# Patient Record
Sex: MALE | Race: BLACK OR AFRICAN AMERICAN | NOT HISPANIC OR LATINO | Employment: FULL TIME | URBAN - METROPOLITAN AREA
[De-identification: names, ages, dates, MRNs, and addresses within clinical notes are randomized per-mention and may not be internally consistent; named-entity substitution may affect disease eponyms.]

---

## 2019-12-16 ENCOUNTER — HOSPITAL ENCOUNTER (EMERGENCY)
Facility: HOSPITAL | Age: 44
Discharge: HOME | End: 2019-12-16
Attending: EMERGENCY MEDICINE | Admitting: EMERGENCY MEDICINE
Payer: COMMERCIAL

## 2019-12-16 VITALS
DIASTOLIC BLOOD PRESSURE: 79 MMHG | TEMPERATURE: 98.2 F | WEIGHT: 307 LBS | BODY MASS INDEX: 42.98 KG/M2 | HEART RATE: 95 BPM | OXYGEN SATURATION: 97 % | SYSTOLIC BLOOD PRESSURE: 149 MMHG | HEIGHT: 71 IN | RESPIRATION RATE: 18 BRPM

## 2019-12-16 DIAGNOSIS — S39.012A STRAIN OF LUMBAR REGION, INITIAL ENCOUNTER: Primary | ICD-10-CM

## 2019-12-16 PROCEDURE — 99283 EMERGENCY DEPT VISIT LOW MDM: CPT

## 2019-12-16 PROCEDURE — 99283 EMERGENCY DEPT VISIT LOW MDM: CPT | Performed by: EMERGENCY MEDICINE

## 2019-12-16 PROCEDURE — 96372 THER/PROPH/DIAG INJ SC/IM: CPT

## 2019-12-16 RX ORDER — ACETAMINOPHEN 325 MG/1
975 TABLET ORAL ONCE
Status: COMPLETED | OUTPATIENT
Start: 2019-12-16 | End: 2019-12-16

## 2019-12-16 RX ORDER — CARVEDILOL PHOSPHATE 20 MG/1
20 CAPSULE, EXTENDED RELEASE ORAL DAILY
COMMUNITY
End: 2020-08-13 | Stop reason: ALTCHOICE

## 2019-12-16 RX ORDER — GLIMEPIRIDE 2 MG/1
2 TABLET ORAL
COMMUNITY

## 2019-12-16 RX ORDER — KETOROLAC TROMETHAMINE 30 MG/ML
15 INJECTION, SOLUTION INTRAMUSCULAR; INTRAVENOUS ONCE
Status: COMPLETED | OUTPATIENT
Start: 2019-12-16 | End: 2019-12-16

## 2019-12-16 RX ORDER — ROSUVASTATIN CALCIUM 5 MG/1
5 TABLET, COATED ORAL DAILY
COMMUNITY

## 2019-12-16 RX ORDER — LIDOCAINE 50 MG/G
1 PATCH TOPICAL ONCE
Status: DISCONTINUED | OUTPATIENT
Start: 2019-12-16 | End: 2019-12-17 | Stop reason: HOSPADM

## 2019-12-16 RX ORDER — ICOSAPENT ETHYL 1000 MG/1
1 CAPSULE ORAL
COMMUNITY

## 2019-12-16 RX ORDER — NAPROXEN 500 MG/1
500 TABLET ORAL
Qty: 30 TABLET | Refills: 0 | Status: SHIPPED | OUTPATIENT
Start: 2019-12-16 | End: 2020-05-06 | Stop reason: HOSPADM

## 2019-12-16 RX ADMIN — KETOROLAC TROMETHAMINE 15 MG: 30 INJECTION, SOLUTION INTRAMUSCULAR at 21:10

## 2019-12-16 RX ADMIN — ACETAMINOPHEN 975 MG: 325 TABLET ORAL at 21:09

## 2019-12-16 RX ADMIN — LIDOCAINE 1 PATCH: 50 PATCH TOPICAL at 21:10

## 2019-12-17 ENCOUNTER — TELEPHONE (OUTPATIENT)
Dept: PHYSICAL THERAPY | Facility: OTHER | Age: 44
End: 2019-12-17

## 2019-12-17 ENCOUNTER — NURSE TRIAGE (OUTPATIENT)
Dept: PHYSICAL THERAPY | Facility: OTHER | Age: 44
End: 2019-12-17

## 2019-12-17 DIAGNOSIS — M54.50 ACUTE MIDLINE LOW BACK PAIN WITHOUT SCIATICA: Primary | ICD-10-CM

## 2019-12-17 NOTE — TELEPHONE ENCOUNTER
Message left for Pt to call us back  Our ph # and hours of business provided  Waiting for return call from Pt  This is the first attempt at reaching this Pt

## 2019-12-17 NOTE — ED ATTENDING ATTESTATION
12/16/2019  ITommy MD, saw and evaluated the patient  I have discussed the patient with the resident/non-physician practitioner and agree with the resident's/non-physician practitioner's findings, Plan of Care, and MDM as documented in the resident's/non-physician practitioner's note, except where noted  All available labs and Radiology studies were reviewed  I was present for key portions of any procedure(s) performed by the resident/non-physician practitioner and I was immediately available to provide assistance  At this point I agree with the current assessment done in the Emergency Department  I have conducted an independent evaluation of this patient a history and physical is as follows:    ED Course     51-year-old male, presenting to the emergency department for evaluation of low back pain  Patient works as a [employer] worker, 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, 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

## 2019-12-17 NOTE — TELEPHONE ENCOUNTER
Background - Initial Assessment  Clinical complaint: Acute low back, mid back and does not radiate  Pt stated that he does lift a lot of heavy objects at work  No known injury, no back/neck surgeries, and is not currently following any neck/back specialist  Pt did state that he is going to be seeing a Chiropractor  Pt was given Naproxen  Date of onset: 1 week  Frequency of pain: constant  Quality of pain: sharp and shooting    Protocols used: [facility] COMPREHENSIVE SPINE PROGRAM PROTOCOL    This nurse did review in detail the comp spine program and what we can provide for the pt for their back pain  Pt is agreeable to being triaged by this nurse and would like to have physical therapy  Referrals were placed to the following:  Physical Therapy at the [facility]  Pt was transferred to  to make evaluation appointment  No further questions voiced at this time and Pt did state understanding of the referral that was placed

## 2019-12-17 NOTE — ED PROVIDER NOTES
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 Taking? Empagliflozin-metFORMIN HCl (SYNJARDY) 12 5-1000 MG TABS   Yes Yes   Sig: Take 1 tablet by mouth 2 (two) times a day   Icosapent Ethyl (VASCEPA) 1 g CAPS   Yes Yes   Sig: Take 1 g by mouth   carvedilol (COREG CR) 20 MG 24 hr capsule   Yes Yes   Sig: Take 20 mg by mouth daily   glimepiride (AMARYL) 2 mg tablet   Yes Yes   Sig: Take 2 mg by mouth every morning before breakfast   rosuvastatin (CRESTOR) 5 mg tablet   Yes Yes   Sig: Take 5 mg by mouth daily      Facility-Administered Medications: None       History reviewed  No pertinent past medical history  No past surgical history on file  History reviewed  No pertinent family history  I have reviewed and agree with the history as documented  Social History     Tobacco Use    Smoking status: Not on file   Substance Use Topics    Alcohol use: Not on file    Drug use: Not on file        Review of Systems   Constitutional: Negative for chills, fever and unexpected weight change  HENT: Negative for congestion, sore throat and trouble swallowing  Eyes: Negative for pain, discharge and itching     Respiratory: Negative for cough, chest tightness, shortness of breath and wheezing  Cardiovascular: Negative for chest pain, palpitations and leg swelling  Gastrointestinal: Negative for abdominal pain, blood in stool, diarrhea, nausea and vomiting  Endocrine: Negative for polyuria  Genitourinary: Negative for difficulty urinating, dysuria, frequency and hematuria  Musculoskeletal: Positive for back pain  Negative for arthralgias  Skin: Negative for color change and rash  Neurological: Negative for dizziness, syncope, weakness, light-headedness and headaches  Physical Exam  ED Triage Vitals [12/16/19 2033]   Temperature Pulse Respirations Blood Pressure SpO2   98 2 °F (36 8 °C) 95 18 149/79 97 %      Temp Source Heart Rate Source Patient Position - Orthostatic VS BP Location FiO2 (%)   Oral Monitor Sitting Left arm --      Pain Score       Worst Possible Pain             Orthostatic Vital Signs  Vitals:    12/16/19 2033   BP: 149/79   Pulse: 95   Patient Position - Orthostatic VS: Sitting       Physical Exam   Constitutional: He is oriented to person, place, and time  He appears well-developed and well-nourished  No distress  HENT:   Head: Normocephalic and atraumatic  Right Ear: External ear normal    Left Ear: External ear normal    Eyes: Pupils are equal, round, and reactive to light  Conjunctivae and EOM are normal    Neck: Normal range of motion  Cardiovascular: Normal rate, regular rhythm, normal heart sounds and intact distal pulses  Exam reveals no gallop and no friction rub  No murmur heard  Pulmonary/Chest: Effort normal and breath sounds normal  No respiratory distress  He has no wheezes  He has no rales  Abdominal: Soft  Bowel sounds are normal  He exhibits no distension  There is no tenderness  There is no guarding  Musculoskeletal: Normal range of motion  He exhibits tenderness (Paraspinal lumbar back)  He exhibits no edema or deformity  Lymphadenopathy:     He has no cervical adenopathy  Neurological: He is alert and oriented to person, place, and time  No cranial nerve deficit or sensory deficit  He exhibits normal muscle tone  Skin: Skin is warm and dry  Psychiatric: He has a normal mood and affect  His behavior is normal    Nursing note and vitals reviewed  ED Medications  Medications   lidocaine (LIDODERM) 5 % patch 1 patch (1 patch Topical Medication Applied 12/16/19 2110)   ketorolac (TORADOL) injection 15 mg (15 mg Intramuscular Given 12/16/19 2110)   acetaminophen (TYLENOL) tablet 975 mg (975 mg Oral Given 12/16/19 2109)       Diagnostic Studies  Results Reviewed     None                 No orders to display         Procedures  Procedures      ED Course                               MDM  Number of Diagnoses or Management Options  Strain of lumbar region, initial encounter:   Diagnosis management comments: 59-year-old male presenting for paraspinal lumbar back pain  Works for [employer] and does a lot of heavy lifting and bending  No red flag symptoms  No focal neuro exam   Has paraspinal tenderness  Will give Toradol, Lidoderm patch, Tylenol  Patient prescribed naproxen for home  Given referral to comprehensive spine program   Patient encouraged to buy back brace for work  Disposition  Final diagnoses:   Strain of lumbar region, initial encounter     Time reflects when diagnosis was documented in both MDM as applicable and the Disposition within this note     Time User Action Codes Description Comment    12/16/2019  9:51 PM Carlos Eduardo Lopez Add [[id number]] Strain of lumbar region, initial encounter       ED Disposition     ED Disposition Condition Date/Time Comment    Discharge Stable Mon Dec 16, 2019  9:51 PM Kiah Cuadra discharge to home/self care              Follow-up Information     Follow up With Specialties Details Why Contact Ria Parada  Schedule an appointment as soon as possible for a visit  For follow up of symptoms [address] [place] [id number] Discharge Medication List as of 12/16/2019  9:52 PM      START taking these medications    Details   naproxen (NAPROSYN) 500 mg tablet Take 1 tablet (500 mg total) by mouth 3 (three) times a day with meals, Starting Mon 12/16/2019, Print         CONTINUE these medications which have NOT CHANGED    Details   carvedilol (COREG CR) 20 MG 24 hr capsule Take 20 mg by mouth daily, Historical Med      Empagliflozin-metFORMIN HCl (SYNJARDY) 12 5-1000 MG TABS Take 1 tablet by mouth 2 (two) times a day, Historical Med      glimepiride (AMARYL) 2 mg tablet Take 2 mg by mouth every morning before breakfast, Historical Med      Icosapent Ethyl (VASCEPA) 1 g CAPS Take 1 g by mouth, Historical Med      rosuvastatin (CRESTOR) 5 mg tablet Take 5 mg by mouth daily, Historical Med               ED Provider  Attending physically available and evaluated Jersey Ybarra I managed the patient along with the ED Attending      Electronically Signed by         Noah Gomez DO  12/16/19 5290

## 2019-12-18 ENCOUNTER — EVALUATION (OUTPATIENT)
Dept: PHYSICAL THERAPY | Facility: REHABILITATION | Age: 44
End: 2019-12-18
Payer: COMMERCIAL

## 2019-12-18 VITALS — HEART RATE: 86 BPM | TEMPERATURE: 98.5 F | DIASTOLIC BLOOD PRESSURE: 82 MMHG | SYSTOLIC BLOOD PRESSURE: 125 MMHG

## 2019-12-18 DIAGNOSIS — M54.50 ACUTE MIDLINE LOW BACK PAIN WITHOUT SCIATICA: Primary | ICD-10-CM

## 2019-12-18 PROCEDURE — 97140 MANUAL THERAPY 1/> REGIONS: CPT | Performed by: PHYSICAL THERAPIST

## 2019-12-18 PROCEDURE — 97161 PT EVAL LOW COMPLEX 20 MIN: CPT | Performed by: PHYSICAL THERAPIST

## 2019-12-18 NOTE — PROGRESS NOTES
PT Evaluation     Today's date: 2019  Patient name: Tea Conner  : 1975  MRN: [id number]  Referring provider: Lupis Sampson MD  Dx:   Encounter Diagnosis     ICD-10-CM    1  Acute midline low back pain without sciatica M54 5 Ambulatory referral to PT spine       Start Time: 1640  Stop Time: 1730  Total time in clinic (min): 50 minutes    Assessment  Assessment details: Tea Conner is a 40 y o  male presenting to outpatient physical therapy on 19 with referral from comprehensive spine program for acute midline upper LS and lower TS pain that is worse after work  Upon evaluation, Lennox Thrasher demonstrates impaired segmental mobility in upper LS and lower TS with pain upon AROM that also limits his AROM with ERP  The listed impairments and functional limitation are effecting Lennox Thrasher ability to function at prior level  They can continue to benefit from physical therapy services at this times in order to address the above discussed impairments and functional limitation in order to allow for a return to premorbid status     Post TS and LS mobilization pt  Reported pain free AROM  Educated on HEP and proper lifting mechanics at work with hip hinge and utilization of hips vs spine with push/pull  Pt  Low risk category     Impairments: abnormal muscle firing, abnormal muscle tone, abnormal or restricted ROM, abnormal movement, activity intolerance, impaired physical strength and pain with function  Understanding of Dx/Px/POC: good   Prognosis: good    Plan  Patient would benefit from: skilled PT  Planned modality interventions: thermotherapy: hydrocollator packs  Planned therapy interventions: joint mobilization, manual therapy, neuromuscular re-education, home exercise program, therapeutic exercise, therapeutic activities, strengthening, patient education and functional ROM exercises  Frequency: 2x week  Duration in weeks: 4  Treatment plan discussed with: patient        Subjective Evaluation    History of Present Illness  Mechanism of injury: Magali Casillas is a 40 y o  male presenting to physical therapy on 19 with referral from [facility] through the comprehensive spine program  He reports about 1 week ago his pain began of gradual onset as his work load has increased due to the holiday  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  He denies any radicular pain, denies and saddle numbness or bowl/bladder changes  Reports pain has been improving since taking medication  Pain is worse with bending and twisting  Pain is better with sleep and rest  Denies pain with cough and sneeze  Does have some pain at times with deep breathing  Sleeping well, denies pain  Reports a prior history of LBP 20 years ago but nothing since  States if his back gets sore we will usually just "pop" it and it would feel better  Recurrent probem    Quality of life: good    Pain  Current pain ratin  At worst pain rating: 10  Location: low back - mid line  Quality: dull ache and sharp      Diagnostic Tests  No diagnostic tests performed  Treatments  No previous or current treatments  Patient Goals  Patient goals for therapy: decreased pain      STG:  Pt  Will be independent with HEP  Pt  Will have pain free AROM  Pt  Will report 5/10 pain or less with activity  Pt  Will improve FOTO to > goal  Pt  Will report 2/10 pain or less with activity including work related activity      Objective    Posture: mild increase in LS lordosis  Myotomes (L/R): Normal LE  Dermatome: (pinprick- L/R): Normal LE     Reflexes:  (L/R) L3-4: 2+ B/l       S1:   2+ B/l            GAIT: normal  Squat assess: good form, pain free    Shoulder compression: normal    Lumbar  % of normal   Flex  Full -ERP   Extn   Full -ERP   SB Left Full -ERP   SB Right Full -ERP   ROT Left full   ROT Right full         MMT         AROM PROM    Hip       L       R        L           R      L     R   Flex  100 100   Extn  Abd  Add  IR      20 20   ER  50 50            G  Max         G  Med  Iliop               Neuro Dynamic Testing:  Slump test: L= neg    R=  neg     Straight leg raise:   L=   neg   R=  neg                 Segmental mobility:   LS= hypomobile upper LS L1-2    TS= Hypomobile T8-12                         Precautions: HTN, DM, HLD, CHF      Manual  12/18            Prone TLJ and lower TS PA G4/5            Upper LS gapping-R G4                                                       Exercise Diary              Supine TS/upper LS ext 3 spots  5x            qped thread needle                                                                                                                                                                                                                                                           Modalities

## 2019-12-23 ENCOUNTER — APPOINTMENT (OUTPATIENT)
Dept: PHYSICAL THERAPY | Facility: REHABILITATION | Age: 44
End: 2019-12-23
Payer: COMMERCIAL

## 2020-01-16 NOTE — PROGRESS NOTES
Gaurav Valnetin has attended a total of 1 physical therapy appointments to date  Patient was last treated on 12/18/2019 and has no remaining appointments scheduled  Goals, objective and subjective information unable to be updated at this time  Patient will be discharged from physical therapy secondary to noncompliance with plan of care

## 2020-02-07 ENCOUNTER — HOSPITAL ENCOUNTER (EMERGENCY)
Facility: HOSPITAL | Age: 45
Discharge: HOME | End: 2020-02-07
Attending: EMERGENCY MEDICINE | Admitting: EMERGENCY MEDICINE
Payer: COMMERCIAL

## 2020-02-07 ENCOUNTER — APPOINTMENT (EMERGENCY)
Dept: RADIOLOGY | Facility: HOSPITAL | Age: 45
End: 2020-02-07
Payer: COMMERCIAL

## 2020-02-07 VITALS
DIASTOLIC BLOOD PRESSURE: 76 MMHG | OXYGEN SATURATION: 96 % | BODY MASS INDEX: 42.28 KG/M2 | HEART RATE: 76 BPM | WEIGHT: 302 LBS | HEIGHT: 71 IN | RESPIRATION RATE: 16 BRPM | SYSTOLIC BLOOD PRESSURE: 133 MMHG | TEMPERATURE: 98.7 F

## 2020-02-07 DIAGNOSIS — R10.9 ABDOMINAL PAIN: Primary | ICD-10-CM

## 2020-02-07 DIAGNOSIS — R05.9 COUGH: ICD-10-CM

## 2020-02-07 DIAGNOSIS — R11.10 VOMITING: ICD-10-CM

## 2020-02-07 LAB
ALBUMIN SERPL BCP-MCNC: 3.5 G/DL (ref 3.5–5)
ALP SERPL-CCNC: 45 U/L (ref 46–116)
ALT SERPL W P-5'-P-CCNC: 36 U/L (ref 12–78)
ANION GAP SERPL CALCULATED.3IONS-SCNC: 5 MMOL/L (ref 4–13)
AST SERPL W P-5'-P-CCNC: 16 U/L (ref 5–45)
BASOPHILS # BLD AUTO: 0.04 THOUSANDS/ΜL (ref 0–0.1)
BASOPHILS NFR BLD AUTO: 1 % (ref 0–1)
BILIRUB SERPL-MCNC: 0.35 MG/DL (ref 0.2–1)
BUN SERPL-MCNC: 16 MG/DL (ref 5–25)
CALCIUM SERPL-MCNC: 9.2 MG/DL (ref 8.3–10.1)
CHLORIDE SERPL-SCNC: 109 MMOL/L (ref 100–108)
CO2 SERPL-SCNC: 28 MMOL/L (ref 21–32)
CREAT SERPL-MCNC: 1.02 MG/DL (ref 0.6–1.3)
EOSINOPHIL # BLD AUTO: 0.08 THOUSAND/ΜL (ref 0–0.61)
EOSINOPHIL NFR BLD AUTO: 1 % (ref 0–6)
ERYTHROCYTE [DISTWIDTH] IN BLOOD BY AUTOMATED COUNT: 12.5 % (ref 11.6–15.1)
GFR SERPL CREATININE-BSD FRML MDRD: 102 ML/MIN/1.73SQ M
GLUCOSE SERPL-MCNC: 187 MG/DL (ref 65–140)
HCT VFR BLD AUTO: 45.5 % (ref 36.5–49.3)
HGB BLD-MCNC: 14.9 G/DL (ref 12–17)
IMM GRANULOCYTES # BLD AUTO: 0.05 THOUSAND/UL (ref 0–0.2)
IMM GRANULOCYTES NFR BLD AUTO: 1 % (ref 0–2)
LIPASE SERPL-CCNC: 415 U/L (ref 73–393)
LYMPHOCYTES # BLD AUTO: 2.05 THOUSANDS/ΜL (ref 0.6–4.47)
LYMPHOCYTES NFR BLD AUTO: 28 % (ref 14–44)
MCH RBC QN AUTO: 28.3 PG (ref 26.8–34.3)
MCHC RBC AUTO-ENTMCNC: 32.7 G/DL (ref 31.4–37.4)
MCV RBC AUTO: 87 FL (ref 82–98)
MONOCYTES # BLD AUTO: 0.41 THOUSAND/ΜL (ref 0.17–1.22)
MONOCYTES NFR BLD AUTO: 6 % (ref 4–12)
NEUTROPHILS # BLD AUTO: 4.59 THOUSANDS/ΜL (ref 1.85–7.62)
NEUTS SEG NFR BLD AUTO: 63 % (ref 43–75)
NRBC BLD AUTO-RTO: 0 /100 WBCS
PLATELET # BLD AUTO: 203 THOUSANDS/UL (ref 149–390)
PMV BLD AUTO: 9.7 FL (ref 8.9–12.7)
POTASSIUM SERPL-SCNC: 4 MMOL/L (ref 3.5–5.3)
PROT SERPL-MCNC: 7.3 G/DL (ref 6.4–8.2)
RBC # BLD AUTO: 5.26 MILLION/UL (ref 3.88–5.62)
SODIUM SERPL-SCNC: 142 MMOL/L (ref 136–145)
WBC # BLD AUTO: 7.22 THOUSAND/UL (ref 4.31–10.16)

## 2020-02-07 PROCEDURE — 99284 EMERGENCY DEPT VISIT MOD MDM: CPT | Performed by: EMERGENCY MEDICINE

## 2020-02-07 PROCEDURE — 80053 COMPREHEN METABOLIC PANEL: CPT | Performed by: EMERGENCY MEDICINE

## 2020-02-07 PROCEDURE — 96361 HYDRATE IV INFUSION ADD-ON: CPT

## 2020-02-07 PROCEDURE — 96374 THER/PROPH/DIAG INJ IV PUSH: CPT

## 2020-02-07 PROCEDURE — 36415 COLL VENOUS BLD VENIPUNCTURE: CPT | Performed by: EMERGENCY MEDICINE

## 2020-02-07 PROCEDURE — 71046 X-RAY EXAM CHEST 2 VIEWS: CPT

## 2020-02-07 PROCEDURE — 85025 COMPLETE CBC W/AUTO DIFF WBC: CPT | Performed by: EMERGENCY MEDICINE

## 2020-02-07 PROCEDURE — 83690 ASSAY OF LIPASE: CPT | Performed by: EMERGENCY MEDICINE

## 2020-02-07 PROCEDURE — 99284 EMERGENCY DEPT VISIT MOD MDM: CPT

## 2020-02-07 PROCEDURE — 96375 TX/PRO/DX INJ NEW DRUG ADDON: CPT

## 2020-02-07 RX ORDER — SEMAGLUTIDE 1.34 MG/ML
1 INJECTION, SOLUTION SUBCUTANEOUS WEEKLY
COMMUNITY
Start: 2019-11-21

## 2020-02-07 RX ORDER — KETOROLAC TROMETHAMINE 30 MG/ML
15 INJECTION, SOLUTION INTRAMUSCULAR; INTRAVENOUS ONCE
Status: COMPLETED | OUTPATIENT
Start: 2020-02-07 | End: 2020-02-07

## 2020-02-07 RX ORDER — ONDANSETRON 4 MG/1
4 TABLET, FILM COATED ORAL EVERY 6 HOURS
Qty: 12 TABLET | Refills: 0 | Status: SHIPPED | OUTPATIENT
Start: 2020-02-07 | End: 2021-10-26 | Stop reason: HOSPADM

## 2020-02-07 RX ORDER — ONDANSETRON 2 MG/ML
4 INJECTION INTRAMUSCULAR; INTRAVENOUS ONCE
Status: COMPLETED | OUTPATIENT
Start: 2020-02-07 | End: 2020-02-07

## 2020-02-07 RX ORDER — INSULIN DEGLUDEC INJECTION 100 U/ML
70 INJECTION, SOLUTION SUBCUTANEOUS
COMMUNITY
Start: 2019-11-24

## 2020-02-07 RX ADMIN — KETOROLAC TROMETHAMINE 15 MG: 30 INJECTION, SOLUTION INTRAMUSCULAR at 04:28

## 2020-02-07 RX ADMIN — ONDANSETRON 4 MG: 2 INJECTION INTRAMUSCULAR; INTRAVENOUS at 04:28

## 2020-02-07 RX ADMIN — SODIUM CHLORIDE 1000 ML: 0.9 INJECTION, SOLUTION INTRAVENOUS at 03:24

## 2020-02-07 NOTE — ED ATTENDING ATTESTATION
2/7/2020  Anabell RANKIN Reynold, DO, saw and evaluated the patient  I have discussed the patient with the resident/non-physician practitioner and agree with the resident's/non-physician practitioner's findings, Plan of Care, and MDM as documented in the resident's/non-physician practitioner's note, except where noted  All available labs and Radiology studies were reviewed  I was present for key portions of any procedure(s) performed by the resident/non-physician practitioner and I was immediately available to provide assistance  At this point I agree with the current assessment done in the Emergency Department  I have conducted an independent evaluation of this patient a history and physical is as follows:    59-year-old male presents with abdominal pain and cough  Patient states he was at work and has some epigastric discomfort and a cough and then vomited after coughing  Patient states the pain improved in his belly  Admits that he has had the cough for a few days, nonproductive, no fevers or chills  Sick contact with a grandchild that had RSV  Denies chest pain  On exam-no acute distress, heart regular, lungs clear, abdomen soft and nontender    Plan-chest x-ray, check labs    ED Course         Critical Care Time  Procedures

## 2020-02-07 NOTE — ED PROVIDER NOTES
History  Chief Complaint   Patient presents with    Abdominal Pain     Pt states "I have a cough for a while, I went to work today and wasn't feelin too good  I vomitted water " Pt reports 3 episodes of emesis, pt report abdominal pain subsided after vomiting episodes  Patient presents for evaluation of abdominal pain  PMHx of diabetes  Complains of pain located in his epigastric area  Patient states that he has had a cough which is nonproductive for the last 5 or 6 days and then today at work he had a coughing fit after which he had the onset of epigastric abdominal pain and a couple episodes of nonbloody/nonbilious emesis  After that his pain had much improved    No exacerbating factors, no radiation of pain to back/chest/legs  Able to tolerate PO  Still passing gas/stools  No recent travel or sick contacts  Denies nausea/vomiting, constipation, diarrhea, dysuria, hematuria, melana, or dark colored stools  No pulsatile abdominal mass to suggest AAA  No Point RLQ tenderness to suggest appy  No pain out of proportion to suggest ischemic colitis  No reported chest pain, pleuritic chest pain or shortness of breath  No reported pulmonary symptoms  No tachypnea  No suprapubic or CVA tenderness  No fever/ruq pain/jaundice  Prior to Admission Medications   Prescriptions Last Dose Informant Patient Reported? Taking?    Empagliflozin-metFORMIN HCl (SYNJARDY) 12 5-1000 MG TABS   Yes Yes   Sig: Take 1 tablet by mouth 2 (two) times a day   Icosapent Ethyl (VASCEPA) 1 g CAPS   Yes Yes   Sig: Take 1 g by mouth   OZEMPIC, 1 MG/DOSE, 2 MG/1 5ML SOPN   Yes Yes   TRESIBA FLEXTOUCH 100 units/mL injection pen   Yes Yes   carvedilol (COREG CR) 20 MG 24 hr capsule   Yes Yes   Sig: Take 20 mg by mouth daily   glimepiride (AMARYL) 2 mg tablet   Yes Yes   Sig: Take 2 mg by mouth every morning before breakfast   naproxen (NAPROSYN) 500 mg tablet Not Taking at Unknown time  No No   Sig: Take 1 tablet (500 mg total) by mouth 3 (three) times a day with meals   Patient not taking: Reported on 2/7/2020   rosuvastatin (CRESTOR) 5 mg tablet   Yes Yes   Sig: Take 5 mg by mouth daily      Facility-Administered Medications: None       Past Medical History:   Diagnosis Date    Congest[facility] heart failure ([facility] )     Diabetes mellitus ([facility] )     Hyperlipidemia     Hypertension        History reviewed  No pertinent surgical history  Family History   Problem Relation Age of Onset    Stroke Mother      I have reviewed and agree with the history as documented  Social History     Tobacco Use    Smoking status: Never Smoker    Smokeless tobacco: Never Used   Substance Use Topics    Alcohol use: Not Currently    Drug use: Never        Review of Systems   Constitutional: Negative for activity change, chills, fatigue and fever  Respiratory: Positive for cough  Negative for shortness of breath  Cardiovascular: Negative for chest pain and palpitations  Gastrointestinal: Positive for abdominal pain, nausea and vomiting  Negative for abdominal distention, constipation and diarrhea  Genitourinary: Negative for dysuria and hematuria  Musculoskeletal: Negative for arthralgias, myalgias and neck pain  Neurological: Negative for dizziness, syncope, light-headedness and headaches  All other systems reviewed and are negative  Physical Exam  ED Triage Vitals [02/07/20 0230]   Temperature Pulse Respirations Blood Pressure SpO2   98 7 °F (37 1 °C) 97 16 128/81 97 %      Temp Source Heart Rate Source Patient Position - Orthostatic VS BP Location FiO2 (%)   Oral Monitor Sitting Right arm --      Pain Score       No Pain             Orthostatic Vital Signs  Vitals:    02/07/20 0230 02/07/20 0445   BP: 128/81 133/76   Pulse: 97 76   Patient Position - Orthostatic VS: Sitting Lying       Physical Exam   Constitutional: He is oriented to person, place, and time  He appears well-developed and well-nourished  No distress     HENT: Head: Normocephalic and atraumatic  Right Ear: External ear normal    Left Ear: External ear normal    Eyes: Pupils are equal, round, and reactive to light  Conjunctivae and EOM are normal  Right eye exhibits no discharge  Left eye exhibits no discharge  Neck: Normal range of motion  Neck supple  No JVD present  No tracheal deviation present  Cardiovascular: Normal rate, regular rhythm, normal heart sounds and intact distal pulses  Exam reveals no gallop and no friction rub  No murmur heard  Pulmonary/Chest: Effort normal and breath sounds normal  No respiratory distress  He has no wheezes  He has no rales  Abdominal: Soft  Normal appearance and bowel sounds are normal  He exhibits no distension and no mass  There is no tenderness  There is no guarding  Musculoskeletal: Normal range of motion  He exhibits no edema, tenderness or deformity  Neurological: He is alert and oriented to person, place, and time  No cranial nerve deficit or sensory deficit  He exhibits normal muscle tone  Coordination normal    Skin: He is not diaphoretic  Psychiatric: He has a normal mood and affect  His behavior is normal  Judgment and thought content normal    Vitals reviewed        ED Medications  Medications   sodium chloride 0 9 % bolus 1,000 mL (0 mL Intravenous Stopped 2/7/20 0428)   ketorolac (TORADOL) injection 15 mg (15 mg Intravenous Given 2/7/20 0428)   ondansetron (ZOFRAN) injection 4 mg (4 mg Intravenous Given 2/7/20 0428)       Diagnostic Studies  Results Reviewed     Procedure Component Value Units Date/Time    Comprehensive metabolic panel [[id number]]  (Abnormal) Collected:  02/07/20 0324    Lab Status:  Final result Specimen:  Blood from Arm, Left Updated:  02/07/20 0354     Sodium 142 mmol/L      Potassium 4 0 mmol/L      Chloride 109 mmol/L      CO2 28 mmol/L      ANION GAP 5 mmol/L      BUN 16 mg/dL      Creatinine 1 02 mg/dL      Glucose 187 mg/dL      Calcium 9 2 mg/dL      AST 16 U/L      ALT 36 U/L      Alkaline Phosphatase 45 U/L      Total Protein 7 3 g/dL      Albumin 3 5 g/dL      Total Bilirubin 0 35 mg/dL      eGFR 102 ml/min/1 73sq m     Narrative:       National Kid[employer] guidelines for Chronic Kidney Disease (CKD):     Stage 1 with normal or high GFR (GFR > 90 mL/min/1 73 square meters)    Stage 2 Mild CKD (GFR = 60-89 mL/min/1 73 square meters)    Stage 3A Moderate CKD (GFR = 45-59 mL/min/1 73 square meters)    Stage 3B Moderate CKD (GFR = 30-44 mL/min/1 73 square meters)    Stage 4 Severe CKD (GFR = 15-29 mL/min/1 73 square meters)    Stage 5 End Stage CKD (GFR <15 mL/min/1 73 square meters)  Note: GFR calculation is accurate only with a steady state creatinine    Lipase [[id number]]  (Abnormal) Collected:  02/07/20 0324    Lab Status:  Final result Specimen:  Blood from Arm, Left Updated:  02/07/20 0354     Lipase 415 u/L     CBC and differential [[id number]] Collected:  02/07/20 0324    Lab Status:  Final result Specimen:  Blood from Arm, Left Updated:  02/07/20 0335     WBC 7 22 Thousand/uL      RBC 5 26 Million/uL      Hemoglobin 14 9 g/dL      Hematocrit 45 5 %      MCV 87 fL      MCH 28 3 pg      MCHC 32 7 g/dL      RDW 12 5 %      MPV 9 7 fL      Platelets 321 Thousands/uL      nRBC 0 /100 WBCs      Neutrophils Relative 63 %      Immat GRANS % 1 %      Lymphocytes Relative 28 %      Monocytes Relative 6 %      Eosinophils Relative 1 %      Basophils Relative 1 %      Neutrophils Absolute 4 59 Thousands/µL      Immature Grans Absolute 0 05 Thousand/uL      Lymphocytes Absolute 2 05 Thousands/µL      Monocytes Absolute 0 41 Thousand/µL      Eosinophils Absolute 0 08 Thousand/µL      Basophils Absolute 0 04 Thousands/µL                  XR chest 2 views    (Results Pending)         Procedures  Procedures      ED Course                               MDM  Number of Diagnoses or Management Options  Abdominal pain:   Cough:   Vomiting:   Diagnosis management comments: Laboratory analysis was unremarkable  Chest x-ray showed no concern for pneumonia  Patient had no abdominal pain or additional episodes of nausea and vomiting while here in emergency department  Provided with a prescription for Zofran in case he developed nausea over the following days  Instructed follow-up with his PCP in the next 3-5 days symptoms do not improve  Disposition  Final diagnoses:   Abdominal pain   Cough   Vomiting     Time reflects when diagnosis was documented in both MDM as applicable and the Disposition within this note     Time User Action Codes Description Comment    2/7/2020  4:59 AM Esau Saucedo Add [R10 9] Abdominal pain     2/7/2020  4:59 AM Anaya Nahum Add [R05] Cough     2/7/2020  4:59 AM Anaya Nahum Add [R11 10] Vomiting       ED Disposition     ED Disposition Condition Date/Time Comment    Discharge Stable Fri Feb 7, 2020  4:59 AM Imagene Cea discharge to home/self care              Follow-up Information     Follow up With Specialties Details Why Contact Info Additional Information    [facility] Al[facility]    [facility] Emergency Department Emergency Medicine   [address] [place] [address], [address] [place] [address], [id number]   [phone]          Discharge Medication List as of 2/7/2020  5:00 AM      START taking these medications    Details   ondansetron (ZOFRAN) 4 mg tablet Take 1 tablet (4 mg total) by mouth every 6 (six) hours, Starting Fri 2/7/2020, Print         CONTINUE these medications which have NOT CHANGED    Details   carvedilol (COREG CR) 20 MG 24 hr capsule Take 20 mg by mouth daily, Historical Med      Empagliflozin-metFORMIN HCl (SYNJARDY) 12 5-1000 MG TABS Take 1 tablet by mouth 2 (two) times a day, Historical Med      glimepiride (AMARYL) 2 mg tablet Take 2 mg by mouth every morning before breakfast, Historical Med      Icosapent Ethyl (VASCEPA) 1 g CAPS Take 1 g by mouth, Historical Med      naproxen (NAPROSYN) 500 mg tablet Take 1 tablet (500 mg total) by mouth 3 (three) times a day with meals, Starting Mon 12/16/2019, Print      OZEMPIC, 1 MG/DOSE, 2 MG/1 5ML SOPN Starting Thu 11/21/2019, Historical Med      rosuvastatin (CRESTOR) 5 mg tablet Take 5 mg by mouth daily, Historical Med      TRESIBA FLEXTOUCH 100 units/mL injection pen Starting Sun 11/24/2019, Historical Med           No discharge procedures on file  ED Provider  Attending physically available and evaluated Gregorio Baez  MASSIEL managed the patient along with the ED Attending      Electronically Signed by         Priyank Simon MD  02/07/20 5399

## 2020-05-05 ENCOUNTER — APPOINTMENT (OUTPATIENT)
Dept: NON INVASIVE DIAGNOSTICS | Facility: HOSPITAL | Age: 45
End: 2020-05-05
Payer: COMMERCIAL

## 2020-05-05 ENCOUNTER — APPOINTMENT (EMERGENCY)
Dept: RADIOLOGY | Facility: HOSPITAL | Age: 45
End: 2020-05-05
Payer: COMMERCIAL

## 2020-05-05 ENCOUNTER — HOSPITAL ENCOUNTER (OUTPATIENT)
Facility: HOSPITAL | Age: 45
Setting detail: OBSERVATION
Discharge: HOME | End: 2020-05-06
Attending: EMERGENCY MEDICINE | Admitting: INTERNAL MEDICINE
Payer: COMMERCIAL

## 2020-05-05 DIAGNOSIS — Z95.5 HISTORY OF CORONARY ANGIOPLASTY WITH INSERTION OF STENT: ICD-10-CM

## 2020-05-05 DIAGNOSIS — R07.9 CHEST PAIN: Primary | ICD-10-CM

## 2020-05-05 DIAGNOSIS — E11.9 TYPE 2 DIABETES MELLITUS WITHOUT COMPLICATION, WITHOUT LONG-TERM CURRENT USE OF INSULIN (HCC): ICD-10-CM

## 2020-05-05 PROBLEM — E78.5 HYPERLIPIDEMIA: Status: ACTIVE | Noted: 2020-05-05

## 2020-05-05 PROBLEM — E78.49 OTHER HYPERLIPIDEMIA: Status: ACTIVE | Noted: 2020-05-05

## 2020-05-05 PROBLEM — I25.118 CORONARY ARTERY DISEASE OF NATIVE ARTERY OF NATIVE HEART WITH STABLE ANGINA PECTORIS (HCC): Status: ACTIVE | Noted: 2020-05-05

## 2020-05-05 PROBLEM — I10 HYPERTENSION: Status: ACTIVE | Noted: 2020-05-05

## 2020-05-05 LAB
ALBUMIN SERPL BCP-MCNC: 3.8 G/DL (ref 3.5–5)
ALP SERPL-CCNC: 52 U/L (ref 46–116)
ALT SERPL W P-5'-P-CCNC: 41 U/L (ref 12–78)
ANION GAP SERPL CALCULATED.3IONS-SCNC: 7 MMOL/L (ref 4–13)
ANION GAP SERPL CALCULATED.3IONS-SCNC: 8 MMOL/L (ref 4–13)
AST SERPL W P-5'-P-CCNC: 20 U/L (ref 5–45)
ATRIAL RATE: 98 BPM
BACTERIA UR QL AUTO: NORMAL /HPF
BASOPHILS # BLD AUTO: 0.04 THOUSANDS/ΜL (ref 0–0.1)
BASOPHILS # BLD AUTO: 0.04 THOUSANDS/ΜL (ref 0–0.1)
BASOPHILS NFR BLD AUTO: 1 % (ref 0–1)
BASOPHILS NFR BLD AUTO: 1 % (ref 0–1)
BILIRUB SERPL-MCNC: 0.36 MG/DL (ref 0.2–1)
BILIRUB UR QL STRIP: NEGATIVE
BUN SERPL-MCNC: 17 MG/DL (ref 5–25)
BUN SERPL-MCNC: 19 MG/DL (ref 5–25)
CALCIUM SERPL-MCNC: 8 MG/DL (ref 8.3–10.1)
CALCIUM SERPL-MCNC: 9.5 MG/DL (ref 8.3–10.1)
CHLORIDE SERPL-SCNC: 107 MMOL/L (ref 100–108)
CHLORIDE SERPL-SCNC: 113 MMOL/L (ref 100–108)
CHOLEST SERPL-MCNC: 122 MG/DL (ref 50–200)
CLARITY UR: CLEAR
CO2 SERPL-SCNC: 22 MMOL/L (ref 21–32)
CO2 SERPL-SCNC: 26 MMOL/L (ref 21–32)
COLOR UR: YELLOW
CREAT SERPL-MCNC: 0.78 MG/DL (ref 0.6–1.3)
CREAT SERPL-MCNC: 1.07 MG/DL (ref 0.6–1.3)
EOSINOPHIL # BLD AUTO: 0.11 THOUSAND/ΜL (ref 0–0.61)
EOSINOPHIL # BLD AUTO: 0.16 THOUSAND/ΜL (ref 0–0.61)
EOSINOPHIL NFR BLD AUTO: 2 % (ref 0–6)
EOSINOPHIL NFR BLD AUTO: 2 % (ref 0–6)
ERYTHROCYTE [DISTWIDTH] IN BLOOD BY AUTOMATED COUNT: 12.7 % (ref 11.6–15.1)
ERYTHROCYTE [DISTWIDTH] IN BLOOD BY AUTOMATED COUNT: 12.7 % (ref 11.6–15.1)
EST. AVERAGE GLUCOSE BLD GHB EST-MCNC: 263 MG/DL
GFR SERPL CREATININE-BSD FRML MDRD: 126 ML/MIN/1.73SQ M
GFR SERPL CREATININE-BSD FRML MDRD: 96 ML/MIN/1.73SQ M
GLUCOSE P FAST SERPL-MCNC: 179 MG/DL (ref 65–99)
GLUCOSE SERPL-MCNC: 113 MG/DL (ref 65–140)
GLUCOSE SERPL-MCNC: 113 MG/DL (ref 65–140)
GLUCOSE SERPL-MCNC: 162 MG/DL (ref 65–140)
GLUCOSE SERPL-MCNC: 179 MG/DL (ref 65–140)
GLUCOSE SERPL-MCNC: 228 MG/DL (ref 65–140)
GLUCOSE SERPL-MCNC: 94 MG/DL (ref 65–140)
GLUCOSE UR STRIP-MCNC: ABNORMAL MG/DL
HBA1C MFR BLD: 10.8 %
HCT VFR BLD AUTO: 45.9 % (ref 36.5–49.3)
HCT VFR BLD AUTO: 46.4 % (ref 36.5–49.3)
HDLC SERPL-MCNC: 26 MG/DL
HGB BLD-MCNC: 15.1 G/DL (ref 12–17)
HGB BLD-MCNC: 15.5 G/DL (ref 12–17)
HGB UR QL STRIP.AUTO: NEGATIVE
HYALINE CASTS #/AREA URNS LPF: NORMAL /LPF
IMM GRANULOCYTES # BLD AUTO: 0.04 THOUSAND/UL (ref 0–0.2)
IMM GRANULOCYTES # BLD AUTO: 0.04 THOUSAND/UL (ref 0–0.2)
IMM GRANULOCYTES NFR BLD AUTO: 1 % (ref 0–2)
IMM GRANULOCYTES NFR BLD AUTO: 1 % (ref 0–2)
KETONES UR STRIP-MCNC: ABNORMAL MG/DL
LDLC SERPL CALC-MCNC: 43 MG/DL (ref 0–100)
LEUKOCYTE ESTERASE UR QL STRIP: ABNORMAL
LYMPHOCYTES # BLD AUTO: 1.78 THOUSANDS/ΜL (ref 0.6–4.47)
LYMPHOCYTES # BLD AUTO: 2.15 THOUSANDS/ΜL (ref 0.6–4.47)
LYMPHOCYTES NFR BLD AUTO: 27 % (ref 14–44)
LYMPHOCYTES NFR BLD AUTO: 30 % (ref 14–44)
MAGNESIUM SERPL-MCNC: 1.7 MG/DL (ref 1.6–2.6)
MCH RBC QN AUTO: 28.3 PG (ref 26.8–34.3)
MCH RBC QN AUTO: 28.5 PG (ref 26.8–34.3)
MCHC RBC AUTO-ENTMCNC: 32.9 G/DL (ref 31.4–37.4)
MCHC RBC AUTO-ENTMCNC: 33.4 G/DL (ref 31.4–37.4)
MCV RBC AUTO: 86 FL (ref 82–98)
MCV RBC AUTO: 86 FL (ref 82–98)
MONOCYTES # BLD AUTO: 0.48 THOUSAND/ΜL (ref 0.17–1.22)
MONOCYTES # BLD AUTO: 0.62 THOUSAND/ΜL (ref 0.17–1.22)
MONOCYTES NFR BLD AUTO: 7 % (ref 4–12)
MONOCYTES NFR BLD AUTO: 9 % (ref 4–12)
NEUTROPHILS # BLD AUTO: 4.11 THOUSANDS/ΜL (ref 1.85–7.62)
NEUTROPHILS # BLD AUTO: 4.21 THOUSANDS/ΜL (ref 1.85–7.62)
NEUTS SEG NFR BLD AUTO: 57 % (ref 43–75)
NEUTS SEG NFR BLD AUTO: 62 % (ref 43–75)
NITRITE UR QL STRIP: NEGATIVE
NON-SQ EPI CELLS URNS QL MICRO: NORMAL /HPF
NRBC BLD AUTO-RTO: 0 /100 WBCS
NRBC BLD AUTO-RTO: 0 /100 WBCS
P AXIS: 51 DEGREES
PH UR STRIP.AUTO: 5.5 [PH]
PHOSPHATE SERPL-MCNC: 3.7 MG/DL (ref 2.7–4.5)
PLATELET # BLD AUTO: 183 THOUSANDS/UL (ref 149–390)
PLATELET # BLD AUTO: 194 THOUSANDS/UL (ref 149–390)
PMV BLD AUTO: 9.4 FL (ref 8.9–12.7)
PMV BLD AUTO: 9.7 FL (ref 8.9–12.7)
POTASSIUM SERPL-SCNC: 3.2 MMOL/L (ref 3.5–5.3)
POTASSIUM SERPL-SCNC: 3.9 MMOL/L (ref 3.5–5.3)
PR INTERVAL: 190 MS
PROT SERPL-MCNC: 7.8 G/DL (ref 6.4–8.2)
PROT UR STRIP-MCNC: NEGATIVE MG/DL
QRS AXIS: -30 DEGREES
QRSD INTERVAL: 118 MS
QT INTERVAL: 314 MS
QTC INTERVAL: 400 MS
RBC # BLD AUTO: 5.34 MILLION/UL (ref 3.88–5.62)
RBC # BLD AUTO: 5.43 MILLION/UL (ref 3.88–5.62)
RBC #/AREA URNS AUTO: NORMAL /HPF
SODIUM SERPL-SCNC: 140 MMOL/L (ref 136–145)
SODIUM SERPL-SCNC: 143 MMOL/L (ref 136–145)
SP GR UR STRIP.AUTO: 1.04 (ref 1–1.03)
T WAVE AXIS: 140 DEGREES
TRIGL SERPL-MCNC: 265 MG/DL
TROPONIN I SERPL-MCNC: <0.02 NG/ML
TSH SERPL DL<=0.05 MIU/L-ACNC: 1.52 UIU/ML (ref 0.36–3.74)
UROBILINOGEN UR QL STRIP.AUTO: 0.2 E.U./DL
VENTRICULAR RATE: 98 BPM
WBC # BLD AUTO: 6.66 THOUSAND/UL (ref 4.31–10.16)
WBC # BLD AUTO: 7.12 THOUSAND/UL (ref 4.31–10.16)
WBC #/AREA URNS AUTO: NORMAL /HPF

## 2020-05-05 PROCEDURE — 83036 HEMOGLOBIN GLYCOSYLATED A1C: CPT | Performed by: INTERNAL MEDICINE

## 2020-05-05 PROCEDURE — 93306 TTE W/DOPPLER COMPLETE: CPT | Performed by: INTERNAL MEDICINE

## 2020-05-05 PROCEDURE — 36415 COLL VENOUS BLD VENIPUNCTURE: CPT

## 2020-05-05 PROCEDURE — 81001 URINALYSIS AUTO W/SCOPE: CPT | Performed by: INTERNAL MEDICINE

## 2020-05-05 PROCEDURE — 99285 EMERGENCY DEPT VISIT HI MDM: CPT

## 2020-05-05 PROCEDURE — 80053 COMPREHEN METABOLIC PANEL: CPT | Performed by: EMERGENCY MEDICINE

## 2020-05-05 PROCEDURE — 99285 EMERGENCY DEPT VISIT HI MDM: CPT | Performed by: EMERGENCY MEDICINE

## 2020-05-05 PROCEDURE — 82948 REAGENT STRIP/BLOOD GLUCOSE: CPT

## 2020-05-05 PROCEDURE — 83735 ASSAY OF MAGNESIUM: CPT | Performed by: INTERNAL MEDICINE

## 2020-05-05 PROCEDURE — 84484 ASSAY OF TROPONIN QUANT: CPT | Performed by: INTERNAL MEDICINE

## 2020-05-05 PROCEDURE — 84484 ASSAY OF TROPONIN QUANT: CPT | Performed by: EMERGENCY MEDICINE

## 2020-05-05 PROCEDURE — 99220 PR INITIAL OBSERVATION CARE/DAY 70 MINUTES: CPT | Performed by: INTERNAL MEDICINE

## 2020-05-05 PROCEDURE — 93306 TTE W/DOPPLER COMPLETE: CPT

## 2020-05-05 PROCEDURE — 80061 LIPID PANEL: CPT | Performed by: NURSE PRACTITIONER

## 2020-05-05 PROCEDURE — 84443 ASSAY THYROID STIM HORMONE: CPT | Performed by: INTERNAL MEDICINE

## 2020-05-05 PROCEDURE — 84100 ASSAY OF PHOSPHORUS: CPT | Performed by: INTERNAL MEDICINE

## 2020-05-05 PROCEDURE — 85025 COMPLETE CBC W/AUTO DIFF WBC: CPT | Performed by: EMERGENCY MEDICINE

## 2020-05-05 PROCEDURE — 85025 COMPLETE CBC W/AUTO DIFF WBC: CPT | Performed by: INTERNAL MEDICINE

## 2020-05-05 PROCEDURE — 93005 ELECTROCARDIOGRAM TRACING: CPT

## 2020-05-05 PROCEDURE — 71045 X-RAY EXAM CHEST 1 VIEW: CPT

## 2020-05-05 PROCEDURE — 93010 ELECTROCARDIOGRAM REPORT: CPT | Performed by: INTERNAL MEDICINE

## 2020-05-05 PROCEDURE — 80048 BASIC METABOLIC PNL TOTAL CA: CPT | Performed by: INTERNAL MEDICINE

## 2020-05-05 PROCEDURE — 99244 OFF/OP CNSLTJ NEW/EST MOD 40: CPT | Performed by: INTERNAL MEDICINE

## 2020-05-05 RX ORDER — ONDANSETRON 2 MG/ML
4 INJECTION INTRAMUSCULAR; INTRAVENOUS EVERY 4 HOURS PRN
Status: DISCONTINUED | OUTPATIENT
Start: 2020-05-05 | End: 2020-05-06 | Stop reason: HOSPADM

## 2020-05-05 RX ORDER — CARVEDILOL 6.25 MG/1
6.25 TABLET ORAL 2 TIMES DAILY WITH MEALS
Status: DISCONTINUED | OUTPATIENT
Start: 2020-05-05 | End: 2020-05-06 | Stop reason: HOSPADM

## 2020-05-05 RX ORDER — CARVEDILOL 12.5 MG/1
12.5 TABLET ORAL 2 TIMES DAILY WITH MEALS
Status: DISCONTINUED | OUTPATIENT
Start: 2020-05-05 | End: 2020-05-05

## 2020-05-05 RX ORDER — ONDANSETRON 4 MG/1
4 TABLET, ORALLY DISINTEGRATING ORAL EVERY 6 HOURS PRN
Status: DISCONTINUED | OUTPATIENT
Start: 2020-05-05 | End: 2020-05-06 | Stop reason: HOSPADM

## 2020-05-05 RX ORDER — MAGNESIUM HYDROXIDE/ALUMINUM HYDROXICE/SIMETHICONE 120; 1200; 1200 MG/30ML; MG/30ML; MG/30ML
30 SUSPENSION ORAL EVERY 6 HOURS PRN
Status: DISCONTINUED | OUTPATIENT
Start: 2020-05-05 | End: 2020-05-06 | Stop reason: HOSPADM

## 2020-05-05 RX ORDER — GLIMEPIRIDE 2 MG/1
2 TABLET ORAL
Status: DISCONTINUED | OUTPATIENT
Start: 2020-05-05 | End: 2020-05-06 | Stop reason: HOSPADM

## 2020-05-05 RX ORDER — SENNOSIDES 8.6 MG
1 TABLET ORAL DAILY PRN
Status: DISCONTINUED | OUTPATIENT
Start: 2020-05-05 | End: 2020-05-06 | Stop reason: HOSPADM

## 2020-05-05 RX ORDER — PRAVASTATIN SODIUM 20 MG
40 TABLET ORAL
Status: DISCONTINUED | OUTPATIENT
Start: 2020-05-05 | End: 2020-05-06 | Stop reason: HOSPADM

## 2020-05-05 RX ORDER — ACETAMINOPHEN 325 MG/1
650 TABLET ORAL EVERY 4 HOURS PRN
Status: DISCONTINUED | OUTPATIENT
Start: 2020-05-05 | End: 2020-05-06 | Stop reason: HOSPADM

## 2020-05-05 RX ORDER — ASPIRIN 81 MG/1
81 TABLET, CHEWABLE ORAL DAILY
Status: DISCONTINUED | OUTPATIENT
Start: 2020-05-05 | End: 2020-05-06 | Stop reason: HOSPADM

## 2020-05-05 RX ORDER — OMEGA-3-ACID ETHYL ESTERS 1 G/1
2 CAPSULE, LIQUID FILLED ORAL DAILY
Status: DISCONTINUED | OUTPATIENT
Start: 2020-05-05 | End: 2020-05-05 | Stop reason: CLARIF

## 2020-05-05 RX ORDER — CHLORAL HYDRATE 500 MG
2000 CAPSULE ORAL DAILY
Status: DISCONTINUED | OUTPATIENT
Start: 2020-05-05 | End: 2020-05-06 | Stop reason: HOSPADM

## 2020-05-05 RX ADMIN — CARVEDILOL 6.25 MG: 6.25 TABLET, FILM COATED ORAL at 16:32

## 2020-05-05 RX ADMIN — ASPIRIN 81 MG 81 MG: 81 TABLET ORAL at 12:01

## 2020-05-05 RX ADMIN — CARVEDILOL 6.25 MG: 6.25 TABLET, FILM COATED ORAL at 07:14

## 2020-05-05 RX ADMIN — OMEGA-3 FATTY ACIDS CAP 1000 MG 2000 MG: 1000 CAP at 09:16

## 2020-05-05 RX ADMIN — PRAVASTATIN SODIUM 40 MG: 20 TABLET ORAL at 16:32

## 2020-05-05 RX ADMIN — ENOXAPARIN SODIUM 40 MG: 40 INJECTION SUBCUTANEOUS at 09:16

## 2020-05-05 RX ADMIN — GLIMEPIRIDE 2 MG: 2 TABLET ORAL at 07:14

## 2020-05-05 RX ADMIN — INSULIN LISPRO 2 UNITS: 100 INJECTION, SOLUTION INTRAVENOUS; SUBCUTANEOUS at 07:14

## 2020-05-06 ENCOUNTER — APPOINTMENT (OUTPATIENT)
Dept: RADIOLOGY | Facility: HOSPITAL | Age: 45
End: 2020-05-06
Payer: COMMERCIAL

## 2020-05-06 ENCOUNTER — APPOINTMENT (OUTPATIENT)
Dept: NON INVASIVE DIAGNOSTICS | Facility: HOSPITAL | Age: 45
End: 2020-05-06
Payer: COMMERCIAL

## 2020-05-06 VITALS
HEART RATE: 96 BPM | BODY MASS INDEX: 41.51 KG/M2 | TEMPERATURE: 98.5 F | RESPIRATION RATE: 18 BRPM | OXYGEN SATURATION: 96 % | HEIGHT: 71 IN | WEIGHT: 296.52 LBS | SYSTOLIC BLOOD PRESSURE: 123 MMHG | DIASTOLIC BLOOD PRESSURE: 81 MMHG

## 2020-05-06 PROBLEM — R07.9 CHEST PAIN IN ADULT: Status: RESOLVED | Noted: 2020-05-05 | Resolved: 2020-05-06

## 2020-05-06 LAB
ANION GAP SERPL CALCULATED.3IONS-SCNC: 5 MMOL/L (ref 4–13)
BASOPHILS # BLD AUTO: 0.04 THOUSANDS/ΜL (ref 0–0.1)
BASOPHILS NFR BLD AUTO: 1 % (ref 0–1)
BUN SERPL-MCNC: 15 MG/DL (ref 5–25)
CALCIUM SERPL-MCNC: 9.1 MG/DL (ref 8.3–10.1)
CHLORIDE SERPL-SCNC: 107 MMOL/L (ref 100–108)
CO2 SERPL-SCNC: 26 MMOL/L (ref 21–32)
CREAT SERPL-MCNC: 1.04 MG/DL (ref 0.6–1.3)
EOSINOPHIL # BLD AUTO: 0.16 THOUSAND/ΜL (ref 0–0.61)
EOSINOPHIL NFR BLD AUTO: 3 % (ref 0–6)
ERYTHROCYTE [DISTWIDTH] IN BLOOD BY AUTOMATED COUNT: 12.9 % (ref 11.6–15.1)
GFR SERPL CREATININE-BSD FRML MDRD: 100 ML/MIN/1.73SQ M
GLUCOSE P FAST SERPL-MCNC: 165 MG/DL (ref 65–99)
GLUCOSE SERPL-MCNC: 146 MG/DL (ref 65–140)
GLUCOSE SERPL-MCNC: 165 MG/DL (ref 65–140)
GLUCOSE SERPL-MCNC: 179 MG/DL (ref 65–140)
HCT VFR BLD AUTO: 48.2 % (ref 36.5–49.3)
HGB BLD-MCNC: 15.7 G/DL (ref 12–17)
IMM GRANULOCYTES # BLD AUTO: 0.03 THOUSAND/UL (ref 0–0.2)
IMM GRANULOCYTES NFR BLD AUTO: 1 % (ref 0–2)
LYMPHOCYTES # BLD AUTO: 1.81 THOUSANDS/ΜL (ref 0.6–4.47)
LYMPHOCYTES NFR BLD AUTO: 31 % (ref 14–44)
MCH RBC QN AUTO: 28.3 PG (ref 26.8–34.3)
MCHC RBC AUTO-ENTMCNC: 32.6 G/DL (ref 31.4–37.4)
MCV RBC AUTO: 87 FL (ref 82–98)
MONOCYTES # BLD AUTO: 0.55 THOUSAND/ΜL (ref 0.17–1.22)
MONOCYTES NFR BLD AUTO: 9 % (ref 4–12)
NEUTROPHILS # BLD AUTO: 3.31 THOUSANDS/ΜL (ref 1.85–7.62)
NEUTS SEG NFR BLD AUTO: 55 % (ref 43–75)
NRBC BLD AUTO-RTO: 0 /100 WBCS
PLATELET # BLD AUTO: 183 THOUSANDS/UL (ref 149–390)
PMV BLD AUTO: 9.5 FL (ref 8.9–12.7)
POTASSIUM SERPL-SCNC: 4.2 MMOL/L (ref 3.5–5.3)
RBC # BLD AUTO: 5.54 MILLION/UL (ref 3.88–5.62)
SODIUM SERPL-SCNC: 138 MMOL/L (ref 136–145)
WBC # BLD AUTO: 5.9 THOUSAND/UL (ref 4.31–10.16)

## 2020-05-06 PROCEDURE — 78452 HT MUSCLE IMAGE SPECT MULT: CPT | Performed by: INTERNAL MEDICINE

## 2020-05-06 PROCEDURE — A9502 TC99M TETROFOSMIN: HCPCS

## 2020-05-06 PROCEDURE — 80048 BASIC METABOLIC PNL TOTAL CA: CPT | Performed by: PHYSICIAN ASSISTANT

## 2020-05-06 PROCEDURE — 99214 OFFICE O/P EST MOD 30 MIN: CPT | Performed by: INTERNAL MEDICINE

## 2020-05-06 PROCEDURE — 93017 CV STRESS TEST TRACING ONLY: CPT

## 2020-05-06 PROCEDURE — 93016 CV STRESS TEST SUPVJ ONLY: CPT | Performed by: INTERNAL MEDICINE

## 2020-05-06 PROCEDURE — 99217 PR OBSERVATION CARE DISCHARGE MANAGEMENT: CPT | Performed by: PHYSICIAN ASSISTANT

## 2020-05-06 PROCEDURE — 78452 HT MUSCLE IMAGE SPECT MULT: CPT

## 2020-05-06 PROCEDURE — 85025 COMPLETE CBC W/AUTO DIFF WBC: CPT | Performed by: PHYSICIAN ASSISTANT

## 2020-05-06 PROCEDURE — 93018 CV STRESS TEST I&R ONLY: CPT | Performed by: INTERNAL MEDICINE

## 2020-05-06 PROCEDURE — 82948 REAGENT STRIP/BLOOD GLUCOSE: CPT

## 2020-05-06 RX ORDER — ASPIRIN 81 MG/1
81 TABLET ORAL DAILY
Qty: 30 TABLET | Refills: 0
Start: 2020-05-06

## 2020-05-06 RX ADMIN — ENOXAPARIN SODIUM 40 MG: 40 INJECTION SUBCUTANEOUS at 08:57

## 2020-05-06 RX ADMIN — CARVEDILOL 6.25 MG: 6.25 TABLET, FILM COATED ORAL at 08:57

## 2020-05-06 RX ADMIN — GLIMEPIRIDE 2 MG: 2 TABLET ORAL at 08:59

## 2020-05-06 RX ADMIN — INSULIN LISPRO 2 UNITS: 100 INJECTION, SOLUTION INTRAVENOUS; SUBCUTANEOUS at 11:30

## 2020-05-06 RX ADMIN — OMEGA-3 FATTY ACIDS CAP 1000 MG 2000 MG: 1000 CAP at 08:57

## 2020-05-06 RX ADMIN — ASPIRIN 81 MG 81 MG: 81 TABLET ORAL at 08:57

## 2020-05-07 LAB
CHEST PAIN STATEMENT: NORMAL
MAX DIASTOLIC BP: 80 MMHG
MAX HEART RATE: 148 BPM
MAX PREDICTED HEART RATE: 175 BPM
MAX. SYSTOLIC BP: 180 MMHG
PROTOCOL NAME: NORMAL
REASON FOR TERMINATION: NORMAL
TARGET HR FORMULA: NORMAL
TEST INDICATION: NORMAL
TIME IN EXERCISE PHASE: NORMAL

## 2020-05-12 ENCOUNTER — TELEPHONE (OUTPATIENT)
Dept: NON INVASIVE DIAGNOSTICS | Facility: HOSPITAL | Age: 45
End: 2020-05-12

## 2020-05-19 ENCOUNTER — HOSPITAL ENCOUNTER (OUTPATIENT)
Dept: RADIOLOGY | Facility: HOSPITAL | Age: 45
Discharge: HOME | End: 2020-05-19
Payer: COMMERCIAL

## 2020-05-19 DIAGNOSIS — R07.9 CHEST PAIN: ICD-10-CM

## 2020-05-19 PROCEDURE — A9585 GADOBUTROL INJECTION: HCPCS | Performed by: NURSE PRACTITIONER

## 2020-05-19 PROCEDURE — 75561 CARDIAC MRI FOR MORPH W/DYE: CPT

## 2020-05-19 RX ADMIN — GADOBUTROL 26 ML: 604.72 INJECTION INTRAVENOUS at 19:09

## 2020-06-10 ENCOUNTER — TELEMEDICINE (OUTPATIENT)
Dept: CARDIOLOGY CLINIC | Facility: CLINIC | Age: 45
End: 2020-06-10
Payer: COMMERCIAL

## 2020-06-10 DIAGNOSIS — I25.118 CORONARY ARTERY DISEASE OF NATIVE ARTERY OF NATIVE HEART WITH STABLE ANGINA PECTORIS (HCC): ICD-10-CM

## 2020-06-10 DIAGNOSIS — I10 ESSENTIAL HYPERTENSION: ICD-10-CM

## 2020-06-10 DIAGNOSIS — I42.2 HYPERTROPHIC CARDIOMYOPATHY (HCC): Primary | ICD-10-CM

## 2020-06-10 DIAGNOSIS — E78.49 OTHER HYPERLIPIDEMIA: ICD-10-CM

## 2020-06-10 PROCEDURE — 99214 OFFICE O/P EST MOD 30 MIN: CPT | Performed by: INTERNAL MEDICINE

## 2020-06-11 ENCOUNTER — TELEPHONE (OUTPATIENT)
Dept: CARDIOLOGY CLINIC | Facility: CLINIC | Age: 45
End: 2020-06-11

## 2020-08-04 ENCOUNTER — OFFICE VISIT (OUTPATIENT)
Dept: CARDIOLOGY CLINIC | Facility: CLINIC | Age: 45
End: 2020-08-04
Payer: COMMERCIAL

## 2020-08-04 VITALS
HEIGHT: 71 IN | WEIGHT: 305.8 LBS | SYSTOLIC BLOOD PRESSURE: 112 MMHG | OXYGEN SATURATION: 96 % | DIASTOLIC BLOOD PRESSURE: 82 MMHG | BODY MASS INDEX: 42.81 KG/M2 | HEART RATE: 93 BPM | TEMPERATURE: 96.8 F

## 2020-08-04 DIAGNOSIS — I42.2 HYPERTROPHIC CARDIOMYOPATHY (HCC): Primary | ICD-10-CM

## 2020-08-04 DIAGNOSIS — I25.118 CORONARY ARTERY DISEASE OF NATIVE ARTERY OF NATIVE HEART WITH STABLE ANGINA PECTORIS (HCC): ICD-10-CM

## 2020-08-04 DIAGNOSIS — I10 ESSENTIAL HYPERTENSION: ICD-10-CM

## 2020-08-04 PROBLEM — E66.9 OBESITY WITH SERIOUS COMORBIDITY: Status: ACTIVE | Noted: 2020-08-04

## 2020-08-04 PROCEDURE — 99245 OFF/OP CONSLTJ NEW/EST HI 55: CPT | Performed by: INTERNAL MEDICINE

## 2020-08-04 NOTE — PROGRESS NOTES
HCM Consultation - Cardiology   Apoorva Rahman 39 y o  male MRN: [id number]  Unit/Bed#:  Encounter: [id number]      Active problem list:    Patient Active Problem List    Diagnosis Date Noted    Hypertrophic cardiomyopathy ([place] [address] ) 06/10/2020     Priority: Low    Type 2 diabetes mellitus without complication, without long-term current use of insulin ([place] [address] ) 2020     Priority: Low    Essential hypertension 2020     Priority: Low    Other hyperlipidemia 2020     Priority: Low    Coronary artery disease of native artery of native heart with stable angina pectoris ([place] [address] ) 2020     Priority: Low    History of coronary angioplasty with insertion of stent 2020     Priority: Low     Plan: Mr Jorge Alberto Vizcarra has had no further chest pain since hospital discharge  He also denies shortness of breath, palpitation, dizziness, syncope or lower extremity swelling  He does snore and wife has noted that he does periodically stop breathing during sleep  His physical activity is limited but he does notice daytime sleepiness and takes hour long naps during the day  He works for [employer] and often has to lift heavy objects (up to 150 lbs)  His family history is relatively benign  His father  at age 39 when he was only 15year old but the circumstances of his death was unclear  There is no history of sudden death at a young age in his first degree relatives  His risk stratification is incomplete  I have scheduled him for an exercise stress echocardiogram (to evaluate for latent outflow tract obstruction) and a 48 hour ambulatory monitor for assessment of arrhythmias  He has agreed to have genetic testing done  This would be important to particularly exclude HCM phenocopies such as hTTR amyloidosis  I have advised Mr Jorge Alberto Vizcarra to adopt a heart healthy diet low in sodium, saturated fat and processed food  I have also discussed potential restrictions on lifting heavy weights   After completion of risk stratification, he would be advised to start daily exercises with the hope of improving cardiovascular conditioning  He states that he has been evaluated for sleep apnea several times (last time 4 years ago) that has been negative  He may need to be reassessed  He will also need evaluation by weight management  Physician Requesting Consult: Maricruz Roberts DO  Reason for Consult / Principal Problem: Hypertrophic cardiomyopathy    HPI: Mr Miko Basilio is a 39year old man with past medical history of hyperlipidemia, type II diabetes and hypertension who was recently diagnosed as having hypertrophic cardiomyopathy phenotype on a transthoracic echocardiogram  The study was performed during an admission in May 2020 for chest pain and showed marked asymmetric septal hypertrophy without left ventricular outflow tract obstruction  Apical views were suboptimal and provocative maneuvers were not performed to elicit any intracavitary or outflow tract gradients  He then underwent an exercise myocardial perfusion study that showed reduced exercise capacity, exaggerated heart rate response, normal blood pressure response and no ischemic changes or significant rhythm abnormality  A cardiac MRI performed on 2020 showed an anteroseptal wall thickness of 29 mm with a septal to inferolateral wall thickness ration of 1 8  The left atrium was mildly dilated and there was no evidence of late gadolinium enhancing lesions  He is currently on carvedilol 20 mg daily  Family history: Father  at age 39 for uncertain cause and the circumstances of his death is not known to the patient who was 15years old at the time  Mother  in  and had diabetes, hypertension and heart failure  He has 3 siblings  The older sister  of complications of drug use at age 32  Another sister is 52year old and has no known medical condition  A younger brother is 29years old and has no heart disease  Mr Lilly Rae has 2 daughters (22 and 16) who are healthy   There is no history of sudden cardiac death or hypertrophic cardiomyopathy in the family  Genetic testing: Initiated (8-4-2020)    Review of Systems: Mr Lavonne Morales has had no further chest pain since hospital discharge  He also denies shortness of breath, palpitation, dizziness, syncope or lower extremity swelling  He does snore and wife has noted that he does periodically stop breathing during sleep  Historical Information   Past Medical History:   Diagnosis Date    [facility]     Diabetes mellitus [facility] )     Hyperlipidemia     Hypertension     Obesity      No past surgical history on file  Family History   Problem Relation Age of Onset    Stroke Mother      Current Outpatient Medications on File Prior to Visit   Medication Sig Dispense Refill    aspirin (ECOTRIN LOW STRENGTH) 81 mg EC tablet Take 1 tablet (81 mg total) by mouth daily 30 tablet 0    carvedilol (COREG CR) 20 MG 24 hr capsule Take 20 mg by mouth daily      Empagliflozin-metFORMIN HCl (SYNJARDY) 12 5-1000 MG TABS Take 1 tablet by mouth 2 (two) times a day      glimepiride (AMARYL) 2 mg tablet Take 2 mg by mouth every morning before breakfast      Icosapent Ethyl (VASCEPA) 1 g CAPS Take 1 g by mouth      ondansetron (ZOFRAN) 4 mg tablet Take 1 tablet (4 mg total) by mouth every 6 (six) hours 12 tablet 0    OZEMPIC, 1 MG/DOSE, 2 MG/1 5ML SOPN       rosuvastatin (CRESTOR) 5 mg tablet Take 5 mg by mouth daily      TRESIBA FLEXTOUCH 100 units/mL injection pen        No current facility-administered medications on file prior to visit        No Known Allergies  Social History     Substance and Sexual Activity   Alcohol Use Not Currently     Social History     Substance and Sexual Activity   Drug Use Never     Social History     Tobacco Use   Smoking Status Never Smoker   Smokeless Tobacco Never Used     Objective   Vitals:   Vitals:    08/04/20 0902   BP: 112/82   BP Location: Right arm   Patient Position: Sitting   Cuff Size: Large Pulse: 93   Temp: (!) 96 8 °F (36 °C)   TempSrc: Temporal   SpO2: 96%   Weight: (!) 139 kg (305 lb 12 8 oz)   Height: 5' 11"   Body mass index is 42 65 kg/m²  Body surface area is 2 53 meters squared        Invasive Devices     Peripheral Intravenous Line            Peripheral IV 05/19/20 Right Antecubital 76 days              Physical Exam:  GEN: Joshua Ruano appears well, alert and oriented x 3, pleasant and cooperative   HEENT: pupils equal, round, and reactive to light; extraocular muscles intact  NECK: supple, no carotid bruits   HEART: regular rhythm, normal S1 and S2, +S4 gallop, no murmurs, clicks, or rubs   LUNGS: clear to auscultation bilaterally; no wheezes, rales, or rhonchi   ABDOMEN: normal bowel sounds, soft, no tenderness, no distention  EXTREMITIES: peripheral pulses normal; no clubbing, cyanosis, or edema  NEURO: no focal findings   SKIN: normal without suspicious lesions on exposed skin    Lab Results:   Labs:  Lab Results   Component Value Date    WBC 5 90 05/06/2020    RBC 5 54 05/06/2020    HGB 15 7 05/06/2020    HCT 48 2 05/06/2020    MCV 87 05/06/2020     05/06/2020    RDW 12 9 05/06/2020     Lab Results   Component Value Date    K 4 2 05/06/2020     05/06/2020    CO2 26 05/06/2020    BUN 15 05/06/2020    CREATININE 1 04 05/06/2020    EGFR 100 05/06/2020    CALCIUM 9 1 05/06/2020    AST 20 05/05/2020    ALT 41 05/05/2020    ALKPHOS 52 05/05/2020     Lab Results   Component Value Date    MG 1 7 05/05/2020     Lab Results   Component Value Date    HDL 26 (L) 05/05/2020    TRIG 265 (H) 05/05/2020    LDLCALC 43 05/05/2020     Lab Results   Component Value Date    MDF0WMXHXYUH 1 520 05/05/2020       Imaging:   I have personally reviewed pertinent films in PACS    Cardiac testing:   Results for orders placed during the hospital encounter of 05/05/20   Echo complete with contrast if indicated    Narrative [address]  [place], [place] [address]  [phone]    Transthoracic Echocardiogram  2D, M-mode, Doppler, and Color Doppler    Study date:  05-May-2020    Patient: Leonard Rosales  MR number: [id number]  Account number: [de-identified]  : 1975  Age: 39 years  Gender: Male  Status: Outpatient  Location: Bedside  Height: 71 in  Weight: 295 5 lb  BP: 140/ 82 mmHg    Indications: Exertional Chest Pain  Diagnoses: R07 9 - Chest pain, unspecified    Sonographer:  Avila Prince  Referring Physician:  BRIDGET Marley  Group:  [facility]  Cardiology Fellow: Merlin Fitch, MD  Interpreting Physician:  Roxana Haney MD    IMPRESSIONS:  There appears to be mild concentric hypertrophy with more moderately thickened septum  In the absence of hypertension, consider the possibility of Hypertrophic cardiomyopathy    SUMMARY    LEFT VENTRICLE:  Systolic function was hyperdynamic  Ejection fraction was estimated to be 75 %  There were no regional wall motion abnormalities  Wall thickness was mild-moderately increased  There was mild concentric hypertrophy  There was moderate assymetrical hypertrophy of the septum  There was dynamic obstruction at rest in the mid cavity, with a peak velocity of 200 cm/s  Doppler parameters were consistent with abnormal left ventricular relaxation (grade 1 diastolic dysfunction)  TRICUSPID VALVE:  There was trace regurgitation  HISTORY: PRIOR HISTORY: HTN;Dyslipidemia;morbid obesity;DM;CHF  PROCEDURE: The procedure was performed at the bedside  This was a routine study  The transthoracic approach was used  The study included complete 2D imaging, M-mode, complete spectral Doppler, and color Doppler  The heart rate was 87 bpm,  at the start of the study  Images were obtained from the parasternal, apical, subcostal, and suprasternal notch acoustic windows  Echocardiographic views were limited due to decreased penetration and lung interference   This was a  technically difficult study     LEFT VENTRICLE: Size was normal  Systolic function was hyperdynamic  Ejection fraction was estimated to be 75 %  There were no regional wall motion abnormalities  Wall thickness was mild-moderately increased  There was mild concentric  hypertrophy  There was moderate assymetrical hypertrophy of the septum  DOPPLER: There was dynamic obstruction at rest in the mid cavity, with a peak velocity of 200 cm/s  Doppler parameters were consistent with abnormal left ventricular  relaxation (grade 1 diastolic dysfunction)  RIGHT VENTRICLE: The size was normal  Systolic function was normal  Wall thickness was normal     LEFT ATRIUM: Size was normal     RIGHT ATRIUM: Size was normal     MITRAL VALVE: Valve structure was normal  There was normal leaflet separation  DOPPLER: The transmitral velocity was within the normal range  There was no evidence for stenosis  There was no regurgitation  AORTIC VALVE: The valve was trileaflet  Leaflets exhibited normal thickness and normal cuspal separation  DOPPLER: Transaortic velocity was within the normal range  There was no evidence for stenosis  There was no regurgitation  TRICUSPID VALVE: The valve structure was normal  There was normal leaflet separation  DOPPLER: The transtricuspid velocity was within the normal range  There was no evidence for stenosis  There was trace regurgitation  PULMONIC VALVE: Leaflets exhibited normal thickness, no calcification, and normal cuspal separation  DOPPLER: The transpulmonic velocity was within the normal range  There was trace regurgitation  PERICARDIUM: There was no pericardial effusion  The pericardium was normal in appearance  AORTA: The root exhibited normal size      SYSTEM MEASUREMENT TABLES    2D  %FS: 32 23 %  Ao Diam: 2 56 cm  EDV(Teich): 98 26 ml  EF(Cube): 68 87 %  EF(Teich): 60 49 %  ESV(Cube): 30 67 ml  ESV(Teich): 38 82 ml  IVSd: 1 71 cm  LA Area: 19 82 cm2  LA Diam: 4 97 cm  LVEDV MOD A4C: 145 82 ml  LVEF MOD A4C: 50 05 %  LVESV MOD A4C: 72 84 ml  LVIDd: 4 62 cm  LVIDs: 3 13 cm  LVLd A4C: 9 34 cm  LVLs A4C: 8 11 cm  LVPWd: 1 65 cm  RA Area: 14 38 cm2  SI(Cube): 27 25 ml/m2  SI(Teich): 23 87 ml/m2  SV MOD A4C: 72 98 ml  SV(Cube): 67 86 ml  SV(Teich): 59 44 ml  rv diam: 3 67 cm    CW  AV Env  Ti: 273 57 ms  AV VTI: 31 4 cm  AV Vmax: 1 55 m/s  AV Vmean: 1 15 m/s  AV maxP 63 mmHg  AV meanP 81 mmHg  MR VTI: 156 12 cm  MR Vmax: 3 8 m/s  MR Vmean: 3 25 m/s  MR maxP 84 mmHg  MR meanP 92 mmHg  PRend P 06 mmHg  PRend Vmax: 1 33 m/s  TR Vmax: 2 79 m/s  TR maxP 17 mmHg    MM  TAPSE: 2 5 cm    PW  E': 0 12 m/s  E/E': 5 62  LVOT Env  Ti: 273 57 ms  LVOT VTI: 30 55 cm  LVOT Vmax: 1 48 m/s  LVOT Vmean: 1 12 m/s  LVOT maxP 77 mmHg  LVOT meanP 43 mmHg  MV A Randell: 0 79 m/s  MV Dec Carson: 2 97 m/s2  MV DecT: 224 72 ms  MV E Randell: 0 67 m/s  MV E/A Ratio: 0 84    [facility] Accredited Echocardiography Laboratory    Prepared and electronically signed by    Markus Carlson MD  Signed [id number] 16:21:39         Results for orders placed during the hospital encounter of 20   NM Myocardial Perfusion Spect (Exercise Induced Stress and/or Rest)    Narrative [address]  [phone]    Stress Gated SPECT Myocardial Perfusion Imaging after Exercise    Patient: Pawan Diop  MR number: [id number]  Account number: [de-identified]  : 1975  Age: 39 years  Gender: Male  Status: Outpatient  Location: Stress lab  Height: 71 in  Weight: 196 lb  BP: 132/ 80 mmHg    Allergies: NO KNOWN ALLERGIES    Diagnosis: R07 89 - Other chest pain    Interpreting Physician:  Phylicia Nieves MD  Referring Physician:  BRIDGET Breen  Primary Physician:  Allan Collins  Technician:  Mayte Parks  RN:  Sage Wolf RN  Group:  [facility]  Cardiology Fellow:   Erlin Greer MD  Report Prepared by[de-identified]  Artemio Obrien RASHEED Malagon    INDICATIONS: Coronary artery disease  HISTORY: The patient is a 39year old  male  Chest pain status: chest pain  Coronary artery disease risk factors: dyslipidemia, hypertension, and diabetes mellitus  Cardiovascular history: congestive heart failure  Medications: a beta blocker, aspirin, and a lipid lowering agent  PHYSICAL EXAM: Baseline physical exam screening: normal and no wheezes audible  REST ECG: Normal sinus rhythm  The ECG showed occasional premature ventricular contractions  T wave inversions were observed in leads I, II, aVL, aVF, V5 and V6  Nonspecific ST abnormalities were present  PROCEDURE: The study was performed in the the Stress lab  Treadmill exercise testing was performed, using the Tin protocol  Gated SPECT myocardial perfusion imaging was performed during stress  Systolic blood pressure was 132 mmHg, at  the start of the study  Diastolic blood pressure was 80 mmHg, at the start of the study  The heart rate was 96 bpm, at the start of the study  IV double checked  TIN PROTOCOL:  HR bpm SBP mmHg DBP mmHg Symptoms  Baseline 96 132 80 none  Stage 1 134 142 80 moderate dyspnea, mild fatigue  Stage 2 139 178 88 moderate dyspnea, moderate fatigue  Immediate 139 180 80 subsiding  Recovery 1 105 146 80 subsiding  Recovery 2 103 134 80 none  No medications or fluids given  STRESS SUMMARY: Duration of exercise was 6 min and 0 sec  Functional capacity was decreased (greater than 40%)  Maximal heart rate during stress was 148 bpm ( 85 % of maximal predicted heart rate)  The heart rate response to stress was  normal  There was normal resting blood pressure with an appropriate response to stress  The rate-pressure product for the peak heart rate and blood pressure was 78115  There was no chest pain during stress  The stress test was terminated  due to achievement of maximal (symptom limited) exercise  Pre oxygen saturation: 95 %   Peak oxygen saturation: 98 %  The stress ECG was negative for ischemia and normal  Arrhythmia during stress: isolated atrial premature beats  ISOTOPE ADMINISTRATION:  Stress isotope administration Resting isotope administration  Agent Tetrofosmin Tetrofosmin  Dose 16 3 mCi 49 2 mCi  Date 05/06/2020 05/06/2020  Injection time 07:05 09:43  Injection-image interval 30 min 49 min    The radiopharmaceutical was injected one minute before the end of exercise  MYOCARDIAL PERFUSION IMAGING:  The image quality was fair  Rotating projection images reveal moderate diaphragmatic attenuation  The TID ratio was 1 2  PERFUSION DEFECTS:  -  No ischemia visualized  There was a small, moderately severe, fixed myocardial perfusion defect of the basal inferior wall likely due to diaphragm attenuation  The defect was worse on rest images  PERFUSION QUANTITATION:  The summed perfusion score measured 2 during stress  GATED SPECT:  Left ventricular ejection fraction was within normal limits by visual estimate  There was no left ventricular regional abnormality  SUMMARY:  -  Stress results: Duration of exercise was 6 min and 0 sec  Functional capacity was decreased (greater than 40%)  Target heart rate was achieved  There was no chest pain during stress  -  ECG conclusions: The stress ECG was negative for ischemia and normal  Arrhythmia during stress: isolated atrial premature beats  -  Perfusion imaging: No ischemia visualized  There was a small, moderately severe, fixed myocardial perfusion defect of the basal inferior wall likely due to diaphragm attenuation  The defect was worse on rest images  -  Gated SPECT: Left ventricular ejection fraction was within normal limits by visual estimate  There was no left ventricular regional abnormality  IMPRESSIONS: There was image artifact, without diagnostic evidence for perfusion abnormality   Left ventricular systolic function was normal     Prepared and signed by    Kanwal Belle MD  Signed 05/06/2020 12:51:16       MRI cardiac  w wo contrast   Status: Final result      39year old man for evaluation for hypertrophic cardiomyopathy      Technique:  1  3 plane SSFP localizers  2  SSFP cine imaging in long and short axis planes  3  T2 weighted DIR FSE in short axis plane  4  26 ml gadobutrol power injected  5  2D inversion recovery FGRE for delayed myocardial enhancement  6  The patient tolerated the procedure well without complication      Measurements:   [place]-septal wall 29 mm  Postero-lateral wall 16 mm  Left ventricular end-diastolic dimension 44 mm  Left ventricular end-systolic dimension 21 mm  Left ventricular end-diastolic volume 082 ml  Left ventricular end-systolic volume  37 ml  Stroke volume 90 ml  Cardiac Output 8 5 L/min  Ejection fraction 71 %  Left atrium 42 mm  Aortic Root 32 mm     Findings:    1  The left ventricle is normal in size with severe anteroseptal hypertrophy with moderate hypertrophy of the remaining myocardial segments  Left ventricular systolic function is normal with a measured ejection fraction of 71%  There is near cavity   obliteration with systole  No noted left ventricular wall motion abnormalities  2  The right ventricle is normal in size with normal right ventricular systolic function  3  The aortic, mitral, and tricuspid valves open without restriction  There is no significant valvular regurgitation, however cine MRI is inaccurate in the qualitative assessment of valvular regurgitation  4  The left atrium is mildly dilated  The aortic root is normal in size  5  There is no evidence of myocardial edema  6  Delayed post-gadolinium imaging demonstrates no region of hyperenhancement      IMPRESSION:  Impression:  1  Severe anteroseptal hypertrophy with normal left ventricular systolic function  2  Normal right ventricular size and systolic function  3  No significant valvular abnormalities    4  No evidence of myocardial scarring, inflammation, or infiltrative disease  5  These findings are suggestive of asymmetric hypertrophic cardiomyopathy, but there currently is no evidence of myocardial fibrosis          Counseling / Coordination of Care  Total floor / unit time spent today 60 minutes  Greater than 50% of total time was spent with the patient and / or family counseling and / or coordination of care

## 2020-08-07 ENCOUNTER — DOCUMENTATION (OUTPATIENT)
Dept: CARDIOLOGY CLINIC | Facility: CLINIC | Age: 45
End: 2020-08-07

## 2020-08-10 ENCOUNTER — HOSPITAL ENCOUNTER (OUTPATIENT)
Dept: NON INVASIVE DIAGNOSTICS | Facility: HOSPITAL | Age: 45
Discharge: HOME | End: 2020-08-10
Payer: COMMERCIAL

## 2020-08-10 DIAGNOSIS — I42.2 HYPERTROPHIC CARDIOMYOPATHY (HCC): ICD-10-CM

## 2020-08-10 LAB
CHEST PAIN STATEMENT: NORMAL
MAX DIASTOLIC BP: 64 MMHG
MAX HEART RATE: 136 BPM
MAX PREDICTED HEART RATE: 175 BPM
MAX. SYSTOLIC BP: 156 MMHG
PROTOCOL NAME: NORMAL
REASON FOR TERMINATION: NORMAL
TARGET HR FORMULA: NORMAL
TEST INDICATION: NORMAL
TIME IN EXERCISE PHASE: NORMAL

## 2020-08-10 PROCEDURE — 93225 XTRNL ECG REC<48 HRS REC: CPT

## 2020-08-10 PROCEDURE — 93350 STRESS TTE ONLY: CPT

## 2020-08-10 PROCEDURE — 93226 XTRNL ECG REC<48 HR SCAN A/R: CPT

## 2020-08-10 PROCEDURE — 93351 STRESS TTE COMPLETE: CPT | Performed by: INTERNAL MEDICINE

## 2020-08-13 DIAGNOSIS — I42.2 HYPERTROPHIC CARDIOMYOPATHY (HCC): Primary | ICD-10-CM

## 2020-08-13 PROCEDURE — 93227 XTRNL ECG REC<48 HR R&I: CPT | Performed by: INTERNAL MEDICINE

## 2020-08-13 RX ORDER — METOPROLOL SUCCINATE 50 MG/1
50 TABLET, EXTENDED RELEASE ORAL 2 TIMES DAILY
Qty: 180 TABLET | Refills: 1 | Status: SHIPPED | OUTPATIENT
Start: 2020-08-13 | End: 2020-08-13 | Stop reason: SDUPTHER

## 2020-08-13 RX ORDER — METOPROLOL SUCCINATE 50 MG/1
50 TABLET, EXTENDED RELEASE ORAL 2 TIMES DAILY
Qty: 180 TABLET | Refills: 1 | Status: SHIPPED | OUTPATIENT
Start: 2020-08-13 | End: 2020-12-28

## 2020-08-25 ENCOUNTER — DOCUMENTATION (OUTPATIENT)
Dept: CARDIOLOGY CLINIC | Facility: CLINIC | Age: 45
End: 2020-08-25

## 2020-09-15 ENCOUNTER — OFFICE VISIT (OUTPATIENT)
Dept: CARDIOLOGY CLINIC | Facility: CLINIC | Age: 45
End: 2020-09-15
Payer: COMMERCIAL

## 2020-09-15 VITALS
BODY MASS INDEX: 42.6 KG/M2 | DIASTOLIC BLOOD PRESSURE: 80 MMHG | HEART RATE: 97 BPM | HEIGHT: 71 IN | TEMPERATURE: 96.4 F | WEIGHT: 304.3 LBS | SYSTOLIC BLOOD PRESSURE: 130 MMHG

## 2020-09-15 DIAGNOSIS — I42.2 HYPERTROPHIC CARDIOMYOPATHY (HCC): Primary | ICD-10-CM

## 2020-09-15 DIAGNOSIS — I10 ESSENTIAL HYPERTENSION: ICD-10-CM

## 2020-09-15 PROCEDURE — 99215 OFFICE O/P EST HI 40 MIN: CPT | Performed by: INTERNAL MEDICINE

## 2020-09-15 NOTE — PROGRESS NOTES
HCM Clinic Follow Up Visit - Cardiology   Jory Hogan 39 y o  male MRN: [id number]  Unit/Bed#:  Encounter: [id number]      Active problem list:    Patient Active Problem List    Diagnosis Date Noted    Obesity with serious comorbidity 08/04/2020     Priority: High    Hypertrophic cardiomyopathy ([facility] ) 06/10/2020     Priority: Low    Type 2 diabetes mellitus without complication, without long-term current use of insulin ([place] [address] ) 05/05/2020     Priority: Low    Essential hypertension 05/05/2020     Priority: Low    Other hyperlipidemia 05/05/2020     Priority: Low    Coronary artery disease of native artery of native heart with stable angina pectoris ([place] [address] ) 05/05/2020     Priority: Low    History of coronary angioplasty with insertion of stent 05/05/2020     Priority: Low       Plan: Mr Janett Quintanilla has been doing well on his current medications  He denies chest pain, shortness of breath, palpitation, dizziness, syncope or lower extremity swelling  He does snore and wife has noted that he does periodically stop breathing during sleep  He has not returned to work and is looking for partial disability due to the requirement for lifting heavy objects at work ([employer])  He is now referred to nutritionist and sleep medicine for further evaluation as weight management and correction of nocturnal hypoxemia would be essential for favorable outcomes  He is motivated to achieve both goals  His genetic test results were received and 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  His two daughters and 2 siblings can now undergo targeted gene testing  His cardiac MRI results were reviewed with him      Physician Requesting Consult: Gracy Schafer DO  Reason for Consult / Principal Problem: Hypertrophic cardiomyopathy     HPI: Mr Jory Hogan is a 39year old man with past medical history of hyperlipidemia, type II diabetes and hypertension who was recently diagnosed as having hypertrophic cardiomyopathy phenotype on a transthoracic echocardiogram  The study was performed during an admission in May 2020 for chest pain and showed marked asymmetric septal hypertrophy without left ventricular outflow tract obstruction  Apical views were suboptimal and provocative maneuvers were not performed to elicit any intracavitary or outflow tract gradients  He then underwent an exercise myocardial perfusion study that showed reduced exercise capacity, exaggerated heart rate response, normal blood pressure response and no ischemic changes or significant rhythm abnormality  A cardiac MRI performed on 2020 showed an anteroseptal wall thickness of 29 mm with a septal to inferolateral wall thickness ration of 1 8  The left atrium was mildly dilated and there was no evidence of late gadolinium enhancing lesions  He is currently on carvedilol 20 mg daily  On 2020: Mr Edilberto Willoughby has had no further chest pain since hospital discharge  He also denies shortness of breath, palpitation, dizziness, syncope or lower extremity swelling  He does snore and wife has noted that he does periodically stop breathing during sleep  His physical activity is limited but he does notice daytime sleepiness and takes hour long naps during the day  He works for [employer] and often has to lift heavy objects (up to 150 lbs)  His family history is relatively benign  His father  at age 39 when he was only 15year old but the circumstances of his death was unclear  There is no history of sudden death at a young age in his first degree relatives  His risk stratification is incomplete  I have scheduled him for an exercise stress echocardiogram (to evaluate for latent outflow tract obstruction) and a 48 hour ambulatory monitor for assessment of arrhythmias  He has agreed to have genetic testing done  This would be important to particularly exclude HCM phenocopies such as hTTR amyloidosis   I have advised Mr Edilberto Willoughby to adopt a heart healthy diet low in sodium, saturated fat and processed food  I have also discussed potential restrictions on lifting heavy weights  After completion of risk stratification, he would be advised to start daily exercises with the hope of improving cardiovascular conditioning  He states that he has been evaluated for sleep apnea several times (last time 4 years ago) that has been negative  He may need to be reassessed  He will also need evaluation by weight management       Family history: Father  at age 39 for uncertain cause and the circumstances of his death is not known to the patient who was 15years old at the time  Mother  in 2019 and had diabetes, hypertension and heart failure  He has 3 siblings  The older sister  of complications of drug use at age 32  Another sister is 52year old and has no known medical condition  A younger brother is 29years old and has no heart disease  Mr Paula Caballero has 2 daughters (Ramsey Andres 3- and Everett Villa 2003) who are healthy  There is no history of sudden cardiac death or hypertrophic cardiomyopathy in the family      Genetic testing Slick Brito 2020): 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      Review of Systems: He denies chest pain, shortness of breath, palpitation, dizziness, syncope or lower extremity swelling  He does snore and wife has noted that he does periodically stop breathing during sleep  Historical Information   Past Medical History:   Diagnosis Date    Congest[facility] failure [facility] )     Diabetes mellitus ([facility] )     Hyperlipidemia     Hypertension     Obesity      No past surgical history on file    Social History     Substance and Sexual Activity   Alcohol Use Not Currently     Social History     Substance and Sexual Activity   Drug Use Never     Social History     Tobacco Use   Smoking Status Never Smoker   Smokeless Tobacco Never Used     Meds/Allergies   all current active meds have been reviewed  No Known Allergies     Current Outpatient Medications on File Prior to Visit   Medication Sig Dispense Refill    aspirin (ECOTRIN LOW STRENGTH) 81 mg EC tablet Take 1 tablet (81 mg total) by mouth daily 30 tablet 0    Empagliflozin-metFORMIN HCl (SYNJARDY) 12 5-1000 MG TABS Take 1 tablet by mouth 2 (two) times a day      glimepiride (AMARYL) 2 mg tablet Take 2 mg by mouth every morning before breakfast      Icosapent Ethyl (VASCEPA) 1 g CAPS Take 1 g by mouth      metoprolol succinate (TOPROL-XL) 50 mg 24 hr tablet Take 1 tablet (50 mg total) by mouth 2 (two) times a day 180 tablet 1    OZEMPIC, 1 MG/DOSE, 2 MG/1 5ML SOPN       rosuvastatin (CRESTOR) 5 mg tablet Take 5 mg by mouth daily      TRESIBA FLEXTOUCH 100 units/mL injection pen       ondansetron (ZOFRAN) 4 mg tablet Take 1 tablet (4 mg total) by mouth every 6 (six) hours (Patient not taking: Reported on 8/4/2020) 12 tablet 0     No current facility-administered medications on file prior to visit  Objective   Vitals:   Vitals:    09/15/20 1246   BP: 130/80   Pulse: 97   Temp: (!) 96 4 °F (35 8 °C)   Body surface area is 2 52 meters squared  Body mass index is 42 44 kg/m²  Invasive Devices     Peripheral Intravenous Line            Peripheral IV 05/19/20 Right Antecubital 118 days              Physical Exam:  GEN: Latasha Drafts appears well, alert and oriented x 3, pleasant and cooperative   HEENT: pupils equal, round, and reactive to light; extraocular muscles intact  NECK: supple, no carotid bruits   HEART: regular rhythm, normal S1 and S2, + S4 gallop, no murmurs, clicks, or rubs   LUNGS: clear to auscultation bilaterally; no wheezes, rales, or rhonchi   ABDOMEN: normal bowel sounds, soft, no tenderness, no distention  EXTREMITIES: peripheral pulses normal; no clubbing, cyanosis, or edema  NEURO: no focal findings   SKIN: normal without suspicious lesions on exposed skin    Lab Results:   No visits with results within 1 Day(s) from this visit     Latest known visit with results is:   Hospital Outpatient Visit on 08/10/2020   Component Date Value    Protocol Name 08/10/2020 Darylene Prows Time In Exercise Phase 08/10/2020 00:06:00     MAX  SYSTOLIC BP  512     Max Diastolic Bp  64     Max Heart Rate 08/10/2020 136     Max Predicted Heart Rate 08/10/2020 175     Reason for Termination 08/10/2020 Fatigue     Test Indication 08/10/2020 Ischemia Evaluation     Target Hr Formular 08/10/2020 (220 - Age)*100%     Chest Pain Statement 08/10/2020 none        Imaging: I have personally reviewed pertinent reports  Holter monitor - 48 hour           PT NAME: Alivia Luna  : 1975                      AGE: 39 y o  GENDER: male  MRN: [id number]                           PROCEDURE: Holter monitor - 48 hour        INDICATIONS(S): HCM     DESCRIPTION OF FINDINGS:     The patient was monitored for a total of 48 hours  The patient was predominantly in normal sinus rhythm throughout the study  The average heart rate was 96 beats per minute  The heart rate ranged from a low of 74 beats per minute in sinus rhythm at 7:41 AM to a maximum of 126 beats per minute in sinus tachycardia at 7:47 PM      Ventricular ectopic activity consisted of 1127 beats (<1% of total beats), of which, 992 were single PVCs, 6 were in bigeminy  There was no sustained or nonsustained ventricular tachycardia      Supraventricular ectopic activity consisted of 4 beats (<1% of total beats), of which, all were single PACs  There was no supraventricular tachycardia identified  There was no evidence of atrial fibrillation or atrial flutter      The patient was in tachycardia for 16 hours and 3 minutes  This was predominantly in the evening and night time hours  There were no significant pauses  The longest R-R interval was 1 2 seconds at 6:07 AM   There was no evidence of advanced degree heart block      A symptom diary was not returned with the Holter Monitor   Therefore, a correlation between symptoms and rhythm tracings could not be made          IMPRESSION:     1  Predominantly normal sinus rhythm with an average heart rate of 96 beats per minute  The patient was in low grade sinus tachycardia for 16 hours out of 48 hours observed  This was predominantly in the evening and night time hours  2  There were rare premature atrial contractions  3  There was a low burden (<1%) of premature ventricular contractions  4  There were no significant pauses or advanced degree heart block       Cardiology Fellow: Ken Guido MD  Attending Cardiologist: Belinda Snow MD     Echo stress test w contrast if indicated     [facility] [address]  [facility] [place] [address] [facility]  [phone]     Exercise Stress Echocardiography     Study date:  10-Aug-2020     Patient: Dustin Javier  MR number: [id number]  Account number: [de-identified]  : 1975  Age: 39 years  Gender: Male  Study date: 10-Aug-2020  Status: Outpatient  Location: Stress lab  Height: 71 in  Weight: 305 lb  BP: 112// 78 mmHg     Indications: Assessment of cardiomyopathy      Diagnosis: I42 9 - Cardiomyopathy, unspecified     Sonographer:  UGO Figueroa  Interpreting Physician:  Beilnda Snow MD  Primary Physician:  Dunia Waterman  Referring Physician:  Belinda Snow MD  Group:  [facility]  Cardiology Fellow:  Jovanni Kwon DO  RN:  Maciel Coreas RN  Report Prepared By:  Maciel Coreas RN     IMPRESSIONS:  Negative exercise stress echocardiogram for myocardial ischemia at submaximal cardiac workload  Exercise capacity was reduced  Blood pressure response to exercise was appropriate  There was no significant rhythm abnormality at rest or  during exercise  There was no significant left ventricular intracavitary gradient at rest, with provocation (Valsalva and standing) or following exercise   Left ventricular systolic function was normal  Diagnostic sensitivity was limited by  submaximal stress      SUMMARY     STRESS RESULTS:  Duration of exercise was 6 min and 0 sec  Maximal work rate was 7 METs  Maximal heart rate during stress was 136 bpm ( 77 % of maximal predicted heart rate)  Target heart rate was not achieved  There was no chest pain during stress  Resting O2 sat 98%, peak stress O2 sat 97%      ECG CONCLUSIONS:  The stress ECG was negative for ischemia      BASELINE:  Estimated left ventricular ejection fraction was 75 %       OTHER ECHO FINDINGS:  Left ventricular wall thickness was markedly increased      HISTORY: The patient is a 39year old  male  Chest pain status: recent onset chest pain  Coronary artery disease risk factors: dyslipidemia, hypertension, and diabetes mellitus  Cardiovascular history: coronary artery  disease  Prior cardiovascular procedures: percutaneous transcoronary angioplasty  Co-morbidity: obesity  Medications: a beta blocker, aspirin, a lipid lowering agent, and diabetic medications      PHYSICAL EXAM: Baseline physical exam screening: normal and no wheezes audible      REST ECG: Normal sinus rhythm  Left axis deviation  The ECG showed incomplete right bundle branch block  There was biatrial enlargement  T wave inversions were observed in leads I, aVL, II, V2, V4, V5, V6      PROCEDURE: The study was performed at the Stress lab  Treadmill exercise testing was performed, using the Tin protocol  Stress and rest echocardiographic evaluation with 2D imaging, spectral Doppler, and color Doppler was performed from  multiple acoustic windows for evaluation of ventricular function      TIN PROTOCOL:  HR bpm SBP mmHg DBP mmHg Symptoms  Baseline 96 112 78 none  Stage 1 126 128 74 none  Stage 2 136 152 86 moderate dyspnea, moderate fatigue  Immediate 99 156 64 subsiding  Recovery 1 100 116 60 none     MEDICATIONS GIVEN: No medications or fluids given      STRESS RESULTS: Duration of exercise was 6 min and 0 sec   The patient exercised to protocol stage 2  Maximal work rate was 7 METs  Maximal heart rate during stress was 136 bpm ( 77 % of maximal predicted heart rate)  Target heart rate was  not achieved  The heart rate response to stress was normal  Maximal systolic blood pressure during stress was 156 mmHg  There was normal resting blood pressure with an appropriate response to stress  There was no chest pain during stress  Resting O2 sat 98%, peak stress O2 sat 97%  The stress test was terminated due to moderate dyspnea and moderate fatigue      ECG CONCLUSIONS: The stress ECG was negative for ischemia  There were no stress arrhythmias or conduction abnormalities      STRESS 2D ECHO RESULTS:     BASELINE: Left ventricular size was normal  Overall left ventricular systolic function was hyperdynamic  Estimated left ventricular ejection fraction was 75 %       PEAK STRESS: There was an appropriate reduction in left ventricular size  There was an appropriate augmentation in LV function      OTHER ECHO FINDINGS: There was severe asymmetric left ventricular hypertrophy  Basal anterior wall measured 28 mm  Basal inferolateral wall measured 17 mm (ratio 1 7)  Left ventricular wall thickness was markedly increased  There was no  evidence for left ventricular dynamic outflow obstruction  The image quality was fair      SYSTEM MEASUREMENT TABLES     CW  AV Env  Ti: 267 59 ms  AV VTI: 31 78 cm  AV Vmax: 1 64 m/s  AV Vmean: 1 19 m/s  AV maxPG: 10 69 mmHg  AV meanP 42 mmHg     PW  LVOT Env  Ti: 276 82 ms  LVOT VTI: 25 31 cm  LVOT Vmax: 1 43 m/s  LVOT Vmean: 0 91 m/s  LVOT maxP 23 mmHg  LVOT meanP 19 mmHg     Prepared and electronically signed by  Juan Carlos Harmon MD  Signed 10-Aug-2020 16:53:21     MRI cardiac  w wo contrast     39year old man for evaluation for hypertrophic cardiomyopathy      Technique:  1  3 plane SSFP localizers  2  SSFP cine imaging in long and short axis planes  3  T2 weighted DIR FSE in short axis plane    4  26 ml gadobutrol power injected  5  2D inversion recovery FGRE for delayed myocardial enhancement  6  The patient tolerated the procedure well without complication      Measurements:   [place]-septal wall 29 mm  Postero-lateral wall 16 mm  Left ventricular end-diastolic dimension 44 mm  Left ventricular end-systolic dimension 21 mm  Left ventricular end-diastolic volume 412 ml  Left ventricular end-systolic volume  37 ml  Stroke volume 90 ml  Cardiac Output 8 5 L/min  Ejection fraction 71 %  Left atrium 42 mm  Aortic Root 32 mm     Findings:    1  The left ventricle is normal in size with severe anteroseptal hypertrophy with moderate hypertrophy of the remaining myocardial segments  Left ventricular systolic function is normal with a measured ejection fraction of 71%  There is near cavity   obliteration with systole  No noted left ventricular wall motion abnormalities  2  The right ventricle is normal in size with normal right ventricular systolic function  3  The aortic, mitral, and tricuspid valves open without restriction  There is no significant valvular regurgitation, however cine MRI is inaccurate in the qualitative assessment of valvular regurgitation  4  The left atrium is mildly dilated  The aortic root is normal in size  5  There is no evidence of myocardial edema  6  Delayed post-gadolinium imaging demonstrates no region of hyperenhancement      IMPRESSION:  Impression:  1  Severe anteroseptal hypertrophy with normal left ventricular systolic function  2  Normal right ventricular size and systolic function  3  No significant valvular abnormalities  4  No evidence of myocardial scarring, inflammation, or infiltrative disease  5  These findings are suggestive of asymmetric hypertrophic cardiomyopathy, but there currently is no evidence of myocardial fibrosis       Workstation performed: [id number]     Counseling / Coordination of Care  Total floor / unit time spent today 40 minutes    Greater than 50% of total time was spent with the patient and / or family counseling and / or coordination of care  EMAR

## 2020-09-16 ENCOUNTER — TELEPHONE (OUTPATIENT)
Dept: SLEEP CENTER | Facility: CLINIC | Age: 45
End: 2020-09-16

## 2020-09-16 NOTE — TELEPHONE ENCOUNTER
----- Message from Saadia Brown MD sent at 9/16/2020  4:39 PM EDT -----  Approved  ----- Message -----  From: Hussein Andre  Sent: 9/16/2020   6:05 AM EDT  To: Saadia Brown MD    This sleep study needs approval      If approved please sign and return to clerical pool  If denied please include reasons why  Also provide alternative testing if warranted  Please sign and return to clerical pool

## 2020-10-19 ENCOUNTER — TELEPHONE (OUTPATIENT)
Dept: SLEEP CENTER | Facility: CLINIC | Age: 45
End: 2020-10-19

## 2020-10-20 ENCOUNTER — OFFICE VISIT (OUTPATIENT)
Dept: CARDIOLOGY CLINIC | Facility: CLINIC | Age: 45
End: 2020-10-20
Payer: COMMERCIAL

## 2020-10-20 VITALS
DIASTOLIC BLOOD PRESSURE: 80 MMHG | HEIGHT: 71 IN | SYSTOLIC BLOOD PRESSURE: 122 MMHG | WEIGHT: 303.8 LBS | BODY MASS INDEX: 42.53 KG/M2 | TEMPERATURE: 97.1 F | HEART RATE: 88 BPM

## 2020-10-20 DIAGNOSIS — I25.118 CORONARY ARTERY DISEASE OF NATIVE ARTERY OF NATIVE HEART WITH STABLE ANGINA PECTORIS (HCC): ICD-10-CM

## 2020-10-20 DIAGNOSIS — I10 ESSENTIAL HYPERTENSION: ICD-10-CM

## 2020-10-20 DIAGNOSIS — I42.2 HYPERTROPHIC CARDIOMYOPATHY (HCC): Primary | ICD-10-CM

## 2020-10-20 PROCEDURE — 99215 OFFICE O/P EST HI 40 MIN: CPT | Performed by: INTERNAL MEDICINE

## 2020-11-12 ENCOUNTER — DOCUMENTATION (OUTPATIENT)
Dept: CARDIOLOGY CLINIC | Facility: CLINIC | Age: 45
End: 2020-11-12

## 2020-12-15 ENCOUNTER — HOSPITAL ENCOUNTER (OUTPATIENT)
Dept: SLEEP CENTER | Facility: CLINIC | Age: 45
Discharge: HOME | End: 2020-12-15
Payer: COMMERCIAL

## 2020-12-15 DIAGNOSIS — I42.2 HYPERTROPHIC CARDIOMYOPATHY (HCC): ICD-10-CM

## 2020-12-15 DIAGNOSIS — I10 ESSENTIAL HYPERTENSION: ICD-10-CM

## 2020-12-15 PROCEDURE — 95810 POLYSOM 6/> YRS 4/> PARAM: CPT

## 2020-12-21 ENCOUNTER — TELEPHONE (OUTPATIENT)
Dept: SLEEP CENTER | Facility: CLINIC | Age: 45
End: 2020-12-21

## 2020-12-24 DIAGNOSIS — I42.2 HYPERTROPHIC CARDIOMYOPATHY (HCC): ICD-10-CM

## 2020-12-28 RX ORDER — METOPROLOL SUCCINATE 50 MG/1
TABLET, EXTENDED RELEASE ORAL
Qty: 180 TABLET | Refills: 1 | Status: SHIPPED | OUTPATIENT
Start: 2020-12-28 | End: 2021-10-30 | Stop reason: SDUPTHER

## 2021-02-10 DIAGNOSIS — E66.9 OBESITY: ICD-10-CM

## 2021-02-10 DIAGNOSIS — G47.30 SLEEP APNEA: Primary | ICD-10-CM

## 2021-03-30 ENCOUNTER — DOCUMENTATION (OUTPATIENT)
Dept: CARDIOLOGY CLINIC | Facility: CLINIC | Age: 46
End: 2021-03-30

## 2021-03-30 ENCOUNTER — OFFICE VISIT (OUTPATIENT)
Dept: CARDIOLOGY CLINIC | Facility: CLINIC | Age: 46
End: 2021-03-30
Payer: COMMERCIAL

## 2021-03-30 VITALS
SYSTOLIC BLOOD PRESSURE: 138 MMHG | WEIGHT: 309.6 LBS | HEIGHT: 71 IN | HEART RATE: 87 BPM | DIASTOLIC BLOOD PRESSURE: 90 MMHG | BODY MASS INDEX: 43.34 KG/M2

## 2021-03-30 DIAGNOSIS — I10 ESSENTIAL HYPERTENSION: ICD-10-CM

## 2021-03-30 DIAGNOSIS — I25.118 CORONARY ARTERY DISEASE OF NATIVE ARTERY OF NATIVE HEART WITH STABLE ANGINA PECTORIS (HCC): ICD-10-CM

## 2021-03-30 DIAGNOSIS — I42.2 HYPERTROPHIC CARDIOMYOPATHY (HCC): Primary | ICD-10-CM

## 2021-03-30 PROCEDURE — 99215 OFFICE O/P EST HI 40 MIN: CPT | Performed by: INTERNAL MEDICINE

## 2021-03-30 NOTE — PROGRESS NOTES
HCM Clinic Follow Up Visit - Cardiology   Aaliyah Goyal 55 y o  male MRN: [id number]  Unit/Bed#:  Encounter: [id number]      Active problem list:    Patient Active Problem List    Diagnosis Date Noted    Obesity with serious comorbidity 08/04/2020     Priority: High    Obstructive sleep apnea (adult) (pediatric)      Priority: Low    Hypertrophic cardiomyopathy ([place] [id number] ) 06/10/2020     Priority: Low    Type 2 diabetes mellitus without complication, without long-term current use of insulin ([place] [id number] ) 05/05/2020     Priority: Low    Essential hypertension 05/05/2020     Priority: Low    Other hyperlipidemia 05/05/2020     Priority: Low    Coronary artery disease of native artery of native heart with stable angina pectoris ([place] [id number] ) 05/05/2020     Priority: Low    History of coronary angioplasty with insertion of stent 05/05/2020     Priority: Low       Plan: Mr Perry Franklin complains of exertional chest discomfort and shortness of breath for the few weeks  He states that the symptoms have prevented him from physical activity intended to help weight loss  As the result, he has gained some weight (now 310 lbs)  He denies dizziness, syncope, lower extremity edema or palpitation  His blood pressure is borderline controlled on metoprolol 50 mg twice daily and he states that he has been watching his diet for excess salt  His diabetes is not optimally controlled with home blood sugars around 175 mg/dL on 3 oral agents, insulin and semaglutide  He also has mild obstructive sleep apnea  He will be seen by sleep specialist on April 19  His cholesterol was last evaluated on 5/5/2020 and showed an HDL of 26, LDL of 43 and a triglyceride level of 265  He is on rosuvastatin 5 mg daily  He is now on long term disability and does not have to lift heavy objects any longer  I have recommended that he undergoes a stress echo study to evaluate his chest pain and shortness of breath as well as his blood pressure response to exercise   He is also referred to nutritionist to help and direct him to lose substantial amount of weight (including referral to weight management team) in order to improve management of his cardiac disease as well as hypertension, diabetes and sleep apnea  Unfortunately, his 2 daughters, and his siblings did not choose to do the targeted gene testing  Physician Requesting Consult: Bennie Garcias DO  Reason for Consult / Principal Problem: Hypertrophic cardiomyopathy    HPI: Mr Marv Coyne is a 55year old man with past medical history of hyperlipidemia, type II diabetes and hypertension who was recently diagnosed as having hypertrophic cardiomyopathy phenotype on a transthoracic echocardiogram  The study was performed during an admission in May 2020 for chest pain and showed marked asymmetric septal hypertrophy without left ventricular outflow tract obstruction  Apical views were suboptimal and provocative maneuvers were not performed to elicit any intracavitary or outflow tract gradients  He then underwent an exercise myocardial perfusion study that showed reduced exercise capacity, exaggerated heart rate response, normal blood pressure response and no ischemic changes or significant rhythm abnormality  A cardiac MRI performed on 5- showed an anteroseptal wall thickness of 29 mm with a septal to inferolateral wall thickness ratio of 18 mm  The left atrium was mildly dilated and there was no evidence of late gadolinium enhancing lesions  He is currently on carvedilol 20 mg daily      On 8-4-2020: Mr Ursula Bach has had no further chest pain since hospital discharge  He also denies shortness of breath, palpitation, dizziness, syncope or lower extremity swelling  He does snore and wife has noted that he does periodically stop breathing during sleep  His physical activity is limited but he does notice daytime sleepiness and takes hour long naps during the day  He works for "[employer] and often has to lift heavy objects (up to 150 lbs)  His family history is relatively benign  His father  at age 39 when he was only 15year old but the circumstances of his death is unclear  There is no history of sudden death at a young age in his first degree relatives  His risk stratification is incomplete  I have scheduled him for an exercise stress echocardiogram (to evaluate for latent outflow tract obstruction) and a 48 hour ambulatory monitor for assessment of arrhythmias  He has agreed to have genetic testing done  This would be important to particularly exclude HCM phenocopies such as hTTR amyloidosis  I have advised Mr Evens Villeda to adopt a heart healthy diet low in sodium, saturated fat and processed food  I have also discussed potential restrictions on lifting heavy weights  After completion of risk stratification, he would be advised to start daily exercises with the hope of improving cardiovascular conditioning  He states that he has been evaluated for sleep apnea several times (last time 4 years ago) that has been negative  He may need to be reassessed  He will also need evaluation by weight management      9-: Mr Evens Villeda has been doing well on his current medications  He denies chest pain, shortness of breath, palpitation, dizziness, syncope or lower extremity swelling  He does snore and wife has noted that he does periodically stop breathing during sleep  He has not returned to work and is looking for partial disability due to the requirement for lifting heavy objects at work ([employer])  He is now referred to nutritionist and sleep medicine for further evaluation as weight management and correction of nocturnal hypoxemia would be essential for favorable outcomes  He is motivated to achieve both goals  His genetic test results were received and 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  His two daughters and 2 siblings can now undergo targeted gene testing  His cardiac MRI results were reviewed with him      10-: Mr Evens Villeda has done very well since last office visit  He denies shortness of breath, palpitation, dizziness, syncope, chest pain or lower extremity swelling  He has increased his daily physical activities and does short distance walks 4 to 5 times a week  He is tolerating his medications and will be returning to work on  with restrictions to his activities  He is scheduled to have sleep study and see the nutritionist in December  Both daughters have been gene tested but results have not been received yet  He does not know if his brother ans sister have done their targeted gene testings  His diet has improved and his blood pressure and heart rate are well controlled       Family history: Father  at age 39 for uncertain cause and the circumstances of his death is not known to the patient who was 15years old at the time  Mother  in 2019 and had diabetes, hypertension and heart failure  He has 3 siblings  The older sister  of complications of drug use at age 32  Another sister Yue Duncan)  is 52year old and has no known medical condition  A younger brother Carine Maynard) is 29years old and has no heart disease  Mr [facility]ins [facility] [place] has 2 daughters ([facility] [id number] [id number] 2003) who are healthy  There is no history of sudden cardiac death or hypertrophic cardiomyopathy in the family      Genetic testing ([employer] 2020): Positive for a pathogenic mutation in the MYH7 gene      Review of Systems: He complains chest pain and shortness of breath particularly with exertion, denies palpitation, dizziness, syncope or lower extremity swelling  He does snore and wife has noted that he does periodically stop breathing during sleep      Historical Information   Past Medical History:   Diagnosis Date    Congestive heart failure ([facility] [address] )     Diabetes mellitus ([facility] )     Hyperlipidemia     Hypertension     Obesity      No past surgical history on file    Social History     Substance and Sexual Activity   Alcohol Use Not Currently Social History     Substance and Sexual Activity   Drug Use Never     Social History     Tobacco Use   Smoking Status Never Smoker   Smokeless Tobacco Never Used     Meds/Allergies   all current active meds have been reviewed  No Known Allergies     Current Outpatient Medications on File Prior to Visit   Medication Sig Dispense Refill    aspirin (ECOTRIN LOW STRENGTH) 81 mg EC tablet Take 1 tablet (81 mg total) by mouth daily 30 tablet 0    Empagliflozin-metFORMIN HCl (SYNJARDY) 12 5-1000 MG TABS Take 1 tablet by mouth 2 (two) times a day      glimepiride (AMARYL) 2 mg tablet Take 2 mg by mouth every morning before breakfast      Icosapent Ethyl (VASCEPA) 1 g CAPS Take 1 g by mouth      metoprolol succinate (TOPROL-XL) 50 mg 24 hr tablet TAKE 1 TABLET TWICE A  tablet 1    OZEMPIC, 1 MG/DOSE, 2 MG/1 5ML SOPN       rosuvastatin (CRESTOR) 5 mg tablet Take 5 mg by mouth daily      TRESIBA FLEXTOUCH 100 units/mL injection pen       ondansetron (ZOFRAN) 4 mg tablet Take 1 tablet (4 mg total) by mouth every 6 (six) hours (Patient not taking: Reported on 8/4/2020) 12 tablet 0     No current facility-administered medications on file prior to visit  Objective   Vitals:   Vitals:    03/30/21 1127   BP: 138/90   BP Location: Left arm   Patient Position: Sitting   Cuff Size: Large   Pulse: 87   Weight: (!) 140 kg (309 lb 9 6 oz)   Height: 5' 11" (1 803 m)   Body mass index is 43 18 kg/m²  Body surface area is 2 54 meters squared      Invasive Devices     Peripheral Intravenous Line            Peripheral IV 05/19/20 Right Antecubital 314 days              Physical Exam:  GEN: Aaliyah Britt appears well, alert and oriented x 3, pleasant and cooperative   HEENT: pupils equal, round, and reactive to light; extraocular muscles intact  NECK: supple, no carotid bruits   HEART: regular rhythm, normal S1 and S2, no murmurs, +S4 gallop, no clicks or rubs   LUNGS: clear to auscultation bilaterally; no wheezes, rales, or rhonchi   ABDOMEN: normal bowel sounds, soft, no tenderness, no distention  EXTREMITIES: peripheral pulses normal; no clubbing, cyanosis, or edema  NEURO: no focal findings   SKIN: normal without suspicious lesions on exposed skin    Lab Results:   No visits with results within 1 Day(s) from this visit  Latest known visit with results is:   Hospital Outpatient Visit on 08/10/2020   Component Date Value    Protocol Name 08/10/2020 Vianney Maldonado Time In Exercise Phase 08/10/2020 00:06:00     MAX  SYSTOLIC BP 13/41/5992 693     Max Diastolic Bp 18/00/6161 64     Max Heart Rate 08/10/2020 136     Max Predicted Heart Rate 08/10/2020 175     Reason for Termination 08/10/2020 Fatigue     Test Indication 08/10/2020 Ischemia Evaluation     Target Hr Formular 08/10/2020 (220 - Age)*100%     Chest Pain Statement 08/10/2020 none      Diagnostic Sleep Study  Status: Final result   Diagnostic Sleep Study  Order: [id number]  Status:  Final result   Visible to patient:  No (inaccessible in [address]) Next appt:  04/09/2021 at 11:30 AM in Sleep Medicine Elizabeth Odell MD) Dx:  Hypertrophic cardiomyopathy ([facility] [address] ); Es    Details    Reading Physician Reading Date Result Priority   Joi Barrientos, [address] [facility] 12/20/2020 Routine      Narrative     Diagnostic Report   Patient Name: Veronica Montalvo Patient Details: Male, 39 years     Patient #: [id number] YOB: 1975   Referring Physician: DR Ta Lindquist Height: 71 0 in   Interpreting Physician: DR MARY CUADRA Weight: 304 0 lbs   Study Date: 12/15/2020 B  M I : 42 4     STUDY FORMAT:   The patient was admitted to the sleep laboratory at the [facility] and oriented to   procedures and equipment  Parvez Sumner was obtained using the [employer] sleep   monitoring system; Parameters monitored: EEG (frontal, central,   occipital), EOG, EMG (chin and leg), ECG, body position, abdominal and   thoracic respiratory effort, snoring, pulse oximetry, and airflow   The   sleep study was scored following the rules established by the American   Academy of Sleep Medicine (AASM)   Hypopnea: event lasting ? 10 seconds   with a ?30% reduction in airflow amplitude and a ?4% decrease in SpO2  HISTORY:   The patient has a history of snoring, hypertension and congestive heart   failure  SLEEP:   Patient slept for 404 0 minutes out of 476 0 minutes in bed representing a   sleep efficiency of 84 9%   Sleep architecture showed a sleep latency of   26 0 minutes and a REM sleep latency of 90 5 minutes   There was 2 6%   Stage N1 noted   There was 69 9% Stage N2 noted   There was 5 9% Stage N3   noted  There was 21 5% REM sleep noted  The patient had 37 arousals for an   average of 5 5 arousals per hour of sleep  RESPIRATORY:   There were a total of 35 respiratory events made up of 1 obstructive   apneas, 0 mixed apneas, 0 central apneas, and 34 hypopneas resulting in an   apnea/hypopnea index (AHI) of 5 2 events per hour of sleep   The AHI in   the supine position was 3 4   The AHI during REM sleep was 17 9  OXIMETRY:   The baseline oxygen saturation with the patient awake was 94 2%   The mean   oxygen saturation throughout the study was 93 8%   The amount of sleep   time below 90% was 0 9 minutes   The lowest oxygen saturation was 87 0%  BODY POSITION:   The patient slept 39 9% of the evening in the supine position, 60 1% on   left side, 0 0% on right side, and 0 0%  in the prone position  LEG MOVEMENT:   There were 465 PLMs; PLM index of 69 1; 4 PLMs associated with arousal;   PLM w/arousal index of 0 6  SUMMARY:          TOTAL INDEX   Apneas and Hypopneas 35 5 2   Central Apneas 0 0 0   Arousals 37 5 5   PLMs 465 69 1       Impression         Polysomnography demonstrates mild obstructive sleep apnea  Consideration   may be given to treatment           Sean Anderson MD   Pulmonology/Sleep Medicine     Board Certified Sleep Physician              Last Resulted: 12/20/20 14:53               Counseling / Coordination of Care  Total floor / unit time spent today 40 minutes  Greater than 50% of total time was spent with the patient and / or family counseling and / or coordination of care

## 2021-04-08 ENCOUNTER — HOSPITAL ENCOUNTER (OUTPATIENT)
Dept: NON INVASIVE DIAGNOSTICS | Facility: HOSPITAL | Age: 46
Discharge: HOME | End: 2021-04-08
Payer: COMMERCIAL

## 2021-04-08 DIAGNOSIS — I42.2 HYPERTROPHIC CARDIOMYOPATHY (HCC): ICD-10-CM

## 2021-04-08 DIAGNOSIS — I42.2 HYPERTROPHIC CARDIOMYOPATHY (HCC): Primary | ICD-10-CM

## 2021-04-08 PROCEDURE — 93351 STRESS TTE COMPLETE: CPT | Performed by: INTERNAL MEDICINE

## 2021-04-08 PROCEDURE — 93350 STRESS TTE ONLY: CPT

## 2021-04-08 RX ORDER — VERAPAMIL HYDROCHLORIDE 40 MG/1
40 TABLET ORAL 3 TIMES DAILY
Qty: 90 TABLET | Refills: 0 | Status: SHIPPED | OUTPATIENT
Start: 2021-04-08 | End: 2021-04-30

## 2021-04-09 ENCOUNTER — OFFICE VISIT (OUTPATIENT)
Dept: SLEEP CENTER | Facility: CLINIC | Age: 46
End: 2021-04-09
Payer: COMMERCIAL

## 2021-04-09 VITALS
BODY MASS INDEX: 42.7 KG/M2 | HEIGHT: 71 IN | WEIGHT: 305 LBS | SYSTOLIC BLOOD PRESSURE: 120 MMHG | DIASTOLIC BLOOD PRESSURE: 80 MMHG | HEART RATE: 89 BPM

## 2021-04-09 DIAGNOSIS — G47.33 OSA (OBSTRUCTIVE SLEEP APNEA): Primary | ICD-10-CM

## 2021-04-09 DIAGNOSIS — G47.30 SLEEP APNEA: ICD-10-CM

## 2021-04-09 PROCEDURE — 99243 OFF/OP CNSLTJ NEW/EST LOW 30: CPT | Performed by: INTERNAL MEDICINE

## 2021-04-09 NOTE — PROGRESS NOTES
Consultation - Cassandra  41 : 1975  MRN: [id number]      Assessment:  The patient has mild obstructive sleep apnea with AHI = 5 2  He has mild daytime sleepiness as well  He works nights and his sleep is disrupted by a young grandchild  Plan:  Start APAP 4 to 20 cm  The patient should well with a nasal mask  Follow up:  Compliance check    History of Present Illness:   55 y o male with a longstanding history of snoring and mild daytime sleepiness  He also has a history of hypertension as well as diabetes mellitus  He has left ventricular hypertrophy with normal ejection fraction    There is no pulmonary hypertension seen on echocardiogram        Review of Systems      Genitourinary none   Cardiology Frequent chest pain or angina,    Gastrointestinal none   Neurology none   Constitutional excessive sweating at night   Integumentary none   Psychiatry none   Musculoskeletal back pain   Pulmonary frequent cough and snoring   ENT none   Endocrine none   Hematological none       I have reviewed and updated the review of systems as necessary    Historical Information    Past Medical History:        Family History: non-contributory    Social History     Socioeconomic History    Marital status: /Civil Union     Spouse name: None    Number of children: None    Years of education: None    Highest education level: None   Occupational History    None   Social Needs    Financial resource strain: None    Food insecurity     Worry: None     Inability: None    Transportation needs     Medical: None     Non-medical: None   Tobacco Use    Smoking status: Never Smoker    Smokeless tobacco: Never Used   Substance and Sexual Activity    Alcohol use: Not Currently    Drug use: Never    Sexual activity: None   Lifestyle    Physical activity     Days per week: None     Minutes per session: None    Stress: None   Relationships    Social connections     Talks on phone: None     Gets together: None     Attends Tenriism service: None     Active member of club or organization: None     Attends meetings of clubs or organizations: None     Relationship status: None    Intimate partner violence     Fear of current or ex partner: None     Emotionally abused: None     Physically abused: None     Forced sexual activity: None   Other Topics Concern    None   Social History Narrative    None         Sleep Schedule: unremarkable    Snoring:  Yes    Witnessed Apnea:  No    Medications/Allergies:    Current Outpatient Medications:     aspirin (ECOTRIN LOW STRENGTH) 81 mg EC tablet, Take 1 tablet (81 mg total) by mouth daily, Disp: 30 tablet, Rfl: 0    Empagliflozin-metFORMIN HCl (SYNJARDY) 12 5-1000 MG TABS, Take 1 tablet by mouth 2 (two) times a day, Disp: , Rfl:     glimepiride (AMARYL) 2 mg tablet, Take 2 mg by mouth every morning before breakfast, Disp: , Rfl:     Icosapent Ethyl (VASCEPA) 1 g CAPS, Take 1 g by mouth, Disp: , Rfl:     metoprolol succinate (TOPROL-XL) 50 mg 24 hr tablet, TAKE 1 TABLET TWICE A DAY, Disp: 180 tablet, Rfl: 1    ondansetron (ZOFRAN) 4 mg tablet, Take 1 tablet (4 mg total) by mouth every 6 (six) hours, Disp: 12 tablet, Rfl: 0    OZEMPIC, 1 MG/DOSE, 2 MG/1 5ML SOPN, , Disp: , Rfl:     rosuvastatin (CRESTOR) 5 mg tablet, Take 5 mg by mouth daily, Disp: , Rfl:     TRESIBA FLEXTOUCH 100 units/mL injection pen, , Disp: , Rfl:     verapamil (CALAN) 40 mg tablet, Take 1 tablet (40 mg total) by mouth 3 (three) times a day, Disp: 90 tablet, Rfl: 0        No notes on file                  Objective:    Vital Signs:   Vitals:    04/09/21 1100   BP: 120/80   Pulse: 89   Weight: (!) 138 kg (305 lb)   Height: 5' 11" (1 803 m)     Neck Circumference: 17      [place] Sleepiness Scale:  Total score: 10    Physical Exam:    General: Alert, appropriate, cooperative, overweight    Head: NC/AT, mild retrognathia    Nose: No septal deviation, nares not obstructed, mucosa normal    Throat: Airway diminished, tongue base thickened, no tonsils visualized    Neck: Normal, no thyromegaly or lymphadenopathy, no JVD    Heart: RR, normal S1 and S2, no murmurs    Chest: Clear bilaterally    Extremity: No clubbing, cyanosis, no edema    Skin: Warm, dry    Neuro: No motor abnormalities, cranial nerves appear intact    Sleep Study Results:   AHI = 5 2  PAP Pressure: Auto PAP: 4 to 20 cm  DME Provider: [employer] [facility]    Counseling / Coordination of Care  A description of the counseling / coordination of care: We discussed the pathophysiology of obstructive sleep apnea as well as the possible treatment options  We also discussed the rationale for positive airway pressure therapy  Board Certified Sleep Specialist    Portions of the record may have been created with voice recognition software  Occasional wrong word or "sound a like" substitutions may have occurred due to the inherent limitations of voice recognition software  Read the chart carefully and recognize, using context, where substitutions have occurred

## 2021-04-14 ENCOUNTER — TELEPHONE (OUTPATIENT)
Dept: SLEEP CENTER | Facility: CLINIC | Age: 46
End: 2021-04-14

## 2021-04-14 LAB
CHEST PAIN STATEMENT: NORMAL
MAX DIASTOLIC BP: 86 MMHG
MAX HEART RATE: 157 BPM
MAX PREDICTED HEART RATE: 174 BPM
MAX. SYSTOLIC BP: 186 MMHG
PROTOCOL NAME: NORMAL
REASON FOR TERMINATION: NORMAL
TARGET HR FORMULA: NORMAL
TIME IN EXERCISE PHASE: NORMAL

## 2021-04-14 NOTE — TELEPHONE ENCOUNTER
Patient has DME set up scheduled 4/30/21 in [place]  Appointment information emailed to Bret santoyo

## 2021-04-30 DIAGNOSIS — I42.2 HYPERTROPHIC CARDIOMYOPATHY (HCC): ICD-10-CM

## 2021-04-30 RX ORDER — VERAPAMIL HYDROCHLORIDE 40 MG/1
40 TABLET ORAL 3 TIMES DAILY
Qty: 90 TABLET | Refills: 0 | Status: SHIPPED | OUTPATIENT
Start: 2021-04-30 | End: 2021-05-07 | Stop reason: ALTCHOICE

## 2021-05-06 DIAGNOSIS — I42.2 HYPERTROPHIC CARDIOMYOPATHY (HCC): ICD-10-CM

## 2021-05-07 DIAGNOSIS — I42.2 HYPERTROPHIC CARDIOMYOPATHY (HCC): Primary | ICD-10-CM

## 2021-05-07 RX ORDER — VERAPAMIL HYDROCHLORIDE 40 MG/1
40 TABLET ORAL 3 TIMES DAILY
Qty: 270 TABLET | Refills: 2 | OUTPATIENT
Start: 2021-05-07 | End: 2021-06-06

## 2021-05-07 RX ORDER — VERAPAMIL HYDROCHLORIDE 120 MG/1
120 CAPSULE, EXTENDED RELEASE ORAL
Qty: 90 CAPSULE | Refills: 1 | Status: SHIPPED | OUTPATIENT
Start: 2021-05-07 | End: 2021-09-21

## 2021-06-10 ENCOUNTER — OFFICE VISIT (OUTPATIENT)
Dept: SLEEP CENTER | Facility: CLINIC | Age: 46
End: 2021-06-10
Payer: COMMERCIAL

## 2021-06-10 VITALS
WEIGHT: 310 LBS | HEART RATE: 83 BPM | DIASTOLIC BLOOD PRESSURE: 80 MMHG | BODY MASS INDEX: 43.4 KG/M2 | SYSTOLIC BLOOD PRESSURE: 126 MMHG | HEIGHT: 71 IN

## 2021-06-10 DIAGNOSIS — G47.33 OSA (OBSTRUCTIVE SLEEP APNEA): Primary | ICD-10-CM

## 2021-06-10 PROCEDURE — 99213 OFFICE O/P EST LOW 20 MIN: CPT | Performed by: INTERNAL MEDICINE

## 2021-06-10 NOTE — PROGRESS NOTES
Progress Note - Sleep Center   Philomena Luz :1975 MRN: [id number]      Reason for Visit:  55 y o male here for annual follow-up    Assessment:  Doing well on current therapy of APAP 4 to 20 cm for mild obstructive sleep apnea (AHI = 5 2)  The patient uses his CPAP nightly, but approximately half of the nights does not use it for a minimum of 4 hours  Plan:  Continue same    Follow up: One year    History of Present Illness:  History of JUNIOR on PAP therapy  Fully compliant and deriving benefit  Review of Systems      Genitourinary none   Cardiology none   Gastrointestinal none   Neurology none   Constitutional excessive sweating at night   Integumentary rash or dry skin   Psychiatry none   Musculoskeletal muscle aches and back pain   Pulmonary shortness of breath with activity   ENT none   Endocrine none   Hematological none         I have reviewed and updated the review of systems as necessary      Historical Information    Past Medical History:   Past Medical History:   Diagnosis Date    Congest[facility] heart failure ([facility] )     Diabetes mellitus ([facility] )     Hyperlipidemia     Hypertension     Obesity          Past Surgical History: No past surgical history on file      Social History:   Social History     Socioeconomic History    Marital status: /Civil Union     Spouse name: None    Number of children: None    Years of education: None    Highest education level: None   Occupational History    None   Social Needs    Financial resource strain: None    Food insecurity     Worry: None     Inability: None    Transportation needs     Medical: None     Non-medical: None   Tobacco Use    Smoking status: Never Smoker    Smokeless tobacco: Never Used   Substance and Sexual Activity    Alcohol use: Not Currently    Drug use: Never    Sexual activity: None   Lifestyle    Physical activity     Days per week: None     Minutes per session: None    Stress: None   Relationships    Social connections     Talks on phone: None     Gets together: None     Attends Scientology service: None     Active member of club or organization: None     Attends meetings of clubs or organizations: None     Relationship status: None    Intimate partner violence     Fear of current or ex partner: None     Emotionally abused: None     Physically abused: None     Forced sexual activity: None   Other Topics Concern    None   Social History Narrative    None       Family History:   Family History   Problem Relation Age of Onset    Stroke Mother        Medications/Allergies:      Current Outpatient Medications:     aspirin (ECOTRIN LOW STRENGTH) 81 mg EC tablet, Take 1 tablet (81 mg total) by mouth daily, Disp: 30 tablet, Rfl: 0    glimepiride (AMARYL) 2 mg tablet, Take 2 mg by mouth every morning before breakfast, Disp: , Rfl:     Icosapent Ethyl (VASCEPA) 1 g CAPS, Take 1 g by mouth, Disp: , Rfl:     metoprolol succinate (TOPROL-XL) 50 mg 24 hr tablet, TAKE 1 TABLET TWICE A DAY, Disp: 180 tablet, Rfl: 1    OZEMPIC, 1 MG/DOSE, 2 MG/1 5ML SOPN, , Disp: , Rfl:     rosuvastatin (CRESTOR) 5 mg tablet, Take 5 mg by mouth daily, Disp: , Rfl:     TRESIBA FLEXTOUCH 100 units/mL injection pen, , Disp: , Rfl:     verapamil (VERELAN PM) 120 MG 24 hr capsule, Take 1 capsule (120 mg total) by mouth daily at bedtime, Disp: 90 capsule, Rfl: 1    Empagliflozin-metFORMIN HCl (SYNJARDY) 12 5-1000 MG TABS, Take 1 tablet by mouth 2 (two) times a day, Disp: , Rfl:     ondansetron (ZOFRAN) 4 mg tablet, Take 1 tablet (4 mg total) by mouth every 6 (six) hours (Patient not taking: Reported on 6/10/2021), Disp: 12 tablet, Rfl: 0          Objective      Vital Signs:   Vitals:    06/10/21 1359   BP: 126/80   Pulse: 83     [place] Sleepiness Scale:  Total score: 4        Physical Exam:    General: Alert, appropriate, cooperative, overweight    Head: NC/AT    Skin: Warm, dry    Neuro: No motor abnormalities, cranial nerves appear intact    Extremity: No clubbing, cyanosis      DME Provider: [employer] [facility]        Counseling / Coordination of Care   I have spent 15 minutes with the patient today in which greater than 50% of this time was spent in counseling/coordination of care regarding: equipment and compliance  Board Certified Sleep Specialist    Portions of the record may have been created with voice recognition software  Occasional wrong word or "sound a like" substitutions may have occurred due to the inherent limitations of voice recognition software  Read the chart carefully and recognize, using context, where substitutions have occurred

## 2021-06-15 ENCOUNTER — OFFICE VISIT (OUTPATIENT)
Dept: CARDIOLOGY CLINIC | Facility: CLINIC | Age: 46
End: 2021-06-15
Payer: COMMERCIAL

## 2021-06-15 VITALS
WEIGHT: 306.1 LBS | BODY MASS INDEX: 42.85 KG/M2 | DIASTOLIC BLOOD PRESSURE: 82 MMHG | HEIGHT: 71 IN | HEART RATE: 85 BPM | SYSTOLIC BLOOD PRESSURE: 118 MMHG

## 2021-06-15 DIAGNOSIS — E11.9 TYPE 2 DIABETES MELLITUS WITHOUT COMPLICATION, WITH LONG-TERM CURRENT USE OF INSULIN (HCC): ICD-10-CM

## 2021-06-15 DIAGNOSIS — E55.9 VITAMIN D DEFICIENCY: ICD-10-CM

## 2021-06-15 DIAGNOSIS — I10 ESSENTIAL HYPERTENSION: ICD-10-CM

## 2021-06-15 DIAGNOSIS — Z79.4 TYPE 2 DIABETES MELLITUS WITHOUT COMPLICATION, WITH LONG-TERM CURRENT USE OF INSULIN (HCC): ICD-10-CM

## 2021-06-15 DIAGNOSIS — I42.2 HYPERTROPHIC CARDIOMYOPATHY (HCC): Primary | ICD-10-CM

## 2021-06-15 DIAGNOSIS — I25.118 CORONARY ARTERY DISEASE OF NATIVE ARTERY OF NATIVE HEART WITH STABLE ANGINA PECTORIS (HCC): ICD-10-CM

## 2021-06-15 DIAGNOSIS — E78.49 OTHER HYPERLIPIDEMIA: ICD-10-CM

## 2021-06-15 PROCEDURE — 99215 OFFICE O/P EST HI 40 MIN: CPT | Performed by: INTERNAL MEDICINE

## 2021-06-15 RX ORDER — FLUTICASONE PROPIONATE 50 MCG
SPRAY, SUSPENSION (ML) NASAL
COMMUNITY
Start: 2021-06-14 | End: 2021-10-26 | Stop reason: HOSPADM

## 2021-06-15 RX ORDER — DAPAGLIFLOZIN AND METFORMIN HYDROCHLORIDE 10; 1000 MG/1; MG/1
TABLET, FILM COATED, EXTENDED RELEASE ORAL
COMMUNITY
Start: 2021-04-22

## 2021-06-15 RX ORDER — CANDESARTAN 32 MG/1
TABLET ORAL
COMMUNITY
Start: 2021-04-16

## 2021-06-15 NOTE — PROGRESS NOTES
[facility] 3 Follow Up Visit - Cardiology   Karuna Fam 55 y o  male MRN: [id number]  Unit/Bed#:  Encounter: [id number]      Active problem list:    Patient Active Problem List    Diagnosis Date Noted    Obesity with serious comorbidity 08/04/2020    Obstructive sleep apnea (adult) (pediatric)     Hypertrophic cardiomyopathy ([place] [address] ) 06/10/2020    Type 2 diabetes mellitus without complication, without long-term current use of insulin ([place] [address] ) 05/05/2020    Essential hypertension 05/05/2020    Other hyperlipidemia 05/05/2020    Coronary artery disease of native artery of native heart with stable angina pectoris ([place] [address] ) 05/05/2020    History of coronary angioplasty with insertion of stent 05/05/2020       Plan: Mr Burton Crenshaw had Stress Echocardiography done on 4/8/2021 which showed significantly reduced exercise capacity, shortness of breath at peak exercise, normal heart rate and blood pressure response to exercise, frequent isolated and paired ventricular premature complexes at peak exercise  It also showed severe left ventricular hypertrophy with asymmetrical hypertrophy of the septum (maximum wall thickness 20 mm), systolic anterior motion of the anterior mitral valve leaflet and subvalvular apparatus was present with pressure gradient of 22 and 42 mmHg at rest and during Valsalva maneuver, with gradient increasing to 60 mmHg immediately after the exercise, findings consistent with a latent obstructive hypertrophic cardiomyopathy  Trivial tricuspid regurgitation with normal estimated pulmonary systolic pressure was also noted  Verapamil 120 mg daily was added to his regimen of metoprolol succinate 50 mg b i d  Today Mr Burton Crenshaw admits to occasional palpitation with exertion, but no dizziness, shortness of breath, or chest pain  Mr Burton Crenshaw continues to follow with Sleep clinic, he had an AHI of 5 2 and was recommended to continue with APAP for at lease 4 hours per night   He denies daytime sleepiness although does take occasional naps during the day  He believes at this time he is getting quality sleep of up to 7 hours per night  His daughters Nilam and Angelina Saini did not choose to undergo genetic testing and have not had screening echocardiograms  He checks his blood glucose at home and has been obtaining values between 120 (fasting), around 220 (post-prandial)  Dapagliflozin-metformin was added to his diabetics regime   He has not had routine labs since 2019 and will order them today  He has received both doses of Pfizer COVID-19 vaccine, along with his wife and daughters  He checks his blood pressure occasionally and the most recent blood pressure was 113/78 mmHg  He has lost 3 pounds since last visit  He has not seen a nutritionist yet  We will obtain the contact information for the clinic  We again discussed the importance of weight loss for overall cardiovascular health  Although his dietary choices have improved he still needs counselling on total caloric intake and choice of food items  Reinforced the importance of the heart-healthy diet  Mr Janett Quintanilla will be seen in [facility] in 11 month  Physician Requesting Consult: Bennie Garcias DO  Reason for Consult / Principal Problem: Hypertrophic cardiomyopathy     HPI: Mr Jory Hogan is a 55year old man with past medical history of hyperlipidemia, type II diabetes and hypertension who was recently diagnosed as having hypertrophic cardiomyopathy phenotype on a transthoracic echocardiogram  The study was performed during an admission in May 2020 for chest pain and showed marked asymmetric septal hypertrophy without left ventricular outflow tract obstruction  Apical views were suboptimal and provocative maneuvers were not performed to elicit any intracavitary or outflow tract gradients   He then underwent an exercise myocardial perfusion study that showed reduced exercise capacity, exaggerated heart rate response, normal blood pressure response and no ischemic changes or significant rhythm abnormality  A cardiac MRI performed on 2020 showed an anteroseptal wall thickness of 29 mm and inferolateral wall thickness of 18 mm  The left atrium was mildly dilated and there was no evidence of late gadolinium enhancing lesions  He is currently on carvedilol 20 mg daily      On 2020: Mr Galdino Cardona has had no further chest pain since hospital discharge  He also denies shortness of breath, palpitation, dizziness, syncope or lower extremity swelling  He does snore and wife has noted that he does periodically stop breathing during sleep  His physical activity is limited but he does notice daytime sleepiness and takes hour long naps during the day  He works for [employer] and often has to lift heavy objects (up to 150 lbs)  His family history is relatively benign  His father  at age 39 when he was only 15year old but the circumstances of his death is unclear  There is no history of sudden death at a young age in his first degree relatives  His risk stratification is incomplete  I have scheduled him for an exercise stress echocardiogram (to evaluate for latent outflow tract obstruction) and a 48 hour ambulatory monitor for assessment of arrhythmias  He has agreed to have genetic testing done  This would be important to particularly exclude HCM phenocopies such as hTTR amyloidosis  I have advised Mr Galdino Cardona to adopt a heart healthy diet low in sodium, saturated fat and processed food  I have also discussed potential restrictions on lifting heavy weights  After completion of risk stratification, he would be advised to start daily exercises with the hope of improving cardiovascular conditioning  He states that he has been evaluated for sleep apnea several times (last time 4 years ago) that has been negative  He may need to be reassessed   He will also need evaluation by weight management      9-: Mr Galdino Cardona has been doing well on his current medications  He denies chest pain, shortness of breath, palpitation, dizziness, syncope or lower extremity swelling  He does snore and wife has noted that he does periodically stop breathing during sleep  He has not returned to work and is looking for partial disability due to the requirement for lifting heavy objects at work ([employer])  He is now referred to nutritionist and sleep medicine for further evaluation as weight management and correction of nocturnal hypoxemia would be essential for favorable outcomes  He is motivated to achieve both goals  His genetic test results were received and 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  His two daughters and 2 siblings can now undergo targeted gene testing  His cardiac MRI results were reviewed with him      10-: Mr Little Barrow has done very well since last office visit  He denies shortness of breath, palpitation, dizziness, syncope, chest pain or lower extremity swelling  He has increased his daily physical activities and does short distance walks 4 to 5 times a week  He is tolerating his medications and will be returning to work on November 4 with restrictions to his activities  He is scheduled to have sleep study and see the nutritionist in December  Both daughters have been gene tested but results have not been received yet  He does not know if his brother ans sister have done their targeted gene testings  His diet has improved and his blood pressure and heart rate are well controlled  3-: Mr Little Barrow complains of exertional chest discomfort and shortness of breath for the few weeks  He states that the symptoms have prevented him from physical activity intended to help weight loss  As the result, he has gained some weight (now 310 lbs)  He denies dizziness, syncope, lower extremity edema or palpitation  His blood pressure is borderline controlled on metoprolol 50 mg twice daily and he states that he has been watching his diet for excess salt   His diabetes is not optimally controlled with home blood sugars around 175 mg/dL on 3 oral agents, insulin and semaglutide  He also has mild obstructive sleep apnea  He will be seen by sleep specialist on   His cholesterol was last evaluated on 2020 and showed an HDL of 26, LDL of 43 and a triglyceride level of 265  He is on rosuvastatin 5 mg daily  He is now on long term disability and does not have to lift heavy objects any longer  I have recommended that he undergoes a stress echo study to evaluate his chest pain and shortness of breath as well as his blood pressure response to exercise  He is also referred to nutritionist to help and direct him to lose substantial amount of weight (including referral to weight management team) in order to improve management of his cardiac disease as well as hypertension, diabetes and sleep apnea  Unfortunately, his 2 daughters, and his siblings did not choose to do the targeted gene testing       Family history: Father  at age 39 for uncertain cause and the circumstances of his death is not known to the patient who was 15years old at the time  Mother  in  and had diabetes, hypertension and heart failure  He has 3 siblings  The older sister  of complications of drug use at age 32  Another sister Erasmo Phan)  is 50year old and has no known medical condition  A younger brother Teja Macario) is 39years old and has no heart disease  Mr Dejon Ruelas has 2 daughters (Sekou Orozco 1- [facility] Dejon Ruelas 2003) who are healthy  There is no history of sudden cardiac death or hypertrophic cardiomyopathy in the family      Genetic testing ([facility] 2020): Positive for a pathogenic mutation in the MYH7 gene      Review of Systems: He complains chest pain and shortness of breath particularly with exertion, denies palpitation, dizziness, syncope or lower extremity swelling   He does snore and wife has noted that he does periodically stop breathing during sleep      Historical Information   Past Medical History:   Diagnosis Date  Congestive heart failure (HCC)     Diabetes mellitus ([facility] )     Hyperlipidemia     Hypertension     Obesity      No past surgical history on file  Social History     Substance and Sexual Activity   Alcohol Use Not Currently     Social History     Substance and Sexual Activity   Drug Use Never     Social History     Tobacco Use   Smoking Status Never Smoker   Smokeless Tobacco Never Used     Meds/Allergies   all current active meds have been reviewed  No Known Allergies     Current Outpatient Medications on File Prior to Visit   Medication Sig Dispense Refill    aspirin (ECOTRIN LOW STRENGTH) 81 mg EC tablet Take 1 tablet (81 mg total) by mouth daily 30 tablet 0    Empagliflozin-metFORMIN HCl (SYNJARDY) 12 5-1000 MG TABS Take 1 tablet by mouth 2 (two) times a day      glimepiride (AMARYL) 2 mg tablet Take 2 mg by mouth every morning before breakfast      Icosapent Ethyl (VASCEPA) 1 g CAPS Take 1 g by mouth      metoprolol succinate (TOPROL-XL) 50 mg 24 hr tablet TAKE 1 TABLET TWICE A  tablet 1    ondansetron (ZOFRAN) 4 mg tablet Take 1 tablet (4 mg total) by mouth every 6 (six) hours (Patient not taking: Reported on 6/10/2021) 12 tablet 0    OZEMPIC, 1 MG/DOSE, 2 MG/1 5ML SOPN       rosuvastatin (CRESTOR) 5 mg tablet Take 5 mg by mouth daily      TRESIBA FLEXTOUCH 100 units/mL injection pen       verapamil (VERELAN PM) 120 MG 24 hr capsule Take 1 capsule (120 mg total) by mouth daily at bedtime 90 capsule 1     No current facility-administered medications on file prior to visit       Objective   Vitals:   Invasive Devices     Peripheral Intravenous Line            Peripheral IV 05/19/20 Right Antecubital 391 days              Physical Exam:  GEN: Cristiano Higgins appears well, alert and oriented x 3, pleasant and cooperative   HEENT: pupils equal, round, and reactive to light; extraocular muscles intact  NECK: supple, no carotid bruits   HEART: regular rhythm, normal S1 and S2, no murmurs, clicks, gallops or rubs   LUNGS: clear to auscultation bilaterally; no wheezes, rales, or rhonchi   ABDOMEN: normal bowel sounds, soft, no tenderness, no distention  EXTREMITIES: peripheral pulses normal; no clubbing, cyanosis, or edema  NEURO: no focal findings   SKIN: normal without suspicious lesions on exposed skin    Lab Results:   No visits with results within 1 Day(s) from this visit  Latest known visit with results is:   Hospital Outpatient Visit on 04/08/2021   Component Date Value    Protocol Name 04/08/2021 Kerry Cost Time In Exercise Phase 04/08/2021 00:06:00     MAX  SYSTOLIC BP 76/32/7062 933     Max Diastolic Bp 72/10/4210 86     Max Heart Rate 04/08/2021 157     Max Predicted Heart Rate 04/08/2021 174     Reason for Termination 04/08/2021 Fatigue     Target Hr Formular 04/08/2021 (220 - Age)*100%     Chest Pain Statement 04/08/2021 none    Results for Mariya George (MRN [id number]) as of 6/15/2021 06:45   Ref   Range 5/6/2020 05:12   Sodium Latest Ref Range: 136 - 145 mmol/L 138   Potassium Latest Ref Range: 3 5 - 5 3 mmol/L 4 2   Chloride Latest Ref Range: 100 - 108 mmol/L 107   CO2 Latest Ref Range: 21 - 32 mmol/L 26   Anion Gap Latest Ref Range: 4 - 13 mmol/L 5   BUN Latest Ref Range: 5 - 25 mg/dL 15   Creatinine Latest Ref Range: 0 60 - 1 30 mg/dL 1 04   Glucose, Random Latest Ref Range: 65 - 140 mg/dL 165 (H)   GLUCOSE FASTING Latest Ref Range: 65 - 99 mg/dL 165 (H)   Calcium Latest Ref Range: 8 3 - 10 1 mg/dL 9 1   eGFR Latest Units: ml/min/1 73sq m 100   WBC Latest Ref Range: 4 31 - 10 16 Thousand/uL 5 90   Red Blood Cell Count Latest Ref Range: 3 88 - 5 62 Million/uL 5 54   Hemoglobin Latest Ref Range: 12 0 - 17 0 g/dL 15 7   HCT Latest Ref Range: 36 5 - 49 3 % 48 2   MCV Latest Ref Range: 82 - 98 fL 87   MCH Latest Ref Range: 26 8 - 34 3 pg 28 3   MCHC Latest Ref Range: 31 4 - 37 4 g/dL 32 6   RDW Latest Ref Range: 11 6 - 15 1 % 12 9   Platelet Count Latest Ref Range: 149 - 390 Thousands/uL 183   MPV Latest Ref Range: 8 9 - 12 7 fL 9 5   nRBC Latest Units: /100 WBCs 0   Neutrophils % Latest Ref Range: 43 - 75 % 55   Immat GRANS % Latest Ref Range: 0 - 2 % 1   Lymphocytes Relative Latest Ref Range: 14 - 44 % 31   Monocytes Relative Latest Ref Range: 4 - 12 % 9   Eosinophils Latest Ref Range: 0 - 6 % 3   Basophils Relative Latest Ref Range: 0 - 1 % 1   Immature Grans Absolute Latest Ref Range: 0 00 - 0 20 Thousand/uL 0 03   Absolute Neutrophils Latest Ref Range: 1 85 - 7 62 Thousands/µL 3 31   Lymphocytes Absolute Latest Ref Range: 0 60 - 4 47 Thousands/µL 1 81   Absolute Monocytes Latest Ref Range: 0 17 - 1 22 Thousand/µL 0 55   Absolute Eosinophils Latest Ref Range: 0 00 - 0 61 Thousand/µL 0 16   Basophils Absolute Latest Ref Range: 0 00 - 0 10 Thousands/µL 0 04       Imaging: I have personally reviewed pertinent reports  Echo stress test w contrast if indicated  Status: Final result   PACS Images     Show images for Echo stress test w contrast if indicated   Study Result    Narrative & Impression   [facility] [address]  [facility], [address] [facility]  [phone]     Exercise Stress Echocardiography     Study date:  2021     Patient: Chaparro Jalloh  MR number: [id number]  Account number: [de-identified]  : 1975  Age: 55 years  Gender: Male  Study date: 2021  Status: Outpatient  Location: Stress lab  Height: 71 in  Weight: 306 lb  BP: 122// 80 mmHg     Indications: Hypertrophic Cardiomyopathy, Chest Pain, SOB     Diagnosis: I42 9 - Cardiomyopathy, unspecified, R07 9 - Chest pain, unspecified     Sonographer:  TITO [facility]  Interpreting Physician:  Jake Ibarra MD  Referring Physician:  Arelis Pond DO  Group:  [facility]  RN:  Sydnie Ernandez RN     IMPRESSIONS:  Exercise capacity was significantly reduced  Severe shortness of breath was noted at peak exercise   Heart rate and blood pressure responses to exercise were normal  There were frequent isolated and paired ventricular premature complexes at  peak exercise  Findings were consistent with latent obstructive hypertrophic cardiomyopathy  Abnormal study after maximal exercise with reproduction of symptoms      SUMMARY     STRESS RESULTS:  Duration of exercise was 6 min  Maximal work rate was 7 METs  Maximal heart rate during stress was 157 bpm ( 90 % of maximal predicted heart rate)  Target heart rate was achieved  There was no chest pain during stress  SpO2 at rest 96%; SpO2 at peak stress 98%      ECG CONCLUSIONS:  The stress ECG was equivocal for ischemia      BASELINE:  Estimated left ventricular ejection fraction was 75 %       ECHO CONCLUSIONS:  There was no echocardiographic evidence for stress-induced ischemia      HISTORY: The patient is a 55year old [facility] [employer] male  Chest pain status: no chest pain  Coronary artery disease risk factors: dyslipidemia, hypertension, and diabetes mellitus  Cardiovascular history: coronary artery disease  Co-morbidity: obesity  Medications: a beta blocker      REST ECG: Normal sinus rhythm  The ECG showed occasional premature ventricular contractions  There was left atrial enlargement  Nonspecific ST and T wave abnormalities were present      PROCEDURE: The study was performed at the Stress lab  The procedure was explained to the patient and informed consent was obtained  Treadmill exercise testing was performed, using the Tin protocol   Stress and rest echocardiographic  evaluation with 2D imaging, spectral Doppler, and color Doppler was performed from multiple acoustic windows for evaluation of ventricular function      TIN PROTOCOL:  HR bpm SBP mmHg DBP mmHg Symptoms  Baseline 100 122 80 none  Stage 1 139 180 82 none  Stage 2 157 186 86 moderate dyspnea, moderate fatigue  Recovery 1 109 114 74 subsiding  Recovery 2 112 120 76 none     MEDICATIONS GIVEN: No medications or fluids given      STRESS RESULTS: Duration of exercise was 6 min  The patient exercised to protocol stage 2  Maximal work rate was 7 METs  Maximal heart rate during stress was 157 bpm ( 90 % of maximal predicted heart rate)  Target heart rate was achieved  The heart rate response to stress was normal  Maximal systolic blood pressure during stress was 157 mmHg  There was normal resting blood pressure with an appropriate response to stress  The rate-pressure product for the peak heart rate and  blood pressure was 65768  There was no chest pain during stress  SpO2 at rest 96%; SpO2 at peak stress 98%  The stress test was terminated due to achievement of target heart rate, severe dyspnea, and mild fatigue  After the procedure, the  patient was discharged to home      ECG CONCLUSIONS: The stress ECG was equivocal for ischemia  Arrhythmia during stress: isolated premature ventricular beats and pairs of premature ventricular beats      STRESS 2D ECHO RESULTS:     BASELINE: The left ventricle was small  Overall left ventricular systolic function was hyperdynamic  Estimated left ventricular ejection fraction was 75 %       PEAK STRESS: LV size was markedly reduced  Excessive augmentation in LV function      OTHER ECHO FINDINGS: There was severe left ventricular hypertrophy with asymmetrical hypertrophy of the septum  Maximum wall thickness was 20 mm  Systolic anterior motion of the anterior mitral valve leaflet and subvalvular apparatus was noted at baseline with pressure gradients of 22 and 42 mmHg, respectively at rest and during Valsalva maneuver  The gradient increased to 60 mmHg  immediately after exercise    There was trivial tricuspid regurgitation with normal estimated pulmonary systolic pressure      ECHO CONCLUSIONS: There was no echocardiographic evidence for stress-induced ischemia      SYSTEM MEASUREMENT TABLES     CW  TR Vmax: 2 32 m/s  TR maxP 6 mmHg     Prepared and electronically signed by  Krystal Carranza Nayan Mosquera MD  Signed 08-Apr-2021 13:42:52       EKG:     Counseling / Coordination of Care  Total floor / unit time spent today 40 minutes  Greater than 50% of total time was spent with the patient and / or family counseling and / or coordination of care

## 2021-06-18 ENCOUNTER — APPOINTMENT (OUTPATIENT)
Dept: LAB | Facility: CLINIC | Age: 46
End: 2021-06-18
Payer: COMMERCIAL

## 2021-06-18 DIAGNOSIS — E78.49 OTHER HYPERLIPIDEMIA: ICD-10-CM

## 2021-06-18 LAB
25(OH)D3 SERPL-MCNC: 42.6 NG/ML (ref 30–100)
ALBUMIN SERPL BCP-MCNC: 3.8 G/DL (ref 3.5–5)
ALP SERPL-CCNC: 49 U/L (ref 46–116)
ALT SERPL W P-5'-P-CCNC: 48 U/L (ref 12–78)
ANION GAP SERPL CALCULATED.3IONS-SCNC: 3 MMOL/L (ref 4–13)
AST SERPL W P-5'-P-CCNC: 24 U/L (ref 5–45)
BASOPHILS # BLD AUTO: 0.07 THOUSANDS/ΜL (ref 0–0.1)
BASOPHILS NFR BLD AUTO: 1 % (ref 0–1)
BILIRUB SERPL-MCNC: 0.57 MG/DL (ref 0.2–1)
BUN SERPL-MCNC: 16 MG/DL (ref 5–25)
CALCIUM SERPL-MCNC: 9.5 MG/DL (ref 8.3–10.1)
CHLORIDE SERPL-SCNC: 107 MMOL/L (ref 100–108)
CHOLEST SERPL-MCNC: 140 MG/DL (ref 50–200)
CO2 SERPL-SCNC: 29 MMOL/L (ref 21–32)
CREAT SERPL-MCNC: 0.89 MG/DL (ref 0.6–1.3)
EOSINOPHIL # BLD AUTO: 0.1 THOUSAND/ΜL (ref 0–0.61)
EOSINOPHIL NFR BLD AUTO: 2 % (ref 0–6)
ERYTHROCYTE [DISTWIDTH] IN BLOOD BY AUTOMATED COUNT: 12.7 % (ref 11.6–15.1)
EST. AVERAGE GLUCOSE BLD GHB EST-MCNC: 269 MG/DL
GFR SERPL CREATININE-BSD FRML MDRD: 119 ML/MIN/1.73SQ M
GLUCOSE P FAST SERPL-MCNC: 160 MG/DL (ref 65–99)
HBA1C MFR BLD: 11 %
HCT VFR BLD AUTO: 46.8 % (ref 36.5–49.3)
HDLC SERPL-MCNC: 32 MG/DL
HGB BLD-MCNC: 15.6 G/DL (ref 12–17)
IMM GRANULOCYTES # BLD AUTO: 0.01 THOUSAND/UL (ref 0–0.2)
IMM GRANULOCYTES NFR BLD AUTO: 0 % (ref 0–2)
LDLC SERPL CALC-MCNC: 79 MG/DL (ref 0–100)
LYMPHOCYTES # BLD AUTO: 1.75 THOUSANDS/ΜL (ref 0.6–4.47)
LYMPHOCYTES NFR BLD AUTO: 32 % (ref 14–44)
MCH RBC QN AUTO: 28.6 PG (ref 26.8–34.3)
MCHC RBC AUTO-ENTMCNC: 33.3 G/DL (ref 31.4–37.4)
MCV RBC AUTO: 86 FL (ref 82–98)
MONOCYTES # BLD AUTO: 0.41 THOUSAND/ΜL (ref 0.17–1.22)
MONOCYTES NFR BLD AUTO: 7 % (ref 4–12)
NEUTROPHILS # BLD AUTO: 3.21 THOUSANDS/ΜL (ref 1.85–7.62)
NEUTS SEG NFR BLD AUTO: 58 % (ref 43–75)
NRBC BLD AUTO-RTO: 0 /100 WBCS
PLATELET # BLD AUTO: 195 THOUSANDS/UL (ref 149–390)
PMV BLD AUTO: 10 FL (ref 8.9–12.7)
POTASSIUM SERPL-SCNC: 4.3 MMOL/L (ref 3.5–5.3)
PROT SERPL-MCNC: 7.7 G/DL (ref 6.4–8.2)
RBC # BLD AUTO: 5.46 MILLION/UL (ref 3.88–5.62)
SODIUM SERPL-SCNC: 139 MMOL/L (ref 136–145)
TRIGL SERPL-MCNC: 146 MG/DL
WBC # BLD AUTO: 5.55 THOUSAND/UL (ref 4.31–10.16)

## 2021-06-18 PROCEDURE — 80053 COMPREHEN METABOLIC PANEL: CPT | Performed by: INTERNAL MEDICINE

## 2021-06-18 PROCEDURE — 85025 COMPLETE CBC W/AUTO DIFF WBC: CPT | Performed by: INTERNAL MEDICINE

## 2021-06-18 PROCEDURE — 80061 LIPID PANEL: CPT

## 2021-06-18 PROCEDURE — 83036 HEMOGLOBIN GLYCOSYLATED A1C: CPT | Performed by: INTERNAL MEDICINE

## 2021-06-18 PROCEDURE — 82306 VITAMIN D 25 HYDROXY: CPT | Performed by: INTERNAL MEDICINE

## 2021-06-18 PROCEDURE — 36415 COLL VENOUS BLD VENIPUNCTURE: CPT | Performed by: INTERNAL MEDICINE

## 2021-06-22 NOTE — PROGRESS NOTES
Reviewed denis's recent labs, which showed Hgb A1C 11%  Consult for endocrinology is placed to help with management of diabetes mellitus type 2  I called  Maciel Shelly to update him on the plan, he is in agreement

## 2021-08-16 ENCOUNTER — DOCUMENTATION (OUTPATIENT)
Dept: CARDIOLOGY CLINIC | Facility: CLINIC | Age: 46
End: 2021-08-16

## 2021-08-16 NOTE — PROGRESS NOTES
[place] [facility]-Attending Physicians Statement and Provider Statement of Physical Abilities faxed to: [phone]

## 2021-09-18 DIAGNOSIS — I42.2 HYPERTROPHIC CARDIOMYOPATHY (HCC): ICD-10-CM

## 2021-09-21 RX ORDER — VERAPAMIL HYDROCHLORIDE 120 MG/1
CAPSULE, EXTENDED RELEASE ORAL
Qty: 90 CAPSULE | Refills: 1 | Status: SHIPPED | OUTPATIENT
Start: 2021-09-21 | End: 2021-10-30 | Stop reason: SDUPTHER

## 2021-10-06 ENCOUNTER — DOCUMENTATION (OUTPATIENT)
Dept: CARDIOLOGY CLINIC | Facility: CLINIC | Age: 46
End: 2021-10-06

## 2021-10-12 ENCOUNTER — OFFICE VISIT (OUTPATIENT)
Dept: CARDIAC SURGERY | Facility: CLINIC | Age: 46
End: 2021-10-12
Payer: COMMERCIAL

## 2021-10-12 VITALS
SYSTOLIC BLOOD PRESSURE: 138 MMHG | OXYGEN SATURATION: 95 % | RESPIRATION RATE: 18 BRPM | HEIGHT: 71 IN | WEIGHT: 305.9 LBS | DIASTOLIC BLOOD PRESSURE: 80 MMHG | HEART RATE: 97 BPM | BODY MASS INDEX: 42.82 KG/M2

## 2021-10-12 DIAGNOSIS — E78.49 OTHER HYPERLIPIDEMIA: Primary | ICD-10-CM

## 2021-10-12 DIAGNOSIS — I10 HYPERTENSION, UNSPECIFIED TYPE: ICD-10-CM

## 2021-10-12 DIAGNOSIS — I25.118 CORONARY ARTERY DISEASE OF NATIVE ARTERY OF NATIVE HEART WITH STABLE ANGINA PECTORIS (HCC): ICD-10-CM

## 2021-10-12 DIAGNOSIS — I15.8 OTHER SECONDARY HYPERTENSION: ICD-10-CM

## 2021-10-12 DIAGNOSIS — I42.2 HYPERTROPHIC CARDIOMYOPATHY (HCC): ICD-10-CM

## 2021-10-12 DIAGNOSIS — M25.512 LEFT SHOULDER PAIN, UNSPECIFIED CHRONICITY: ICD-10-CM

## 2021-10-12 PROCEDURE — 99215 OFFICE O/P EST HI 40 MIN: CPT | Performed by: INTERNAL MEDICINE

## 2021-10-12 RX ORDER — BLOOD PRESSURE TEST KIT
KIT MISCELLANEOUS 2 TIMES DAILY
Qty: 1 KIT | Refills: 2 | Status: SHIPPED | OUTPATIENT
Start: 2021-10-12

## 2021-10-25 ENCOUNTER — HOSPITAL ENCOUNTER (OUTPATIENT)
Facility: HOSPITAL | Age: 46
Setting detail: OBSERVATION
Discharge: HOME | End: 2021-10-26
Attending: EMERGENCY MEDICINE | Admitting: INTERNAL MEDICINE
Payer: COMMERCIAL

## 2021-10-25 ENCOUNTER — APPOINTMENT (EMERGENCY)
Dept: RADIOLOGY | Facility: HOSPITAL | Age: 46
End: 2021-10-25
Payer: COMMERCIAL

## 2021-10-25 DIAGNOSIS — E66.9 OBESITY WITH SERIOUS COMORBIDITY: ICD-10-CM

## 2021-10-25 DIAGNOSIS — R07.9 CHEST PAIN: Primary | ICD-10-CM

## 2021-10-25 LAB
ALBUMIN SERPL BCP-MCNC: 3.6 G/DL (ref 3.5–5)
ALP SERPL-CCNC: 57 U/L (ref 46–116)
ALT SERPL W P-5'-P-CCNC: 49 U/L (ref 12–78)
ANION GAP SERPL CALCULATED.3IONS-SCNC: 3 MMOL/L (ref 4–13)
AST SERPL W P-5'-P-CCNC: 26 U/L (ref 5–45)
ATRIAL RATE: 93 BPM
BASOPHILS # BLD AUTO: 0.04 THOUSANDS/ΜL (ref 0–0.1)
BASOPHILS NFR BLD AUTO: 1 % (ref 0–1)
BILIRUB SERPL-MCNC: 0.39 MG/DL (ref 0.2–1)
BUN SERPL-MCNC: 22 MG/DL (ref 5–25)
CALCIUM SERPL-MCNC: 9.7 MG/DL (ref 8.3–10.1)
CHLORIDE SERPL-SCNC: 105 MMOL/L (ref 100–108)
CO2 SERPL-SCNC: 27 MMOL/L (ref 21–32)
CREAT SERPL-MCNC: 1.13 MG/DL (ref 0.6–1.3)
EOSINOPHIL # BLD AUTO: 0.11 THOUSAND/ΜL (ref 0–0.61)
EOSINOPHIL NFR BLD AUTO: 2 % (ref 0–6)
ERYTHROCYTE [DISTWIDTH] IN BLOOD BY AUTOMATED COUNT: 12.2 % (ref 11.6–15.1)
FLUAV RNA RESP QL NAA+PROBE: NEGATIVE
FLUBV RNA RESP QL NAA+PROBE: NEGATIVE
GFR SERPL CREATININE-BSD FRML MDRD: 90 ML/MIN/1.73SQ M
GLUCOSE SERPL-MCNC: 155 MG/DL (ref 65–140)
GLUCOSE SERPL-MCNC: 245 MG/DL (ref 65–140)
HCT VFR BLD AUTO: 44.5 % (ref 36.5–49.3)
HGB BLD-MCNC: 15 G/DL (ref 12–17)
IMM GRANULOCYTES # BLD AUTO: 0.03 THOUSAND/UL (ref 0–0.2)
IMM GRANULOCYTES NFR BLD AUTO: 0 % (ref 0–2)
LYMPHOCYTES # BLD AUTO: 2.1 THOUSANDS/ΜL (ref 0.6–4.47)
LYMPHOCYTES NFR BLD AUTO: 30 % (ref 14–44)
MCH RBC QN AUTO: 28.5 PG (ref 26.8–34.3)
MCHC RBC AUTO-ENTMCNC: 33.7 G/DL (ref 31.4–37.4)
MCV RBC AUTO: 84 FL (ref 82–98)
MONOCYTES # BLD AUTO: 0.5 THOUSAND/ΜL (ref 0.17–1.22)
MONOCYTES NFR BLD AUTO: 7 % (ref 4–12)
NEUTROPHILS # BLD AUTO: 4.19 THOUSANDS/ΜL (ref 1.85–7.62)
NEUTS SEG NFR BLD AUTO: 60 % (ref 43–75)
NRBC BLD AUTO-RTO: 0 /100 WBCS
P AXIS: 63 DEGREES
PLATELET # BLD AUTO: 187 THOUSANDS/UL (ref 149–390)
PMV BLD AUTO: 9.9 FL (ref 8.9–12.7)
POTASSIUM SERPL-SCNC: 4.1 MMOL/L (ref 3.5–5.3)
PR INTERVAL: 206 MS
PROT SERPL-MCNC: 8 G/DL (ref 6.4–8.2)
QRS AXIS: -60 DEGREES
QRSD INTERVAL: 118 MS
QT INTERVAL: 354 MS
QTC INTERVAL: 440 MS
RBC # BLD AUTO: 5.27 MILLION/UL (ref 3.88–5.62)
RSV RNA RESP QL NAA+PROBE: NEGATIVE
SARS-COV-2 RNA RESP QL NAA+PROBE: NEGATIVE
SODIUM SERPL-SCNC: 135 MMOL/L (ref 136–145)
T WAVE AXIS: 130 DEGREES
TROPONIN I SERPL-MCNC: <0.02 NG/ML
TROPONIN I SERPL-MCNC: <0.02 NG/ML
VENTRICULAR RATE: 93 BPM
WBC # BLD AUTO: 6.97 THOUSAND/UL (ref 4.31–10.16)

## 2021-10-25 PROCEDURE — 93010 ELECTROCARDIOGRAM REPORT: CPT | Performed by: INTERNAL MEDICINE

## 2021-10-25 PROCEDURE — 99285 EMERGENCY DEPT VISIT HI MDM: CPT | Performed by: EMERGENCY MEDICINE

## 2021-10-25 PROCEDURE — 71045 X-RAY EXAM CHEST 1 VIEW: CPT

## 2021-10-25 PROCEDURE — 82948 REAGENT STRIP/BLOOD GLUCOSE: CPT

## 2021-10-25 PROCEDURE — 80053 COMPREHEN METABOLIC PANEL: CPT | Performed by: EMERGENCY MEDICINE

## 2021-10-25 PROCEDURE — 84484 ASSAY OF TROPONIN QUANT: CPT | Performed by: PHYSICIAN ASSISTANT

## 2021-10-25 PROCEDURE — 85025 COMPLETE CBC W/AUTO DIFF WBC: CPT | Performed by: EMERGENCY MEDICINE

## 2021-10-25 PROCEDURE — 93005 ELECTROCARDIOGRAM TRACING: CPT

## 2021-10-25 PROCEDURE — 99285 EMERGENCY DEPT VISIT HI MDM: CPT

## 2021-10-25 PROCEDURE — 99220 PR INITIAL OBSERVATION CARE/DAY 70 MINUTES: CPT | Performed by: PHYSICIAN ASSISTANT

## 2021-10-25 PROCEDURE — 36415 COLL VENOUS BLD VENIPUNCTURE: CPT

## 2021-10-25 PROCEDURE — 0241U HB NFCT DS VIR RESP RNA 4 TRGT: CPT | Performed by: EMERGENCY MEDICINE

## 2021-10-25 PROCEDURE — 84484 ASSAY OF TROPONIN QUANT: CPT | Performed by: EMERGENCY MEDICINE

## 2021-10-25 RX ORDER — OMEGA-3-ACID ETHYL ESTERS 1 G/1
1 CAPSULE, LIQUID FILLED ORAL DAILY
Status: DISCONTINUED | OUTPATIENT
Start: 2021-10-26 | End: 2021-10-26 | Stop reason: CLARIF

## 2021-10-25 RX ORDER — ASPIRIN 81 MG/1
324 TABLET, CHEWABLE ORAL ONCE
Status: COMPLETED | OUTPATIENT
Start: 2021-10-25 | End: 2021-10-25

## 2021-10-25 RX ORDER — MAGNESIUM HYDROXIDE/ALUMINUM HYDROXICE/SIMETHICONE 120; 1200; 1200 MG/30ML; MG/30ML; MG/30ML
30 SUSPENSION ORAL EVERY 4 HOURS PRN
Status: DISCONTINUED | OUTPATIENT
Start: 2021-10-25 | End: 2021-10-26 | Stop reason: HOSPADM

## 2021-10-25 RX ORDER — ASPIRIN 81 MG/1
81 TABLET ORAL DAILY
Status: DISCONTINUED | OUTPATIENT
Start: 2021-10-26 | End: 2021-10-26 | Stop reason: HOSPADM

## 2021-10-25 RX ORDER — KETOROLAC TROMETHAMINE 30 MG/ML
15 INJECTION, SOLUTION INTRAMUSCULAR; INTRAVENOUS EVERY 6 HOURS PRN
Status: DISCONTINUED | OUTPATIENT
Start: 2021-10-25 | End: 2021-10-26 | Stop reason: HOSPADM

## 2021-10-25 RX ORDER — ATORVASTATIN CALCIUM 10 MG/1
10 TABLET, FILM COATED ORAL
Status: DISCONTINUED | OUTPATIENT
Start: 2021-10-26 | End: 2021-10-26 | Stop reason: HOSPADM

## 2021-10-25 RX ORDER — PRAVASTATIN SODIUM 40 MG
40 TABLET ORAL
Status: DISCONTINUED | OUTPATIENT
Start: 2021-10-26 | End: 2021-10-25 | Stop reason: SDUPTHER

## 2021-10-25 RX ORDER — METOPROLOL SUCCINATE 50 MG/1
50 TABLET, EXTENDED RELEASE ORAL 2 TIMES DAILY
Status: DISCONTINUED | OUTPATIENT
Start: 2021-10-25 | End: 2021-10-26 | Stop reason: HOSPADM

## 2021-10-25 RX ADMIN — ASPIRIN 81 MG CHEWABLE TABLET 324 MG: 81 TABLET CHEWABLE at 20:03

## 2021-10-26 ENCOUNTER — DOCUMENTATION (OUTPATIENT)
Dept: CARDIOLOGY CLINIC | Facility: CLINIC | Age: 46
End: 2021-10-26

## 2021-10-26 VITALS
OXYGEN SATURATION: 96 % | TEMPERATURE: 98.1 F | RESPIRATION RATE: 18 BRPM | HEIGHT: 71 IN | BODY MASS INDEX: 43.43 KG/M2 | HEART RATE: 92 BPM | WEIGHT: 310.2 LBS | DIASTOLIC BLOOD PRESSURE: 74 MMHG | SYSTOLIC BLOOD PRESSURE: 121 MMHG

## 2021-10-26 LAB
ANION GAP SERPL CALCULATED.3IONS-SCNC: 3 MMOL/L (ref 4–13)
BUN SERPL-MCNC: 19 MG/DL (ref 5–25)
CALCIUM SERPL-MCNC: 9.3 MG/DL (ref 8.3–10.1)
CHLORIDE SERPL-SCNC: 107 MMOL/L (ref 100–108)
CO2 SERPL-SCNC: 27 MMOL/L (ref 21–32)
CREAT SERPL-MCNC: 1 MG/DL (ref 0.6–1.3)
GFR SERPL CREATININE-BSD FRML MDRD: 104 ML/MIN/1.73SQ M
GLUCOSE SERPL-MCNC: 154 MG/DL (ref 65–140)
GLUCOSE SERPL-MCNC: 165 MG/DL (ref 65–140)
GLUCOSE SERPL-MCNC: 185 MG/DL (ref 65–140)
MAGNESIUM SERPL-MCNC: 2.3 MG/DL (ref 1.6–2.6)
PHOSPHATE SERPL-MCNC: 4 MG/DL (ref 2.7–4.5)
POTASSIUM SERPL-SCNC: 3.7 MMOL/L (ref 3.5–5.3)
SODIUM SERPL-SCNC: 137 MMOL/L (ref 136–145)
TROPONIN I SERPL-MCNC: <0.02 NG/ML
TROPONIN I SERPL-MCNC: <0.02 NG/ML
TSH SERPL DL<=0.05 MIU/L-ACNC: 2.1 UIU/ML (ref 0.36–3.74)

## 2021-10-26 PROCEDURE — 84484 ASSAY OF TROPONIN QUANT: CPT | Performed by: PHYSICIAN ASSISTANT

## 2021-10-26 PROCEDURE — 99215 OFFICE O/P EST HI 40 MIN: CPT | Performed by: INTERNAL MEDICINE

## 2021-10-26 PROCEDURE — 82948 REAGENT STRIP/BLOOD GLUCOSE: CPT

## 2021-10-26 PROCEDURE — 84100 ASSAY OF PHOSPHORUS: CPT | Performed by: PHYSICIAN ASSISTANT

## 2021-10-26 PROCEDURE — 84484 ASSAY OF TROPONIN QUANT: CPT | Performed by: STUDENT IN AN ORGANIZED HEALTH CARE EDUCATION/TRAINING PROGRAM

## 2021-10-26 PROCEDURE — 80048 BASIC METABOLIC PNL TOTAL CA: CPT | Performed by: PHYSICIAN ASSISTANT

## 2021-10-26 PROCEDURE — 84443 ASSAY THYROID STIM HORMONE: CPT | Performed by: PHYSICIAN ASSISTANT

## 2021-10-26 PROCEDURE — 83735 ASSAY OF MAGNESIUM: CPT | Performed by: PHYSICIAN ASSISTANT

## 2021-10-26 PROCEDURE — 99217 PR OBSERVATION CARE DISCHARGE MANAGEMENT: CPT | Performed by: INTERNAL MEDICINE

## 2021-10-26 RX ORDER — CHLORAL HYDRATE 500 MG
1000 CAPSULE ORAL DAILY
Status: DISCONTINUED | OUTPATIENT
Start: 2021-10-26 | End: 2021-10-26 | Stop reason: HOSPADM

## 2021-10-26 RX ADMIN — INSULIN LISPRO 2 UNITS: 100 INJECTION, SOLUTION INTRAVENOUS; SUBCUTANEOUS at 00:39

## 2021-10-26 RX ADMIN — ASPIRIN 81 MG: 81 TABLET, COATED ORAL at 09:43

## 2021-10-26 RX ADMIN — OMEGA-3 FATTY ACIDS CAP 1000 MG 1 G: 1000 CAP at 09:44

## 2021-10-26 RX ADMIN — INSULIN LISPRO 2 UNITS: 100 INJECTION, SOLUTION INTRAVENOUS; SUBCUTANEOUS at 12:14

## 2021-10-26 RX ADMIN — ATORVASTATIN CALCIUM 10 MG: 10 TABLET, FILM COATED ORAL at 16:51

## 2021-10-26 RX ADMIN — INSULIN LISPRO 2 UNITS: 100 INJECTION, SOLUTION INTRAVENOUS; SUBCUTANEOUS at 06:21

## 2021-10-26 RX ADMIN — ENOXAPARIN SODIUM 40 MG: 40 INJECTION SUBCUTANEOUS at 09:45

## 2021-10-26 RX ADMIN — METOPROLOL SUCCINATE 50 MG: 50 TABLET, EXTENDED RELEASE ORAL at 09:43

## 2021-10-30 DIAGNOSIS — I42.2 HYPERTROPHIC CARDIOMYOPATHY (HCC): ICD-10-CM

## 2021-11-02 RX ORDER — METOPROLOL SUCCINATE 50 MG/1
50 TABLET, EXTENDED RELEASE ORAL 2 TIMES DAILY
Qty: 180 TABLET | Refills: 1 | Status: SHIPPED | OUTPATIENT
Start: 2021-11-02

## 2021-11-02 RX ORDER — VERAPAMIL HYDROCHLORIDE 120 MG/1
120 CAPSULE, EXTENDED RELEASE ORAL
Qty: 90 CAPSULE | Refills: 1 | Status: SHIPPED | OUTPATIENT
Start: 2021-11-02

## 2021-11-03 ENCOUNTER — DOCUMENTATION (OUTPATIENT)
Dept: CARDIOLOGY CLINIC | Facility: CLINIC | Age: 46
End: 2021-11-03

## 2021-12-10 ENCOUNTER — OFFICE VISIT (OUTPATIENT)
Dept: CARDIAC SURGERY | Facility: CLINIC | Age: 46
End: 2021-12-10
Payer: COMMERCIAL

## 2021-12-10 VITALS
WEIGHT: 305.4 LBS | TEMPERATURE: 98.5 F | HEIGHT: 71 IN | OXYGEN SATURATION: 96 % | HEART RATE: 85 BPM | BODY MASS INDEX: 42.76 KG/M2 | RESPIRATION RATE: 18 BRPM | SYSTOLIC BLOOD PRESSURE: 114 MMHG | DIASTOLIC BLOOD PRESSURE: 76 MMHG

## 2021-12-10 DIAGNOSIS — I10 HYPERTENSION, UNSPECIFIED TYPE: ICD-10-CM

## 2021-12-10 DIAGNOSIS — I42.2 HYPERTROPHIC CARDIOMYOPATHY (HCC): ICD-10-CM

## 2021-12-10 DIAGNOSIS — E78.49 OTHER HYPERLIPIDEMIA: Primary | ICD-10-CM

## 2021-12-10 DIAGNOSIS — I25.118 CORONARY ARTERY DISEASE OF NATIVE ARTERY OF NATIVE HEART WITH STABLE ANGINA PECTORIS (HCC): ICD-10-CM

## 2021-12-10 PROCEDURE — 99215 OFFICE O/P EST HI 40 MIN: CPT | Performed by: INTERNAL MEDICINE

## 2021-12-16 ENCOUNTER — CONSULT (OUTPATIENT)
Dept: ENDOCRINOLOGY | Facility: CLINIC | Age: 46
End: 2021-12-16
Payer: COMMERCIAL

## 2021-12-16 VITALS
BODY MASS INDEX: 43.29 KG/M2 | SYSTOLIC BLOOD PRESSURE: 124 MMHG | HEART RATE: 81 BPM | WEIGHT: 309.2 LBS | HEIGHT: 71 IN | DIASTOLIC BLOOD PRESSURE: 80 MMHG

## 2021-12-16 DIAGNOSIS — E11.65 TYPE 2 DIABETES MELLITUS WITH HYPERGLYCEMIA, WITH LONG-TERM CURRENT USE OF INSULIN (HCC): ICD-10-CM

## 2021-12-16 DIAGNOSIS — Z79.4 TYPE 2 DIABETES MELLITUS WITH HYPERGLYCEMIA, WITH LONG-TERM CURRENT USE OF INSULIN (HCC): Primary | ICD-10-CM

## 2021-12-16 DIAGNOSIS — IMO0002 UNCONTROLLED TYPE 2 DIABETES MELLITUS WITH CIRCULATORY DISORDER, WITH LONG-TERM CURRENT USE OF INSULIN: ICD-10-CM

## 2021-12-16 DIAGNOSIS — I10 ESSENTIAL HYPERTENSION: ICD-10-CM

## 2021-12-16 DIAGNOSIS — E11.65 TYPE 2 DIABETES MELLITUS WITH HYPERGLYCEMIA, WITH LONG-TERM CURRENT USE OF INSULIN (HCC): Primary | ICD-10-CM

## 2021-12-16 DIAGNOSIS — Z79.4 CURRENT USE OF INSULIN (HCC): Primary | ICD-10-CM

## 2021-12-16 DIAGNOSIS — E78.49 OTHER HYPERLIPIDEMIA: ICD-10-CM

## 2021-12-16 DIAGNOSIS — Z79.4 TYPE 2 DIABETES MELLITUS WITH HYPERGLYCEMIA, WITH LONG-TERM CURRENT USE OF INSULIN (HCC): ICD-10-CM

## 2021-12-16 LAB — SL AMB POCT HEMOGLOBIN AIC: 12.6 (ref ?–6.5)

## 2021-12-16 PROCEDURE — 83036 HEMOGLOBIN GLYCOSYLATED A1C: CPT

## 2021-12-16 PROCEDURE — 99244 OFF/OP CNSLTJ NEW/EST MOD 40: CPT | Performed by: INTERNAL MEDICINE

## 2021-12-16 RX ORDER — BLOOD-GLUCOSE METER
EACH MISCELLANEOUS
Qty: 1 KIT | Refills: 1 | Status: SHIPPED | OUTPATIENT
Start: 2021-12-16 | End: 2022-01-12 | Stop reason: SDUPTHER

## 2021-12-16 RX ORDER — LANCETS
EACH MISCELLANEOUS
Qty: 200 EACH | Refills: 2 | Status: SHIPPED | OUTPATIENT
Start: 2021-12-16 | End: 2022-01-12 | Stop reason: SDUPTHER

## 2021-12-16 RX ORDER — BLOOD SUGAR DIAGNOSTIC
STRIP MISCELLANEOUS
Qty: 400 STRIP | Refills: 2 | Status: SHIPPED | OUTPATIENT
Start: 2021-12-16 | End: 2022-01-12 | Stop reason: SDUPTHER

## 2022-01-05 ENCOUNTER — OFFICE VISIT (OUTPATIENT)
Dept: DIABETES SERVICES | Facility: CLINIC | Age: 47
End: 2022-01-05
Payer: COMMERCIAL

## 2022-01-05 VITALS — WEIGHT: 309 LBS | BODY MASS INDEX: 43.1 KG/M2

## 2022-01-05 DIAGNOSIS — Z79.4 TYPE 2 DIABETES MELLITUS WITH HYPERGLYCEMIA, WITH LONG-TERM CURRENT USE OF INSULIN (HCC): Primary | ICD-10-CM

## 2022-01-05 DIAGNOSIS — E11.65 TYPE 2 DIABETES MELLITUS WITH HYPERGLYCEMIA, WITH LONG-TERM CURRENT USE OF INSULIN (HCC): Primary | ICD-10-CM

## 2022-01-05 PROCEDURE — 97802 MEDICAL NUTRITION INDIV IN: CPT | Performed by: DIETITIAN, REGISTERED

## 2022-01-05 NOTE — PATIENT INSTRUCTIONS
1  Consume 3 meals a day about 4-5 hours apart (no meal skipping)  2  60 grams of carbs per meal and no more than 15 grams per snack

## 2022-01-05 NOTE — PROGRESS NOTES
Medical Nutrition Therapy        Assessment    Visit Type: Initial visit  Chief complaint/Medical Diagnosis/reason for visit E11 65 (T2DM with hyperglycemia with insulin)    JOANNE Garza was seen today for his initial MNT visit  Patient reports that he was diagnosed with diabetes 20 years ago  He admits to occasionally consuming a regular soda or Gatorade  Severiano Garza tries to make healthy food choices  Problems identified in food recall include inconsistent carbohydrate intake at meals and poorly timed meals/meal skipping  Provided patient with guidelines for a 2000 calorie meal plan to assist with consistency, balance and portion control  Encouraged the consumption of regular meals at regular times 4-5 hours apart (no meal skipping)  Advised patient to keep carbohydrate intake to 60 grams per meal and 15 grams per snack to assist with glycemic control  Suggested keeping protein intake to 10 ounces a day and fat to 5 servings daily to assist with lipid management and calorie control  Portion booklet and food labels were used to teach basic carbohydrate counting  Severiano Garza will discuss safe exercise with his PCP secondary to back issues  Patient agreed to keep daily food logs  Follow-up TBD  RD will remain available for further dietary questions/concerns       Ht Readings from Last 1 Encounters:   12/16/21 5' 11" (1 803 m)     Wt Readings from Last 3 Encounters:   01/05/22 (!) 140 kg (309 lb)   12/16/21 (!) 140 kg (309 lb 3 2 oz)   12/10/21 (!) 139 kg (305 lb 6 4 oz)     Weight Change: No    Barriers to Learning: no barriers    Do you follow any special diet presently?: No, but tries to make healthy choices  Who shops: patient and spouse  Who cooks: patient and spouse    Food Log: Completed via the method of food recall    Breakfast:9:00-11:30am skips 2-3 times a week 2-3 eggs, sausage or scrapple, no toast, but sometimes 1 cup of grits OR a protein meal replacement shake (once a week) water or diet soda  Morning Snack:none  Lunch:2:00-3:30PM SKIPS 4 times a week: 1 can of navy bean soup and a turkey or chicken and swiss on 2 slices of wheat or rye bread and tomato slices and guerrero OR out: subway 6" tuna hoagie on wheat; diet soda  Afternoon Snack: none  Dinner:7:00PM 6 oz meat, chicken,3 oz of fish, 2-3 nights no starch or 1/2 cup potato or noodle or rice, 2 cups corn or peas or non-starchy veggies OR 1 cup pasta and meat sauce, veggies; diet soda or water  Evening Snack:10:00-11:00PM 6 Ritz crackers and 1-2 oz cheese, sometimes pepperoni or salami  Beverages: water and diet soda  Eating out/Take out:4-5 times a week (between lunch and dinner)  Exercise walks for 30-45 minutes daily    Calorie needs 9703-6525 kcals/day Carbs: 60g/meal, 15g/snack     Fat: 5 servings/day    Protein:10 servings/day    Nutrition Diagnosis:  Food and nutrition related knowledge deficit  related to Lack of prior exposure to accurate nutrition related information as evidenced by No prior knowledge of need for food and nutrition related recommendations    Intervention: label reading, behavior modification strategies, carbohydrate counting, meal timing, meal planning, individualized meal plan, monitoring portion control and food diary     Treatment Goals: Patient will consume 3 meals a day and Patient will count carbohydrates    Monitoring and evaluation:    Term code indicator  FH 1 3 2 Food Intake Criteria: Consume 3 meals a day about 4-5 hours apart (no meal skipping)  Term code indicator  FH 1 6 3 Carbohydrate Intake Criteria: 60 grams of carbs per meal and no more than 15 grams per snack  Materials Provided: Portion booklet and food logs    Patients Response to Instruction:  Cecilio Bhatia  Expected Compliancegood    Start- Stop: 2:20-3:20  Total Minutes: 60 Minutes  Group or Individual Instruction: MNT-I  Other: Dr Pedro Mayer    Thank you for coming to the [facility] for education today    Please feel free to call with any questions or concerns      Roge Broderick  [address] [place] [phone]

## 2022-01-12 DIAGNOSIS — Z79.4 CURRENT USE OF INSULIN (HCC): ICD-10-CM

## 2022-01-12 DIAGNOSIS — Z79.4 TYPE 2 DIABETES MELLITUS WITH HYPERGLYCEMIA, WITH LONG-TERM CURRENT USE OF INSULIN (HCC): ICD-10-CM

## 2022-01-12 DIAGNOSIS — E11.65 TYPE 2 DIABETES MELLITUS WITH HYPERGLYCEMIA, WITH LONG-TERM CURRENT USE OF INSULIN (HCC): ICD-10-CM

## 2022-01-12 RX ORDER — BLOOD-GLUCOSE METER
EACH MISCELLANEOUS
Qty: 1 KIT | Refills: 1 | Status: SHIPPED | OUTPATIENT
Start: 2022-01-12

## 2022-01-12 RX ORDER — LANCETS
EACH MISCELLANEOUS
Qty: 400 EACH | Refills: 2 | Status: SHIPPED | OUTPATIENT
Start: 2022-01-12

## 2022-01-12 RX ORDER — BLOOD SUGAR DIAGNOSTIC
STRIP MISCELLANEOUS
Qty: 400 STRIP | Refills: 2 | Status: SHIPPED | OUTPATIENT
Start: 2022-01-12

## 2022-01-12 NOTE — TELEPHONE ENCOUNTER
Patient called and would like testing supplies sent to [facility] in  [facility] since he is having problems with [facility]

## 2022-02-15 ENCOUNTER — DOCUMENTATION (OUTPATIENT)
Dept: CARDIOLOGY CLINIC | Facility: CLINIC | Age: 47
End: 2022-02-15

## 2022-02-15 NOTE — PROGRESS NOTES
[employer]CTION[employer] FORMS COMPLETED AND SIGNED BY DR Orlando Sandy (PT APPLYING FOR PERMANENT DISAB)  FAXED [facility] [phone]

## 2022-05-03 ENCOUNTER — DOCUMENTATION (OUTPATIENT)
Dept: CARDIOLOGY CLINIC | Facility: CLINIC | Age: 47
End: 2022-05-03

## 2022-05-04 ENCOUNTER — DOCUMENTATION (OUTPATIENT)
Dept: CARDIOLOGY CLINIC | Facility: CLINIC | Age: 47
End: 2022-05-04

## 2022-05-04 NOTE — PROGRESS NOTES
LETTER DATED 4/14/2022 FROM THE [facility] MEDICAL INFO-FAXED 12/10/2021 [facility] CLINIC NOTE FROM DR Ingrid Krause HC:[phone]

## 2022-08-17 DIAGNOSIS — I42.2 HYPERTROPHIC CARDIOMYOPATHY (HCC): ICD-10-CM

## 2022-08-17 RX ORDER — VERAPAMIL HYDROCHLORIDE 120 MG/1
120 CAPSULE, EXTENDED RELEASE ORAL
Qty: 90 CAPSULE | Refills: 0 | Status: SHIPPED | OUTPATIENT
Start: 2022-08-17

## 2022-08-17 RX ORDER — METOPROLOL SUCCINATE 50 MG/1
50 TABLET, EXTENDED RELEASE ORAL 2 TIMES DAILY
Qty: 180 TABLET | Refills: 0 | Status: SHIPPED | OUTPATIENT
Start: 2022-08-17

## 2022-08-27 ENCOUNTER — HOSPITAL ENCOUNTER (EMERGENCY)
Facility: HOSPITAL | Age: 47
Discharge: HOME | End: 2022-08-27
Attending: EMERGENCY MEDICINE

## 2022-08-27 VITALS
DIASTOLIC BLOOD PRESSURE: 80 MMHG | TEMPERATURE: 97.7 F | HEART RATE: 97 BPM | OXYGEN SATURATION: 98 % | SYSTOLIC BLOOD PRESSURE: 125 MMHG | RESPIRATION RATE: 20 BRPM

## 2022-08-27 DIAGNOSIS — Z79.4 CURRENT USE OF INSULIN (HCC): ICD-10-CM

## 2022-08-27 DIAGNOSIS — L02.91 ABSCESS: Primary | ICD-10-CM

## 2022-08-27 PROCEDURE — 99282 EMERGENCY DEPT VISIT SF MDM: CPT

## 2022-08-27 PROCEDURE — 99284 EMERGENCY DEPT VISIT MOD MDM: CPT | Performed by: EMERGENCY MEDICINE

## 2022-08-27 PROCEDURE — 55100 DRAINAGE OF SCROTUM ABSCESS: CPT | Performed by: EMERGENCY MEDICINE

## 2022-08-27 RX ORDER — CLINDAMYCIN HYDROCHLORIDE 300 MG/1
300 CAPSULE ORAL 4 TIMES DAILY
Qty: 28 CAPSULE | Refills: 0 | Status: SHIPPED | OUTPATIENT
Start: 2022-08-27 | End: 2022-09-03

## 2022-08-27 RX ORDER — CLINDAMYCIN HYDROCHLORIDE 300 MG/1
300 CAPSULE ORAL 4 TIMES DAILY
Qty: 28 CAPSULE | Refills: 0 | Status: SHIPPED | OUTPATIENT
Start: 2022-08-27 | End: 2022-08-27 | Stop reason: SDUPTHER

## 2022-08-27 RX ORDER — CLINDAMYCIN HYDROCHLORIDE 150 MG/1
300 CAPSULE ORAL ONCE
Status: COMPLETED | OUTPATIENT
Start: 2022-08-27 | End: 2022-08-27

## 2022-08-27 RX ORDER — LIDOCAINE HYDROCHLORIDE 10 MG/ML
5 INJECTION, SOLUTION EPIDURAL; INFILTRATION; INTRACAUDAL; PERINEURAL ONCE
Status: COMPLETED | OUTPATIENT
Start: 2022-08-27 | End: 2022-08-27

## 2022-08-27 RX ADMIN — LIDOCAINE HYDROCHLORIDE 5 ML: 10 INJECTION, SOLUTION EPIDURAL; INFILTRATION; INTRACAUDAL; PERINEURAL at 19:26

## 2022-08-27 RX ADMIN — CLINDAMYCIN HYDROCHLORIDE 300 MG: 150 CAPSULE ORAL at 19:55

## 2022-08-27 NOTE — ED PROCEDURE NOTE
Procedure  Incision and drain    Date/Time: 8/27/2022 7:52 PM  Performed by: Amadeo Floyd MD  Authorized by: Amadeo Floyd MD   Universal Protocol:  Procedure performed by:  Consent: Verbal consent obtained  Consent given by: patient  Patient understanding: patient states understanding of the procedure being performed  Patient consent: the patient's understanding of the procedure matches consent given      Patient location:  ED  Location:     Type:  Abscess    Location:  Anogenital    Anogenital location:  Scrotal space  Anesthesia (see MAR for exact dosages): Anesthesia method:  Local infiltration    Local anesthetic:  Lidocaine 1% WITH epi  Procedure details:     Complexity:  Simple    Incision types:  Stab incision    Scalpel blade:  10    Approach:  Open    Incision depth:  Subcutaneous    Wound management:  Probed and deloculated and irrigated with saline    Drainage:  Bloody and purulent    Drainage amount:  Copious    Wound treatment:  Wound left open and packing placed    Packing materials:  1/4 in iodoform gauze  Post-procedure details:     Patient tolerance of procedure:   Tolerated well, no immediate complications                     Amadeo Floyd MD  08/27/22 8621

## 2022-08-27 NOTE — ED ATTENDING ATTESTATION
8/27/2022  IElissa MD, saw and evaluated the patient  I have discussed the patient with the resident/non-physician practitioner and agree with the resident's/non-physician practitioner's findings, Plan of Care, and MDM as documented in the resident's/non-physician practitioner's note, except where noted  All available labs and Radiology studies were reviewed  I was present for key portions of any procedure(s) performed by the resident/non-physician practitioner and I was immediately available to provide assistance  At this point I agree with the current assessment done in the Emergency Department    I have conducted an independent evaluation of this patient a history and physical is as follows:  Pt has abscess in groin for 2 weeks leaking for past week no fevers PE; alert r groin abscess approximately 5 cm with some induration this is draining milky grey liquid MDM: will do I and d pack and start antibiotics  ED Course         Critical Care Time  Procedures

## 2022-08-28 NOTE — DISCHARGE INSTRUCTIONS
You have been evaluated in the Emergency Department today for abscess  Your evaluation, including incision and drainage, suggests that your symptoms are due to an infected abscess requiring antibiotics  We have prescribed one for you to take  Please follow up with your primary care physician within two days  Please call the  about your diabetes  Return to the Emergency Department if you experience worsening of your symptoms  Thank you for choosing us for your care

## 2022-08-28 NOTE — ED PROVIDER NOTES
History  Chief Complaint   Patient presents with    Abscess     Pt presents ambulatory with c/o R groin abscess, +drainage      Patient is a 57-year-old male with past medical history of coronary artery disease and type 2 diabetes presenting with a mass in his left groin for 2 weeks  He describes the mass is occasionally painful and occasionally draining, having drained blood in mucus for the last week and a half  Patient notably has uncontrolled diabetes, due to him not having insurance  He has noticed that the mass has been growing and decided come in to get evaluated  He has had no fevers, no dysuria, no abdominal pain, no chest pain, no shortness of breath, no headache or any other complaint  Prior to Admission Medications   Prescriptions Last Dose Informant Patient Reported? Taking?    Blood Glucose Monitoring Suppl (OneTouch Verio) w/Device KIT   No No   Sig: Use to test 3-4x daily   Blood Pressure KIT  Self No No   Sig: Use 2 (two) times a day   Diclofenac Sodium (VOLTAREN) 1 %  Self No No   Sig: Apply 2 g topically 4 (four) times a day   Icosapent Ethyl (VASCEPA) 1 g CAPS  Self Yes No   Sig: Take 1 g by mouth   Lancets (onetouch ultrasoft) lancets   No No   Sig: Use to tests 3-4x daily   OZEMPIC, 1 MG/DOSE, 2 MG/1 5ML SOPN  Self Yes No   Si mg once a week     TRESIBA FLEXTOUCH 100 units/mL injection pen  Self Yes No   Sig: Inject 70 Units under the skin daily at bedtime     Xigduo XR  MG TB24  Self Yes No   aspirin (ECOTRIN LOW STRENGTH) 81 mg EC tablet  Self No No   Sig: Take 1 tablet (81 mg total) by mouth daily   candesartan (ATACAND) 32 MG tablet  Self Yes No   glimepiride (AMARYL) 2 mg tablet  Self Yes No   Sig: Take 2 mg by mouth every morning before breakfast     glucose blood (OneTouch Verio) test strip   No No   Sig: Use to test 3-4x daily   metoprolol succinate (TOPROL-XL) 50 mg 24 hr tablet   No No   Sig: Take 1 tablet (50 mg total) by mouth 2 (two) times a day rosuvastatin (CRESTOR) 5 mg tablet  Self Yes No   Sig: Take 5 mg by mouth daily   verapamil (VERELAN PM) 120 MG 24 hr capsule   No No   Sig: Take 1 capsule (120 mg total) by mouth daily at bedtime      Facility-Administered Medications: None       Past Medical History:   Diagnosis Date    Congestive heart failure ([facility] )     Diabetes mellitus ([place] [address] )     Hyperlipidemia     Hypertension     Obesity        History reviewed  No pertinent surgical history  Family History   Problem Relation Age of Onset    Diabetes Mother     Stroke Mother     Stroke Father      I have reviewed and agree with the history as documented  E-Cigarette/Vaping    E-Cigarette Use Never User      E-Cigarette/Vaping Substances    Nicotine No     THC No     CBD No     Flavoring No      Social History     Tobacco Use    Smoking status: Never Smoker    Smokeless tobacco: Never Used   Vaping Use    Vaping Use: Never used   Substance Use Topics    Alcohol use: Not Currently    Drug use: Never        Review of Systems   Constitutional: Negative for chills and fever  HENT: Negative for ear pain and sore throat  Eyes: Negative for pain and visual disturbance  Respiratory: Negative for cough and shortness of breath  Cardiovascular: Negative for chest pain and palpitations  Gastrointestinal: Negative for abdominal pain and vomiting  Genitourinary: Negative for dysuria and hematuria  Musculoskeletal: Negative for arthralgias and back pain  Skin: Negative for color change and rash  Neurological: Negative for seizures and syncope  All other systems reviewed and are negative        Physical Exam  ED Triage Vitals [08/27/22 1709]   Temperature Pulse Respirations Blood Pressure SpO2   97 7 °F (36 5 °C) 97 20 125/80 98 %      Temp Source Heart Rate Source Patient Position - Orthostatic VS BP Location FiO2 (%)   Temporal -- -- -- --      Pain Score       --             Orthostatic Vital Signs  Vitals:    08/27/22 9299 BP: 125/80   Pulse: 97       Physical Exam  Vitals and nursing note reviewed  Constitutional:       Appearance: He is well-developed  HENT:      Head: Normocephalic and atraumatic  Eyes:      Conjunctiva/sclera: Conjunctivae normal    Cardiovascular:      Rate and Rhythm: Normal rate and regular rhythm  Heart sounds: No murmur heard  Pulmonary:      Effort: Pulmonary effort is normal  No respiratory distress  Breath sounds: Normal breath sounds  Abdominal:      Palpations: Abdomen is soft  Tenderness: There is no abdominal tenderness  Musculoskeletal:      Cervical back: Neck supple  Skin:     General: Skin is warm and dry  Neurological:      Mental Status: He is alert  ED Medications  Medications   lidocaine (PF) (XYLOCAINE-MPF) 1 % injection 5 mL (5 mL Infiltration Given by Other 8/27/22 1926)   clindamycin (CLEOCIN) capsule 300 mg (300 mg Oral Given 8/27/22 1955)       Diagnostic Studies  Results Reviewed     None                 No orders to display         Procedures  Incision and drain    Date/Time: 8/28/2022 1:34 AM  Performed by: Marguerite Abraham MD  Authorized by: Marguerite Abraham MD   Universal Protocol:  Procedure performed by:  Consent: Verbal consent obtained  Consent given by: patient  Patient understanding: patient states understanding of the procedure being performed  Patient identity confirmed: verbally with patient      Patient location:  ED  Location:     Type:  Abscess    Location:  Anogenital    Anogenital location:  Scrotal wall  Pre-procedure details:     Skin preparation:  Chloraprep  Anesthesia (see MAR for exact dosages):      Anesthesia method:  Local infiltration    Local anesthetic:  Lidocaine 1% WITH epi  Procedure details:     Complexity:  Simple    Needle aspiration: no      Incision types:  Stab incision    Incision depth:  Subcutaneous    Wound management:  Probed and deloculated and irrigated with saline    Drainage:  Bloody and purulent    Drainage amount:  Copious    Wound treatment:  Wound left open    Packing materials:  1/4 in iodoform gauze  Post-procedure details:     Patient tolerance of procedure: Tolerated well, no immediate complications          ED Course                                       MDM  Number of Diagnoses or Management Options  Abscess  Current use of insulin ([facility])  Diagnosis management comments: Patient's abscess was draining even before the I&D was performed  The and D was performed to increase the amount of drainage  After anesthesia and incision, the abscess was expressed with copious blood and pus coming out  Patient felt some relief after the abscess was expressed  Abscess was packed with gauze to facilitate draining  Patient was prescribed clindamycin for the abscess and given return precautions  We additionally wrote a referral to social work to assist him in getting connected with care for his diabetes  I reviewed all testing with the patient  I gave oral return precautions for what to return for in addition to the written return precautions  The patient and any family present verbalized understanding of the discharge instructions and warnings that would necessitate return to the Emergency Department  I specifically highlighted areas of special concern regarding the written and verbal discharge instructions and return precautions  All questions were answered prior to discharge        Disposition  Final diagnoses:   Abscess   Current use of insulin ([address] )     Time reflects when diagnosis was documented in both MDM as applicable and the Disposition within this note     Time User Action Codes Description Comment    8/27/2022  7:55 PM Londell Colon Add [L02 91] Abscess     8/27/2022  8:21 PM Londell Colon Add [Z79 4] Current use of insulin ([address] )     8/27/2022  8:24 PM Londell Colon Modify [L02 91] Abscess     8/27/2022  8:24 PM Milly Nichols Modify [Z79 4] Current use of insulin ([address] ) ED Disposition     ED Disposition   Discharge    Condition   Stable    Date/Time   Sat Aug 27, 2022  7:55 PM    Comment   Kimmy Shall discharge to home/self care                 Follow-up Information     Follow up With Specialties Details Why Contact Info Additional Information    Soy Ramirez    [address] [facility] Emergency Department Emergency Medicine Go to  If symptoms worsen [address] [phone]  [address] [facility] Emergency Department, [address], [place], [address] Social Work    [address] [place] [address] Avenue  [phone] [address] [place] [address] [employer] [facility], [address], [place], [address]          Discharge Medication List as of 8/27/2022  8:29 PM      CONTINUE these medications which have CHANGED    Details   clindamycin (CLEOCIN) 300 MG capsule Take 1 capsule (300 mg total) by mouth 4 (four) times a day for 7 days, Starting Sat 8/27/2022, Until Sat 9/3/2022, Normal         CONTINUE these medications which have NOT CHANGED    Details   metoprolol succinate (TOPROL-XL) 50 mg 24 hr tablet Take 1 tablet (50 mg total) by mouth 2 (two) times a day, Starting Wed 8/17/2022, Normal      verapamil (VERELAN PM) 120 MG 24 hr capsule Take 1 capsule (120 mg total) by mouth daily at bedtime, Starting Wed 8/17/2022, Normal      aspirin (ECOTRIN LOW STRENGTH) 81 mg EC tablet Take 1 tablet (81 mg total) by mouth daily, Starting Wed 5/6/2020, No Print      Blood Glucose Monitoring Suppl (OneTouch Verio) w/Device KIT Use to test 3-4x daily, Normal      Blood Pressure KIT Use 2 (two) times a day, Starting Tue 10/12/2021, Normal      candesartan (ATACAND) 32 MG tablet Starting Fri 4/16/2021, Historical Med      Diclofenac Sodium (VOLTAREN) 1 % Apply 2 g topically 4 (four) times a day, Starting Tue 10/12/2021, Normal glimepiride (AMARYL) 2 mg tablet Take 2 mg by mouth every morning before breakfast  , Historical Med      glucose blood (OneTouch Verio) test strip Use to test 3-4x daily, Normal      Icosapent Ethyl (VASCEPA) 1 g CAPS Take 1 g by mouth, Historical Med      Lancets (onetouch ultrasoft) lancets Use to tests 3-4x daily, Normal      OZEMPIC, 1 MG/DOSE, 2 MG/1 5ML SOPN 1 mg once a week  , Starting Thu 11/21/2019, Historical Med      rosuvastatin (CRESTOR) 5 mg tablet Take 5 mg by mouth daily, Historical Med      TRESIBA FLEXTOUCH 100 units/mL injection pen Inject 70 Units under the skin daily at bedtime  , Starting Sun 11/24/2019, Historical Med      Xigduo XR  MG TB24 Starting Thu 4/22/2021, Historical Med               PDMP Review       Value Time User    PDMP Reviewed  Yes 5/6/2020  2:27 PM Zane Aguilar PA-C           ED Provider  Attending physically available and evaluated Jory Vinnycarole  I managed the patient along with the ED Attending      Electronically Signed by         Jose Fulton MD  08/28/22 8226

## 2022-09-07 ENCOUNTER — DOCUMENTATION (OUTPATIENT)
Dept: CARDIOLOGY CLINIC | Facility: CLINIC | Age: 47
End: 2022-09-07

## 2022-09-07 NOTE — PROGRESS NOTES
[place] [employer] ASSESSMENT FORM COMPLETED AND SIGNED BY DR HERNANDEZ-FAXED TO:[phone]  HANDICAP PLACARD FORM SIGNED BY DR Rebeca Santo, MAILED TO PATIENT

## 2022-10-03 NOTE — PROGRESS NOTES
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, with long-term current use of insulin 05/05/2020    Essential hypertension 05/05/2020    Other hyperlipidemia 05/05/2020    Coronary artery disease of native artery of native heart with stable angina pectoris ([place] [address] ) 05/05/2020    History of coronary angioplasty with insertion of stent 05/05/2020    Current use of insulin ([place] [address] ) 12/16/2021     Plan: Mr Galdino Cardona was last seen in the [place] [address] on 12-  Today, he is seen along with Dr Dain Quinn, cardiology fellow  Mr Galdino Cardona remains symptomatic with continued chest discomfort that occurs intermittently on a daily basis for brief period of time  It is not associated with other symptoms but he does have exertional dyspnea as well with symptoms suggestive of class II-III severity  He does sleep using 2 pillows but has no paroxysmal nocturnal dyspnea  He continues to have bendopnea that is at times associated with dizziness as well  His does regularly check his blood pressure but not his weight at home  Today, his blood pressure is well controlled and his weight is 250 lbs  He has lost substantial weight recently down from 305 lbs  He has lost his insurance but so far has been able to afford his medication and has been compliant  Examination does not reveal objective evidence of volume overload  He is started on ranolazine to manage chest pain possibly related to microvascular disease  He also states that he would drink as much as 1 5 gallon of water every day and was asked to reduce that by half to see if that helps the shortness of breath   An echocardiogram was ordered (last study 2021) pending insurance reactivation  Physician Requesting Consult: Bennie Garcias DO  Reason for Consult / Principal Problem: Hypertrophic cardiomyopathy     HPI: Mr Jory Hogan is a 49 year old man with past medical history of hyperlipidemia, type II diabetes and hypertension who was recently diagnosed as having hypertrophic cardiomyopathy phenotype on a transthoracic echocardiogram  The study was performed during an admission in May 2020 for chest pain and showed marked asymmetric septal hypertrophy without left ventricular outflow tract obstruction  Apical views were suboptimal and provocative maneuvers were not performed to elicit any intracavitary or outflow tract gradients  He then underwent an exercise myocardial perfusion study that showed reduced exercise capacity, exaggerated heart rate response, normal blood pressure response and no ischemic changes or significant rhythm abnormality  A cardiac MRI performed on 2020 showed an anteroseptal wall thickness of 29 mm and inferolateral wall thickness of 18 mm  The left atrium was mildly dilated and there was no evidence of late gadolinium enhancing lesions  He is currently on carvedilol 20 mg daily      On 2020: Mr Janett Quintanilla has had no further chest pain since hospital discharge  He also denies shortness of breath, palpitation, dizziness, syncope or lower extremity swelling  He does snore and wife has noted that he does periodically stop breathing during sleep  His physical activity is limited but he does notice daytime sleepiness and takes hour long naps during the day  He works for [employer] and often has to lift heavy objects (up to 150 lbs)  His family history is relatively benign  His father  at age 39 when he was only 15year old but the circumstances of his death is unclear  There is no history of sudden death at a young age in his first degree relatives  His risk stratification is incomplete   I have scheduled him for an exercise stress echocardiogram (to evaluate for latent outflow tract obstruction) and a 48 hour ambulatory monitor for assessment of arrhythmias  He has agreed to have genetic testing done  This would be important to particularly exclude HCM phenocopies such as hTTR amyloidosis  I have advised Mr Maciel Bravo to adopt a heart healthy diet low in sodium, saturated fat and processed food  I have also discussed potential restrictions on lifting heavy weights  After completion of risk stratification, he would be advised to start daily exercises with the hope of improving cardiovascular conditioning  He states that he has been evaluated for sleep apnea several times (last time 4 years ago) that has been negative  He may need to be reassessed  He will also need evaluation by weight management      9-: Mr Maciel Bravo has been doing well on his current medications  He denies chest pain, shortness of breath, palpitation, dizziness, syncope or lower extremity swelling  He does snore and wife has noted that he does periodically stop breathing during sleep  He has not returned to work and is looking for partial disability due to the requirement for lifting heavy objects at work ([employer])  He is now referred to nutritionist and sleep medicine for further evaluation as weight management and correction of nocturnal hypoxemia would be essential for favorable outcomes  He is motivated to achieve both goals  His genetic test results were received and 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  His two daughters and 2 siblings can now undergo targeted gene testing  His cardiac MRI results were reviewed with him      10-: Mr Maciel Bravo has done very well since last office visit  He denies shortness of breath, palpitation, dizziness, syncope, chest pain or lower extremity swelling  He has increased his daily physical activities and does short distance walks 4 to 5 times a week   He is tolerating his medications and will be returning to work on November 4 with restrictions to his activities  He is scheduled to have sleep study and see the nutritionist in December  Both daughters have been gene tested but results have not been received yet  He does not know if his brother ans sister have done their targeted gene testings  His diet has improved and his blood pressure and heart rate are well controlled      3-: Mr Arevalo complains of exertional chest discomfort and shortness of breath for the few weeks  He states that the symptoms have prevented him from physical activity intended to help weight loss  As the result, he has gained some weight (now 310 lbs)  He denies dizziness, syncope, lower extremity edema or palpitation  His blood pressure is borderline controlled on metoprolol 50 mg twice daily and he states that he has been watching his diet for excess salt  His diabetes is not optimally controlled with home blood sugars around 175 mg/dL on 3 oral agents, insulin and semaglutide  He also has mild obstructive sleep apnea  He will be seen by sleep specialist on April 19  His cholesterol was last evaluated on 5/5/2020 and showed an HDL of 26, LDL of 43 and a triglyceride level of 265  He is on rosuvastatin 5 mg daily  He is now on long term disability and does not have to lift heavy objects any longer  I have recommended that he undergoes a stress echo study to evaluate his chest pain and shortness of breath as well as his blood pressure response to exercise  He is also referred to nutritionist to help and direct him to lose substantial amount of weight (including referral to weight management team) in order to improve management of his cardiac disease as well as hypertension, diabetes and sleep apnea   Unfortunately, his 2 daughters, and his siblings did not choose to do the targeted gene testing      6-: Mr Renae Enciso had Stress Echocardiography done on 4/8/2021 which showed significantly reduced exercise capacity, shortness of breath at peak exercise, normal heart rate and blood pressure response to exercise, frequent isolated and paired ventricular premature complexes at peak exercise  It also showed severe left ventricular hypertrophy with asymmetrical hypertrophy of the septum (maximum wall thickness 20 mm), systolic anterior motion of the anterior mitral valve leaflet and subvalvular apparatus was present with pressure gradient of 22 and 42 mmHg at rest and during Valsalva maneuver, with gradient increasing to 60 mmHg immediately after the exercise, findings consistent with a latent obstructive hypertrophic cardiomyopathy  Trivial tricuspid regurgitation with normal estimated pulmonary systolic pressure was also noted   Verapamil 120 mg daily was added to his regimen of metoprolol succinate 50 mg b i d  Today Mr Jorge Alberto Vizcarra admits to occasional palpitation with exertion, but no dizziness, shortness of breath, or chest pain   Mr Jorge Alberto Vizcarra continues to follow with Sleep clinic, he had an AHI of 5 2 and was recommended to continue with APAP for at lease 4 hours per night  He denies daytime sleepiness although does take occasional naps during the day  He believes at this time he is getting quality sleep of up to 7 hours per night  His daughters Nilam and Rosa did not choose to undergo genetic testing and have not had screening echocardiograms  He checks his blood glucose at home and has been obtaining values between 120 (fasting), around 220 (post-prandial)  Dapagliflozin-metformin was added to his diabetics regime   He has not had routine labs since 2019 and will order them today  He has received both doses of Pfizer COVID-19 vaccine, along with his wife and daughters  He checks his blood pressure occasionally and the most recent blood pressure was 113/78 mmHg  He has lost 3 pounds since last visit   He has not seen a nutritionist yet  We will obtain the contact information for the clinic   We again discussed the importance of weight loss for overall cardiovascular health  Although his dietary choices have improved he still needs counselling on total caloric intake and choice of food items   Reinforced the importance of the heart-healthy diet  Mr Gilmar Bhakta will be seen in [facility] in 6 month       10-: Mr Gilmar Bhakta continues having occasional exertional chest pain and exertional dyspnea (NYHA Class 2-3) as well as occasional headache  He does not check blood pressure at home  We ordered blood pressure cuff and instructed him to check blood pressure twice daily once he get the cuff, keep blood pressure and heart rate logs, and call us with results  At this time will continue with the current medication regime and once he gets blood pressure cuff, will plan to stop candesartan and increase verapamil dosage to help with blood pressure as well as HCM symptom control  Recent labs showed Hgb A1C 11% and we reffered him to Endocrinology clinic  He has not been seen their yet and at this time PCP manages his diabetic medications  Will contact Endocrinology and Nutrition clinics to get him appointments to help with DM management as well as weight management  Mr Gilmar Bhakta also complained of the left shoulder bursitis pain, for which topical diclofenac is ordered  We again stressed the importance of weight loss, blood pressure control, and heart-healthy diet  He will be seen in [facility] clinic in 6 months      12-: Mr Gilmar Bhakta was admitted to hospital on 10- for chest pain  He had no troponin elevation or ischemic chest discomfort and chest pain resolved spontaneously  He was discharged on his home medications after a day of observation  Since then he has not have any further chest pain  He also denies palpitation, dizziness, syncope or lower extremity edema although has had occasional dyspnea on exertion  His home blood pressure measurements have been mostly in the acceptable range and his blood pressure in the office is 114/78 mmHg   Laboratory work during hospitalization shows normal K, BUN and Cr  His blood sugar has been elevated and he is going to be switched to a new insulin preparation and will be seeing endocrinology on 2021  He has lost a few lbs and is determined to voluntarily loose more weight by reducing snacking and cutting back on carbohydrates  His left shoulder pain has improved after applying topical diclofenac  I did not change his antihypertensive regimen at this visit  He follows with sleep medicine for his mild obstructive sleep apnea  His family did not choose to be genetically screened for HCM      Family history: Father  at age 39 for uncertain cause and the circumstances of his death is not known to the patient who was 15years old at the time  Mother  in 2019 and had diabetes, hypertension and heart failure  He has 3 siblings  The older sister  of complications of drug use at age 32  Another sister (Stephen)  is 52 year old and has no known medical condition  A younger brother (Brando) is 42 years old and has no heart disease  Mr Monroe Tabor has 2 daughters (Kyle Woods 0973 HDN HZHRTV Monroe Tabor 2003) who are healthy  There is no history of sudden cardiac death or hypertrophic cardiomyopathy in the family      Genetic testing ([facility] 2020): Positive for a pathogenic mutation in the MYH7 gene      Review of Systems: He complains of occasional shortness of breath with exertion (NYHA Class 2-3),endorses chest pain, and dizziness, denied  syncope or lower extremity swelling  Historical Information   Past Medical History:   Diagnosis Date    [facility]est[facility] )     Diabetes mellitus [facility] )     Hyperlipidemia     Hypertension     Obesity      No past surgical history on file    Social History     Substance and Sexual Activity   Alcohol Use Not Currently     Social History     Substance and Sexual Activity   Drug Use Never     Social History     Tobacco Use   Smoking Status Never Smoker Smokeless Tobacco Never Used     Meds/Allergies   all current active meds have been reviewed  No Known Allergies    Objective   Vitals:   Vitals:    10/04/22 0939   BP: 120/72   BP Location: Left arm   Patient Position: Sitting   Cuff Size: Adult   Pulse: 93   SpO2: 98%   Weight: 113 kg (250 lb)   Height: 5' 11" (1 803 m)   Body surface area is 2 32 meters squared  Body mass index is 34 87 kg/m²  Invasive Devices  Report    None               Physical Exam:  GEN: Ela Logan appears well, alert and oriented x 3, pleasant and cooperative   HEENT: pupils equal, round, and reactive to light; extraocular muscles intact  NECK: supple, no carotid bruits   HEART: regular rhythm, normal S1 and S2, no murmurs, clicks, gallops or rubs   LUNGS: clear to auscultation bilaterally; no wheezes, rales, or rhonchi   ABDOMEN: normal bowel sounds, soft, no tenderness, no distention  EXTREMITIES: peripheral pulses normal; no clubbing, cyanosis, or edema  NEURO: no focal findings   SKIN: normal without suspicious lesions on exposed skin    Lab Results:   No visits with results within 1 Day(s) from this visit  Latest known visit with results is:   Orders Only on 12/16/2021   Component Date Value    Hemoglobin A1C 12/16/2021 12 6 (A)     Imaging: I have personally reviewed pertinent reports  Counseling / Coordination of Care  Total floor / unit time spent today 40 minutes  Greater than 50% of total time was spent with the patient and / or family counseling and / or coordination of care

## 2022-10-04 ENCOUNTER — OFFICE VISIT (OUTPATIENT)
Dept: CARDIAC SURGERY | Facility: CLINIC | Age: 47
End: 2022-10-04

## 2022-10-04 VITALS
HEIGHT: 71 IN | SYSTOLIC BLOOD PRESSURE: 120 MMHG | DIASTOLIC BLOOD PRESSURE: 72 MMHG | OXYGEN SATURATION: 98 % | HEART RATE: 93 BPM | WEIGHT: 250 LBS | BODY MASS INDEX: 35 KG/M2

## 2022-10-04 DIAGNOSIS — I10 ESSENTIAL HYPERTENSION: Primary | ICD-10-CM

## 2022-10-04 DIAGNOSIS — I42.2 HYPERTROPHIC CARDIOMYOPATHY (HCC): ICD-10-CM

## 2022-10-04 PROCEDURE — 99215 OFFICE O/P EST HI 40 MIN: CPT | Performed by: INTERNAL MEDICINE

## 2022-10-04 RX ORDER — RANOLAZINE 500 MG/1
500 TABLET, EXTENDED RELEASE ORAL DAILY
Qty: 30 TABLET | Refills: 0 | Status: SHIPPED | OUTPATIENT
Start: 2022-10-04

## 2022-10-31 ENCOUNTER — TELEPHONE (OUTPATIENT)
Dept: CARDIOLOGY CLINIC | Facility: CLINIC | Age: 47
End: 2022-10-31

## 2022-10-31 NOTE — TELEPHONE ENCOUNTER
Pt called today asking for a follow up ?  He spoke with someone last week in the office about his chest pain-I do not see any messages -Pt current medication Ranolazine 500 mg daily -please address

## 2022-11-09 ENCOUNTER — TELEPHONE (OUTPATIENT)
Dept: CARDIOLOGY CLINIC | Facility: CLINIC | Age: 47
End: 2022-11-09

## 2022-11-09 NOTE — TELEPHONE ENCOUNTER
P/c'd , he is still having CP  It comes and goes  He feels the 500 mg was not working  He also ran out of Ranexa on 11/4/22  Pt states no one called him in regards to this issue  Pt said his Bp has been WNL  Did see notes that messages were left for pt  So he did not go to the ER  Do you want to increase the Ranexa?       Please advise

## 2022-11-11 ENCOUNTER — E-CONSULT REQUEST (OUTPATIENT)
Dept: CARDIOLOGY CLINIC | Facility: CLINIC | Age: 47
End: 2022-11-11
Payer: COMMERCIAL

## 2022-11-11 DIAGNOSIS — I20.89 ANGINA AT REST: Primary | ICD-10-CM

## 2022-11-11 PROCEDURE — 99204 OFFICE O/P NEW MOD 45 MIN: CPT | Performed by: INTERNAL MEDICINE

## 2022-11-11 RX ORDER — RANOLAZINE 500 MG/1
500 TABLET, EXTENDED RELEASE ORAL 2 TIMES DAILY
Qty: 60 TABLET | Refills: 2 | Status: SHIPPED | OUTPATIENT
Start: 2022-11-11 | End: 2023-02-21

## 2022-11-11 NOTE — PROGRESS NOTES
Had the pleasure to speak with Mr. Charisse Escalona over the phone on 11/11/2022 at 11:05am. He has reached out to clinic to discuss an ongoing chest pain he has been experiencing for some time and need for a refill on Ranexa. He describs the chest pain as either right- or left-sided pain, sharp, lasting 2-3 minutes, no association with exertion, improves with Ranexa, nonradiating without obvious exacerbating factor. Mr. Charisse Escalona has HCM and this chronic chest pain has been attributed to dynamic changes to blood flow secondary to HCM. He denied new or worsening SOB, NATASHA, orthopnea, PND, bendopnea or other changes from his baseline. He monitors BP at home, -120/80s, endorses compliance with medications and follow ups. Plan:  1. Unstable Angina: less likely secondary to CAD  - Ranexa 500mg QD was increased to twice daily  - He was encouraged to seek urgent medical attention if chest pain worsens   - He was asked to call us back if the twice daily dosing of Ranexa does not resolve the aforementioned symptoms   - He verbalized understanding that progression of chest pain may or may not be secondary to HCM, and he will need to be vigilant if chest pain worsens and/or does not resolve.

## 2022-11-23 DIAGNOSIS — I42.2 HYPERTROPHIC CARDIOMYOPATHY (HCC): ICD-10-CM

## 2022-11-25 RX ORDER — VERAPAMIL HYDROCHLORIDE 120 MG/1
120 CAPSULE, EXTENDED RELEASE ORAL
Qty: 90 CAPSULE | Refills: 0 | Status: SHIPPED | OUTPATIENT
Start: 2022-11-25

## 2022-11-25 RX ORDER — METOPROLOL SUCCINATE 50 MG/1
TABLET, EXTENDED RELEASE ORAL
Qty: 180 TABLET | Refills: 0 | Status: SHIPPED | OUTPATIENT
Start: 2022-11-25

## 2023-02-21 DIAGNOSIS — I20.8 ANGINA AT REST (HCC): ICD-10-CM

## 2023-02-21 RX ORDER — RANOLAZINE 500 MG/1
TABLET, EXTENDED RELEASE ORAL
Qty: 60 TABLET | Refills: 2 | Status: SHIPPED | OUTPATIENT
Start: 2023-02-21

## 2023-02-27 ENCOUNTER — HOSPITAL ENCOUNTER (OUTPATIENT)
Dept: NON INVASIVE DIAGNOSTICS | Facility: HOSPITAL | Age: 48
Discharge: HOME | End: 2023-02-27

## 2023-02-27 VITALS
SYSTOLIC BLOOD PRESSURE: 120 MMHG | DIASTOLIC BLOOD PRESSURE: 72 MMHG | HEIGHT: 71 IN | HEART RATE: 93 BPM | BODY MASS INDEX: 35 KG/M2 | WEIGHT: 250 LBS

## 2023-02-27 DIAGNOSIS — I42.2 HYPERTROPHIC CARDIOMYOPATHY (HCC): ICD-10-CM

## 2023-02-27 RX ADMIN — PERFLUTREN 0.6 ML/MIN: 6.52 INJECTION, SUSPENSION INTRAVENOUS at 11:35

## 2023-02-28 LAB
AORTIC ROOT: 2.8 CM
APICAL FOUR CHAMBER EJECTION FRACTION: 79 %
ASCENDING AORTA: 3 CM
E WAVE DECELERATION TIME: 171 MS
FRACTIONAL SHORTENING: 45 (ref 28–44)
INTERVENTRICULAR SEPTUM IN DIASTOLE (PARASTERNAL SHORT AXIS VIEW): 2.2 CM
INTERVENTRICULAR SEPTUM: 2.2 CM (ref 0.6–1.1)
LAAS-AP4: 20.1 CM2
LEFT ATRIUM SIZE: 4.5 CM
LEFT INTERNAL DIMENSION IN SYSTOLE: 2.2 CM (ref 2.1–4)
LEFT VENTRICULAR INTERNAL DIMENSION IN DIASTOLE: 4 CM (ref 3.5–6)
LEFT VENTRICULAR POSTERIOR WALL IN END DIASTOLE: 1.1 CM
LEFT VENTRICULAR STROKE VOLUME: 52 ML
LVSV (TEICH): 52 ML
MV E'TISSUE VEL-LAT: 9 CM/S
MV E'TISSUE VEL-SEP: 6 CM/S
MV PEAK A VEL: 0.9 M/S
MV PEAK E VEL: 61 CM/S
MV STENOSIS PRESSURE HALF TIME: 50 MS
MV VALVE AREA P 1/2 METHOD: 4.4
RIGHT ATRIUM AREA SYSTOLE A4C: 14.1 CM2
RIGHT VENTRICLE ID DIMENSION: 3.4 CM
SL CV ECHO LV DYNAMIC OBSTRUCTION PEAK GRADIENT (REST): 21 MMHG
SL CV ECHO LV DYNAMIC OBSTRUCTION PEAK GRADIENT (VALSAL: 41 MMHG
SL CV LV EF: 70
SL CV PED ECHO LEFT VENTRICLE DIASTOLIC VOLUME (MOD BIPLANE) 2D: 68 ML
SL CV PED ECHO LEFT VENTRICLE SYSTOLIC VOLUME (MOD BIPLANE) 2D: 16 ML
TR MAX PG: 15 MMHG
TR PEAK VELOCITY: 1.9 M/S
TRICUSPID ANNULAR PLANE SYSTOLIC EXCURSION: 1.9 CM
TRICUSPID VALVE PEAK REGURGITATION VELOCITY: 1.94 M/S

## 2023-03-21 ENCOUNTER — HOSPITAL ENCOUNTER (OUTPATIENT)
Facility: HOSPITAL | Age: 48
Setting detail: OBSERVATION
Discharge: HOME | End: 2023-03-23
Attending: EMERGENCY MEDICINE | Admitting: INTERNAL MEDICINE

## 2023-03-21 ENCOUNTER — APPOINTMENT (EMERGENCY)
Dept: RADIOLOGY | Facility: HOSPITAL | Age: 48
End: 2023-03-21

## 2023-03-21 DIAGNOSIS — K85.90 PANCREATITIS: Primary | ICD-10-CM

## 2023-03-21 DIAGNOSIS — E11.65 TYPE 2 DIABETES MELLITUS WITH HYPERGLYCEMIA, WITH LONG-TERM CURRENT USE OF INSULIN (HCC): ICD-10-CM

## 2023-03-21 DIAGNOSIS — Z79.4 TYPE 2 DIABETES MELLITUS WITH HYPERGLYCEMIA, WITH LONG-TERM CURRENT USE OF INSULIN (HCC): ICD-10-CM

## 2023-03-21 DIAGNOSIS — M79.604 RIGHT LEG PAIN: ICD-10-CM

## 2023-03-21 LAB
ALBUMIN SERPL BCP-MCNC: 3.9 G/DL (ref 3.5–5)
ALP SERPL-CCNC: 76 U/L (ref 46–116)
ALT SERPL W P-5'-P-CCNC: 58 U/L (ref 12–78)
ANION GAP SERPL CALCULATED.3IONS-SCNC: 3 MMOL/L (ref 4–13)
AST SERPL W P-5'-P-CCNC: 26 U/L (ref 5–45)
BASOPHILS # BLD AUTO: 0.02 THOUSANDS/ÂΜL (ref 0–0.1)
BASOPHILS NFR BLD AUTO: 0 % (ref 0–1)
BILIRUB SERPL-MCNC: 0.68 MG/DL (ref 0.2–1)
BUN SERPL-MCNC: 19 MG/DL (ref 5–25)
CALCIUM SERPL-MCNC: 9.6 MG/DL (ref 8.3–10.1)
CARDIAC TROPONIN I PNL SERPL HS: 7 NG/L
CHLORIDE SERPL-SCNC: 101 MMOL/L (ref 96–108)
CO2 SERPL-SCNC: 27 MMOL/L (ref 21–32)
CREAT SERPL-MCNC: 1.21 MG/DL (ref 0.6–1.3)
EOSINOPHIL # BLD AUTO: 0.2 THOUSAND/ÂΜL (ref 0–0.61)
EOSINOPHIL NFR BLD AUTO: 4 % (ref 0–6)
ERYTHROCYTE [DISTWIDTH] IN BLOOD BY AUTOMATED COUNT: 12.2 % (ref 11.6–15.1)
GFR SERPL CREATININE-BSD FRML MDRD: 70 ML/MIN/1.73SQ M
GLUCOSE SERPL-MCNC: 296 MG/DL (ref 65–140)
HCT VFR BLD AUTO: 47.3 % (ref 36.5–49.3)
HGB BLD-MCNC: 16.1 G/DL (ref 12–17)
IMM GRANULOCYTES # BLD AUTO: 0.02 THOUSAND/UL (ref 0–0.2)
IMM GRANULOCYTES NFR BLD AUTO: 0 % (ref 0–2)
LIPASE SERPL-CCNC: 1295 U/L (ref 73–393)
LYMPHOCYTES # BLD AUTO: 1.5 THOUSANDS/ÂΜL (ref 0.6–4.47)
LYMPHOCYTES NFR BLD AUTO: 31 % (ref 14–44)
MCH RBC QN AUTO: 29.4 PG (ref 26.8–34.3)
MCHC RBC AUTO-ENTMCNC: 34 G/DL (ref 31.4–37.4)
MCV RBC AUTO: 87 FL (ref 82–98)
MONOCYTES # BLD AUTO: 0.52 THOUSAND/ÂΜL (ref 0.17–1.22)
MONOCYTES NFR BLD AUTO: 11 % (ref 4–12)
NEUTROPHILS # BLD AUTO: 2.54 THOUSANDS/ÂΜL (ref 1.85–7.62)
NEUTS SEG NFR BLD AUTO: 54 % (ref 43–75)
NRBC BLD AUTO-RTO: 0 /100 WBCS
PLATELET # BLD AUTO: 189 THOUSANDS/UL (ref 149–390)
PMV BLD AUTO: 9.2 FL (ref 8.9–12.7)
POTASSIUM SERPL-SCNC: 4.1 MMOL/L (ref 3.5–5.3)
PROT SERPL-MCNC: 8 G/DL (ref 6.4–8.4)
RBC # BLD AUTO: 5.47 MILLION/UL (ref 3.88–5.62)
SODIUM SERPL-SCNC: 131 MMOL/L (ref 135–147)
WBC # BLD AUTO: 4.8 THOUSAND/UL (ref 4.31–10.16)

## 2023-03-21 RX ORDER — HYDROMORPHONE HCL/PF 1 MG/ML
0.5 SYRINGE (ML) INJECTION ONCE
Status: COMPLETED | OUTPATIENT
Start: 2023-03-21 | End: 2023-03-21

## 2023-03-21 RX ADMIN — SODIUM CHLORIDE 1000 ML: 0.9 INJECTION, SOLUTION INTRAVENOUS at 22:34

## 2023-03-21 RX ADMIN — HYDROMORPHONE HYDROCHLORIDE 0.5 MG: 1 INJECTION, SOLUTION INTRAMUSCULAR; INTRAVENOUS; SUBCUTANEOUS at 22:36

## 2023-03-22 PROBLEM — R10.9 ABDOMINAL PAIN: Status: ACTIVE | Noted: 2023-03-22

## 2023-03-22 PROBLEM — M79.606 LEG PAIN: Status: ACTIVE | Noted: 2023-03-22

## 2023-03-22 LAB
2HR DELTA HS TROPONIN: 0 NG/L
4HR DELTA HS TROPONIN: 0 NG/L
ALBUMIN SERPL BCP-MCNC: 3 G/DL (ref 3.5–5)
ALP SERPL-CCNC: 68 U/L (ref 46–116)
ALT SERPL W P-5'-P-CCNC: 45 U/L (ref 12–78)
ANION GAP SERPL CALCULATED.3IONS-SCNC: 3 MMOL/L (ref 4–13)
AST SERPL W P-5'-P-CCNC: 25 U/L (ref 5–45)
ATRIAL RATE: 90 BPM
BASOPHILS # BLD AUTO: 0.03 THOUSANDS/ÂΜL (ref 0–0.1)
BASOPHILS NFR BLD AUTO: 1 % (ref 0–1)
BILIRUB DIRECT SERPL-MCNC: 0.14 MG/DL (ref 0–0.2)
BILIRUB SERPL-MCNC: 0.43 MG/DL (ref 0.2–1)
BUN SERPL-MCNC: 19 MG/DL (ref 5–25)
CALCIUM SERPL-MCNC: 8.5 MG/DL (ref 8.3–10.1)
CARDIAC TROPONIN I PNL SERPL HS: 7 NG/L
CARDIAC TROPONIN I PNL SERPL HS: 7 NG/L
CHLORIDE SERPL-SCNC: 106 MMOL/L (ref 96–108)
CO2 SERPL-SCNC: 26 MMOL/L (ref 21–32)
CREAT SERPL-MCNC: 1 MG/DL (ref 0.6–1.3)
EOSINOPHIL # BLD AUTO: 0.18 THOUSAND/ÂΜL (ref 0–0.61)
EOSINOPHIL NFR BLD AUTO: 4 % (ref 0–6)
ERYTHROCYTE [DISTWIDTH] IN BLOOD BY AUTOMATED COUNT: 12.2 % (ref 11.6–15.1)
GFR SERPL CREATININE-BSD FRML MDRD: 88 ML/MIN/1.73SQ M
GLUCOSE SERPL-MCNC: 109 MG/DL (ref 65–140)
GLUCOSE SERPL-MCNC: 160 MG/DL (ref 65–140)
GLUCOSE SERPL-MCNC: 204 MG/DL (ref 65–140)
GLUCOSE SERPL-MCNC: 217 MG/DL (ref 65–140)
GLUCOSE SERPL-MCNC: 249 MG/DL (ref 65–140)
HCT VFR BLD AUTO: 44.3 % (ref 36.5–49.3)
HGB BLD-MCNC: 14.5 G/DL (ref 12–17)
IMM GRANULOCYTES # BLD AUTO: 0.02 THOUSAND/UL (ref 0–0.2)
IMM GRANULOCYTES NFR BLD AUTO: 1 % (ref 0–2)
LYMPHOCYTES # BLD AUTO: 1.35 THOUSANDS/ÂΜL (ref 0.6–4.47)
LYMPHOCYTES NFR BLD AUTO: 31 % (ref 14–44)
MAGNESIUM SERPL-MCNC: 2.1 MG/DL (ref 1.6–2.6)
MCH RBC QN AUTO: 29.1 PG (ref 26.8–34.3)
MCHC RBC AUTO-ENTMCNC: 32.7 G/DL (ref 31.4–37.4)
MCV RBC AUTO: 89 FL (ref 82–98)
MONOCYTES # BLD AUTO: 0.51 THOUSAND/ÂΜL (ref 0.17–1.22)
MONOCYTES NFR BLD AUTO: 12 % (ref 4–12)
NEUTROPHILS # BLD AUTO: 2.33 THOUSANDS/ÂΜL (ref 1.85–7.62)
NEUTS SEG NFR BLD AUTO: 51 % (ref 43–75)
NRBC BLD AUTO-RTO: 0 /100 WBCS
P AXIS: 61 DEGREES
PLATELET # BLD AUTO: 171 THOUSANDS/UL (ref 149–390)
PMV BLD AUTO: 9.2 FL (ref 8.9–12.7)
POTASSIUM SERPL-SCNC: 3.9 MMOL/L (ref 3.5–5.3)
PR INTERVAL: 192 MS
PROT SERPL-MCNC: 6.7 G/DL (ref 6.4–8.4)
QRS AXIS: -42 DEGREES
QRSD INTERVAL: 112 MS
QT INTERVAL: 370 MS
QTC INTERVAL: 452 MS
RBC # BLD AUTO: 4.98 MILLION/UL (ref 3.88–5.62)
SODIUM SERPL-SCNC: 135 MMOL/L (ref 135–147)
T WAVE AXIS: 143 DEGREES
VENTRICULAR RATE: 90 BPM
WBC # BLD AUTO: 4.42 THOUSAND/UL (ref 4.31–10.16)

## 2023-03-22 RX ORDER — SODIUM CHLORIDE, SODIUM GLUCONATE, SODIUM ACETATE, POTASSIUM CHLORIDE, MAGNESIUM CHLORIDE, SODIUM PHOSPHATE, DIBASIC, AND POTASSIUM PHOSPHATE .53; .5; .37; .037; .03; .012; .00082 G/100ML; G/100ML; G/100ML; G/100ML; G/100ML; G/100ML; G/100ML
100 INJECTION, SOLUTION INTRAVENOUS CONTINUOUS
Status: DISPENSED | OUTPATIENT
Start: 2023-03-22 | End: 2023-03-22

## 2023-03-22 RX ORDER — ASPIRIN 81 MG/1
81 TABLET ORAL DAILY
Status: DISCONTINUED | OUTPATIENT
Start: 2023-03-22 | End: 2023-03-23 | Stop reason: HOSPADM

## 2023-03-22 RX ORDER — INSULIN GLARGINE 100 [IU]/ML
40 INJECTION, SOLUTION SUBCUTANEOUS
Status: DISCONTINUED | OUTPATIENT
Start: 2023-03-22 | End: 2023-03-22

## 2023-03-22 RX ORDER — LOSARTAN POTASSIUM 50 MG/1
100 TABLET ORAL DAILY
Status: DISCONTINUED | OUTPATIENT
Start: 2023-03-22 | End: 2023-03-23 | Stop reason: HOSPADM

## 2023-03-22 RX ORDER — ONDANSETRON 2 MG/ML
4 INJECTION INTRAMUSCULAR; INTRAVENOUS EVERY 6 HOURS PRN
Status: DISCONTINUED | OUTPATIENT
Start: 2023-03-22 | End: 2023-03-23 | Stop reason: HOSPADM

## 2023-03-22 RX ORDER — ACETAMINOPHEN 325 MG/1
650 TABLET ORAL EVERY 6 HOURS PRN
Status: DISCONTINUED | OUTPATIENT
Start: 2023-03-22 | End: 2023-03-23 | Stop reason: HOSPADM

## 2023-03-22 RX ORDER — OXYCODONE HYDROCHLORIDE 5 MG/1
5 TABLET ORAL EVERY 4 HOURS PRN
Status: DISCONTINUED | OUTPATIENT
Start: 2023-03-22 | End: 2023-03-23 | Stop reason: HOSPADM

## 2023-03-22 RX ORDER — INSULIN LISPRO 100 [IU]/ML
1-5 INJECTION, SOLUTION INTRAVENOUS; SUBCUTANEOUS
Status: DISCONTINUED | OUTPATIENT
Start: 2023-03-22 | End: 2023-03-23 | Stop reason: HOSPADM

## 2023-03-22 RX ORDER — INSULIN LISPRO 100 [IU]/ML
10 INJECTION, SOLUTION INTRAVENOUS; SUBCUTANEOUS
Status: DISCONTINUED | OUTPATIENT
Start: 2023-03-22 | End: 2023-03-23 | Stop reason: HOSPADM

## 2023-03-22 RX ORDER — OXYCODONE HYDROCHLORIDE 10 MG/1
10 TABLET ORAL EVERY 4 HOURS PRN
Status: DISCONTINUED | OUTPATIENT
Start: 2023-03-22 | End: 2023-03-23 | Stop reason: HOSPADM

## 2023-03-22 RX ORDER — HYDROMORPHONE HCL IN WATER/PF 6 MG/30 ML
0.2 PATIENT CONTROLLED ANALGESIA SYRINGE INTRAVENOUS
Status: DISCONTINUED | OUTPATIENT
Start: 2023-03-22 | End: 2023-03-23 | Stop reason: HOSPADM

## 2023-03-22 RX ORDER — INSULIN GLARGINE 100 [IU]/ML
30 INJECTION, SOLUTION SUBCUTANEOUS
Status: DISCONTINUED | OUTPATIENT
Start: 2023-03-22 | End: 2023-03-23 | Stop reason: HOSPADM

## 2023-03-22 RX ORDER — RANOLAZINE 500 MG/1
500 TABLET, EXTENDED RELEASE ORAL 2 TIMES DAILY
Status: DISCONTINUED | OUTPATIENT
Start: 2023-03-22 | End: 2023-03-23 | Stop reason: HOSPADM

## 2023-03-22 RX ORDER — ENOXAPARIN SODIUM 100 MG/ML
40 INJECTION SUBCUTANEOUS DAILY
Status: DISCONTINUED | OUTPATIENT
Start: 2023-03-22 | End: 2023-03-23 | Stop reason: HOSPADM

## 2023-03-22 RX ORDER — METOPROLOL SUCCINATE 50 MG/1
50 TABLET, EXTENDED RELEASE ORAL 2 TIMES DAILY
Status: DISCONTINUED | OUTPATIENT
Start: 2023-03-22 | End: 2023-03-23 | Stop reason: HOSPADM

## 2023-03-22 RX ADMIN — INSULIN LISPRO 1 UNITS: 100 INJECTION, SOLUTION INTRAVENOUS; SUBCUTANEOUS at 08:35

## 2023-03-22 RX ADMIN — ACETAMINOPHEN 650 MG: 325 TABLET ORAL at 07:46

## 2023-03-22 RX ADMIN — RANOLAZINE 500 MG: 500 TABLET, FILM COATED, EXTENDED RELEASE ORAL at 12:15

## 2023-03-22 RX ADMIN — METOPROLOL SUCCINATE 50 MG: 50 TABLET, EXTENDED RELEASE ORAL at 08:32

## 2023-03-22 RX ADMIN — LOSARTAN POTASSIUM 100 MG: 50 TABLET, FILM COATED ORAL at 08:32

## 2023-03-22 RX ADMIN — VERAPAMIL HYDROCHLORIDE 120 MG: 120 TABLET, FILM COATED, EXTENDED RELEASE ORAL at 12:15

## 2023-03-22 RX ADMIN — INSULIN GLARGINE 30 UNITS: 100 INJECTION, SOLUTION SUBCUTANEOUS at 21:42

## 2023-03-22 RX ADMIN — RANOLAZINE 500 MG: 500 TABLET, FILM COATED, EXTENDED RELEASE ORAL at 17:16

## 2023-03-22 RX ADMIN — METOPROLOL SUCCINATE 50 MG: 50 TABLET, EXTENDED RELEASE ORAL at 17:16

## 2023-03-22 RX ADMIN — SODIUM CHLORIDE, SODIUM GLUCONATE, SODIUM ACETATE, POTASSIUM CHLORIDE, MAGNESIUM CHLORIDE, SODIUM PHOSPHATE, DIBASIC, AND POTASSIUM PHOSPHATE 100 ML/HR: .53; .5; .37; .037; .03; .012; .00082 INJECTION, SOLUTION INTRAVENOUS at 03:08

## 2023-03-22 RX ADMIN — INSULIN LISPRO 10 UNITS: 100 INJECTION, SOLUTION INTRAVENOUS; SUBCUTANEOUS at 17:17

## 2023-03-22 RX ADMIN — ENOXAPARIN SODIUM 40 MG: 40 INJECTION SUBCUTANEOUS at 08:32

## 2023-03-22 RX ADMIN — INSULIN LISPRO 1 UNITS: 100 INJECTION, SOLUTION INTRAVENOUS; SUBCUTANEOUS at 12:11

## 2023-03-22 RX ADMIN — INSULIN LISPRO 1 UNITS: 100 INJECTION, SOLUTION INTRAVENOUS; SUBCUTANEOUS at 17:17

## 2023-03-22 RX ADMIN — ASPIRIN 81 MG: 81 TABLET, COATED ORAL at 08:32

## 2023-03-22 NOTE — ASSESSMENT & PLAN NOTE
Lab Results   Component Value Date    HGBA1C 12 7 (H) 03/18/2023       No results for input(s): POCGLU in the last 72 hours  Blood Sugar Average: Last 72 hrs:  Current regimen includes glimepiride, metformin, Ozempic, and Tresiba 60 units nightly    Hold glimepiride, metformin and Ozempic  Continue long-acting insulin with glargine, decrease to 40 units nightly for goal -180  Sliding scale insulin  Hypoglycemia protocol

## 2023-03-22 NOTE — ASSESSMENT & PLAN NOTE
Reports 2 to 3 days of worsening epigastric and lower midline abdominal pain  This been associated with diarrhea, poor p o  intake  Lipase 1295 on admission  Right upper quadrant ultrasound negative for gallstones or pancreatic inflammation  Has had recent sick contacts, as his children have recently been sick with similar symptoms including diarrhea  Does report he is recently started back on Ozempic and metformin  Suspect viral gastroenteritis versus pancreatitis    · Clear liquid diet  · Pain control as needed  · IV fluids  · Antiemetics as needed  · Obtain stool PCR

## 2023-03-22 NOTE — ED PROVIDER NOTES
History  Chief Complaint   Patient presents with   • Abdominal Pain     Pt states hes had diarrhea x2 days  States he also has right leg pain states he thinks he pulled a muscle     80-year-old male patient with history of CHF, diabetes, hypertension, hyperlipidemia presenting with epigastric abdominal pain onset 2 days ago  Patient states that he has some mild diarrhea  Patient also complaining of right-sided posterior thigh pain  Patient states that he has pain whenever he moves around in his seat  Patient states that he has chronic chest pain  Denies fever, nausea, vomiting, urinary symptoms  Patient denies previous history of abdominal surgeries  Denies previous history of this pain  Denies alcohol or drug use  Patient states that he has not been eating  Prior to Admission Medications   Prescriptions Last Dose Informant Patient Reported? Taking?    Blood Glucose Monitoring Suppl (OneTouch Verio) w/Device KIT   No No   Sig: Use to test 3-4x daily   Blood Pressure KIT   No No   Sig: Use 2 (two) times a day   Diclofenac Sodium (VOLTAREN) 1 %   No No   Sig: Apply 2 g topically 4 (four) times a day   Icosapent Ethyl 1 g CAPS Not Taking  Yes No   Sig: Take 1 g by mouth   Patient not taking: Reported on 3/22/2023   Lancets (onetouch ultrasoft) lancets   No No   Sig: Use to tests 3-4x daily   OZEMPIC, 1 MG/DOSE, 2 MG/1 5ML SOPN   Yes No   Si mg once a week     TRESIBA FLEXTOUCH 100 units/mL injection pen   Yes No   Sig: Inject 70 Units under the skin daily at bedtime     Xigduo XR  MG TB24 Not Taking  Yes No   Patient not taking: Reported on 3/22/2023   aspirin (ECOTRIN LOW STRENGTH) 81 mg EC tablet   No No   Sig: Take 1 tablet (81 mg total) by mouth daily   candesartan (ATACAND) 32 MG tablet   Yes No   glimepiride (AMARYL) 2 mg tablet   Yes No   Sig: Take 2 mg by mouth every morning before breakfast     glucose blood (OneTouch Verio) test strip   No No   Sig: Use to test 3-4x daily metFORMIN (GLUCOPHAGE) 500 mg tablet   Yes Yes   Sig: Take 1 tablet by mouth 2 (two) times a day   metoprolol succinate (TOPROL-XL) 50 mg 24 hr tablet   No No   Sig: TAKE 1 TABLET BY MOUTH TWICE A DAY   ranolazine (RANEXA) 500 mg 12 hr tablet   No No   Sig: take 1 tablet by mouth twice a day   rosuvastatin (CRESTOR) 5 mg tablet   Yes No   Sig: Take 5 mg by mouth daily   verapamil (VERELAN PM) 120 MG 24 hr capsule   No No   Sig: TAKE 1 CAPSULE (120 MG TOTAL) BY MOUTH DAILY AT BEDTIME      Facility-Administered Medications: None       Past Medical History:   Diagnosis Date   • Congestive heart failure (HCC)    • Diabetes mellitus (HCC)    • Hyperlipidemia    • Hypertension    • Obesity        History reviewed  No pertinent surgical history  Family History   Problem Relation Age of Onset   • Diabetes Mother    • Stroke Mother    • Stroke Father      I have reviewed and agree with the history as documented  E-Cigarette/Vaping   • E-Cigarette Use Never User      E-Cigarette/Vaping Substances   • Nicotine No    • THC No    • CBD No    • Flavoring No      Social History     Tobacco Use   • Smoking status: Never   • Smokeless tobacco: Never   Vaping Use   • Vaping Use: Never used   Substance Use Topics   • Alcohol use: Not Currently   • Drug use: Never        Review of Systems   Gastrointestinal: Positive for abdominal pain and diarrhea  Musculoskeletal:        Right leg pain   All other systems reviewed and are negative        Physical Exam  ED Triage Vitals   Temperature Pulse Respirations Blood Pressure SpO2   03/21/23 2106 03/21/23 2106 03/21/23 2106 03/21/23 2106 03/21/23 2106   98 °F (36 7 °C) 94 18 122/77 98 %      Temp Source Heart Rate Source Patient Position - Orthostatic VS BP Location FiO2 (%)   03/22/23 2305 03/21/23 2106 03/22/23 0528 03/22/23 0528 --   Temporal Monitor Lying Right arm       Pain Score       03/21/23 2236       6             Orthostatic Vital Signs  Vitals:    03/22/23 1503 03/22/23 2214 03/22/23 2305 03/23/23 0727   BP: 114/76 114/76 114/76 116/76   Pulse: 77 82 82    Patient Position - Orthostatic VS:           Physical Exam  Vitals reviewed  Constitutional:       Appearance: Normal appearance  HENT:      Head: Normocephalic and atraumatic  Nose: Nose normal       Mouth/Throat:      Mouth: Mucous membranes are moist       Pharynx: Oropharynx is clear  Eyes:      Extraocular Movements: Extraocular movements intact  Conjunctiva/sclera: Conjunctivae normal    Cardiovascular:      Rate and Rhythm: Normal rate and regular rhythm  Pulses: Normal pulses  Heart sounds: Normal heart sounds  Pulmonary:      Effort: Pulmonary effort is normal       Breath sounds: Normal breath sounds  Abdominal:      General: Bowel sounds are normal       Palpations: Abdomen is soft  Tenderness: There is abdominal tenderness in the right upper quadrant and epigastric area  Musculoskeletal:         General: Tenderness (Right posterior thigh tenderness) present  Normal range of motion  Cervical back: Normal range of motion  Skin:     General: Skin is warm and dry  Neurological:      General: No focal deficit present  Mental Status: He is alert and oriented to person, place, and time  Mental status is at baseline           ED Medications  Medications   multi-electrolyte (PLASMALYTE-A/ISOLYTE-S PH 7 4) IV solution (0 mL/hr Intravenous Stopped 3/22/23 1257)   sodium chloride 0 9 % bolus 1,000 mL (0 mL Intravenous Stopped 3/21/23 2347)   HYDROmorphone (DILAUDID) injection 0 5 mg (0 5 mg Intravenous Given 3/21/23 2236)       Diagnostic Studies  Results Reviewed     Procedure Component Value Units Date/Time    Fingerstick Glucose (POCT) [[id number]]  (Abnormal) Collected: 03/22/23 0739    Lab Status: Final result Updated: 03/22/23 0743     POC Glucose 217 mg/dl     Hepatic function panel [[id number]]  (Abnormal) Collected: 03/22/23 0527    Lab Status: Final result Specimen: Blood from Arm, Right Updated: 03/22/23 0615     Total Bilirubin 0 43 mg/dL      Bilirubin, Direct 0 14 mg/dL      Alkaline Phosphatase 68 U/L      AST 25 U/L      ALT 45 U/L      Total Protein 6 7 g/dL      Albumin 3 0 g/dL     Basic metabolic panel [[id number]]  (Abnormal) Collected: 03/22/23 0527    Lab Status: Final result Specimen: Blood from Arm, Right Updated: 03/22/23 0612     Sodium 135 mmol/L      Potassium 3 9 mmol/L      Chloride 106 mmol/L      CO2 26 mmol/L      ANION GAP 3 mmol/L      BUN 19 mg/dL      Creatinine 1 00 mg/dL      Glucose 249 mg/dL      Calcium 8 5 mg/dL      eGFR 88 ml/min/1 73sq m     Narrative:      [place] guidelines for Chronic Kidney Disease (CKD):   •  Stage 1 with normal or high GFR (GFR > 90 mL/min/1 73 square meters)  •  Stage 2 Mild CKD (GFR = 60-89 mL/min/1 73 square meters)  •  Stage 3A Moderate CKD (GFR = 45-59 mL/min/1 73 square meters)  •  Stage 3B Moderate CKD (GFR = 30-44 mL/min/1 73 square meters)  •  Stage 4 Severe CKD (GFR = 15-29 mL/min/1 73 square meters)  •  Stage 5 End Stage CKD (GFR <15 mL/min/1 73 square meters)  Note: GFR calculation is accurate only with a steady state creatinine    Magnesium [[id number]]  (Normal) Collected: 03/22/23 0527    Lab Status: Final result Specimen: Blood from Arm, Right Updated: 03/22/23 0612     Magnesium 2 1 mg/dL     CBC and differential [[id number]] Collected: 03/22/23 0527    Lab Status: Final result Specimen: Blood from Arm, Right Updated: 03/22/23 0554     WBC 4 42 Thousand/uL      RBC 4 98 Million/uL      Hemoglobin 14 5 g/dL      Hematocrit 44 3 %      MCV 89 fL      MCH 29 1 pg      MCHC 32 7 g/dL      RDW 12 2 %      MPV 9 2 fL      Platelets 106 Thousands/uL      nRBC 0 /100 WBCs      Neutrophils Relative 51 %      Immat GRANS % 1 %      Lymphocytes Relative 31 %      Monocytes Relative 12 %      Eosinophils Relative 4 %      Basophils Relative 1 %      Neutrophils Absolute 2 33 Thousands/µL Immature Grans Absolute 0 02 Thousand/uL      Lymphocytes Absolute 1 35 Thousands/µL      Monocytes Absolute 0 51 Thousand/µL      Eosinophils Absolute 0 18 Thousand/µL      Basophils Absolute 0 03 Thousands/µL     HS Troponin I 4hr [[id number]]  (Normal) Collected: 03/22/23 0255    Lab Status: Final result Specimen: Blood from Arm, Right Updated: 03/22/23 0350     hs TnI 4hr 7 ng/L      Delta 4hr hsTnI 0 ng/L     HS Troponin I 2hr [[id number]]  (Normal) Collected: 03/22/23 0046    Lab Status: Final result Specimen: Blood from Arm, Right Updated: 03/22/23 0122     hs TnI 2hr 7 ng/L      Delta 2hr hsTnI 0 ng/L     HS Troponin 0hr (reflex protocol) [[id number]]  (Normal) Collected: 03/21/23 2240    Lab Status: Final result Specimen: Blood from Arm, Right Updated: 03/21/23 2317     hs TnI 0hr 7 ng/L     Comprehensive metabolic panel [[id number]]  (Abnormal) Collected: 03/21/23 2117    Lab Status: Final result Specimen: Blood from Arm, Left Updated: 03/21/23 2213     Sodium 131 mmol/L      Potassium 4 1 mmol/L      Chloride 101 mmol/L      CO2 27 mmol/L      ANION GAP 3 mmol/L      BUN 19 mg/dL      Creatinine 1 21 mg/dL      Glucose 296 mg/dL      Calcium 9 6 mg/dL      AST 26 U/L      ALT 58 U/L      Alkaline Phosphatase 76 U/L      Total Protein 8 0 g/dL      Albumin 3 9 g/dL      Total Bilirubin 0 68 mg/dL      eGFR 70 ml/min/1 73sq m     Narrative:      [place] guidelines for Chronic Kidney Disease (CKD):   •  Stage 1 with normal or high GFR (GFR > 90 mL/min/1 73 square meters)  •  Stage 2 Mild CKD (GFR = 60-89 mL/min/1 73 square meters)  •  Stage 3A Moderate CKD (GFR = 45-59 mL/min/1 73 square meters)  •  Stage 3B Moderate CKD (GFR = 30-44 mL/min/1 73 square meters)  •  Stage 4 Severe CKD (GFR = 15-29 mL/min/1 73 square meters)  •  Stage 5 End Stage CKD (GFR <15 mL/min/1 73 square meters)  Note: GFR calculation is accurate only with a steady state creatinine    Lipase [[id number]]  (Abnormal) Collected: 03/21/23 2117    Lab Status: Final result Specimen: Blood from Arm, Left Updated: 03/21/23 2211     Lipase 1,295 u/L     CBC and differential [[id number]] Collected: 03/21/23 2117    Lab Status: Final result Specimen: Blood from Arm, Left Updated: 03/21/23 2127     WBC 4 80 Thousand/uL      RBC 5 47 Million/uL      Hemoglobin 16 1 g/dL      Hematocrit 47 3 %      MCV 87 fL      MCH 29 4 pg      MCHC 34 0 g/dL      RDW 12 2 %      MPV 9 2 fL      Platelets 762 Thousands/uL      nRBC 0 /100 WBCs      Neutrophils Relative 54 %      Immat GRANS % 0 %      Lymphocytes Relative 31 %      Monocytes Relative 11 %      Eosinophils Relative 4 %      Basophils Relative 0 %      Neutrophils Absolute 2 54 Thousands/µL      Immature Grans Absolute 0 02 Thousand/uL      Lymphocytes Absolute 1 50 Thousands/µL      Monocytes Absolute 0 52 Thousand/µL      Eosinophils Absolute 0 20 Thousand/µL      Basophils Absolute 0 02 Thousands/µL                  US right upper quadrant   Final Result by Joseluis Renner MD (03/22 0024)      1  No gallbladder abnormality   2    Hepatic steatosis            Workstation performed: [id number]               Procedures  POC Biliary US    Date/Time: 3/21/2023 10:26 PM  Performed by: Soumya Lomas MD  Authorized by: Soumya Lomas MD     Patient location:  ED  Performed by:  Resident  Procedure details:     Exam Type:  Diagnostic    Indications: upper right quadrant abdominal pain and epigastric pain      Assessment for:  Cholecystitis and cholelithiasis    Views obtained: gallbladder (transverse and longitudinal)      Image quality: diagnostic      Image availability:  Images available in PACS  Findings:     Cholelithiasis: not identified      Common bile duct:  Unable to visualize    Gallbladder wall:  Normal    Pericholecystic fluid: not identified      Sonographic Vázquez's sign: negative    Interpretation:     Biliary ultrasound impressions: normal gallbladder ultrasound            ED Course                                       Medical Decision Making  42-year-old male patient presenting with epigastric pain and diarrhea  Patient also has right leg pain likely musculoskeletal   Exam shows epigastric and right upper quadrant tenderness  Possible DDx includes gallbladder, pancreatitis, gastritis  Labs show elevated lipase and elevated glucose but otherwise within normal limits  Right upper quadrant ultrasound shows no gallbladder abnormality  Patient treated with Dilaudid and fluids with improvement of symptoms  Pancreatitis:     Details: Elevated lipase, epigastric pain  Likely pancreatitis  Right leg pain:     Details: Patient has right posterior leg pain tender to palpation  Likely musculoskeletal  Amount and/or Complexity of Data Reviewed  Labs: ordered  Radiology: ordered  Discussion of management or test interpretation with external provider(s): Admitted to medicine for pancreatitis    Risk  Prescription drug management  Decision regarding hospitalization  Disposition  Final diagnoses:   Pancreatitis   Right leg pain     Time reflects when diagnosis was documented in both MDM as applicable and the Disposition within this note     Time User Action Codes Description Comment    3/22/2023 12:53 AM Devota Player ANNIE ALFREDO Add [K85 90] Pancreatitis     3/22/2023 12:53 AM Maggie Cline Add [N15 [id number]] Right leg pain     3/22/2023 12:38 PM Edelmira Mckeon Add [E11 65,  Z79 4] Type 2 diabetes mellitus with hyperglycemia, with long-term current use of insulin [facility])       ED Disposition     ED Disposition   Admit    Condition   Stable    Date/Time   Wed Mar 22, 2023 12:53 AM    Comment   Case was discussed with Dr Natalia Hayes and the patient's admission status was agreed to be Admission Status: inpatient status to the service of Dr Natalia Hayes              Follow-up Information     Follow up With Specialties Details Why Contact Info    Bree Crowley  Follow up in 1 week(s)  [address] [facility] [address]      Doretha Hill MD Endocrinology   [address]  [phone]            Discharge Medication List as of 3/23/2023  9:00 AM      START taking these medications    Details   !! glucose blood (OneTouch Verio) test strip May substitute brand based on insurance coverage  Check glucose TID , Normal      insulin glargine (LANTUS) 100 units/mL subcutaneous injection Inject 30 Units under the skin daily at bedtime, Starting Thu 3/23/2023, Normal      Insulin Glargine Solostar (Lantus SoloStar) 100 UNIT/ML SOPN Inject 0 3 mL (30 Units total) under the skin daily at bedtime, Starting Thu 3/23/2023, Normal      insulin lispro (HumaLOG KwikPen) 100 units/mL injection pen Inject 10 Units under the skin 3 (three) times a day with meals, Starting Thu 3/23/2023, Normal      !! Insulin Pen Needle (BD Pen Needle Ann 2nd Gen) 32G X 4 MM MISC For use with insulin pen  Pharmacy may dispense brand covered by insurance , Normal      !! Insulin Pen Needle (BD Pen Needle Ann 2nd Gen) 32G X 4 MM MISC For use with insulin pen  Pharmacy may dispense brand covered by insurance , Normal       !! - Potential duplicate medications found  Please discuss with provider        CONTINUE these medications which have CHANGED    Details   metFORMIN (GLUCOPHAGE) 1000 MG tablet Take 1 tablet (1,000 mg total) by mouth 2 (two) times a day with meals, Starting Thu 3/23/2023, Normal         CONTINUE these medications which have NOT CHANGED    Details   aspirin (ECOTRIN LOW STRENGTH) 81 mg EC tablet Take 1 tablet (81 mg total) by mouth daily, Starting Wed 5/6/2020, No Print      Blood Glucose Monitoring Suppl (OneTouch Verio) w/Device KIT Use to test 3-4x daily, Normal      Blood Pressure KIT Use 2 (two) times a day, Starting Tue 10/12/2021, Normal      candesartan (ATACAND) 32 MG tablet Starting Fri 4/16/2021, Historical Med      Diclofenac Sodium (VOLTAREN) 1 % Apply 2 g topically 4 (four) times a day, Starting Tue 10/12/2021, Normal      !! glucose blood (OneTouch Verio) test strip Use to test 3-4x daily, Normal      Lancets (onetouch ultrasoft) lancets Use to tests 3-4x daily, Normal      metoprolol succinate (TOPROL-XL) 50 mg 24 hr tablet TAKE 1 TABLET BY MOUTH TWICE A DAY, Normal      ranolazine (RANEXA) 500 mg 12 hr tablet take 1 tablet by mouth twice a day, Normal      rosuvastatin (CRESTOR) 5 mg tablet Take 5 mg by mouth daily, Historical Med      verapamil (VERELAN PM) 120 MG 24 hr capsule TAKE 1 CAPSULE (120 MG TOTAL) BY MOUTH DAILY AT BEDTIME, Starting Fri 11/25/2022, Normal       !! - Potential duplicate medications found  Please discuss with provider  STOP taking these medications       glimepiride (AMARYL) 2 mg tablet Comments:   Reason for Stopping:         Icosapent Ethyl 1 g CAPS Comments:   Reason for Stopping:         OZEMPIC, 1 MG/DOSE, 2 MG/1 5ML SOPN Comments:   Reason for Stopping:         TRESIBA FLEXTOUCH 100 units/mL injection pen Comments:   Reason for Stopping:         Xigduo XR  MG TB24 Comments:   Reason for Stopping:             Outpatient Discharge Orders   Discharge Diet     Activity as tolerated       PDMP Review       Value Time User    PDMP Reviewed  Yes 5/6/2020  2:27 PM Paula Childress PA-C           ED Provider  Attending physically available and evaluated Jack Lockett  I managed the patient along with the ED Attending      Electronically Signed by         Jaswinder Hewitt MD  03/26/23 7676

## 2023-03-22 NOTE — ASSESSMENT & PLAN NOTE
Reports chronic chest pain  Initial troponin 7    Continue Ranexa, metoprolol, verapamil  Continue aspirin

## 2023-03-22 NOTE — ED ATTENDING ATTESTATION
3/21/2023  INate DO, saw and evaluated the patient  I have discussed the patient with the resident/non-physician practitioner and agree with the resident's/non-physician practitioner's findings, Plan of Care, and MDM as documented in the resident's/non-physician practitioner's note, except where noted  All available labs and Radiology studies were reviewed  I was present for key portions of any procedure(s) performed by the resident/non-physician practitioner and I was immediately available to provide assistance  At this point I agree with the current assessment done in the Emergency Department  I have conducted an independent evaluation of this patient a history and physical is as follows:    Patient is a 66-year-old male with a history of diabetes, hypertension, hyperlipidemia, says 3 days ago began having some mild epigastric discomfort, relatively constant, sometimes worse with eating food  Slight anorexia  Also having 2 days of loose diarrhea, nonbloody, non-mucousy  No travel history or sick contacts, no recent antibiotics, no previous abdominal surgeries  Says about 2 days ago he thinks he pulled a muscle because he has some pain behind his right leg in his right thigh hamstring area  No fever, no chills  General:  Patient is well-appearing  Head:  Atraumatic  Eyes:  Conjunctiva pink  ENT:  Mucous membranes are moist  Neck:  Supple  Cardiac:  S1-S2, without murmurs  Lungs:  Clear to auscultation bilaterally  Abdomen: Mild epigastric tenderness, no tympany, rigidity, no guarding, no CVA tenderness  Extremities: Slight discomfort to the right hamstring area, no warmth or redness  No pain or swelling or redness at the hip, knee, ankle  No bony tenderness to the right leg    Neurologic:  Awake, fluent speech, normal comprehension, AAOx3  Skin:  Pink warm and dry  Psychiatric:  Alert, pleasant, cooperative        ED Course     Labs Reviewed   COMPREHENSIVE METABOLIC PANEL - Abnormal Result Value Ref Range Status    Sodium 131 (*) 135 - 147 mmol/L Final    Potassium 4 1  3 5 - 5 3 mmol/L Final    Chloride 101  96 - 108 mmol/L Final    CO2 27  21 - 32 mmol/L Final    ANION GAP 3 (*) 4 - 13 mmol/L Final    BUN 19  5 - 25 mg/dL Final    Creatinine 1 21  0 60 - 1 30 mg/dL Final    Comment: Standardized to IDMS reference method    Glucose 296 (*) 65 - 140 mg/dL Final    Comment: If the patient is fasting, the ADA then defines impaired fasting glucose as > 100 mg/dL and diabetes as > or equal to 123 mg/dL  Specimen collection should occur prior to Sulfasalazine administration due to the potential for falsely depressed results  Specimen collection should occur prior to Sulfapyridine administration due to the potential for falsely elevated results  Calcium 9 6  8 3 - 10 1 mg/dL Final    AST 26  5 - 45 U/L Final    Comment: Specimen collection should occur prior to Sulfasalazine administration due to the potential for falsely depressed results  ALT 58  12 - 78 U/L Final    Comment: Specimen collection should occur prior to Sulfasalazine and/or Sulfapyridine administration due to the potential for falsely depressed results  Alkaline Phosphatase 76  46 - 116 U/L Final    Total Protein 8 0  6 4 - 8 4 g/dL Final    Albumin 3 9  3 5 - 5 0 g/dL Final    Total Bilirubin 0 68  0 20 - 1 00 mg/dL Final    Comment: Use of this assay is not recommended for patients undergoing treatment with eltrombopag due to the potential for falsely elevated results      eGFR 70  ml/min/1 73sq m Final    Narrative:     [place] guidelines for Chronic Kidney Disease (CKD):   •  Stage 1 with normal or high GFR (GFR > 90 mL/min/1 73 square meters)  •  Stage 2 Mild CKD (GFR = 60-89 mL/min/1 73 square meters)  •  Stage 3A Moderate CKD (GFR = 45-59 mL/min/1 73 square meters)  •  Stage 3B Moderate CKD (GFR = 30-44 mL/min/1 73 square meters)  •  Stage 4 Severe CKD (GFR = 15-29 mL/min/1 73 square meters)  •  Stage 5 End Stage CKD (GFR <15 mL/min/1 73 square meters)  Note: GFR calculation is accurate only with a steady state creatinine   LIPASE - Abnormal    Lipase 1,295 (*) 73 - 393 u/L Final   HS TROPONIN I 0HR - Normal    hs TnI 0hr 7  "Refer to ACS Flowchart"- see link ng/L Final    Comment:                                              Initial (time 0) result  If >=50 ng/L, Myocardial injury suggested ;  Type of myocardial injury and treatment strategy  to be determined  If 5-49 ng/L, a delta result at 2 hours and or 4 hours will be needed to further evaluate  If <4 ng/L, and chest pain has been >3 hours since onset, patient may qualify for discharge based on the HEART score in the ED  If <5 ng/L and <3hours since onset of chest pain, a delta result at 2 hours will be needed to further evaluate  HS Troponin 99th Percentile URL of a Health Population=12 ng/L with a 95% Confidence Interval of 8-18 ng/L  Second Troponin (time 2 hours)  If calculated delta >= 20 ng/L,  Myocardial injury suggested ; Type of myocardial injury and treatment strategy to be determined  If 5-49 ng/L and the calculated delta is 5-19 ng/L, consult medical service for evaluation  Continue evaluation for ischemia on ecg and other possible etiology and repeat hs troponin at 4 hours  If delta is <5 ng/L at 2 hours, consider discharge based on risk stratification via the HEART score (if in ED), or GIOVANNA risk score in IP/Observation      HS Troponin 99th Percentile URL of a Health Population=12 ng/L with a 95% Confidence Interval of 8-18 ng/L    CBC AND DIFFERENTIAL    WBC 4 80  4 31 - 10 16 Thousand/uL Final    RBC 5 47  3 88 - 5 62 Million/uL Final    Hemoglobin 16 1  12 0 - 17 0 g/dL Final    Hematocrit 47 3  36 5 - 49 3 % Final    MCV 87  82 - 98 fL Final    MCH 29 4  26 8 - 34 3 pg Final    MCHC 34 0  31 4 - 37 4 g/dL Final    RDW 12 2  11 6 - 15 1 % Final    MPV 9 2  8 9 - 12 7 fL Final    Platelets 471  912 - 390 Thousands/uL Final    nRBC 0  /100 WBCs Final    Neutrophils Relative 54  43 - 75 % Final    Immat GRANS % 0  0 - 2 % Final    Lymphocytes Relative 31  14 - 44 % Final    Monocytes Relative 11  4 - 12 % Final    Eosinophils Relative 4  0 - 6 % Final    Basophils Relative 0  0 - 1 % Final    Neutrophils Absolute 2 54  1 85 - 7 62 Thousands/µL Final    Immature Grans Absolute 0 02  0 00 - 0 20 Thousand/uL Final    Lymphocytes Absolute 1 50  0 60 - 4 47 Thousands/µL Final    Monocytes Absolute 0 52  0 17 - 1 22 Thousand/µL Final    Eosinophils Absolute 0 20  0 00 - 0 61 Thousand/µL Final    Basophils Absolute 0 02  0 00 - 0 10 Thousands/µL Final   HS TROPONIN I 2HR       US right upper quadrant    (Results Pending)       This point believe the patient has pancreatitis  Ultrasound is currently pending about the possibility of gallstone pancreatitis  If there is evidence of gallstones on the ultrasound, patient will be discussed with general surgery for admission for possible cholecystectomy and further management  If the ultrasound does not show acute cholelithiasis, patient will be admitted to medicine  Signed out to Dr Pascale Knapp    DIAGNOSIS:  #1 acute abdominal pain #2 acute pancreatitis, #3 rule out gallstone pancreatitis    MEDICAL DECISION MAKING CODING    The differential diagnosis before testing included (but is not limited to) acute nonspecific abdominal pain, acute viral illness, acute idiopathic pancreatitis, acute gallstone pancreatitis and acute severe hypokalemia from diarrhea which is a medical condition that poses a threat to life/function  Chronic conditions affecting care: As per HPI    COLLECTION AND INTERPRETATION OF DATA  I reviewed prior external notes, including March 10, 2023 office visit, which showed past med history as noted above      I ordered each unique test  Tests reviewed personally by me:  ECG: See my ED course  Labs: elevated lipase consistent with pancreatitis    Social Determinants of Health:  Presentation to ED outside of business hours or on night shift    Critical Care Time  Procedures

## 2023-03-22 NOTE — H&P
[facility]  H&P- Miko Basilio 1975, 50 y o  male MRN: [id number]  Unit/Bed#: ED 03 Encounter: [id number]  Primary Care Provider: Karan Tracey   Date and time admitted to hospital: 3/21/2023  9:39 PM    * Abdominal pain  Assessment & Plan  Reports 2 to 3 days of worsening epigastric and lower midline abdominal pain  This been associated with diarrhea, poor p o  intake  Lipase 1295 on admission  Right upper quadrant ultrasound negative for gallstones or pancreatic inflammation  Has had recent sick contacts, as his children have recently been sick with similar symptoms including diarrhea  Does report he is recently started back on Ozempic and metformin  Suspect viral gastroenteritis versus pancreatitis  · Clear liquid diet  · Pain control as needed  · IV fluids  · Antiemetics as needed  · Obtain stool PCR    Leg pain  Assessment & Plan  Reports right thigh pain over the last several days  Concerned he may have strained it  Pain control as needed    Coronary artery disease of native artery of native heart with stable angina pectoris [facility])  Assessment & Plan  Reports chronic chest pain  Initial troponin 7  Continue Ranexa, metoprolol, verapamil  Continue aspirin    Other hyperlipidemia  Assessment & Plan  Continue statin    Essential hypertension  Assessment & Plan  Continue metoprolol, verapamil, ARB    Type 2 diabetes mellitus ([facility] [address] )  Assessment & Plan  Lab Results   Component Value Date    HGBA1C 12 7 (H) 03/18/2023       No results for input(s): POCGLU in the last 72 hours  Blood Sugar Average: Last 72 hrs:  Current regimen includes glimepiride, metformin, Ozempic, and Tresiba 60 units nightly    Hold glimepiride, metformin and Ozempic  Continue long-acting insulin with glargine, decrease to 40 units nightly for goal -180  Sliding scale insulin  Hypoglycemia protocol        VTE Prophylaxis: Enoxaparin (Lovenox)  / sequential compression device and foot pump applied   Code Status: Prior level 1-full code      Anticipated Length of Stay:  Patient will be admitted on an Observation basis with an anticipated length of stay of less than 2 midnights  Justification for Hospital Stay: Please see detailed plans noted above  Chief Complaint:     Abdominal pain    History of Present Illness:  Wm Benjamin is a 50 y o  male who has past medical history significant for diabetes, CAD presenting with abdominal pain  Patient reports onset of epigastric and lower midline abdominal pain about 2 to 3 days ago  He describes the pain as a cramping "hunger pains" sensation  He reports associated nonbloody diarrhea up to 3 times daily, poor p o  intake and headache  He denies fever, chills, nausea, vomiting, shortness of breath, dysuria  He does report chronic chest pain that is unchanged  He does report sick contacts, as his children were recently sick with similar symptoms including diarrhea  In ED, lipase found to be 1295  Right upper quadrant ultrasound negative for gallstones or pancreatic inflammation  Review of Systems:    Constitutional:  Denies fever or chills   Eyes:  Denies change in visual acuity   HENT:  Denies nasal congestion or sore throat   Respiratory:  Denies cough or shortness of breath   Cardiovascular:  Denies chest pain or edema   GI: Reports abdominal pain, diarrhea  :  Denies dysuria   Musculoskeletal: Reports leg pain  Integument:  Denies rash   Neurologic: Reports headache  Psychiatric:  Denies depression or anxiety     Past Medical and Surgical History:   Past Medical History:   Diagnosis Date   • Congestive heart failure ([facility] [address] )    • Diabetes mellitus ([facility] [address] )    • Hyperlipidemia    • Hypertension    • Obesity      History reviewed  No pertinent surgical history  Meds/Allergies:  No current facility-administered medications on file prior to encounter       Current Outpatient Medications on File Prior to Encounter   Medication Sig Dispense Refill   • metFORMIN (GLUCOPHAGE) 500 mg tablet Take 1 tablet by mouth 2 (two) times a day     • aspirin (ECOTRIN LOW STRENGTH) 81 mg EC tablet Take 1 tablet (81 mg total) by mouth daily 30 tablet 0   • Blood Glucose Monitoring Suppl (OneTouch Verio) w/Device KIT Use to test 3-4x daily 1 kit 1   • Blood Pressure KIT Use 2 (two) times a day 1 kit 2   • candesartan (ATACAND) 32 MG tablet      • Diclofenac Sodium (VOLTAREN) 1 % Apply 2 g topically 4 (four) times a day 100 g 2   • glimepiride (AMARYL) 2 mg tablet Take 2 mg by mouth every morning before breakfast       • glucose blood (OneTouch Verio) test strip Use to test 3-4x daily 400 strip 2   • Icosapent Ethyl 1 g CAPS Take 1 g by mouth (Patient not taking: Reported on 3/22/2023)     • Lancets (onetouch ultrasoft) lancets Use to tests 3-4x daily 400 each 2   • metoprolol succinate (TOPROL-XL) 50 mg 24 hr tablet TAKE 1 TABLET BY MOUTH TWICE A  tablet 0   • OZEMPIC, 1 MG/DOSE, 2 MG/1 5ML SOPN 1 mg once a week       • ranolazine (RANEXA) 500 mg 12 hr tablet take 1 tablet by mouth twice a day 60 tablet 2   • rosuvastatin (CRESTOR) 5 mg tablet Take 5 mg by mouth daily     • TRESIBA FLEXTOUCH 100 units/mL injection pen Inject 70 Units under the skin daily at bedtime       • verapamil (VERELAN PM) 120 MG 24 hr capsule TAKE 1 CAPSULE (120 MG TOTAL) BY MOUTH DAILY AT BEDTIME 90 capsule 0   • Xigduo XR  MG TB24  (Patient not taking: Reported on 3/22/2023)           Allergies: No Known Allergies    History:  Marital Status: /Civil Union     Substance Use History:   Social History     Substance and Sexual Activity   Alcohol Use Not Currently     Social History     Tobacco Use   Smoking Status Never   Smokeless Tobacco Never     Social History     Substance and Sexual Activity   Drug Use Never       Family History:  Family History   Problem Relation Age of Onset   • Diabetes Mother    • Stroke Mother    • Stroke Father        Physical Exam: Vitals:   Blood Pressure: 122/77 (03/21/23 2106)  Pulse: 94 (03/21/23 2106)  Temperature: 98 °F (36 7 °C) (03/21/23 2106)  Respirations: 18 (03/21/23 2106)  SpO2: 98 % (03/21/23 2106)    Constitutional:  Non-toxic appearance, no acute distress  Eyes:  EOMI, No scleral icterus   HENT:   oropharynx moist, external ears normal, external nose normal   Respiratory:  No respiratory distress, no wheezing   Cardiovascular:  Normal rate, no murmurs   GI:  Soft, nondistended, tenderness to light palpation in epigastric and lower midline regions, no rebound or guarding   Musculoskeletal:  no tenderness, no deformities, no edema  Integument:  no jaundice, no rash   Neurologic:  Alert &awake, communicative,  no focal deficits noted   Psychiatric:  Speech and behavior appropriate       Lab Results: I have personally reviewed pertinent reports  Results from last 7 days   Lab Units 03/21/23 2117   WBC Thousand/uL 4 80   HEMOGLOBIN g/dL 16 1   HEMATOCRIT % 47 3   PLATELETS Thousands/uL 189   NEUTROS PCT % 54   LYMPHS PCT % 31   MONOS PCT % 11   EOS PCT % 4     Results from last 7 days   Lab Units 03/21/23  2117   POTASSIUM mmol/L 4 1   CHLORIDE mmol/L 101   CO2 mmol/L 27   BUN mg/dL 19   CREATININE mg/dL 1 21   CALCIUM mg/dL 9 6   ALK PHOS U/L 76   ALT U/L 58   AST U/L 26             Imaging: I have personally reviewed pertinent reports  US right upper quadrant    Result Date: 3/22/2023  Narrative: RIGHT UPPER QUADRANT ULTRASOUND INDICATION:   r/o gallstones, cholecystitis  Elevated lipase COMPARISON:  None TECHNIQUE:   Real-time ultrasound of the right upper quadrant was performed with a curvilinear transducer with both volumetric sweeps and still imaging techniques  FINDINGS: PANCREAS:  Visualized portions of the pancreas are within normal limits  AORTA AND IVC:  Visualized portions are normal for patient age  LIVER: Size: 18 2 cm Contour:  Surface contour is smooth   Parenchyma:  Echogenic No evidence of suspicious mass  Limited imaging of the main portal vein shows it to be patent and hepatopetal   BILIARY: No gallbladder findings  No intrahepatic biliary dilatation  CBD measures  5 0 mm  No choledocholithiasis  KIDNEY: Right kidney [id number] 5 x 5 7 x 5 9 cm  Volume 217 0 mL Small cyst ASCITES:   None  Impression: 1  No gallbladder abnormality 2  Hepatic steatosis Workstation performed: [id number]     Total time for visit, including counseling/coordination of care: 30 minutes  Greater than 50% of this total time spent on direct patient counseling and coorination of care  Epic Records Reviewed as well as Records in Care Everywhere    ** Please Note: Dragon 360 Dictation voice to text software was used in the creation of this document   **

## 2023-03-22 NOTE — CONSULTS
Endocrinology Consultation  Tina Stevens  51 yo M   MRN [id number]  BE [id number]  [id number]     Assessment/Plan:  Tina Stevens 50 y o  male with poorly controlled insulin-dependent T2DM, CAD with angina s/p angioplasty, HTN, HLD, obesity who presented with epigastric pain  Endocrinology consulted for type 2 diabetes management  #  Uncontrolled, insulin-requiring type 2 diabetes  -a1c 12 7%  -outpatient regimen: glimepiride 2 mg with breakfast, metformin 500 mg bid, tresiba 60 units HS, and [place]  Ozempic last used about a month ago, as patient lost prescription coverage   -diabetic complications: CAD  No retinopathy, neuropathy, nephropathy   -inpatient: glargine 30 units HS, lispro 10 units tid meals, and SSI with meals and bedtime  -will need glucometer on discharge so he can check his sugars three times daily  -CM assistance with insulin pricing (needs short-acting insulin with meals) appreciated  -upon discharge should resume metformin at 500 mg bid and after a week increase to 1000 mg bid  At that time he should add back the [place] as well   -should pursue diabetic education outpatient and follow-up with endocrinology    Reason for Admission / Principal Problem: abdominal pain  Reason for Consult: uncontrolled type 2 diabetes  Consulting physician: Nicki Romo MD    HISTORY OF PRESENT ILLNESS     Tina Stevens 50 y o  male with poorly controlled insulin-dependent T2DM, CAD with angina s/p angioplasty, HTN, HLD, obesity who presented with epigastric pain  Endocrinology consulted for type 2 diabetes management  Abdominal pain for 2-3 days in the epigastric and central abdominal regions  He has had diarrhea and poor p o  intake  Children were sick recently with the same GI symptoms  He does not have a prior history of pancreatitis  Lipase was elevated to 1295, abdominal ultrasound did not indicate acute gallbladder pathology or pancreatic inflammation  Patient has a 20+ year history of diabetes   Insulin requiring for 5-10 years  Current home medications: glimepiride 2 mg qd, metformin 500 mg bid, ozempic 2 mg subQ weekly, and Tresiba 60 units  and farxiga  Patient has had multiple insurance and therefore medication changes recently though, and has been off of ozempic for about a month  Previously was on Xigduo (metformin-dapagliflozin)  Has completed diabetic education many years ago  No diabetic complications of neuropathy, retinopathy (last eye exam 2022) or nephropathy  Activity: nothing formal, has young children  On ACEi and statin for his HTN and HLD- managed by cardiology  Family history: diabetes in mother and sister    SUBJECTIVE   Feeling well today  Appetite is improved and therefore primary team has advanced his diet from clears (at beef broth for both lunch and dinner) to regular diet with carb control as tolerated  Patient without n/v/d or abdominal pain  REVIEW OF SYSTEMS   Review of Systems   Constitutional: Negative for activity change, appetite change, chills, fatigue and fever  Respiratory: Negative for cough and shortness of breath  Cardiovascular: Negative for chest pain  Gastrointestinal: Negative for abdominal distention, abdominal pain, constipation, diarrhea, nausea and vomiting  OBJECTIVE     Vitals:    23 2106 23 0528 23 0831   BP: 122/77 119/65 114/76   BP Location:  Right arm    Pulse: 94 83 75   Resp: 18 17    Temp: 98 °F (36 7 °C)  97 5 °F (36 4 °C)   SpO2: 98% 97% 97%      Temperature:   Temp (24hrs), Av 8 °F (36 6 °C), Min:97 5 °F (36 4 °C), Max:98 °F (36 7 °C)    Temperature: 97 5 °F (36 4 °C)  Intake & Output:  I/O        0701   0700  0701   0700  0701   0700    I  V    981 7    IV Piggyback  1000     Total Intake  1000 981 7    Net  +1000 +981 7               Weights: There is no height or weight on file to calculate BMI    Weight (last 2 days)     None        Physical Exam  Constitutional: Appearance: He is obese  Cardiovascular:      Rate and Rhythm: Normal rate and regular rhythm  Pulmonary:      Effort: Pulmonary effort is normal       Breath sounds: Normal breath sounds  Abdominal:      General: Bowel sounds are normal  There is no distension  Palpations: Abdomen is soft  Tenderness: There is no abdominal tenderness  There is no guarding  Musculoskeletal:      Right lower leg: No edema  Left lower leg: No edema  Neurological:      Mental Status: He is alert and oriented to person, place, and time  Psychiatric:         Mood and Affect: Mood normal          Behavior: Behavior normal          Thought Content: Thought content normal          Judgment: Judgment normal        PAST MEDICAL HISTORY     Past Medical History:   Diagnosis Date   • Congestive heart failure ([facility] )    • Diabetes mellitus ([facility] )    • Hyperlipidemia    • Hypertension    • Obesity      PAST SURGICAL HISTORY   History reviewed  No pertinent surgical history  SOCIAL & FAMILY HISTORY     Social History     Substance and Sexual Activity   Alcohol Use Not Currently     Social History     Substance and Sexual Activity   Drug Use Never     Social History     Tobacco Use   Smoking Status Never   Smokeless Tobacco Never     Family History   Problem Relation Age of Onset   • Diabetes Mother    • Stroke Mother    • Stroke Father      LABORATORY DATA     Labs: I have personally reviewed pertinent reports      Results from last 7 days   Lab Units 03/22/23  0527 03/21/23 2117   WBC Thousand/uL 4 42 4 80   HEMOGLOBIN g/dL 14 5 16 1   HEMATOCRIT % 44 3 47 3   PLATELETS Thousands/uL 171 189   NEUTROS PCT % 51 54   MONOS PCT % 12 11      Results from last 7 days   Lab Units 03/22/23  0527 03/21/23 2117   POTASSIUM mmol/L 3 9 4 1   CHLORIDE mmol/L 106 101   CO2 mmol/L 26 27   BUN mg/dL 19 19   CREATININE mg/dL 1 00 1 21   CALCIUM mg/dL 8 5 9 6   ALK PHOS U/L 68 76   ALT U/L 45 58   AST U/L 25 26     Results from last 7 days   Lab Units 03/22/23  0527   MAGNESIUM mg/dL 2 1                      Micro:  No results found for: BLOODCX, Sergei Lugo, WOUNDCULT, 2025 [facility] TESTS     Imaging: I have personally reviewed pertinent reports  US right upper quadrant    Result Date: 3/22/2023  Impression: 1  No gallbladder abnormality 2  Hepatic steatosis Workstation performed: [id number]     EKG, Pathology, and Other Studies: I have personally reviewed pertinent reports  ALLERGIES   No Known Allergies  MEDICATIONS PRIOR TO ARRIVAL     Prior to Admission medications    Medication Sig Start Date End Date Taking?  Authorizing Provider   metFORMIN (GLUCOPHAGE) 500 mg tablet Take 1 tablet by mouth 2 (two) times a day 2/28/23  Yes Historical Provider, MD   aspirin (ECOTRIN LOW STRENGTH) 81 mg EC tablet Take 1 tablet (81 mg total) by mouth daily 5/6/20   Felicia Pacheco PA-C   Blood Glucose Monitoring Suppl (OneTouch Verio) w/Device KIT Use to test 3-4x daily 1/12/22   Baljit Williamson MD   Blood Pressure KIT Use 2 (two) times a day 10/12/21   Arleen Phan DO   candesartan (ATACAND) 32 MG tablet  4/16/21   Historical Provider, MD   Diclofenac Sodium (VOLTAREN) 1 % Apply 2 g topically 4 (four) times a day 10/12/21   Arleen Phan DO   glimepiride (AMARYL) 2 mg tablet Take 2 mg by mouth every morning before breakfast      Historical Provider, MD   glucose blood (OneTouch Verio) test strip Use to test 3-4x daily 1/12/22   Baljit Williamson MD   Icosapent Ethyl 1 g CAPS Take 1 g by mouth  Patient not taking: Reported on 3/22/2023    Historical Provider, MD   Lancets (onetouch ultrasoft) lancets Use to tests 3-4x daily 1/12/22   Baljit Williamson MD   metoprolol succinate (TOPROL-XL) 50 mg 24 hr tablet TAKE 1 TABLET BY MOUTH TWICE A DAY 11/25/22   Arleen Phan DO   OZEMPIC, 1 MG/DOSE, 2 MG/1 5ML SOPN 1 mg once a week   11/21/19   Historical Provider, MD   ranolazine (RANEXA) 500 mg 12 hr tablet take 1 tablet by mouth twice a day 2/21/23   Jenifer Aguilera MD   rosuvastatin (CRESTOR) 5 mg tablet Take 5 mg by mouth daily    Historical Provider, MD   Lawence Failing 100 units/mL injection pen Inject 70 Units under the skin daily at bedtime   11/24/19   Historical Provider, MD   verapamil (VERELAN PM) 120 MG 24 hr capsule TAKE 1 CAPSULE (120 MG TOTAL) BY MOUTH DAILY AT BEDTIME 11/25/22   Arleen Phan,    Xigduo XR  MG TB24  4/22/21   Historical Provider, MD     MEDICATIONS ADMINISTERED IN LAST 24 HOURS     Medication Administration - last 24 hours from 03/21/2023 1258 to 03/22/2023 1258       Date/Time Order Dose Route Action Action by     03/21/2023 2347 EDT sodium chloride 0 9 % bolus 1,000 mL 0 mL Intravenous Stopped Delsijuan francisco Shea     03/21/2023 2234 EDT sodium chloride 0 9 % bolus 1,000 mL 1,000 mL Intravenous New Bag Kike Beck RN     03/21/2023 2236 EDT HYDROmorphone (DILAUDID) injection 0 5 mg 0 5 mg Intravenous Given Kike Beck RN     03/22/2023 1900 EDT aspirin (ECOTRIN LOW STRENGTH) EC tablet 81 mg 81 mg Oral Given Caroline Bravo RN     03/22/2023 9101 EDT losartan (COZAAR) tablet 100 mg 100 mg Oral Given Caroline Bravo RN     03/22/2023 9168 EDT metoprolol succinate (TOPROL-XL) 24 hr tablet 50 mg 50 mg Oral Given Caroline Bravo RN     03/22/2023 1215 EDT ranolazine (RANEXA) 12 hr tablet 500 mg 500 mg Oral Given Caroline Bravo RN     03/22/2023 1215 EDT verapamil (CALAN-SR) CR tablet 120 mg 120 mg Oral Given Caroline Bravo RN     03/22/2023 1257 EDT multi-electrolyte (PLASMALYTE-A/ISOLYTE-S PH 7 4) IV solution 0 mL/hr Intravenous Stopped Caroline Bravo RN     03/22/2023 0308 EDT multi-electrolyte (PLASMALYTE-A/ISOLYTE-S PH 7 4) IV solution 100 mL/hr Intravenous New Bag Reji Gerard RN     03/22/2023 8021 EDT acetaminophen (TYLENOL) tablet 650 mg 650 mg Oral Given Salty Pierre RN     03/22/2023 8802 EDT enoxaparin (LOVENOX) subcutaneous injection 40 mg 40 mg Subcutaneous Given Neva Logan RN     03/22/2023 1211 EDT insulin lispro (HumaLOG) 100 units/mL subcutaneous injection 1-5 Units 1 Units Subcutaneous Given Neva Logan RN     03/22/2023 2627 EDT insulin lispro (HumaLOG) 100 units/mL subcutaneous injection 1-5 Units 1 Units Subcutaneous Given Neva Logan RN        CURRENT MEDICATIONS     Current Facility-Administered Medications   Medication Dose Route Frequency Provider Last Rate   • acetaminophen  650 mg Oral Q6H PRN Chuyita Haskins MD     • aspirin  81 mg Oral Daily Chuyita Haskins MD     • enoxaparin  40 mg Subcutaneous Daily Chuyita Haskins MD     • HYDROmorphone  0 2 mg Intravenous Q3H PRN Chuyita Haskins MD     • insulin glargine  40 Units Subcutaneous HS Chuyita Haskins MD     • insulin lispro  1-5 Units Subcutaneous TID AC Chuyita Haskins MD     • insulin lispro  1-5 Units Subcutaneous HS Chuyita Haskins MD     • losartan  100 mg Oral Daily Chuyita Haskins MD     • metoprolol succinate  50 mg Oral BID Chuyita Haskins MD     • multi-electrolyte  100 mL/hr Intravenous Continuous Chuyita Haskins MD Stopped (03/22/23 1257)   • ondansetron  4 mg Intravenous Q6H PRN Chuyita Haskins MD     • oxyCODONE  10 mg Oral Q4H PRN Chuyita Haskins MD     • oxyCODONE  5 mg Oral Q4H PRN Chuyita Haskins MD     • ranolazine  500 mg Oral BID Chuyita Haskins MD     • verapamil  120 mg Oral Daily Chuyita Haskins MD       multi-electrolyte, 100 mL/hr, Last Rate: Stopped (03/22/23 1257)      acetaminophen, 650 mg, Q6H PRN  HYDROmorphone, 0 2 mg, Q3H PRN  ondansetron, 4 mg, Q6H PRN  oxyCODONE, 10 mg, Q4H PRN  oxyCODONE, 5 mg, Q4H PRN        Portions of the record may have been created with voice recognition software  Occasional wrong word or "sound a like" substitutions may have occurred due to the inherent limitations of voice recognition software    Read the chart carefully and recognize, using context, where substitutions have occurred     ==  Shelbi Yung, [facility]  Internal Medicine Residency, PGY3

## 2023-03-22 NOTE — ASSESSMENT & PLAN NOTE
Reports right thigh pain over the last several days  Concerned he may have strained it    Pain control as needed

## 2023-03-22 NOTE — PROGRESS NOTES
Angela Kim's Internal Medicine Post Admit Check:     Date of visit: 03/22/23    The patient was admitted earlier to the [facility] Internal Medicine Service  Please see initial intake history and physical for detailed admission history  This is a supplemental update following a post admit checkup  Subjective: Patient reports feeling slightly improved today  States he has been tolerating clear liquids without difficulty and would like to advance to regular diet tonight  He tells me he has been on metformin in the past and he says he has not yet started the Ozempic prescribed by his PCP  He tells me he has followed with [facility]ocrin[facility] in the past and I advised plan to have inpatient evaluation  Patient still with mild abdominal pain but denies nausea/vomiting/diarrhea  Exam: obese, lying comfortably in bed, NAD  Heart RRR  Respiratory effort normal on room air  Abdomen slightly tender to palpation, hyperactive bowel sounds       Assessment and Plan:   · Abdominal pain with diarrhea   · Noted with elevated Lipase however RUQ US without abnormal findings   · Consideration for medication induced pancreatitis however patient reports being on Metformin in the past, and has not yet started Ozempic given insurance issues   · Noted lipid panel from 3/18/23 with TG level slightly elevated at 162  · Continue supportive cares, IV fluids, analgesics as needed   · Discontinue stool studies given improvement/resolution of diarrhea   · Advance to regular diet and monitor for tolerance     · Uncontrolled T2DM on insulin   · Noted with A1c of 12   · Metformin, Ozempic, Amaryl on hold while inpatient   · Current insulin regimen: Lantus 40 units qhs + SSI   · Carb controlled diet   · Appreciate endocrine consult and recommendations     · Hypertriglyceridemia   · Continue statin   · Outpatient follow-up     Jennifer Mckeon PA-C

## 2023-03-23 VITALS
HEIGHT: 75 IN | BODY MASS INDEX: 31.71 KG/M2 | DIASTOLIC BLOOD PRESSURE: 76 MMHG | SYSTOLIC BLOOD PRESSURE: 116 MMHG | HEART RATE: 82 BPM | WEIGHT: 255 LBS | RESPIRATION RATE: 18 BRPM | TEMPERATURE: 97.5 F | OXYGEN SATURATION: 100 %

## 2023-03-23 LAB
ANION GAP SERPL CALCULATED.3IONS-SCNC: 3 MMOL/L (ref 4–13)
BUN SERPL-MCNC: 14 MG/DL (ref 5–25)
CALCIUM SERPL-MCNC: 8.8 MG/DL (ref 8.3–10.1)
CHLORIDE SERPL-SCNC: 107 MMOL/L (ref 96–108)
CO2 SERPL-SCNC: 26 MMOL/L (ref 21–32)
CREAT SERPL-MCNC: 0.84 MG/DL (ref 0.6–1.3)
GFR SERPL CREATININE-BSD FRML MDRD: 103 ML/MIN/1.73SQ M
GLUCOSE P FAST SERPL-MCNC: 131 MG/DL (ref 65–99)
GLUCOSE SERPL-MCNC: 118 MG/DL (ref 65–140)
GLUCOSE SERPL-MCNC: 131 MG/DL (ref 65–140)
GLUCOSE SERPL-MCNC: 187 MG/DL (ref 65–140)
LIPASE SERPL-CCNC: 257 U/L (ref 73–393)
POTASSIUM SERPL-SCNC: 4 MMOL/L (ref 3.5–5.3)
SODIUM SERPL-SCNC: 136 MMOL/L (ref 135–147)

## 2023-03-23 RX ORDER — INSULIN GLARGINE 100 [IU]/ML
30 INJECTION, SOLUTION SUBCUTANEOUS
Qty: 5 ML | Refills: 0 | Status: SHIPPED | OUTPATIENT
Start: 2023-03-23

## 2023-03-23 RX ORDER — BLOOD SUGAR DIAGNOSTIC
STRIP MISCELLANEOUS
Qty: 100 EACH | Refills: 0 | Status: SHIPPED | OUTPATIENT
Start: 2023-03-23

## 2023-03-23 RX ORDER — INSULIN GLARGINE 100 [IU]/ML
30 INJECTION, SOLUTION SUBCUTANEOUS
Qty: 10 ML | Refills: 0 | Status: SHIPPED | OUTPATIENT
Start: 2023-03-23

## 2023-03-23 RX ORDER — PEN NEEDLE, DIABETIC 32GX 5/32"
NEEDLE, DISPOSABLE MISCELLANEOUS
Qty: 100 EACH | Refills: 0 | Status: SHIPPED | OUTPATIENT
Start: 2023-03-23

## 2023-03-23 RX ORDER — INSULIN LISPRO 100 [IU]/ML
10 INJECTION, SOLUTION INTRAVENOUS; SUBCUTANEOUS
Qty: 5 ML | Refills: 0 | Status: SHIPPED | OUTPATIENT
Start: 2023-03-23

## 2023-03-23 RX ADMIN — INSULIN LISPRO 10 UNITS: 100 INJECTION, SOLUTION INTRAVENOUS; SUBCUTANEOUS at 08:00

## 2023-03-23 RX ADMIN — INSULIN LISPRO 10 UNITS: 100 INJECTION, SOLUTION INTRAVENOUS; SUBCUTANEOUS at 12:04

## 2023-03-23 RX ADMIN — METOPROLOL SUCCINATE 50 MG: 50 TABLET, EXTENDED RELEASE ORAL at 08:54

## 2023-03-23 RX ADMIN — VERAPAMIL HYDROCHLORIDE 120 MG: 120 TABLET, FILM COATED, EXTENDED RELEASE ORAL at 08:56

## 2023-03-23 RX ADMIN — RANOLAZINE 500 MG: 500 TABLET, FILM COATED, EXTENDED RELEASE ORAL at 12:03

## 2023-03-23 RX ADMIN — ASPIRIN 81 MG: 81 TABLET, COATED ORAL at 08:54

## 2023-03-23 RX ADMIN — INSULIN LISPRO 1 UNITS: 100 INJECTION, SOLUTION INTRAVENOUS; SUBCUTANEOUS at 12:04

## 2023-03-23 RX ADMIN — LOSARTAN POTASSIUM 100 MG: 50 TABLET, FILM COATED ORAL at 08:54

## 2023-03-23 NOTE — ASSESSMENT & PLAN NOTE
Reports 2 to 3 days of worsening epigastric and lower midline abdominal pain  This been associated with diarrhea, poor p o  intake  Lipase 1295 on admission  Right upper quadrant ultrasound negative for gallstones or pancreatic inflammation  Has had recent sick contacts, as his children have recently been sick with similar symptoms including diarrhea  Suspect viral gastroenteritis versus pancreatitis    Symptom has resolved on IV fluid and hydration  Lipase is normal now  Patient tolerating oral diet  Stable to discharge home today

## 2023-03-23 NOTE — DISCHARGE SUMMARY
[facility]  Discharge- Kelly Na 1975, 50 y o  male MRN: [id number]  Unit/Bed#: [facility] 815-01 Encounter: [id number]  Primary Care Provider: Lashell Jones   Date and time admitted to hospital: 3/21/2023  9:39 PM    * Abdominal pain  Assessment & Plan  Reports 2 to 3 days of worsening epigastric and lower midline abdominal pain  This been associated with diarrhea, poor p o  intake  Lipase 1295 on admission  Right upper quadrant ultrasound negative for gallstones or pancreatic inflammation  Has had recent sick contacts, as his children have recently been sick with similar symptoms including diarrhea  Suspect viral gastroenteritis versus pancreatitis  Symptom has resolved on IV fluid and hydration  Lipase is normal now  Patient tolerating oral diet  Stable to discharge home today    Type 2 diabetes mellitus [facility])  Assessment & Plan  Lab Results   Component Value Date    HGBA1C 12 7 (H) 03/18/2023       Recent Labs     03/22/23  1557 03/22/23  2049 03/23/23  0729 03/23/23  1055   POCGLU 160* 109 118 187*       Blood Sugar Average: Last 72 hrs:  (P) 667 [id number]   Poorly controlled diabetes   current regimen includes glimepiride, metformin, Ozempic, and Tresiba 60 units nightly  Evaluated by endocrine  Patient started on Lantus 30 units nightly and Humalog 10 units with meals  Discontinue  Glimepiride, continue metformin increased to 1 g twice daily and continue to hold Ozempic,   Prescription for insulin supplies were sent to his pharmacy    Coronary artery disease of native artery of native heart with stable angina pectoris [facility])  Assessment & Plan  Reports chronic chest pain    Negative serial troponin  Continue Ranexa, metoprolol, verapamil  Continue aspirin  Stable    Other hyperlipidemia  Assessment & Plan  Continue statin    Essential hypertension  Assessment & Plan  Continue metoprolol, verapamil, ARB    Medical Problems     Resolved Problems  Date Reviewed: 3/23/2023   None       Discharging Physician / Practitioner: Rod Lebron DO  PCP: Michael Alvarez  Admission Date:   Admission Orders (From admission, onward)     Ordered        03/22/23 0054  Place in Observation  Once                      Discharge Date: 03/23/23    Consultations During Hospital Stay:  · Endocrine    Procedures Performed:   · Right upper quadrant ultrasound no gallbladder abnormality,  positive for hepatic steatosis    Significant Findings / Test Results:   · As above    Incidental Findings:   · None    Test Results Pending at Discharge (will require follow up):   · none     Outpatient Tests Requested:  · Endocrine follow-up    Complications:  none    Reason for Admission:   Abdominal pain    Hospital Course:   Jory Hogan is a 50 y o  male patient who originally presented to the hospital on 3/21/2023 due to stomach pain  Per H&P   patient reports onset of epigastric and lower midline abdominal pain about 2 to 3 days ago  He describes the pain as a cramping "hunger pains" sensation  He reports associated nonbloody diarrhea up to 3 times daily, poor p o  intake and headache  He denies fever, chills, nausea, vomiting, shortness of breath, dysuria  He does report chronic chest pain that is unchanged  He does report sick contacts, as his children were recently sick with similar symptoms including diarrhea      In ED, lipase found to be 1295  Right upper quadrant ultrasound negative for gallstones or pancreatic inflammation    Patient was admitted to the hospital hydrated with IV fluid with improvement in his symptoms  Lipase was normalized  He was evaluated by endocrine due to uncontrolled diabetes and he was started on Lantus qhs  and Humalog with meals    Currently he is in stable condition, tolerating oral diet, no further symptoms he will be discharged home today to follow-up with his PCP and endocrine as an outpatient      Please see above list of diagnoses and related plan for additional information  Condition at Discharge: stable    Discharge Day Visit / Exam:   Subjective:    Patient seen and examined  Comfortable in bed  Tolerating oral diet  No abdominal pain  Vitals: Blood Pressure: 116/76 (03/23/23 0727)  Pulse: 82 (03/22/23 2305)  Temperature: 97 5 °F (36 4 °C) (03/23/23 0727)  Temp Source: Temporal (03/22/23 2305)  Respirations: 18 (03/23/23 0727)  Height: 6' 3" (190 5 cm) (03/23/23 0900)  Weight - Scale: 116 kg (255 lb) (03/23/23 0900)  SpO2: 100 % (03/22/23 2214)  Exam:   Physical Exam   Patient is awake alert oriented no acute distress  Lungs clear to auscultation bilateral  Heart with systolic murmur  Abdomen soft nontender  Lower extremities no edema      Discharge instructions/Information to patient and family:   See after visit summary for information provided to patient and family  Provisions for Follow-Up Care:  See after visit summary for information related to follow-up care and any pertinent home health orders  Disposition:   Home    Planned Readmission: no     Discharge Statement:  I spent 45  minutes discharging the patient  This time was spent on the day of discharge  I had direct contact with the patient on the day of discharge  Greater than 50% of the total time was spent examining patient, answering all patient questions, arranging and discussing plan of care with patient as well as directly providing post-discharge instructions  Additional time then spent on discharge activities  Discharge Medications:  See after visit summary for reconciled discharge medications provided to patient and/or family        **Please Note: This note may have been constructed using a voice recognition system**

## 2023-03-23 NOTE — ASSESSMENT & PLAN NOTE
Reports chronic chest pain    Negative serial troponin  Continue Ranexa, metoprolol, verapamil  Continue aspirin  Stable

## 2023-03-23 NOTE — ASSESSMENT & PLAN NOTE
Lab Results   Component Value Date    HGBA1C 12 7 (H) 03/18/2023       Recent Labs     03/22/23  1557 03/22/23  2049 03/23/23  0729 03/23/23  1055   POCGLU 160* 109 118 187*       Blood Sugar Average: Last 72 hrs:  (P) 353 [id number]   Poorly controlled diabetes   current regimen includes glimepiride, metformin, Ozempic, and Tresiba 60 units nightly    Evaluated by endocrine  Patient started on Lantus 30 units nightly and Humalog 10 units with meals  Discontinue  Glimepiride, continue metformin increased to 1 g twice daily and continue to hold Ozempic,   Prescription for insulin supplies were sent to his pharmacy

## 2023-04-26 ENCOUNTER — PROCEDURE VISIT (OUTPATIENT)
Age: 48
End: 2023-04-26

## 2023-04-26 VITALS
WEIGHT: 259 LBS | SYSTOLIC BLOOD PRESSURE: 144 MMHG | DIASTOLIC BLOOD PRESSURE: 90 MMHG | HEART RATE: 80 BPM | HEIGHT: 75 IN | BODY MASS INDEX: 32.2 KG/M2

## 2023-04-26 DIAGNOSIS — M99.03 SEGMENTAL DYSFUNCTION OF LUMBAR REGION: ICD-10-CM

## 2023-04-26 DIAGNOSIS — S76.311A STRAIN OF RIGHT HAMSTRING MUSCLE, INITIAL ENCOUNTER: ICD-10-CM

## 2023-04-26 DIAGNOSIS — M54.41 ACUTE BILATERAL LOW BACK PAIN WITH RIGHT-SIDED SCIATICA: ICD-10-CM

## 2023-04-26 DIAGNOSIS — M54.16 LUMBAR RADICULOPATHY: ICD-10-CM

## 2023-04-26 DIAGNOSIS — M99.04 SEGMENTAL DYSFUNCTION OF SACRAL REGION: ICD-10-CM

## 2023-04-26 DIAGNOSIS — M99.05 SEGMENTAL DYSFUNCTION OF PELVIC REGION: Primary | ICD-10-CM

## 2023-04-26 NOTE — PROGRESS NOTES
Date of first visit: 4/11/2023       HPI:  Maggie Cope returns for treatment today right-sided low back right posterior thigh pain as well as mid back and neck stiffness  He feels slightly better in the neck and upper back still intermittent right leg symptoms  The following portions of the patient's history were reviewed and updated as appropriate: allergies, current medications, past family history, past medical history, past social history, past surgical history, and problem list     Review of Systems    Physical Exam:  Pelvic obliquity is noted leg with inequality is present  Misalignment of the right innominate on sacrum  Biomechanically joint dysfunction right SI L5-S1 motion unit he still has erector spinae hypertonicity noted on the right  Joint dysfunction T4-T5 C5-C6 C1-C2 paracervical hypertonicity noted with increased range of motion over initial presentation  Assessment:   Diagnosis ICD-10-CM Associated Orders   1  Segmental dysfunction of pelvic region  M99 05       2  Lumbar radiculopathy  M54 16       3  Segmental dysfunction of sacral region  M99 04       4  Segmental dysfunction of lumbar region  M99 03       5  Acute bilateral low back pain with right-sided sciatica  M54 41       6  Strain of right hamstring muscle, initial encounter  [id number]                 Treatment: [id number]  Manipulation to the right innominate, sacrum, L5 via Mart drop maneuver well-tolerated  Manipulation T4 C5 C 1 well-tolerated  Discussion:  Daily icing flexion-based stretching for the lower back I will see him back for follow-up

## 2023-05-08 NOTE — PROGRESS NOTES
[place] [address] Follow-up Visit - Cardiology   Hanna Lancaster 50 y o  male MRN: [id number]  Unit/Bed#:  Encounter: [id number]    Patient Active Problem List    Diagnosis Date Noted   • Obesity with serious comorbidity 08/04/2020   • Obstructive sleep apnea (adult) (pediatric)    • Hypertrophic cardiomyopathy ([place] [address] ) 06/10/2020   • Type 2 diabetes mellitus ([place] [address] ) 05/05/2020   • Essential hypertension 05/05/2020   • Other hyperlipidemia 05/05/2020   • Coronary artery disease of native artery of native heart with stable angina pectoris ([place] [address] ) 05/05/2020   • History of coronary angioplasty with insertion of stent 05/05/2020   • Abdominal pain 03/22/2023   • Leg pain 03/22/2023   • Current use of insulin ([place] [address] ) 12/16/2021     Plan: Mr Matilde Morales was last seen in the [place] [address] on 10-4-2022  He was admitted to hospital on 3- for acute gastroenteritis that was thought to be viral and was associated with elevated lipase level consistent with pancreatitis  There was no gallstone on ultrasound  His semiglutide was discontinued and he is now on insulin with latest HgbA1C of 12 7%  He needs aggressive management of his diabetes at this point  Home glucose measurements have ranged from 117-300 mg/dL  He has had some chest discomfort thought to be due to coronary microvascular disease that has responded to ranolazine  Mr Matilde Morales denies palpitation, dizziness, syncope, lower extremity edema or exertional chest pain  He experiences chest discomfort that is localized to the left costochondral region and is reproduced by touch likely secondary to costochondritis  He is quite limited in his ambulation due to severe peripheral neuropathy and uses a cane to keep his balance  He does get short of breath with minimal exertion  His blood pressure is well controlled and exam does not indicate any evidence of cardiac decompensation  He has been found to have a positive occult blood test in his stool and will be undergoing colonoscopy soon   His latest echocardiogram performed on 2- has shown:    •  Left Ventricle: Wall thickness is severely increased  There is severe asymmetric hypertrophy of the septal wall  The left ventricular ejection fraction is 70%  Systolic function is hyperdynamic  Wall motion is normal  Diastolic function is mildly abnormal, consistent with grade I (abnormal) relaxation  There is no LV dynamic obstruction with a peak gradient of 21 0 mmHg  There is outflow tract dynamic obstruction with valsalva with a peak gradient of 41 0 mmHg  •  Left Atrium: The atrium is mildly dilated  •  Mitral Valve: There is systolic anterior motion without late peaking gradient  He has no murmur on cardiac auscultation today  He is recently started on Mirtazapine and feels that his depression has improved and his sleep is more regulated  He will continue candesartan, metoprolol, verapamil, ranolazine and rosuvastatin at this time  He has had no recent lipid profile  Physician Requesting Consult: Bennie Garcias DO  Reason for Consult / Principal Problem: Hypertrophic cardiomyopathy     HPI: Mr Ulyess Nyhan was rubens seen at [facility] office at age 55  He had a past medical history of hyperlipidemia, type II diabetes and hypertension and was recently diagnosed as having hypertrophic cardiomyopathy phenotype on a transthoracic echocardiogram  The study was performed during an admission in May 2020 for chest pain and showed marked asymmetric septal hypertrophy without left ventricular outflow tract obstruction  Apical views were suboptimal and provocative maneuvers were not performed to elicit any intracavitary or outflow tract gradients  He then underwent an exercise myocardial perfusion study that showed reduced exercise capacity, exaggerated heart rate response, normal blood pressure response and no ischemic changes or significant rhythm abnormality   A cardiac MRI performed on 5- showed an anteroseptal wall thickness of 29 mm and inferolateral wall thickness of 18 mm  The left atrium was mildly dilated and there was no evidence of late gadolinium enhancing lesions  He is currently on carvedilol 20 mg daily      On 2020: Mr Veronica Loja has had no further chest pain since hospital discharge  He also denies shortness of breath, palpitation, dizziness, syncope or lower extremity swelling  He does snore and wife has noted that he does periodically stop breathing during sleep  His physical activity is limited but he does notice daytime sleepiness and takes hour long naps during the day  He works for [employer] and often has to lift heavy objects (up to 150 lbs)  His family history is relatively benign  His father  at age 39 when he was only 15year old but the circumstances of his death is unclear  There is no history of sudden death at a young age in his first degree relatives  His risk stratification is incomplete  I have scheduled him for an exercise stress echocardiogram (to evaluate for latent outflow tract obstruction) and a 48 hour ambulatory monitor for assessment of arrhythmias  He has agreed to have genetic testing done  This would be important to particularly exclude HCM phenocopies such as hTTR amyloidosis  I have advised Mr Veronica Loja to adopt a heart healthy diet low in sodium, saturated fat and processed food  I have also discussed potential restrictions on lifting heavy weights  After completion of risk stratification, he would be advised to start daily exercises with the hope of improving cardiovascular conditioning  He states that he has been evaluated for sleep apnea several times (last time 4 years ago) that has been negative  He may need to be reassessed  He will also need evaluation by weight management      9-: Mr Veronica Loja has been doing well on his current medications  He denies chest pain, shortness of breath, palpitation, dizziness, syncope or lower extremity swelling   He does snore and wife has noted that he does periodically stop breathing during sleep  He has not returned to work and is looking for partial disability due to the requirement for lifting heavy objects at work ([employer])  He is now referred to nutritionist and sleep medicine for further evaluation as weight management and correction of nocturnal hypoxemia would be essential for favorable outcomes  He is motivated to achieve both goals  His genetic test results were received and 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  His two daughters and 2 siblings can now undergo targeted gene testing  His cardiac MRI results were reviewed with him      10-: Mr Marylin Cartagena has done very well since last office visit  He denies shortness of breath, palpitation, dizziness, syncope, chest pain or lower extremity swelling  He has increased his daily physical activities and does short distance walks 4 to 5 times a week  He is tolerating his medications and will be returning to work on November 4 with restrictions to his activities  He is scheduled to have sleep study and see the nutritionist in December  Both daughters have been gene tested but results have not been received yet  He does not know if his brother ans sister have done their targeted gene testings  His diet has improved and his blood pressure and heart rate are well controlled      3-: Mr Arevalo complains of exertional chest discomfort and shortness of breath for the few weeks  He states that the symptoms have prevented him from physical activity intended to help weight loss  As the result, he has gained some weight (now 310 lbs)  He denies dizziness, syncope, lower extremity edema or palpitation  His blood pressure is borderline controlled on metoprolol 50 mg twice daily and he states that he has been watching his diet for excess salt  His diabetes is not optimally controlled with home blood sugars around 175 mg/dL on 3 oral agents, insulin and semaglutide  He also has mild obstructive sleep apnea   He will be seen by sleep specialist on April 19  His cholesterol was last evaluated on 5/5/2020 and showed an HDL of 26, LDL of 43 and a triglyceride level of 265  He is on rosuvastatin 5 mg daily  He is now on long term disability and does not have to lift heavy objects any longer  I have recommended that he undergoes a stress echo study to evaluate his chest pain and shortness of breath as well as his blood pressure response to exercise  He is also referred to nutritionist to help and direct him to lose substantial amount of weight (including referral to weight management team) in order to improve management of his cardiac disease as well as hypertension, diabetes and sleep apnea  Unfortunately, his 2 daughters, and his siblings did not choose to do the targeted gene testing      6-: Mr Ina Espinoza had Stress Echocardiography done on 4/8/2021 which showed significantly reduced exercise capacity, shortness of breath at peak exercise, normal heart rate and blood pressure response to exercise, frequent isolated and paired ventricular premature complexes at peak exercise  It also showed severe left ventricular hypertrophy with asymmetrical hypertrophy of the septum (maximum wall thickness 20 mm), systolic anterior motion of the anterior mitral valve leaflet and subvalvular apparatus was present with pressure gradient of 22 and 42 mmHg at rest and during Valsalva maneuver, with gradient increasing to 60 mmHg immediately after the exercise, findings consistent with a latent obstructive hypertrophic cardiomyopathy  Trivial tricuspid regurgitation with normal estimated pulmonary systolic pressure was also noted   Verapamil 120 mg daily was added to his regimen of metoprolol succinate 50 mg b i d  Today Mr Ina Espinoza admits to occasional palpitation with exertion, but no dizziness, shortness of breath, or chest pain   Mr Ina Espinoza continues to follow with Sleep clinic, he had an AHI of 5 2 and was recommended to continue with APAP for at lease 4 hours per night  He denies daytime sleepiness although does take occasional naps during the day  He believes at this time he is getting quality sleep of up to 7 hours per night  His daughters Jame did not choose to undergo genetic testing and have not had screening echocardiograms  He checks his blood glucose at home and has been obtaining values between 120 (fasting), around 220 (post-prandial)  Dapagliflozin-metformin was added to his diabetics regime   He has not had routine labs since 2019 and will order them today  He has received both doses of Pfizer COVID-19 vaccine, along with his wife and daughters  He checks his blood pressure occasionally and the most recent blood pressure was 113/78 mmHg  He has lost 3 pounds since last visit   He has not seen a nutritionist yet  We will obtain the contact information for the clinic  We again discussed the importance of weight loss for overall cardiovascular health  Although his dietary choices have improved he still needs counselling on total caloric intake and choice of food items   Reinforced the importance of the heart-healthy diet  Mr Aline Heller will be seen in [facility] clinic in 6 month       10-: Mr Aline Heller continues having occasional exertional chest pain and exertional dyspnea (NYHA Class 2-3) as well as occasional headache  He does not check blood pressure at home  We ordered blood pressure cuff and instructed him to check blood pressure twice daily once he get the cuff, keep blood pressure and heart rate logs, and call us with results  At this time will continue with the current medication regime and once he gets blood pressure cuff, will plan to stop candesartan and increase verapamil dosage to help with blood pressure as well as HCM symptom control  Recent labs showed Hgb A1C 11% and we reffered him to Endocrinology clinic  He has not been seen their yet and at this time PCP manages his diabetic medications   Will contact Endocrinology and Nutrition clinics to get him appointments to help with DM management as well as weight management  Mr Ina Espinoza also complained of the left shoulder bursitis pain, for which topical diclofenac is ordered  We again stressed the importance of weight loss, blood pressure control, and heart-healthy diet  He will be seen in [facility] in 6 months       12-: Mr Ina Espinoza was admitted to hospital on 10- for chest pain  He had no troponin elevation or ischemic chest discomfort and chest pain resolved spontaneously  He was discharged on his home medications after a day of observation  Since then he has not have any further chest pain  He also denies palpitation, dizziness, syncope or lower extremity edema although has had occasional dyspnea on exertion  His home blood pressure measurements have been mostly in the acceptable range and his blood pressure in the office is 114/78 mmHg  Laboratory work during hospitalization shows normal K, BUN and Cr  His blood sugar has been elevated and he is going to be switched to a new insulin preparation and will be seeing endocrinology on 12/16/2021  He has lost a few lbs and is determined to voluntarily loose more weight by reducing snacking and cutting back on carbohydrates  His left shoulder pain has improved after applying topical diclofenac  I did not change his antihypertensive regimen at this visit  He follows with sleep medicine for his mild obstructive sleep apnea  His family did not choose to be genetically screened for HCM  10-4-2022: Mr Ina Espinoza was last seen in the [place] [address] on 12-  Today, he is seen along with Dr Prema Wadsworth, cardiology fellow  Mr Ina Espinoza remains symptomatic with continued chest discomfort that occurs intermittently on a daily basis for brief period of time  It is not associated with other symptoms but he does have exertional dyspnea as well with symptoms suggestive of class II-III severity   He does sleep using 2 pillows but has no paroxysmal nocturnal dyspnea  He continues to have bendopnea that is at times associated with dizziness as well  His does regularly check his blood pressure but not his weight at home  Today, his blood pressure is well controlled and his weight is 250 lbs  He has lost substantial weight recently down from 305 lbs  He has lost his insurance but so far has been able to afford his medication and has been compliant  Examination does not reveal objective evidence of volume overload  He is started on ranolazine to manage chest pain possibly related to microvascular disease  He also states that he would drink as much as 1 5 gallon of water every day and was asked to reduce that by half to see if that helps the shortness of breath  An echocardiogram was ordered (last study 2021) pending insurance reactivation         Family history: Father  at age 39 for uncertain cause and the circumstances of his death is not known to the patient who was 15years old at the time  Mother  in 2019 and had diabetes, hypertension and heart failure  He has 3 siblings  The older sister  of complications of drug use at age 32  Another sister (Stephen)  is 52 year old and has no known medical condition  A younger brother (Brando) is 42 years old and has no heart disease  Mr Marcos Torres has 2 daughters (Gerry Warner 4- PLT TITUS Torres 2003) who are healthy  There is no history of sudden cardiac death or hypertrophic cardiomyopathy in the family      Genetic testing ([facility] 2020): Positive for a pathogenic mutation in the MYH7 gene      Review of Systems: Mr Marcos Torres denies palpitation, dizziness, syncope, lower extremity edema or exertional chest pain  He experiences chest discomfort that is localized to the left costochondral region and is reproduced by touch likely secondary to costochondritis  He is quite limited in his ambulation due to severe peripheral neuropathy and uses a cane to keep his balance   He does get short of breath with minimal exertion  Historical Information   Past Medical History:   Diagnosis Date   • Congestive heart failure ([facility] [address] )    • Diabetes mellitus ([facility] [address] )    • Hyperlipidemia    • Hypertension    • Obesity      No past surgical history on file  Family History   Problem Relation Age of Onset   • Diabetes Mother    • Stroke Mother    • Stroke Father      Current Outpatient Medications on File Prior to Visit   Medication Sig Dispense Refill   • aspirin (ECOTRIN LOW STRENGTH) 81 mg EC tablet Take 1 tablet (81 mg total) by mouth daily 30 tablet 0   • Blood Glucose Monitoring Suppl (OneTouch Verio) w/Device KIT Use to test 3-4x daily 1 kit 1   • Blood Pressure KIT Use 2 (two) times a day 1 kit 2   • candesartan (ATACAND) 32 MG tablet      • Diclofenac Sodium (VOLTAREN) 1 % Apply 2 g topically 4 (four) times a day 100 g 2   • glucose blood (OneTouch Verio) test strip Use to test 3-4x daily 400 strip 2   • glucose blood (OneTouch Verio) test strip May substitute brand based on insurance coverage  Check glucose TID  100 each 0   • insulin glargine (LANTUS) 100 units/mL subcutaneous injection Inject 30 Units under the skin daily at bedtime 10 mL 0   • Insulin Glargine Solostar (Lantus SoloStar) 100 UNIT/ML SOPN Inject 0 3 mL (30 Units total) under the skin daily at bedtime 5 mL 0   • insulin lispro (HumaLOG KwikPen) 100 units/mL injection pen Inject 10 Units under the skin 3 (three) times a day with meals 5 mL 0   • Insulin Pen Needle (BD Pen Needle Ann 2nd Gen) 32G X 4 MM MISC For use with insulin pen  Pharmacy may dispense brand covered by insurance  100 each 0   • Insulin Pen Needle (BD Pen Needle Ann 2nd Gen) 32G X 4 MM MISC For use with insulin pen  Pharmacy may dispense brand covered by insurance   100 each 0   • Lancets (onetouch ultrasoft) lancets Use to tests 3-4x daily 400 each 2   • metFORMIN (GLUCOPHAGE) 1000 MG tablet Take 1 tablet (1,000 mg total) by mouth 2 (two) times a day with meals 60 tablet 0   • "metoprolol succinate (TOPROL-XL) 50 mg 24 hr tablet TAKE 1 TABLET BY MOUTH TWICE A  tablet 0   • ranolazine (RANEXA) 500 mg 12 hr tablet take 1 tablet by mouth twice a day 60 tablet 2   • rosuvastatin (CRESTOR) 5 mg tablet Take 5 mg by mouth daily     • verapamil (VERELAN PM) 120 MG 24 hr capsule TAKE 1 CAPSULE (120 MG TOTAL) BY MOUTH DAILY AT BEDTIME 90 capsule 0     No current facility-administered medications on file prior to visit  No Known Allergies  Social History     Substance and Sexual Activity   Alcohol Use Not Currently     Social History     Substance and Sexual Activity   Drug Use Never     Social History     Tobacco Use   Smoking Status Never   Smokeless Tobacco Never     Objective   Vitals:   Vitals:    05/09/23 0904   BP: 130/78   BP Location: Left arm   Patient Position: Sitting   Cuff Size: Large   Pulse: 95   SpO2: 99%   Weight: 115 kg (253 lb 9 6 oz)   Height: 6' 3\" (1 905 m)   Body surface area is 2 43 meters squared  Body mass index is 31 7 kg/m²    Hear rate 95 bpm    Invasive Devices     None               Physical Exam:  GEN: Glenda Garces appears well, alert and oriented x 3, pleasant and cooperative   HEENT: pupils equal, round, and reactive to light; extraocular muscles intact  NECK: supple, no carotid bruits   HEART: regular rhythm, normal S1 and S2, no murmurs, clicks, gallops or rubs   LUNGS: There is point tenderness at the left 3rd costochondral junction consistent with costochondritis, lungs are clear to auscultation bilaterally; no wheezes, rales, or rhonchi   ABDOMEN: normal bowel sounds, soft, no tenderness, no distention  EXTREMITIES: peripheral pulses normal; no clubbing, cyanosis, or edema  NEURO: no focal findings   SKIN: normal without suspicious lesions on exposed skin    Lab Results:   Lab Results   Component Value Date    WBC 4 42 03/22/2023    RBC 4 98 03/22/2023    HGB 14 5 03/22/2023    HCT 44 3 03/22/2023    MCV 89 03/22/2023     03/22/2023    RDW " 12 2 2023     Lab Results   Component Value Date    K 4 0 2023     2023    CO2 26 2023    BUN 14 2023    CREATININE 0 84 2023    EGFR 103 2023    CALCIUM 8 8 2023    AST 25 2023    ALT 45 2023    ALKPHOS 68 2023     Lab Results   Component Value Date    MG 2 1 2023     Lab Results   Component Value Date    HDL 32 (L) 2021    TRIG 146 2021    LDLCALC 79 2021     Lab Results   Component Value Date    JLT0WJXIJAER 2 100 10/26/2021     Imaging:   I have personally reviewed pertinent films in PACS    Cardiac testing:   Results for orders placed during the hospital encounter of 20    Echo complete with contrast if indicated    Narrative  [address]  [address] [facility], [address] [place] [address]  [phone]    Transthoracic Echocardiogram  2D, M-mode, Doppler, and Color Doppler    Study date:  05-May-2020    Patient: Any Greer  MR number: [id number]  Account number: [de-identified]  : 1975  Age: 39 years  Gender: Male  Status: Outpatient  Location: Bedside  Height: 71 in  Weight: 295 5 lb  BP: 140/ 82 mmHg    Indications: Exertional Chest Pain  Diagnoses: R07 9 - Chest pain, unspecified    Sonographer:  Chris Cagle  Referring Physician:  BRIDGET Loaiza  Group:  [facility]  Cardiology Fellow: Rocky Eduardo MD  Interpreting Physician:  Chandan Chau MD    IMPRESSIONS:  There appears to be mild concentric hypertrophy with more moderately thickened septum  In the absence of hypertension, consider the possibility of Hypertrophic cardiomyopathy    SUMMARY    LEFT VENTRICLE:  Systolic function was hyperdynamic  Ejection fraction was estimated to be 75 %  There were no regional wall motion abnormalities  Wall thickness was mild-moderately increased  There was mild concentric hypertrophy  There was moderate assymetrical hypertrophy of the septum    There was dynamic obstruction at rest in the mid cavity, with a peak velocity of 200 cm/s  Doppler parameters were consistent with abnormal left ventricular relaxation (grade 1 diastolic dysfunction)  TRICUSPID VALVE:  There was trace regurgitation  HISTORY: PRIOR HISTORY: HTN;Dyslipidemia;morbid obesity;DM;CHF  PROCEDURE: The procedure was performed at the bedside  This was a routine study  The transthoracic approach was used  The study included complete 2D imaging, M-mode, complete spectral Doppler, and color Doppler  The heart rate was 87 bpm,  at the start of the study  Images were obtained from the parasternal, apical, subcostal, and suprasternal notch acoustic windows  Echocardiographic views were limited due to decreased penetration and lung interference  This was a  technically difficult study  LEFT VENTRICLE: Size was normal  Systolic function was hyperdynamic  Ejection fraction was estimated to be 75 %  There were no regional wall motion abnormalities  Wall thickness was mild-moderately increased  There was mild concentric  hypertrophy  There was moderate assymetrical hypertrophy of the septum  DOPPLER: There was dynamic obstruction at rest in the mid cavity, with a peak velocity of 200 cm/s  Doppler parameters were consistent with abnormal left ventricular  relaxation (grade 1 diastolic dysfunction)  RIGHT VENTRICLE: The size was normal  Systolic function was normal  Wall thickness was normal     LEFT ATRIUM: Size was normal     RIGHT ATRIUM: Size was normal     MITRAL VALVE: Valve structure was normal  There was normal leaflet separation  DOPPLER: The transmitral velocity was within the normal range  There was no evidence for stenosis  There was no regurgitation  AORTIC VALVE: The valve was trileaflet  Leaflets exhibited normal thickness and normal cuspal separation  DOPPLER: Transaortic velocity was within the normal range  There was no evidence for stenosis   There was no regurgitation  TRICUSPID VALVE: The valve structure was normal  There was normal leaflet separation  DOPPLER: The transtricuspid velocity was within the normal range  There was no evidence for stenosis  There was trace regurgitation  PULMONIC VALVE: Leaflets exhibited normal thickness, no calcification, and normal cuspal separation  DOPPLER: The transpulmonic velocity was within the normal range  There was trace regurgitation  PERICARDIUM: There was no pericardial effusion  The pericardium was normal in appearance  AORTA: The root exhibited normal size  SYSTEM MEASUREMENT TABLES    2D  %FS: 32 23 %  Ao Diam: 2 56 cm  EDV(Teich): 98 26 ml  EF(Cube): 68 87 %  EF(Teich): 60 49 %  ESV(Cube): 30 67 ml  ESV(Teich): 38 82 ml  IVSd: 1 71 cm  LA Area: 19 82 cm2  LA Diam: 4 97 cm  LVEDV MOD A4C: 145 82 ml  LVEF MOD A4C: 50 05 %  LVESV MOD A4C: 72 84 ml  LVIDd: 4 62 cm  LVIDs: 3 13 cm  LVLd A4C: 9 34 cm  LVLs A4C: 8 11 cm  LVPWd: 1 65 cm  RA Area: 14 38 cm2  SI(Cube): 27 25 ml/m2  SI(Teich): 23 87 ml/m2  SV MOD A4C: 72 98 ml  SV(Cube): 67 86 ml  SV(Teich): 59 44 ml  rv diam: 3 67 cm    CW  AV Env  Ti: 273 57 ms  AV VTI: 31 4 cm  AV Vmax: 1 55 m/s  AV Vmean: 1 15 m/s  AV maxP 63 mmHg  AV meanP 81 mmHg  MR VTI: 156 12 cm  MR Vmax: 3 8 m/s  MR Vmean: 3 25 m/s  MR maxP 84 mmHg  MR meanP 92 mmHg  PRend P 06 mmHg  PRend Vmax: 1 33 m/s  TR Vmax: 2 79 m/s  TR maxP 17 mmHg    MM  TAPSE: 2 5 cm    PW  E': 0 12 m/s  E/E': 5 62  LVOT Env  Ti: 273 57 ms  LVOT VTI: 30 55 cm  LVOT Vmax: 1 48 m/s  LVOT Vmean: 1 12 m/s  LVOT maxP 77 mmHg  LVOT meanP 43 mmHg  MV A Randell: 0 79 m/s  MV Dec Crawford: 2 97 m/s2  MV DecT: 224 72 ms  MV E Randell: 0 67 m/s  MV E/A Ratio: 0 84    [employer] Accredited Echocardiography Laboratory    Prepared and electronically signed by    Eva Henriquez MD  Signed [id number] 16:21:39    Results for orders placed during the hospital encounter of 20    NM Myocardial Perfusion Spect (Exercise Induced Stress and/or Rest)    Narrative  [address]  [facility], [address] [place] [address]  [phone]    Stress Gated SPECT Myocardial Perfusion Imaging after Exercise    Patient: Toyin Javed  MR number: [id number]  Account number: [de-identified]  : 1975  Age: 39 years  Gender: Male  Status: Outpatient  Location: Stress lab  Height: 71 in  Weight: 196 lb  BP: 132/ 80 mmHg    Allergies: NO KNOWN ALLERGIES    Diagnosis: R07 89 - Other chest pain    Interpreting Physician:  Mary Moseley MD  Referring Physician:  BRIDGET Mills  Primary Physician:  Gifty Jang  Technician:  Joslyn Barnes  RN:  Sridevi Martins RN  Group:  [facility]  Cardiology Fellow: Zain Man MD  Report Prepared by[de-identified]  Sridevi Martins RN    INDICATIONS: Coronary artery disease  HISTORY: The patient is a 39year old  male  Chest pain status: chest pain  Coronary artery disease risk factors: dyslipidemia, hypertension, and diabetes mellitus  Cardiovascular history: congestive heart failure  Medications: a beta blocker, aspirin, and a lipid lowering agent  PHYSICAL EXAM: Baseline physical exam screening: normal and no wheezes audible  REST ECG: Normal sinus rhythm  The ECG showed occasional premature ventricular contractions  T wave inversions were observed in leads I, II, aVL, aVF, V5 and V6  Nonspecific ST abnormalities were present  PROCEDURE: The study was performed in the the Stress lab  Treadmill exercise testing was performed, using the Joe protocol  Gated SPECT myocardial perfusion imaging was performed during stress  Systolic blood pressure was 132 mmHg, at  the start of the study  Diastolic blood pressure was 80 mmHg, at the start of the study  The heart rate was 96 bpm, at the start of the study  IV double checked      JOE PROTOCOL:  HR bpm SBP mmHg DBP mmHg Symptoms  Baseline 96 132 80 none  Stage 1 134 142 80 moderate dyspnea, mild fatigue  Stage 2 139 178 88 moderate dyspnea, moderate fatigue  Immediate 139 180 80 subsiding  Recovery 1 105 146 80 subsiding  Recovery 2 103 134 80 none  No medications or fluids given  STRESS SUMMARY: Duration of exercise was 6 min and 0 sec  Functional capacity was decreased (greater than 40%)  Maximal heart rate during stress was 148 bpm ( 85 % of maximal predicted heart rate)  The heart rate response to stress was  normal  There was normal resting blood pressure with an appropriate response to stress  The rate-pressure product for the peak heart rate and blood pressure was [id number]  There was no chest pain during stress  The stress test was terminated  due to achievement of maximal (symptom limited) exercise  Pre oxygen saturation: 95 %  Peak oxygen saturation: 98 %  The stress ECG was negative for ischemia and normal  Arrhythmia during stress: isolated atrial premature beats  ISOTOPE ADMINISTRATION:  Stress isotope administration Resting isotope administration  Agent Tetrofosmin Tetrofosmin  Dose 16 3 mCi 49 2 mCi  Date 05/06/2020 05/06/2020  Injection time 07:05 09:43  Injection-image interval 30 min 49 min    The radiopharmaceutical was injected one minute before the end of exercise  MYOCARDIAL PERFUSION IMAGING:  The image quality was fair  Rotating projection images reveal moderate diaphragmatic attenuation  The TID ratio was 1 2  PERFUSION DEFECTS:  -  No ischemia visualized  There was a small, moderately severe, fixed myocardial perfusion defect of the basal inferior wall likely due to diaphragm attenuation  The defect was worse on rest images  PERFUSION QUANTITATION:  The summed perfusion score measured 2 during stress  GATED SPECT:  Left ventricular ejection fraction was within normal limits by visual estimate  There was no left ventricular regional abnormality  SUMMARY:  -  Stress results: Duration of exercise was 6 min and 0 sec  "Functional capacity was decreased (greater than 40%)  Target heart rate was achieved  There was no chest pain during stress  -  ECG conclusions: The stress ECG was negative for ischemia and normal  Arrhythmia during stress: isolated atrial premature beats  -  Perfusion imaging: No ischemia visualized  There was a small, moderately severe, fixed myocardial perfusion defect of the basal inferior wall likely due to diaphragm attenuation  The defect was worse on rest images  -  Gated SPECT: Left ventricular ejection fraction was within normal limits by visual estimate  There was no left ventricular regional abnormality  IMPRESSIONS: There was image artifact, without diagnostic evidence for perfusion abnormality  Left ventricular systolic function was normal     Prepared and signed by    Elissa Grace MD  Signed 2020 12:51:16    Name: Alicia Hinton                       : 1975  MRN: [id number]                       Age: 50 y o  Patient Status: Outpatient          Gender: male  Vitals    Height Weight BSA (Calculated - m2) BP Pulse   5' 11\" (1 803 m) 113 kg (250 lb) 2 32 sq meters 120/72 93     PACS Images     Show images for Echo complete w/ contrast if indicated  Study Details    This transthoracic echocardiogram was performed in the echo lab  This was a routine, outpatient study  Study quality was adequate  This was a technically difficult study due to decreased penetration  A complete 2D, color flow Doppler, spectral Doppler, 2D, color flow Doppler, spectral Doppler and strain transthoracic echocardiogram was performed  The apical, parasternal, subcostal and suprasternal views were obtained  0 6mL/min in NSS of Definity ultrasound enhancing agent was used due to opacify the left ventricle  Indications  Priority: Routine  Dx: Hypertrophic cardiomyopathy ([address] ) [I42 2 (ICD-10-CM)]   Comments: Evaluation of HCM  Please inform Dr Sheela Murray when the appointment is made   Patient is currently " between health insurance and will wait until the new plan kicks in  History    Diabetes  Obstructive sleep apnea  Hypertension  CAD  Hypertrophic cardiomyopathy  HLD  Interpretation Summary       •  Left Ventricle: Wall thickness is severely increased  There is severe asymmetric hypertrophy of the septal wall  The left ventricular ejection fraction is 70%  Systolic function is hyperdynamic  Wall motion is normal  Diastolic function is mildly abnormal, consistent with grade I (abnormal) relaxation  There is no LV dynamic obstruction with a peak gradient of 21 0 mmHg  There is outflow tract dynamic obstruction with valsalva with a peak gradient of 41 0 mmHg  •  Left Atrium: The atrium is mildly dilated  •  Mitral Valve: There is systolic anterior motion without late peaking gradient      Strain was performed to quantify interventricular dyssynchrony and evaluate components of myocardial function due to HCM  Results from the utilization of Strain Analysis are listed in the report below      Findings    Left Ventricle Left ventricular cavity size is small  Wall thickness is severely increased  There is severe asymmetric hypertrophy of the septal wall  The left ventricular ejection fraction is 70%  Systolic function is hyperdynamic  Wall motion is normal  Diastolic function is mildly abnormal, consistent with grade I (abnormal) relaxation  There is no LV dynamic obstruction with a peak gradient of 21 0 mmHg  There is outflow tract dynamic obstruction with valsalva with a peak gradient of 41 0 mmHg  Right Ventricle Right ventricular cavity size is normal  Systolic function is normal    Left Atrium The atrium is mildly dilated  Right Atrium The atrium is normal in size  Aortic Valve The aortic valve is trileaflet  The leaflets are not thickened  The leaflets are mildly calcified  The leaflets exhibit normal mobility  There is no evidence of regurgitation  The aortic valve has no significant stenosis  Mitral Valve Mitral valve structure is normal  There is mild  There is trace regurgitation  There is no evidence of stenosis  There is systolic anterior motion without late peaking gradient  Tricuspid Valve Tricuspid valve structure is normal  There is trace regurgitation  There is no evidence of stenosis  Pulmonic Valve Pulmonic valve structure is normal  There is trace regurgitation  There is no evidence of stenosis  Ascending Aorta The aortic root is normal in size  The ascending aorta is normal in size  IVC/SVC The inferior vena cava is normal in size  Respirophasic changes were normal    Pericardium There is no pericardial effusion       Left Ventricle Measurements    Function/Volumes   A4C EF 79 %         Dimensions   LVIDd 4 cm         LVIDS 2 2 cm         IVSd 2 2 cm         LVPWd 1 1 cm         FS 45          Diastolic Filling   MV E' Tissue Velocity Septal 6 cm/s         MV E' Tissue Velocity Lateral 9 cm/s         E wave deceleration time 171 ms         MV Peak E Randell 61 cm/s         MV Peak A Randell 0 9 m/s          Other Measurements   Peak gradient (Rest) 21 mmHg         Peak Gradient (Valsalva) 41 mmHg              Right Ventricle Measurements    Dimensions   RVID d 3 4 cm         Tricuspid annular plane systolic excursion 1 9 cm               Left Atrium Measurements    Dimensions   LA size 4 5 cm               Right Atrium Measurements    Dimensions   RAA A4C 14 1 cm2               Mitral Valve Measurements    Stenosis   MV stenosis pressure 1/2 time 50 ms         MV valve area p 1/2 method 4 4                Tricuspid Valve Measurements    RVSP Parameters   TR Peak Randell 1 9 m/s         Triscuspid Valve Regurgitation Peak Gradient 15 mmHg               Aorta Measurements    Aortic Dimensions   Ao root 2 8 cm         Asc Ao 3 cm               Exam Details    Performed Procedure Technologist Supporting Staff Performing Physician   Echo complete w/ contrast and strain Etienne Silvestre         Appointment Date/Status Modality Department    2/27/2023     Completed BE ECHO RM 2 BE CAR NON INV           Begin Exam End Exam  End Exam Questionnaires   2/27/2023 10:30 AM 2/27/2023 12:03 PM  PATIENT EDUCATION            All Reviewers List    Nayeli Wilson MD on 2/28/2023  1:25 PM     Signed    Electronically signed by Karlo Sanchez MD on 2/27/23 at 1547 EST   Electronically signed by Levon Boggs MD on 2/28/23 at 1009 EST     Counseling / Coordination of Care  Total floor / unit time spent today 40 minutes  Greater than 50% of total time was spent with the patient and / or family counseling and / or coordination of care

## 2023-05-09 ENCOUNTER — OFFICE VISIT (OUTPATIENT)
Dept: CARDIAC SURGERY | Facility: CLINIC | Age: 48
End: 2023-05-09

## 2023-05-09 ENCOUNTER — PROCEDURE VISIT (OUTPATIENT)
Age: 48
End: 2023-05-09

## 2023-05-09 VITALS
DIASTOLIC BLOOD PRESSURE: 78 MMHG | SYSTOLIC BLOOD PRESSURE: 138 MMHG | BODY MASS INDEX: 31.52 KG/M2 | HEART RATE: 90 BPM | WEIGHT: 253.53 LBS | HEIGHT: 75 IN

## 2023-05-09 VITALS
HEIGHT: 75 IN | WEIGHT: 253.6 LBS | HEART RATE: 95 BPM | SYSTOLIC BLOOD PRESSURE: 130 MMHG | BODY MASS INDEX: 31.53 KG/M2 | DIASTOLIC BLOOD PRESSURE: 78 MMHG | OXYGEN SATURATION: 99 %

## 2023-05-09 DIAGNOSIS — M99.03 SEGMENTAL DYSFUNCTION OF LUMBAR REGION: ICD-10-CM

## 2023-05-09 DIAGNOSIS — M99.04 SEGMENTAL DYSFUNCTION OF SACRAL REGION: ICD-10-CM

## 2023-05-09 DIAGNOSIS — I25.118 CORONARY ARTERY DISEASE OF NATIVE ARTERY OF NATIVE HEART WITH STABLE ANGINA PECTORIS (HCC): ICD-10-CM

## 2023-05-09 DIAGNOSIS — I10 ESSENTIAL HYPERTENSION: ICD-10-CM

## 2023-05-09 DIAGNOSIS — M99.05 SEGMENTAL DYSFUNCTION OF PELVIC REGION: Primary | ICD-10-CM

## 2023-05-09 DIAGNOSIS — M54.41 ACUTE BILATERAL LOW BACK PAIN WITH RIGHT-SIDED SCIATICA: ICD-10-CM

## 2023-05-09 DIAGNOSIS — I42.2 HYPERTROPHIC CARDIOMYOPATHY (HCC): Primary | ICD-10-CM

## 2023-05-09 DIAGNOSIS — M54.16 LUMBAR RADICULOPATHY: ICD-10-CM

## 2023-05-09 RX ORDER — MIRTAZAPINE 15 MG/1
15 TABLET, FILM COATED ORAL EVERY EVENING
COMMUNITY
Start: 2023-04-28

## 2023-05-09 RX ORDER — LOSARTAN POTASSIUM 100 MG/1
100 TABLET ORAL DAILY
COMMUNITY
Start: 2023-04-19

## 2023-05-09 NOTE — PROGRESS NOTES
Date of first visit: 4/11/2023       HPI:  Hermelindo Etienne returns for treatment today right-sided low back right posterior thigh pain as well as mid back and neck stiffness  He feels slightly better in the neck and upper back still intermittent right leg symptoms  5  VPAS      The following portions of the patient's history were reviewed and updated as appropriate: allergies, current medications, past family history, past medical history, past social history, past surgical history, and problem list     Review of Systems    Physical Exam:  Pelvic obliquity is noted leg with inequality is present  Misalignment of the right innominate on sacrum  Biomechanically joint dysfunction right SI L5-S1 motion unit he still has erector spinae hypertonicity noted on the right  Joint dysfunction T4-T5 C5-C6 C1-C2 paracervical hypertonicity noted with increased range of motion over initial presentation  Assessment:   Diagnosis ICD-10-CM Associated Orders   1  Segmental dysfunction of pelvic region  M99 05       2  Lumbar radiculopathy  M54 16       3  Segmental dysfunction of sacral region  M99 04       4  Segmental dysfunction of lumbar region  M99 03       5  Acute bilateral low back pain with right-sided sciatica  M54 41                 Treatment: [id number]  Manipulation to the right innominate, sacrum, L5 via Mart drop maneuver well-tolerated  Manipulation T4 C5 C 1 well-tolerated  Discussion:  Daily icing flexion-based stretching for the lower back I will see him back for follow-up

## 2023-05-16 DIAGNOSIS — I20.8 ANGINA AT REST (HCC): ICD-10-CM

## 2023-05-16 RX ORDER — RANOLAZINE 500 MG/1
TABLET, EXTENDED RELEASE ORAL
Qty: 60 TABLET | Refills: 2 | Status: SHIPPED | OUTPATIENT
Start: 2023-05-16

## 2023-05-30 ENCOUNTER — HOSPITAL ENCOUNTER (EMERGENCY)
Facility: HOSPITAL | Age: 48
Discharge: HOME | End: 2023-05-30
Attending: EMERGENCY MEDICINE
Payer: COMMERCIAL

## 2023-05-30 ENCOUNTER — APPOINTMENT (EMERGENCY)
Dept: RADIOLOGY | Facility: HOSPITAL | Age: 48
End: 2023-05-30
Payer: COMMERCIAL

## 2023-05-30 VITALS
TEMPERATURE: 97.3 F | OXYGEN SATURATION: 99 % | HEART RATE: 94 BPM | RESPIRATION RATE: 20 BRPM | DIASTOLIC BLOOD PRESSURE: 81 MMHG | SYSTOLIC BLOOD PRESSURE: 144 MMHG

## 2023-05-30 DIAGNOSIS — E11.65 HYPERGLYCEMIA DUE TO DIABETES MELLITUS (HCC): ICD-10-CM

## 2023-05-30 DIAGNOSIS — R07.9 CHEST PAIN: Primary | ICD-10-CM

## 2023-05-30 LAB
2HR DELTA HS TROPONIN: 2 NG/L
ALBUMIN SERPL BCP-MCNC: 3.6 G/DL (ref 3.5–5)
ALP SERPL-CCNC: 108 U/L (ref 46–116)
ALT SERPL W P-5'-P-CCNC: 49 U/L (ref 12–78)
ANION GAP SERPL CALCULATED.3IONS-SCNC: 4 MMOL/L (ref 4–13)
AST SERPL W P-5'-P-CCNC: 33 U/L (ref 5–45)
BASOPHILS # BLD AUTO: 0.03 THOUSANDS/ÂΜL (ref 0–0.1)
BASOPHILS NFR BLD AUTO: 1 % (ref 0–1)
BILIRUB SERPL-MCNC: 0.63 MG/DL (ref 0.2–1)
BNP SERPL-MCNC: 12 PG/ML (ref 0–100)
BUN SERPL-MCNC: 19 MG/DL (ref 5–25)
CALCIUM SERPL-MCNC: 9.7 MG/DL (ref 8.3–10.1)
CARDIAC TROPONIN I PNL SERPL HS: 15 NG/L
CARDIAC TROPONIN I PNL SERPL HS: 17 NG/L
CHLORIDE SERPL-SCNC: 97 MMOL/L (ref 96–108)
CO2 SERPL-SCNC: 26 MMOL/L (ref 21–32)
CREAT SERPL-MCNC: 1.36 MG/DL (ref 0.6–1.3)
EOSINOPHIL # BLD AUTO: 0.1 THOUSAND/ÂΜL (ref 0–0.61)
EOSINOPHIL NFR BLD AUTO: 2 % (ref 0–6)
ERYTHROCYTE [DISTWIDTH] IN BLOOD BY AUTOMATED COUNT: 11.9 % (ref 11.6–15.1)
GFR SERPL CREATININE-BSD FRML MDRD: 61 ML/MIN/1.73SQ M
GLUCOSE SERPL-MCNC: 394 MG/DL (ref 65–140)
GLUCOSE SERPL-MCNC: 522 MG/DL (ref 65–140)
HCT VFR BLD AUTO: 41 % (ref 36.5–49.3)
HGB BLD-MCNC: 14.8 G/DL (ref 12–17)
IMM GRANULOCYTES # BLD AUTO: 0.02 THOUSAND/UL (ref 0–0.2)
IMM GRANULOCYTES NFR BLD AUTO: 0 % (ref 0–2)
LYMPHOCYTES # BLD AUTO: 1.63 THOUSANDS/ÂΜL (ref 0.6–4.47)
LYMPHOCYTES NFR BLD AUTO: 28 % (ref 14–44)
MCH RBC QN AUTO: 29.5 PG (ref 26.8–34.3)
MCHC RBC AUTO-ENTMCNC: 36.1 G/DL (ref 31.4–37.4)
MCV RBC AUTO: 82 FL (ref 82–98)
MONOCYTES # BLD AUTO: 0.56 THOUSAND/ÂΜL (ref 0.17–1.22)
MONOCYTES NFR BLD AUTO: 10 % (ref 4–12)
NEUTROPHILS # BLD AUTO: 3.43 THOUSANDS/ÂΜL (ref 1.85–7.62)
NEUTS SEG NFR BLD AUTO: 59 % (ref 43–75)
NRBC BLD AUTO-RTO: 0 /100 WBCS
PLATELET # BLD AUTO: 207 THOUSANDS/UL (ref 149–390)
PMV BLD AUTO: 10.2 FL (ref 8.9–12.7)
POTASSIUM SERPL-SCNC: 4.7 MMOL/L (ref 3.5–5.3)
PROT SERPL-MCNC: 7.9 G/DL (ref 6.4–8.4)
RBC # BLD AUTO: 5.01 MILLION/UL (ref 3.88–5.62)
SODIUM SERPL-SCNC: 127 MMOL/L (ref 135–147)
WBC # BLD AUTO: 5.77 THOUSAND/UL (ref 4.31–10.16)

## 2023-05-30 PROCEDURE — 84484 ASSAY OF TROPONIN QUANT: CPT

## 2023-05-30 PROCEDURE — 99285 EMERGENCY DEPT VISIT HI MDM: CPT

## 2023-05-30 PROCEDURE — 36415 COLL VENOUS BLD VENIPUNCTURE: CPT

## 2023-05-30 PROCEDURE — 96375 TX/PRO/DX INJ NEW DRUG ADDON: CPT

## 2023-05-30 PROCEDURE — 82948 REAGENT STRIP/BLOOD GLUCOSE: CPT

## 2023-05-30 PROCEDURE — 85025 COMPLETE CBC W/AUTO DIFF WBC: CPT

## 2023-05-30 PROCEDURE — 71045 X-RAY EXAM CHEST 1 VIEW: CPT

## 2023-05-30 PROCEDURE — 83880 ASSAY OF NATRIURETIC PEPTIDE: CPT

## 2023-05-30 PROCEDURE — 96365 THER/PROPH/DIAG IV INF INIT: CPT

## 2023-05-30 PROCEDURE — 96366 THER/PROPH/DIAG IV INF ADDON: CPT

## 2023-05-30 PROCEDURE — 80053 COMPREHEN METABOLIC PANEL: CPT

## 2023-05-30 PROCEDURE — 93005 ELECTROCARDIOGRAM TRACING: CPT

## 2023-05-30 RX ORDER — SODIUM CHLORIDE, SODIUM GLUCONATE, SODIUM ACETATE, POTASSIUM CHLORIDE, MAGNESIUM CHLORIDE, SODIUM PHOSPHATE, DIBASIC, AND POTASSIUM PHOSPHATE .53; .5; .37; .037; .03; .012; .00082 G/100ML; G/100ML; G/100ML; G/100ML; G/100ML; G/100ML; G/100ML
1000 INJECTION, SOLUTION INTRAVENOUS ONCE
Status: COMPLETED | OUTPATIENT
Start: 2023-05-30 | End: 2023-05-30

## 2023-05-30 RX ORDER — KETOROLAC TROMETHAMINE 30 MG/ML
15 INJECTION, SOLUTION INTRAMUSCULAR; INTRAVENOUS ONCE
Status: COMPLETED | OUTPATIENT
Start: 2023-05-30 | End: 2023-05-30

## 2023-05-30 RX ADMIN — SODIUM CHLORIDE, SODIUM GLUCONATE, SODIUM ACETATE, POTASSIUM CHLORIDE, MAGNESIUM CHLORIDE, SODIUM PHOSPHATE, DIBASIC, AND POTASSIUM PHOSPHATE 1000 ML: .53; .5; .37; .037; .03; .012; .00082 INJECTION, SOLUTION INTRAVENOUS at 13:26

## 2023-05-30 RX ADMIN — KETOROLAC TROMETHAMINE 15 MG: 30 INJECTION, SOLUTION INTRAMUSCULAR; INTRAVENOUS at 13:26

## 2023-05-30 RX ADMIN — INSULIN HUMAN 10 UNITS: 100 INJECTION, SOLUTION PARENTERAL at 14:40

## 2023-05-30 NOTE — Clinical Note
Huber Pagan was seen and treated in our emergency department on 5/30/2023  Diagnosis:     Lawanda Rogers    He may return on this date: 05/31/2023         If you have any questions or concerns, please don't hesitate to call        Claire Newton MD    ______________________________           _______________          _______________  Hospital Representative                              Date                                Time

## 2023-05-30 NOTE — ED ATTENDING ATTESTATION
5/30/2023  ISelwyn DO, saw and evaluated the patient  I have discussed the patient with the resident/non-physician practitioner and agree with the resident's/non-physician practitioner's findings, Plan of Care, and MDM as documented in the resident's/non-physician practitioner's note, except where noted  All available labs and Radiology studies were reviewed  I was present for key portions of any procedure(s) performed by the resident/non-physician practitioner and I was immediately available to provide assistance  At this point I agree with the current assessment done in the Emergency Department  I have conducted an independent evaluation of this patient a history and physical is as follows:    50 yom with cp  Intermittent over the past two days  Worse after an argument with his wife  pmhx hld, htn, DM  Current exam normal  No le edema  Lungs cta  A/P: chest pain, atypical, differential includes ACS, arrhythmia, anxiety  Plan cardiac workup, ecg, labs          ED Course     ekg interpreted by me sinus tach, strain pattern, no stemi    Critical Care Time  Procedures

## 2023-05-30 NOTE — ED PROVIDER NOTES
History  Chief Complaint   Patient presents with   • Chest Pain     Pt reports chest pain with exertional sob     51-year-old male with history of CHF, diabetes, hyperlipidemia, hypertension, presents emergency department due to chest pain and shortness of breath with exertion  Patient has had a history of ongoing chest pains for about 3 years  He had an exacerbation starting 2 days ago after having a verbal argument with his wife  He endorses left-sided chest pain that is nonradiating  He also endorses shortness of breath with exertion  Last time this happened was a few months ago when he also had a verbal argument with his wife  No other complaints at this time  Prior to Admission Medications   Prescriptions Last Dose Informant Patient Reported? Taking? Blood Glucose Monitoring Suppl (OneTouch Verio) w/Device KIT  Self No No   Sig: Use to test 3-4x daily   Blood Pressure KIT  Self No No   Sig: Use 2 (two) times a day   Diclofenac Sodium (VOLTAREN) 1 %  Self No No   Sig: Apply 2 g topically 4 (four) times a day   Patient not taking: Reported on 5/9/2023   Insulin Glargine Solostar (Lantus SoloStar) 100 UNIT/ML SOPN   No No   Sig: Inject 0 3 mL (30 Units total) under the skin daily at bedtime   Insulin Pen Needle (BD Pen Needle Ann 2nd Gen) 32G X 4 MM MISC   No No   Sig: For use with insulin pen  Pharmacy may dispense brand covered by insurance  Insulin Pen Needle (BD Pen Needle Ann 2nd Gen) 32G X 4 MM MISC   No No   Sig: For use with insulin pen  Pharmacy may dispense brand covered by insurance     Lancets (onetouch ultrasoft) lancets  Self No No   Sig: Use to tests 3-4x daily   aspirin (ECOTRIN LOW STRENGTH) 81 mg EC tablet  Self No No   Sig: Take 1 tablet (81 mg total) by mouth daily   candesartan (ATACAND) 32 MG tablet  Self Yes No   glucose blood (OneTouch Verio) test strip  Self No No   Sig: Use to test 3-4x daily   glucose blood (OneTouch Verio) test strip   No No   Sig: May substitute brand based on insurance coverage  Check glucose TID  insulin glargine (LANTUS) 100 units/mL subcutaneous injection   No No   Sig: Inject 30 Units under the skin daily at bedtime   Patient not taking: Reported on 5/9/2023   insulin lispro (HumaLOG KwikPen) 100 units/mL injection pen   No No   Sig: Inject 10 Units under the skin 3 (three) times a day with meals   losartan (COZAAR) 100 MG tablet   Yes No   Sig: Take 100 mg by mouth daily   metFORMIN (GLUCOPHAGE) 1000 MG tablet   No No   Sig: Take 1 tablet (1,000 mg total) by mouth 2 (two) times a day with meals   Patient not taking: Reported on 5/9/2023   metoprolol succinate (TOPROL-XL) 50 mg 24 hr tablet   No No   Sig: TAKE 1 TABLET BY MOUTH TWICE A DAY   mirtazapine (REMERON) 15 mg tablet   Yes No   Sig: Take 15 mg by mouth every evening   ranolazine (RANEXA) 500 mg 12 hr tablet   No No   Sig: take 1 tablet by mouth twice a day   rosuvastatin (CRESTOR) 5 mg tablet  Self Yes No   Sig: Take 5 mg by mouth daily   verapamil (VERELAN PM) 120 MG 24 hr capsule   No No   Sig: TAKE 1 CAPSULE (120 MG TOTAL) BY MOUTH DAILY AT BEDTIME      Facility-Administered Medications: None       Past Medical History:   Diagnosis Date   • Congestive heart failure (HCC)    • Diabetes mellitus (HCC)    • Hyperlipidemia    • Hypertension    • Obesity        History reviewed  No pertinent surgical history  Family History   Problem Relation Age of Onset   • Diabetes Mother    • Stroke Mother    • Stroke Father      I have reviewed and agree with the history as documented      E-Cigarette/Vaping   • E-Cigarette Use Never User      E-Cigarette/Vaping Substances   • Nicotine No    • THC No    • CBD No    • Flavoring No      Social History     Tobacco Use   • Smoking status: Never   • Smokeless tobacco: Never   Vaping Use   • Vaping Use: Never used   Substance Use Topics   • Alcohol use: Not Currently   • Drug use: Never        Review of Systems    Physical Exam  ED Triage Vitals [05/30/23 1301] Temperature Pulse Respirations Blood Pressure SpO2   (!) 97 3 °F (36 3 °C) 105 22 154/96 99 %      Temp Source Heart Rate Source Patient Position - Orthostatic VS BP Location FiO2 (%)   Tympanic Monitor Lying Left arm --      Pain Score       8             Orthostatic Vital Signs  Vitals:    05/30/23 1301 05/30/23 1319 05/30/23 1500 05/30/23 1600   BP: 154/96 134/82 129/78 144/81   Pulse: 105 100 90 94   Patient Position - Orthostatic VS: Lying          Physical Exam  Vitals and nursing note reviewed  Constitutional:       General: He is not in acute distress  Appearance: He is well-developed  HENT:      Head: Normocephalic and atraumatic  Eyes:      Conjunctiva/sclera: Conjunctivae normal    Cardiovascular:      Rate and Rhythm: Normal rate and regular rhythm  Heart sounds: No murmur heard  Pulmonary:      Effort: Pulmonary effort is normal  No respiratory distress  Breath sounds: Normal breath sounds  Abdominal:      Palpations: Abdomen is soft  Tenderness: There is no abdominal tenderness  Musculoskeletal:         General: No swelling  Cervical back: Neck supple  Skin:     General: Skin is warm and dry  Capillary Refill: Capillary refill takes less than 2 seconds  Neurological:      Mental Status: He is alert     Psychiatric:         Mood and Affect: Mood normal          ED Medications  Medications   ketorolac (TORADOL) injection 15 mg (15 mg Intravenous Given 5/30/23 1326)   multi-electrolyte (ISOLYTE-S PH 7 4) bolus 1,000 mL (0 mL Intravenous Stopped 5/30/23 1530)   insulin regular (HumuLIN R,NovoLIN R) injection 10 Units (10 Units Intravenous Given 5/30/23 1440)       Diagnostic Studies  Results Reviewed     Procedure Component Value Units Date/Time    HS Troponin I 2hr [[id number]]  (Normal) Collected: 05/30/23 1557    Lab Status: Final result Specimen: Blood from Arm, Right Updated: 05/30/23 1648     hs TnI 2hr 17 ng/L      Delta 2hr hsTnI 2 ng/L     Fingerstick Glucose (POCT) [[id number]]  (Abnormal) Collected: 05/30/23 1556    Lab Status: Final result Updated: 05/30/23 1559     POC Glucose 394 mg/dl     Comprehensive metabolic panel [[id number]]  (Abnormal) Collected: 05/30/23 1320    Lab Status: Final result Specimen: Blood from Arm, Right Updated: 05/30/23 1436     Sodium 127 mmol/L      Potassium 4 7 mmol/L      Chloride 97 mmol/L      CO2 26 mmol/L      ANION GAP 4 mmol/L      BUN 19 mg/dL      Creatinine 1 36 mg/dL      Glucose 522 mg/dL      Calcium 9 7 mg/dL      AST 33 U/L      ALT 49 U/L      Alkaline Phosphatase 108 U/L      Total Protein 7 9 g/dL      Albumin 3 6 g/dL      Total Bilirubin 0 63 mg/dL      eGFR 61 ml/min/1 73sq m     Narrative:      [facility] guidelines for Chronic Kidney Disease (CKD):   •  Stage 1 with normal or high GFR (GFR > 90 mL/min/1 73 square meters)  •  Stage 2 Mild CKD (GFR = 60-89 mL/min/1 73 square meters)  •  Stage 3A Moderate CKD (GFR = 45-59 mL/min/1 73 square meters)  •  Stage 3B Moderate CKD (GFR = 30-44 mL/min/1 73 square meters)  •  Stage 4 Severe CKD (GFR = 15-29 mL/min/1 73 square meters)  •  Stage 5 End Stage CKD (GFR <15 mL/min/1 73 square meters)  Note: GFR calculation is accurate only with a steady state creatinine    HS Troponin 0hr (reflex protocol) [[id number]]  (Normal) Collected: 05/30/23 1320    Lab Status: Final result Specimen: Blood from Arm, Right Updated: 05/30/23 1357     hs TnI 0hr 15 ng/L     B-Type Natriuretic Peptide(BNP) [[id number]]  (Normal) Collected: 05/30/23 1320    Lab Status: Final result Specimen: Blood from Arm, Right Updated: 05/30/23 1355     BNP 12 pg/mL     CBC and differential [[id number]] Collected: 05/30/23 1320    Lab Status: Final result Specimen: Blood from Arm, Right Updated: 05/30/23 1336     WBC 5 77 Thousand/uL      RBC 5 01 Million/uL      Hemoglobin 14 8 g/dL      Hematocrit 41 0 %      MCV 82 fL      MCH 29 5 pg      MCHC 36 1 g/dL      RDW 11 9 %      MPV 10 2 fL      Platelets 411 Thousands/uL      nRBC 0 /100 WBCs      Neutrophils Relative 59 %      Immat GRANS % 0 %      Lymphocytes Relative 28 %      Monocytes Relative 10 %      Eosinophils Relative 2 %      Basophils Relative 1 %      Neutrophils Absolute 3 43 Thousands/µL      Immature Grans Absolute 0 02 Thousand/uL      Lymphocytes Absolute 1 63 Thousands/µL      Monocytes Absolute 0 56 Thousand/µL      Eosinophils Absolute 0 10 Thousand/µL      Basophils Absolute 0 03 Thousands/µL                  XR chest 1 view portable   ED Interpretation by Rahel Leary MD (05/30 5319)   No acute cardiopulmonary disease      Final Result by Deya Bertrand MD (05/31 1204)      No acute cardiopulmonary disease        Findings are stable            Workstation performed: [id number]               Procedures  ECG 12 Lead Documentation Only    Date/Time: 5/30/2023 1:14 PM    Performed by: Rahel Leary MD  Authorized by: Rahel Leary MD    ECG reviewed by me, the ED Provider: yes    Patient location:  ED  Previous ECG:     Previous ECG:  Compared to current  Interpretation:     Interpretation: abnormal    Rate:     ECG rate assessment: normal    Rhythm:     Rhythm: sinus rhythm    Ectopy:     Ectopy: none    QRS:     QRS axis:  Left    QRS intervals:  Normal  Conduction:     Conduction: normal    ST segments:     ST segments:  Normal  T waves:     T waves: inverted      Inverted:  AVL, V2, V3, V4, V6 and V5          ED Course  ED Course as of 06/02/23 1349   Tue May 30, 2023   1336 Pulse: 100  Per chart review, patient frequently evaluated with HR 90-100s   1438 Glucose, Random(!!): 522  Giving 10u insulin   1456 Creatinine(!): 1 36  Gary, giving IVF             HEART Risk Score    Flowsheet Row Most Recent Value   Heart Score Risk Calculator    History 0 Filed at: 06/02/2023 1347   ECG 1 Filed at: 06/02/2023 1347   Age 1 Filed at: 06/02/2023 1347   Risk Factors 1 Filed at: 06/02/2023 1347   Troponin 1 Filed at: 06/02/2023 1347   HEART Score 4 Filed at: 06/02/2023 1347                                Medical Decision Making  44-year-old male presenting to emergency department with chest pain and dyspnea  May be stress related, although cannot rule out ACS  Will obtain labs, EKG, chest x-ray to evaluate  Work-up overall reassuring with negative delta Trope  Discussed findings with patient who would prefer to continue home management  Recommend follow-up with primary care doctor and cardiologist   Patient agreeable plan and discharged with return precautions and home care recommendations  Amount and/or Complexity of Data Reviewed  Labs: ordered  Decision-making details documented in ED Course  Radiology: ordered and independent interpretation performed  Risk  OTC drugs  Prescription drug management  Disposition  Final diagnoses:   Chest pain   Hyperglycemia due to diabetes mellitus ([facility] [address] )     Time reflects when diagnosis was documented in both MDM as applicable and the Disposition within this note     Time User Action Codes Description Comment    5/30/2023  5:03 PM Darcella Brand Add [R07 9] Chest pain     5/30/2023  5:03 PM YokHaven ruckery Add [E11 65] Hyperglycemia due to diabetes mellitus [facility])       ED Disposition     ED Disposition   Discharge    Condition   Stable    Date/Time   Tue May 30, 2023  5:03 PM    Comment   Michelle Noel discharge to home/self care                 Follow-up Information     Follow up With Specialties Details Why [id number] Ania Bella MD Family Medicine In 1 week  [facility] [address],  [address]  [phone]            Discharge Medication List as of 5/30/2023  5:03 PM      CONTINUE these medications which have NOT CHANGED    Details   aspirin (ECOTRIN LOW STRENGTH) 81 mg EC tablet Take 1 tablet (81 mg total) by mouth daily, Starting Wed 5/6/2020, No Print      Blood Glucose Monitoring Suppl (OneTouch Verio) w/Device KIT Use to test 3-4x daily, Normal      Blood Pressure KIT Use 2 (two) times a day, Starting Tue 10/12/2021, Normal      candesartan (ATACAND) 32 MG tablet Starting Fri 4/16/2021, Historical Med      Diclofenac Sodium (VOLTAREN) 1 % Apply 2 g topically 4 (four) times a day, Starting Tue 10/12/2021, Normal      !! glucose blood (OneTouch Verio) test strip Use to test 3-4x daily, Normal      !! glucose blood (OneTouch Verio) test strip May substitute brand based on insurance coverage  Check glucose TID , Normal      insulin glargine (LANTUS) 100 units/mL subcutaneous injection Inject 30 Units under the skin daily at bedtime, Starting Thu 3/23/2023, Normal      Insulin Glargine Solostar (Lantus SoloStar) 100 UNIT/ML SOPN Inject 0 3 mL (30 Units total) under the skin daily at bedtime, Starting Thu 3/23/2023, Normal      insulin lispro (HumaLOG KwikPen) 100 units/mL injection pen Inject 10 Units under the skin 3 (three) times a day with meals, Starting Thu 3/23/2023, Normal      !! Insulin Pen Needle (BD Pen Needle Ann 2nd Gen) 32G X 4 MM MISC For use with insulin pen  Pharmacy may dispense brand covered by insurance , Normal      !! Insulin Pen Needle (BD Pen Needle Ann 2nd Gen) 32G X 4 MM MISC For use with insulin pen   Pharmacy may dispense brand covered by insurance , Normal      Lancets (onetouch ultrasoft) lancets Use to tests 3-4x daily, Normal      losartan (COZAAR) 100 MG tablet Take 100 mg by mouth daily, Starting Wed 4/19/2023, Historical Med      metFORMIN (GLUCOPHAGE) 1000 MG tablet Take 1 tablet (1,000 mg total) by mouth 2 (two) times a day with meals, Starting Thu 3/23/2023, Normal      metoprolol succinate (TOPROL-XL) 50 mg 24 hr tablet TAKE 1 TABLET BY MOUTH TWICE A DAY, Normal      mirtazapine (REMERON) 15 mg tablet Take 15 mg by mouth every evening, Starting Fri 4/28/2023, Historical Med      ranolazine (RANEXA) 500 mg 12 hr tablet take 1 tablet by mouth twice a day, Normal      rosuvastatin (CRESTOR) 5 mg tablet Take 5 mg by mouth daily, Historical Med      verapamil (VERELAN PM) 120 MG 24 hr capsule TAKE 1 CAPSULE (120 MG TOTAL) BY MOUTH DAILY AT BEDTIME, Starting Fri 11/25/2022, Normal       !! - Potential duplicate medications found  Please discuss with provider  No discharge procedures on file  PDMP Review       Value Time User    PDMP Reviewed  Yes 5/6/2020  2:27 PM Cintia Trevino PA-C           ED Provider  Attending physically available and evaluated Sari Byrd  MASSIEL managed the patient along with the ED Attending      Electronically Signed by         Monty Bagley MD  06/02/23 4469

## 2023-05-31 LAB
ATRIAL RATE: 105 BPM
P AXIS: 52 DEGREES
PR INTERVAL: 174 MS
QRS AXIS: -34 DEGREES
QRSD INTERVAL: 116 MS
QT INTERVAL: 360 MS
QTC INTERVAL: 475 MS
T WAVE AXIS: 134 DEGREES
VENTRICULAR RATE: 105 BPM

## 2023-05-31 PROCEDURE — 93010 ELECTROCARDIOGRAM REPORT: CPT | Performed by: INTERNAL MEDICINE

## 2023-06-30 ENCOUNTER — PROCEDURE VISIT (OUTPATIENT)
Age: 48
End: 2023-06-30
Payer: COMMERCIAL

## 2023-06-30 VITALS
DIASTOLIC BLOOD PRESSURE: 92 MMHG | SYSTOLIC BLOOD PRESSURE: 132 MMHG | BODY MASS INDEX: 31.69 KG/M2 | HEART RATE: 96 BPM | HEIGHT: 75 IN

## 2023-06-30 DIAGNOSIS — M99.05 SEGMENTAL DYSFUNCTION OF PELVIC REGION: Primary | ICD-10-CM

## 2023-06-30 DIAGNOSIS — M54.41 ACUTE BILATERAL LOW BACK PAIN WITH RIGHT-SIDED SCIATICA: ICD-10-CM

## 2023-06-30 DIAGNOSIS — M99.04 SEGMENTAL DYSFUNCTION OF SACRAL REGION: ICD-10-CM

## 2023-06-30 DIAGNOSIS — M54.16 LUMBAR RADICULOPATHY: ICD-10-CM

## 2023-06-30 DIAGNOSIS — M99.03 SEGMENTAL DYSFUNCTION OF LUMBAR REGION: ICD-10-CM

## 2023-06-30 DIAGNOSIS — S76.311A STRAIN OF RIGHT HAMSTRING MUSCLE, INITIAL ENCOUNTER: ICD-10-CM

## 2023-06-30 PROCEDURE — 98942 CHIROPRACTIC MANJ 5 REGIONS: CPT | Performed by: CHIROPRACTOR

## 2023-06-30 NOTE — PROGRESS NOTES
Date of first visit: 4/11/2023       HPI:  Jay Jc returns for treatment today right-sided low back right posterior thigh pain as well as mid back and neck stiffness  He reports a flareup of right-sided low back pain into the right posterior thigh  [facility] particular [facility] very stiff and sore feels shifted  Denies any weaknesses denies any bowel bladder changes reports no other associated symptomatology  5  VPAS  Neck    VPAS  10  Low back      The following portions of the patient's history were reviewed and updated as appropriate: allergies, current medications, past family history, past medical history, past social history, past surgical history, and problem list     Review of Systems    Physical Exam:  Pelvic obliquity is noted leg with inequality is present  Misalignment of the right innominate on sacrum  Biomechanically joint dysfunction right SI L5-S1 motion unit he still has erector spinae hypertonicity noted on the right  Joint dysfunction T4-T5 C5-C6 C1-C2 paracervical hypertonicity noted with increased range of motion over initial presentation  Assessment:   Diagnosis ICD-10-CM Associated Orders   1  Segmental dysfunction of pelvic region  M99 05       2  Lumbar radiculopathy  M54 16       3  Segmental dysfunction of sacral region  M99 04       4  Segmental dysfunction of lumbar region  M99 03       5  Acute bilateral low back pain with right-sided sciatica  M54 41       6  Strain of right hamstring muscle, initial encounter  S76 311A                 Treatment: 44663  Manipulation to the right innominate, sacrum, L5 via Mart drop maneuver well-tolerated  Manipulation T4 C5 C 1 well-tolerated  Discussion:  Daily icing flexion-based stretching for the lower back I will see him back for follow-up

## 2023-07-07 ENCOUNTER — PROCEDURE VISIT (OUTPATIENT)
Age: 48
End: 2023-07-07
Payer: COMMERCIAL

## 2023-07-07 VITALS
SYSTOLIC BLOOD PRESSURE: 134 MMHG | WEIGHT: 253 LBS | HEIGHT: 75 IN | BODY MASS INDEX: 31.46 KG/M2 | HEART RATE: 102 BPM | DIASTOLIC BLOOD PRESSURE: 83 MMHG

## 2023-07-07 DIAGNOSIS — M99.03 SEGMENTAL DYSFUNCTION OF LUMBAR REGION: ICD-10-CM

## 2023-07-07 DIAGNOSIS — S76.311A STRAIN OF RIGHT HAMSTRING MUSCLE, INITIAL ENCOUNTER: ICD-10-CM

## 2023-07-07 DIAGNOSIS — M54.16 LUMBAR RADICULOPATHY: ICD-10-CM

## 2023-07-07 DIAGNOSIS — M54.41 ACUTE BILATERAL LOW BACK PAIN WITH RIGHT-SIDED SCIATICA: ICD-10-CM

## 2023-07-07 DIAGNOSIS — M99.04 SEGMENTAL DYSFUNCTION OF SACRAL REGION: ICD-10-CM

## 2023-07-07 DIAGNOSIS — M99.05 SEGMENTAL DYSFUNCTION OF PELVIC REGION: Primary | ICD-10-CM

## 2023-07-07 PROCEDURE — 98942 CHIROPRACTIC MANJ 5 REGIONS: CPT | Performed by: CHIROPRACTOR

## 2023-07-07 NOTE — PROGRESS NOTES
Date of first visit: 4/11/2023       HPI:  Yonathan Lomas returns for treatment today right-sided low back right posterior thigh pain as well as mid back and neck stiffness. He reports a flareup of right-sided low back pain into the right posterior thigh. Just returned from [facility] does not remember 1 particular incident very stiff and sore feels shifted. Denies any weaknesses denies any bowel bladder changes reports no other associated symptomatology. He is back in the office today for treatment reports overall improvement. 5  VPAS  Neck    VPAS  7  Low back      The following portions of the patient's history were reviewed and updated as appropriate: allergies, current medications, past family history, past medical history, past social history, past surgical history, and problem list.    Review of Systems    Physical Exam:  Pelvic obliquity is noted leg with inequality is present. Misalignment of the right innominate on sacrum. Biomechanically joint dysfunction right SI L5-S1 motion unit he still has erector spinae hypertonicity noted on the right. Joint dysfunction T4-T5 C5-C6 C1-C2 paracervical hypertonicity noted with increased range of motion over initial presentation. Assessment:   Diagnosis ICD-10-CM Associated Orders   1. Segmental dysfunction of pelvic region  M99.05       2. Lumbar radiculopathy  M54.16       3. Segmental dysfunction of sacral region  M99.04       4. Segmental dysfunction of lumbar region  M99.03       5. Acute bilateral low back pain with right-sided sciatica  M54.41       6. Strain of right hamstring muscle, initial encounter  S76.311A                 Treatment: 77241  Manipulation to the right innominate, sacrum, L5 via Mart drop maneuver well-tolerated. Manipulation T4 C5 C 1 well-tolerated. Discussion:  Daily icing flexion-based stretching for the lower back I will see him back for follow-up.

## 2023-07-21 ENCOUNTER — PROCEDURE VISIT (OUTPATIENT)
Age: 48
End: 2023-07-21
Payer: COMMERCIAL

## 2023-07-21 VITALS — SYSTOLIC BLOOD PRESSURE: 131 MMHG | HEART RATE: 106 BPM | DIASTOLIC BLOOD PRESSURE: 91 MMHG

## 2023-07-21 DIAGNOSIS — M54.16 LUMBAR RADICULOPATHY: ICD-10-CM

## 2023-07-21 DIAGNOSIS — M99.04 SEGMENTAL DYSFUNCTION OF SACRAL REGION: ICD-10-CM

## 2023-07-21 DIAGNOSIS — M99.05 SEGMENTAL DYSFUNCTION OF PELVIC REGION: Primary | ICD-10-CM

## 2023-07-21 DIAGNOSIS — M54.41 ACUTE BILATERAL LOW BACK PAIN WITH RIGHT-SIDED SCIATICA: ICD-10-CM

## 2023-07-21 DIAGNOSIS — M99.03 SEGMENTAL DYSFUNCTION OF LUMBAR REGION: ICD-10-CM

## 2023-07-21 PROCEDURE — 98942 CHIROPRACTIC MANJ 5 REGIONS: CPT | Performed by: CHIROPRACTOR

## 2023-07-21 NOTE — PROGRESS NOTES
Date of first visit: 4/11/2023       HPI:  Clarissa Sumner returns for treatment today right-sided low back right posterior thigh pain as well as mid back and neck stiffness. He reports a flareup of right-sided low back pain into the right posterior thigh. Denies any weaknesses denies any bowel bladder changes reports no other associated symptomatology. He is back in the office today for treatment reports overall improvement. Is having some issues with lower extremity neuropathy. VPAS 5  Neck    VPAS  8  Low back      The following portions of the patient's history were reviewed and updated as appropriate: allergies, current medications, past family history, past medical history, past social history, past surgical history, and problem list.    Review of Systems    Physical Exam:  Pelvic obliquity is noted leg with inequality is present. Misalignment of the right innominate on sacrum. Biomechanically joint dysfunction right SI L5-S1 motion unit he still has erector spinae hypertonicity noted on the right. Joint dysfunction T4-T5 C5-C6 C1-C2 paracervical hypertonicity noted with increased range of motion over initial presentation. Assessment:   Diagnosis ICD-10-CM Associated Orders   1. Segmental dysfunction of pelvic region  M99.05       2. Lumbar radiculopathy  M54.16       3. Segmental dysfunction of sacral region  M99.04       4. Segmental dysfunction of lumbar region  M99.03       5. Acute bilateral low back pain with right-sided sciatica  M54.41                 Treatment: 04281  Manipulation to the right innominate, sacrum, L5 via Mart drop maneuver well-tolerated. Manipulation T4 C5 C 1 well-tolerated. Discussion:  Daily icing flexion-based stretching for the lower back I will see him back for follow-up.

## 2023-08-01 ENCOUNTER — PROCEDURE VISIT (OUTPATIENT)
Age: 48
End: 2023-08-01
Payer: COMMERCIAL

## 2023-08-01 VITALS
HEIGHT: 75 IN | BODY MASS INDEX: 31.46 KG/M2 | DIASTOLIC BLOOD PRESSURE: 83 MMHG | SYSTOLIC BLOOD PRESSURE: 142 MMHG | WEIGHT: 253 LBS

## 2023-08-01 DIAGNOSIS — M99.03 SEGMENTAL DYSFUNCTION OF LUMBAR REGION: ICD-10-CM

## 2023-08-01 DIAGNOSIS — M54.16 LUMBAR RADICULOPATHY: ICD-10-CM

## 2023-08-01 DIAGNOSIS — M99.04 SEGMENTAL DYSFUNCTION OF SACRAL REGION: ICD-10-CM

## 2023-08-01 DIAGNOSIS — M99.05 SEGMENTAL DYSFUNCTION OF PELVIC REGION: Primary | ICD-10-CM

## 2023-08-01 DIAGNOSIS — M54.41 ACUTE BILATERAL LOW BACK PAIN WITH RIGHT-SIDED SCIATICA: ICD-10-CM

## 2023-08-01 PROCEDURE — 98942 CHIROPRACTIC MANJ 5 REGIONS: CPT | Performed by: CHIROPRACTOR

## 2023-08-01 NOTE — PROGRESS NOTES
Date of first visit: 4/11/2023       HPI:  Amanda Asencio returns for treatment today right-sided low back right posterior thigh pain as well as mid back and neck stiffness. He reports a flareup of right-sided low back pain into the right posterior thigh. Denies any weaknesses denies any bowel bladder changes reports no other associated symptomatology. He is back in the office today for treatment reports overall improvement. Is having some issues with lower extremity neuropathy. VPAS 3  Neck    VPAS  6-7  Low back      The following portions of the patient's history were reviewed and updated as appropriate: allergies, current medications, past family history, past medical history, past social history, past surgical history, and problem list.    Review of Systems    Physical Exam:  Pelvic obliquity is noted leg with inequality is present. Misalignment of the right innominate on sacrum. Biomechanically joint dysfunction right SI L5-S1 motion unit he still has erector spinae hypertonicity noted on the right. Joint dysfunction T4-T5 C5-C6 C1-C2 paracervical hypertonicity noted with increased range of motion over initial presentation. Assessment:   Diagnosis ICD-10-CM Associated Orders   1. Segmental dysfunction of pelvic region  M99.05       2. Lumbar radiculopathy  M54.16       3. Segmental dysfunction of sacral region  M99.04       4. Segmental dysfunction of lumbar region  M99.03       5. Acute bilateral low back pain with right-sided sciatica  M54.41                 Treatment: 36929  Manipulation to the right innominate, sacrum, L5 via Mart drop maneuver well-tolerated. Manipulation T4 C5 C 1 well-tolerated. Discussion:  Daily icing flexion-based stretching for the lower back I will see him back for follow-up.

## 2023-08-13 ENCOUNTER — HOSPITAL ENCOUNTER (EMERGENCY)
Facility: HOSPITAL | Age: 48
Discharge: HOME | End: 2023-08-13
Attending: EMERGENCY MEDICINE
Payer: COMMERCIAL

## 2023-08-13 ENCOUNTER — APPOINTMENT (EMERGENCY)
Dept: RADIOLOGY | Facility: HOSPITAL | Age: 48
End: 2023-08-13
Payer: COMMERCIAL

## 2023-08-13 VITALS
SYSTOLIC BLOOD PRESSURE: 123 MMHG | HEART RATE: 75 BPM | TEMPERATURE: 98.7 F | DIASTOLIC BLOOD PRESSURE: 66 MMHG | RESPIRATION RATE: 16 BRPM | OXYGEN SATURATION: 96 %

## 2023-08-13 DIAGNOSIS — I20.8 ANGINA AT REST (HCC): ICD-10-CM

## 2023-08-13 DIAGNOSIS — R10.13 EPIGASTRIC PAIN: Primary | ICD-10-CM

## 2023-08-13 LAB
2HR DELTA HS TROPONIN: -1 NG/L
ALBUMIN SERPL BCP-MCNC: 3.3 G/DL (ref 3.5–5)
ALP SERPL-CCNC: 96 U/L (ref 46–116)
ALT SERPL W P-5'-P-CCNC: 42 U/L (ref 12–78)
ANION GAP SERPL CALCULATED.3IONS-SCNC: 4 MMOL/L
AST SERPL W P-5'-P-CCNC: 31 U/L (ref 5–45)
ATRIAL RATE: 82 BPM
BASOPHILS # BLD AUTO: 0.05 THOUSANDS/ÂΜL (ref 0–0.1)
BASOPHILS NFR BLD AUTO: 1 % (ref 0–1)
BILIRUB SERPL-MCNC: 0.73 MG/DL (ref 0.2–1)
BUN SERPL-MCNC: 14 MG/DL (ref 5–25)
CALCIUM ALBUM COR SERPL-MCNC: 10 MG/DL (ref 8.3–10.1)
CALCIUM SERPL-MCNC: 9.4 MG/DL (ref 8.3–10.1)
CARDIAC TROPONIN I PNL SERPL HS: 8 NG/L
CARDIAC TROPONIN I PNL SERPL HS: 9 NG/L
CHLORIDE SERPL-SCNC: 107 MMOL/L (ref 96–108)
CO2 SERPL-SCNC: 28 MMOL/L (ref 21–32)
CREAT SERPL-MCNC: 0.98 MG/DL (ref 0.6–1.3)
EOSINOPHIL # BLD AUTO: 0.22 THOUSAND/ÂΜL (ref 0–0.61)
EOSINOPHIL NFR BLD AUTO: 4 % (ref 0–6)
ERYTHROCYTE [DISTWIDTH] IN BLOOD BY AUTOMATED COUNT: 12.2 % (ref 11.6–15.1)
GFR SERPL CREATININE-BSD FRML MDRD: 90 ML/MIN/1.73SQ M
GLUCOSE SERPL-MCNC: 132 MG/DL (ref 65–140)
HCT VFR BLD AUTO: 42.1 % (ref 36.5–49.3)
HGB BLD-MCNC: 14.3 G/DL (ref 12–17)
IMM GRANULOCYTES # BLD AUTO: 0.02 THOUSAND/UL (ref 0–0.2)
IMM GRANULOCYTES NFR BLD AUTO: 0 % (ref 0–2)
LIPASE SERPL-CCNC: 331 U/L (ref 73–393)
LYMPHOCYTES # BLD AUTO: 1.82 THOUSANDS/ÂΜL (ref 0.6–4.47)
LYMPHOCYTES NFR BLD AUTO: 32 % (ref 14–44)
MCH RBC QN AUTO: 29.6 PG (ref 26.8–34.3)
MCHC RBC AUTO-ENTMCNC: 34 G/DL (ref 31.4–37.4)
MCV RBC AUTO: 87 FL (ref 82–98)
MONOCYTES # BLD AUTO: 0.69 THOUSAND/ÂΜL (ref 0.17–1.22)
MONOCYTES NFR BLD AUTO: 12 % (ref 4–12)
NEUTROPHILS # BLD AUTO: 2.83 THOUSANDS/ÂΜL (ref 1.85–7.62)
NEUTS SEG NFR BLD AUTO: 51 % (ref 43–75)
NRBC BLD AUTO-RTO: 0 /100 WBCS
P AXIS: 44 DEGREES
PLATELET # BLD AUTO: 227 THOUSANDS/UL (ref 149–390)
PMV BLD AUTO: 9.9 FL (ref 8.9–12.7)
POTASSIUM SERPL-SCNC: 3.7 MMOL/L (ref 3.5–5.3)
PR INTERVAL: 184 MS
PROT SERPL-MCNC: 7.9 G/DL (ref 6.4–8.4)
QRS AXIS: -48 DEGREES
QRSD INTERVAL: 108 MS
QT INTERVAL: 400 MS
QTC INTERVAL: 467 MS
RBC # BLD AUTO: 4.83 MILLION/UL (ref 3.88–5.62)
SODIUM SERPL-SCNC: 139 MMOL/L (ref 135–147)
T WAVE AXIS: 140 DEGREES
VENTRICULAR RATE: 82 BPM
WBC # BLD AUTO: 5.63 THOUSAND/UL (ref 4.31–10.16)

## 2023-08-13 PROCEDURE — 85025 COMPLETE CBC W/AUTO DIFF WBC: CPT

## 2023-08-13 PROCEDURE — 99285 EMERGENCY DEPT VISIT HI MDM: CPT

## 2023-08-13 PROCEDURE — 80053 COMPREHEN METABOLIC PANEL: CPT

## 2023-08-13 PROCEDURE — 71046 X-RAY EXAM CHEST 2 VIEWS: CPT

## 2023-08-13 PROCEDURE — 36415 COLL VENOUS BLD VENIPUNCTURE: CPT

## 2023-08-13 PROCEDURE — 84484 ASSAY OF TROPONIN QUANT: CPT

## 2023-08-13 PROCEDURE — 99285 EMERGENCY DEPT VISIT HI MDM: CPT | Performed by: EMERGENCY MEDICINE

## 2023-08-13 PROCEDURE — 86677 HELICOBACTER PYLORI ANTIBODY: CPT

## 2023-08-13 PROCEDURE — 93005 ELECTROCARDIOGRAM TRACING: CPT

## 2023-08-13 PROCEDURE — 83690 ASSAY OF LIPASE: CPT

## 2023-08-13 PROCEDURE — 93010 ELECTROCARDIOGRAM REPORT: CPT | Performed by: INTERNAL MEDICINE

## 2023-08-13 RX ORDER — SUCRALFATE 1 G/1
1 TABLET ORAL ONCE
Status: COMPLETED | OUTPATIENT
Start: 2023-08-13 | End: 2023-08-13

## 2023-08-13 RX ORDER — SUCRALFATE 1 G/1
1 TABLET ORAL 4 TIMES DAILY
Qty: 120 TABLET | Refills: 0 | Status: SHIPPED | OUTPATIENT
Start: 2023-08-13 | End: 2023-09-12

## 2023-08-13 RX ORDER — OMEPRAZOLE 20 MG/1
20 CAPSULE, DELAYED RELEASE ORAL DAILY
Qty: 30 CAPSULE | Refills: 0 | Status: SHIPPED | OUTPATIENT
Start: 2023-08-13 | End: 2023-09-12

## 2023-08-13 RX ADMIN — SUCRALFATE 1 G: 1 TABLET ORAL at 13:25

## 2023-08-13 NOTE — ED PROVIDER NOTES
History  Chief Complaint   Patient presents with   • Chest Pain     Pt reports chest pain since Monday from side to side per pt. Pt reports sharp pains that come and go. Pt reports SOB, denies dizziness. JOANNE Haro is a 50 y.o. male PMH HTN  CAD s/p angioplasty, diabetes, hypertrophic cardiomyopathy, HLD, JUNIOR presenting for one week of intermittent epigastric pain. He was on vacation when this pain started, got home yesterday, and decided to come to the ER for evaluation. He says the pain is intermittent 8/10 stabbing pain in his epigastrium, LUQ, and RUQ. He has avoided food as he feels it is sometimes worse with eating. Peptobismol helped a a little bit, but hasn't found anything that consistently makes the pain better or worse. He is not in any pain currently. He says he is SOB when walking but this is unchanged from his baseline. Mr. Pavan Mahan states he was admitted to [facility] four months ago for similar abdominal pain and was found to have elevated Lipase - symptoms resolved with IV fluids and tincture of time. Mr. Pavan Mahan follows with Dr. Ely Broderick of [facility]. He has had multiple cardiac stress tests and a recent echo in Feb of 2023 which showed EF 70% with severely thickened septal wall and mildly dilated LA. On most recent note 5/09/23, Dr. Ely Broderick states "He has had some chest discomfort thought to be due to coronary microvascular disease that has responded to ranolazine." Costochondritis also noted on exam at that time Mr. Pavan Mahan says he has had reproducible chest wall tenderness for the past three years ever since his first admission for chest pain back in 2020. Prior to Admission Medications   Prescriptions Last Dose Informant Patient Reported? Taking?    Blood Glucose Monitoring Suppl (OneTouch Verio) w/Device KIT  Self No No   Sig: Use to test 3-4x daily   Blood Pressure KIT  Self No No   Sig: Use 2 (two) times a day   Diclofenac Sodium (VOLTAREN) 1 %  Self No No   Sig: Apply 2 g topically 4 (four) times a day   Patient not taking: Reported on 5/9/2023   Insulin Glargine Solostar (Lantus SoloStar) 100 UNIT/ML SOPN  Self No No   Sig: Inject 0.3 mL (30 Units total) under the skin daily at bedtime   Insulin Pen Needle (BD Pen Needle Ann 2nd Gen) 32G X 4 MM MISC  Self No No   Sig: For use with insulin pen. Pharmacy may dispense brand covered by insurance. Insulin Pen Needle (BD Pen Needle Ann 2nd Gen) 32G X 4 MM MISC  Self No No   Sig: For use with insulin pen. Pharmacy may dispense brand covered by insurance. Lancets (onetouch ultrasoft) lancets  Self No No   Sig: Use to tests 3-4x daily   aspirin (ECOTRIN LOW STRENGTH) 81 mg EC tablet  Self No No   Sig: Take 1 tablet (81 mg total) by mouth daily   candesartan (ATACAND) 32 MG tablet  Self Yes No   glucose blood (OneTouch Verio) test strip  Self No No   Sig: Use to test 3-4x daily   glucose blood (OneTouch Verio) test strip  Self No No   Sig: May substitute brand based on insurance coverage. Check glucose TID.    insulin glargine (LANTUS) 100 units/mL subcutaneous injection  Self No No   Sig: Inject 30 Units under the skin daily at bedtime   Patient not taking: Reported on 5/9/2023   insulin lispro (HumaLOG KwikPen) 100 units/mL injection pen  Self No No   Sig: Inject 10 Units under the skin 3 (three) times a day with meals   losartan (COZAAR) 100 MG tablet  Self Yes No   Sig: Take 100 mg by mouth daily   metFORMIN (GLUCOPHAGE) 1000 MG tablet  Self No No   Sig: Take 1 tablet (1,000 mg total) by mouth 2 (two) times a day with meals   metoprolol succinate (TOPROL-XL) 50 mg 24 hr tablet  Self No No   Sig: TAKE 1 TABLET BY MOUTH TWICE A DAY   mirtazapine (REMERON) 15 mg tablet  Self Yes No   Sig: Take 15 mg by mouth every evening   ranolazine (RANEXA) 500 mg 12 hr tablet  Self No No   Sig: take 1 tablet by mouth twice a day   rosuvastatin (CRESTOR) 5 mg tablet  Self Yes No   Sig: Take 5 mg by mouth daily   verapamil (VERELAN PM) 120 MG 24 hr capsule  Self No No   Sig: TAKE 1 CAPSULE (120 MG TOTAL) BY MOUTH DAILY AT BEDTIME      Facility-Administered Medications: None       Past Medical History:   Diagnosis Date   • Congestive heart failure ([address])    • Diabetes mellitus ([address])    • Hyperlipidemia    • Hypertension    • Obesity        History reviewed. No pertinent surgical history. Family History   Problem Relation Age of Onset   • Diabetes Mother    • Stroke Mother    • Stroke Father      I have reviewed and agree with the history as documented. E-Cigarette/Vaping   • E-Cigarette Use Never User      E-Cigarette/Vaping Substances   • Nicotine No    • THC No    • CBD No    • Flavoring No      Social History     Tobacco Use   • Smoking status: Never   • Smokeless tobacco: Never   Vaping Use   • Vaping Use: Never used   Substance Use Topics   • Alcohol use: Not Currently   • Drug use: Never        Review of Systems   Constitutional: Negative for activity change, appetite change and chills. Respiratory: Positive for shortness of breath. Negative for apnea, cough, choking and chest tightness. Cardiovascular: Positive for chest pain. Negative for palpitations and leg swelling. Gastrointestinal: Positive for abdominal pain. Negative for abdominal distention, constipation, diarrhea, nausea and vomiting. Skin: Negative for color change, pallor and wound. Neurological: Negative for dizziness and light-headedness.        Physical Exam  ED Triage Vitals [08/13/23 1241]   Temperature Pulse Respirations Blood Pressure SpO2   (!) 97.4 °F (36.3 °C) 83 18 140/87 99 %      Temp Source Heart Rate Source Patient Position - Orthostatic VS BP Location FiO2 (%)   Temporal Monitor Sitting Left arm --      Pain Score       8             Orthostatic Vital Signs  Vitals:    08/13/23 1241 08/13/23 1545   BP: 140/87 123/66   Pulse: 83 75   Patient Position - Orthostatic VS: Sitting Lying       Physical Exam  Constitutional:       Appearance: He is well-developed and normal weight. HENT:      Head: Normocephalic and atraumatic. Eyes:      Pupils: Pupils are equal, round, and reactive to light. Cardiovascular:      Rate and Rhythm: Normal rate and regular rhythm. No extrasystoles are present. Heart sounds: Normal heart sounds. No S3 or S4 sounds. Pulmonary:      Effort: Pulmonary effort is normal. No tachypnea or respiratory distress. Breath sounds: Normal breath sounds. Chest:      Chest wall: Tenderness (Tenderness across sternum and chostochrondal joints) present. No crepitus. Abdominal:      Palpations: Abdomen is soft. There is no hepatomegaly or splenomegaly. Tenderness: There is abdominal tenderness (Epigastric and tender bilateraly just inferior to the ribs). There is no guarding. Comments: Murpheys negative    Musculoskeletal:         General: Normal range of motion. Right lower leg: No edema. Left lower leg: No edema. Skin:     General: Skin is warm and dry. Capillary Refill: Capillary refill takes 2 to 3 seconds. Neurological:      General: No focal deficit present. Mental Status: He is alert. Comments: Bilateral LE peripheral neuropathy          ED Medications  Medications   sucralfate (CARAFATE) tablet 1 g (1 g Oral Given 8/13/23 1325)       Diagnostic Studies  Results Reviewed     Procedure Component Value Units Date/Time    H. pylori antibody, IgG [[id number]] Collected: 08/13/23 1647    Lab Status:  In process Specimen: Blood from Arm, Right Updated: 08/13/23 1653    HS Troponin I 2hr [[id number]]  (Normal) Collected: 08/13/23 1548    Lab Status: Final result Specimen: Blood from Arm, Right Updated: 08/13/23 1634     hs TnI 2hr 8 ng/L      Delta 2hr hsTnI -1 ng/L     HS Troponin 0hr (reflex protocol) [[id number]]  (Normal) Collected: 08/13/23 1325    Lab Status: Final result Specimen: Blood from Arm, Right Updated: 08/13/23 1418     hs TnI 0hr 9 ng/L     Comprehensive metabolic panel [[id number]] (Abnormal) Collected: 08/13/23 1325    Lab Status: Final result Specimen: Blood from Arm, Right Updated: 08/13/23 1402     Sodium 139 mmol/L      Potassium 3.7 mmol/L      Chloride 107 mmol/L      CO2 28 mmol/L      ANION GAP 4 mmol/L      BUN 14 mg/dL      Creatinine 0.98 mg/dL      Glucose 132 mg/dL      Calcium 9.4 mg/dL      Corrected Calcium 10.0 mg/dL      AST 31 U/L      ALT 42 U/L      Alkaline Phosphatase 96 U/L      Total Protein 7.9 g/dL      Albumin 3.3 g/dL      Total Bilirubin 0.73 mg/dL      eGFR 90 ml/min/1.73sq m     Narrative:      National Kidney Disease Foundation guidelines for Chronic Kidney Disease (CKD):   •  Stage 1 with normal or high GFR (GFR > 90 mL/min/1.73 square meters)  •  Stage 2 Mild CKD (GFR = 60-89 mL/min/1.73 square meters)  •  Stage 3A Moderate CKD (GFR = 45-59 mL/min/1.73 square meters)  •  Stage 3B Moderate CKD (GFR = 30-44 mL/min/1.73 square meters)  •  Stage 4 Severe CKD (GFR = 15-29 mL/min/1.73 square meters)  •  Stage 5 End Stage CKD (GFR <15 mL/min/1.73 square meters)  Note: GFR calculation is accurate only with a steady state creatinine    Lipase [[id number]]  (Normal) Collected: 08/13/23 1325    Lab Status: Final result Specimen: Blood from Arm, Right Updated: 08/13/23 1402     Lipase 331 u/L     CBC and differential [[id number]] Collected: 08/13/23 1325    Lab Status: Final result Specimen: Blood from Arm, Right Updated: 08/13/23 1345     WBC 5.63 Thousand/uL      RBC 4.83 Million/uL      Hemoglobin 14.3 g/dL      Hematocrit 42.1 %      MCV 87 fL      MCH 29.6 pg      MCHC 34.0 g/dL      RDW 12.2 %      MPV 9.9 fL      Platelets 725 Thousands/uL      nRBC 0 /100 WBCs      Neutrophils Relative 51 %      Immat GRANS % 0 %      Lymphocytes Relative 32 %      Monocytes Relative 12 %      Eosinophils Relative 4 %      Basophils Relative 1 %      Neutrophils Absolute 2.83 Thousands/µL      Immature Grans Absolute 0.02 Thousand/uL      Lymphocytes Absolute 1.82 Thousands/µL Monocytes Absolute 0.69 Thousand/µL      Eosinophils Absolute 0.22 Thousand/µL      Basophils Absolute 0.05 Thousands/µL                  XR chest 2 views   ED Interpretation by Sofia Knapp MD (08/13 1352)   Normal cardiomediastinal silhouette. No pleural effusion, pulmonary edema, or pneumothorax. No fracture noted. Final Result by Sarthak Arango MD (08/13 1355)      No acute cardiopulmonary disease. Workstation performed: [id number]               Procedures  ECG 12 Lead Documentation Only    Date/Time: 8/14/2023 12:59 PM    Performed by: Sofia Knapp MD  Authorized by: Sofia Knapp MD    ECG reviewed by me, the ED Provider: yes    Patient location:  ED  Previous ECG:     Previous ECG:  Compared to current    Similarity:  No change  Interpretation:     Interpretation: abnormal    Rate:     ECG rate assessment: normal    Rhythm:     Rhythm: sinus rhythm    Ectopy:     Ectopy: none    QRS:     QRS axis:  Normal    QRS intervals:  Normal  Conduction:     Conduction: abnormal      Abnormal conduction: incomplete RBBB    ST segments:     ST segments:  Normal  T waves:     T waves: inverted      Inverted:  I, V4, aVL, V5 and V6          ED Course  ED Course as of 08/14/23 1301   Sun Aug 13, 2023   1409 Lipase: 331   1412 Hemoglobin: 14.3   1412 Potassium: 3.7   1412 Calcium: 9.4   1431 Patient complaining of generalized epigastric pain. Pain has not improved with Carafate. 1431 hs TnI 0hr: 9   1636 Delta 2hr hsTnI: -1             HEART Risk Score    Flowsheet Row Most Recent Value   Heart Score Risk Calculator    History 0 Filed at: 08/13/2023 1311   ECG 0 Filed at: 08/13/2023 1311   Age 1 Filed at: 08/13/2023 1311   Risk Factors 2 Filed at: 08/13/2023 1311   Troponin --   HEART Score --                      SBIRT 22yo+    Flowsheet Row Most Recent Value   Initial Alcohol Screen: US AUDIT-C     1. How often do you have a drink containing alcohol? 0 Filed at: 08/13/2023 1241   2.  How many drinks containing alcohol do you have on a typical day you are drinking? 0 Filed at: 08/13/2023 1241   3a. Male UNDER 65: How often do you have five or more drinks on one occasion? 1 Filed at: 08/13/2023 1241   Audit-C Score 1 Filed at: 08/13/2023 1241   DARIAN: How many times in the past year have you. .. Used an illegal drug or used a prescription medication for non-medical reasons? Never Filed at: 08/13/2023 1241                [facility]     Patient is a 50 y.o. male  who presents to the ED with epigastric, RUQ, LUQ abdominal pain. Vital signs geovani. Exam as listed above    Differential diagnosis includes but is not limited to angina, gastritis, peptic ulcer, GERD, pancreatitis. EKG unchanged from baseline, no current chest pain, diaphoresis, SOB. Plan troponin with delta, CXR, CMP, CBC, lipase. All blood work was negative; ACS ruled out. Given exam and cardiology note listed on chart review as seen above in HPI of this note, patient's pain felt to be GI in nature with most likely GERD of PUD. H. Pylori urine antigen not readily available in the ED. IgG blood test drawn for completeness - patient can follow up with GI with referral provided. Patient given prescription for Carafate and protonix    View ED course above for further discussion on patient workup. All labs reviewed and utilized in the medical decision making process  All radiology studies independently viewed by me and interpreted by the radiologist.  I reviewed all testing with the patient. Upon re-evaluation patient denying any pain, comfortable with plan.         Disposition  Final diagnoses:   Epigastric pain     Time reflects when diagnosis was documented in both MDM as applicable and the Disposition within this note     Time User Action Codes Description Comment    8/13/2023  5:05 PM Philly Ang Add [R10.13] Epigastric pain       ED Disposition     ED Disposition   Discharge    Condition   Stable    Date/Time   Sun Aug 13, 2023  5:04 PM    Comment   Wilma Quezada discharge to home/self care. Follow-up Information     Follow up With Specialties Details Why [id number]  Pediatric Gastro[facility]ology \[facility]\"" Pediatric Gastroenterology   [address] [facility] [place] [address] [phone]          Discharge Medication List as of 8/13/2023  5:08 PM      START taking these medications    Details   omeprazole (PriLOSEC) 20 mg delayed release capsule Take 1 capsule (20 mg total) by mouth daily, Starting Sun 8/13/2023, Until Tue 9/12/2023, Normal      sucralfate (CARAFATE) 1 g tablet Take 1 tablet (1 g total) by mouth 4 (four) times a day, Starting Sun 8/13/2023, Until Tue 9/12/2023, Normal         CONTINUE these medications which have NOT CHANGED    Details   aspirin (ECOTRIN LOW STRENGTH) 81 mg EC tablet Take 1 tablet (81 mg total) by mouth daily, Starting Wed 5/6/2020, No Print      Blood Glucose Monitoring Suppl (OneTouch Verio) w/Device KIT Use to test 3-4x daily, Normal      Blood Pressure KIT Use 2 (two) times a day, Starting Tue 10/12/2021, Normal      candesartan (ATACAND) 32 MG tablet Starting Fri 4/16/2021, Historical Med      Diclofenac Sodium (VOLTAREN) 1 % Apply 2 g topically 4 (four) times a day, Starting Tue 10/12/2021, Normal      !! glucose blood (OneTouch Verio) test strip Use to test 3-4x daily, Normal      !! glucose blood (OneTouch Verio) test strip May substitute brand based on insurance coverage.  Check glucose TID., Normal      insulin glargine (LANTUS) 100 units/mL subcutaneous injection Inject 30 Units under the skin daily at bedtime, Starting Thu 3/23/2023, Normal      Insulin Glargine Solostar (Lantus SoloStar) 100 UNIT/ML SOPN Inject 0.3 mL (30 Units total) under the skin daily at bedtime, Starting Thu 3/23/2023, Normal      insulin lispro (HumaLOG KwikPen) 100 units/mL injection pen Inject 10 Units under the skin 3 (three) times a day with meals, Starting Thu 3/23/2023, Normal      !! Insulin Pen Needle (BD Pen Needle Ann 2nd Gen) 32G X 4 MM MISC For use with insulin pen. Pharmacy may dispense brand covered by insurance., Normal      !! Insulin Pen Needle (BD Pen Needle Ann 2nd Gen) 32G X 4 MM MISC For use with insulin pen. Pharmacy may dispense brand covered by insurance., Normal      Lancets (onetouch ultrasoft) lancets Use to tests 3-4x daily, Normal      losartan (COZAAR) 100 MG tablet Take 100 mg by mouth daily, Starting Wed 4/19/2023, Historical Med      metFORMIN (GLUCOPHAGE) 1000 MG tablet Take 1 tablet (1,000 mg total) by mouth 2 (two) times a day with meals, Starting Thu 3/23/2023, Normal      metoprolol succinate (TOPROL-XL) 50 mg 24 hr tablet TAKE 1 TABLET BY MOUTH TWICE A DAY, Normal      mirtazapine (REMERON) 15 mg tablet Take 15 mg by mouth every evening, Starting Fri 4/28/2023, Historical Med      ranolazine (RANEXA) 500 mg 12 hr tablet take 1 tablet by mouth twice a day, Normal      rosuvastatin (CRESTOR) 5 mg tablet Take 5 mg by mouth daily, Historical Med      verapamil (VERELAN PM) 120 MG 24 hr capsule TAKE 1 CAPSULE (120 MG TOTAL) BY MOUTH DAILY AT BEDTIME, Starting Fri 11/25/2022, Normal       !! - Potential duplicate medications found. Please discuss with provider. PDMP Review       Value Time User    PDMP Reviewed  Yes 5/6/2020  2:27 PM Milka Dumas PA-C           ED Provider  Attending physically available and evaluated Randy Cárdenas. I managed the patient along with the ED Attending.     Electronically Signed by         Thong Hope MD  08/14/23 6259

## 2023-08-13 NOTE — DISCHARGE INSTRUCTIONS
Today you were evaluated for epigastric pain and chest pain. Your EKG, chest xray, and cardiac enzymes were all normal. Your Lipase, complete blood count, and complete metabolic panel were also normal. I think you may have a peptic ulcer or some inflammation of your stomach. Please take omeprazole and carafate as prescribed for empiric treatment of your symptoms. You have also been provided with a referral to GI. You should receive a phonecall to schedule your appointment. A phone number for you to call the office is also provided on this sheet. Please return to the ER if you have worsening chest pain with sweating, shortness of breath, jaw or neck pain, or arm numbness.

## 2023-08-13 NOTE — ED ATTENDING ATTESTATION
8/13/2023  IMaldonado MD, saw and evaluated the patient. I have discussed the patient with the resident/non-physician practitioner and agree with the resident's/non-physician practitioner's findings, Plan of Care, and MDM as documented in the resident's/non-physician practitioner's note, except where noted. All available labs and Radiology studies were reviewed. I was present for key portions of any procedure(s) performed by the resident/non-physician practitioner and I was immediately available to provide assistance. At this point I agree with the current assessment done in the Emergency Department. I have conducted an independent evaluation of this patient a history and physical is as follows:  Pt started with chest pain on Monday Pt was on cruise Pt has significant cardiac history Pt has intermittent stabbing pain lasting 1 - 2 minutes  Pain is epigastric to luq or ruq + sob but not worse than usual  No nv Pt feels eating makes pain worse.  Currently asymptomatic Pt has ahd similar pain in past Pt was hospitalized for 3 days at that time and they attributed it to insulin and meds were changed PE: alert heart reg lungs clear abd soft tender midepigastric area and chest wall tenderness ext nad MDM[de-identified] will check cardiac labs and abd labs   ED Course         Critical Care Time  Procedures

## 2023-08-14 RX ORDER — RANOLAZINE 500 MG/1
TABLET, EXTENDED RELEASE ORAL
Qty: 60 TABLET | Refills: 2 | Status: SHIPPED | OUTPATIENT
Start: 2023-08-14

## 2023-08-15 ENCOUNTER — PROCEDURE VISIT (OUTPATIENT)
Age: 48
End: 2023-08-15
Payer: COMMERCIAL

## 2023-08-15 VITALS
DIASTOLIC BLOOD PRESSURE: 89 MMHG | HEIGHT: 75 IN | WEIGHT: 253 LBS | BODY MASS INDEX: 31.46 KG/M2 | SYSTOLIC BLOOD PRESSURE: 138 MMHG

## 2023-08-15 DIAGNOSIS — M99.03 SEGMENTAL DYSFUNCTION OF LUMBAR REGION: ICD-10-CM

## 2023-08-15 DIAGNOSIS — M54.16 LUMBAR RADICULOPATHY: ICD-10-CM

## 2023-08-15 DIAGNOSIS — M99.05 SEGMENTAL DYSFUNCTION OF PELVIC REGION: Primary | ICD-10-CM

## 2023-08-15 DIAGNOSIS — M99.04 SEGMENTAL DYSFUNCTION OF SACRAL REGION: ICD-10-CM

## 2023-08-15 DIAGNOSIS — M54.41 ACUTE BILATERAL LOW BACK PAIN WITH RIGHT-SIDED SCIATICA: ICD-10-CM

## 2023-08-15 LAB — H PYLORI IGG SER IA-ACNC: 6.36 INDEX VALUE (ref 0–0.79)

## 2023-08-15 PROCEDURE — 98942 CHIROPRACTIC MANJ 5 REGIONS: CPT | Performed by: CHIROPRACTOR

## 2023-08-15 NOTE — PROGRESS NOTES
Date of first visit: 4/11/2023       HPI:  Margie Iverson returns for treatment today right-sided low back right posterior thigh pain as well as mid back and neck stiffness. He reports a flareup of right-sided low back pain into the right posterior thigh. Denies any weaknesses denies any bowel bladder changes reports no other associated symptomatology. Did have a fall the other night struck his left lower back on a sharp edge exacerbated symptomatology. VPAS 3  Neck    VPAS  6-7  Low back      The following portions of the patient's history were reviewed and updated as appropriate: allergies, current medications, past family history, past medical history, past social history, past surgical history, and problem list.    Review of Systems    Physical Exam:  Pelvic obliquity is noted leg with inequality is present. Misalignment of the right innominate on sacrum. Biomechanically joint dysfunction right SI L5-S1 motion unit he still has erector spinae hypertonicity noted on the right. Joint dysfunction T4-T5 C5-C6 C1-C2 paracervical hypertonicity noted with increased range of motion over initial presentation. Assessment:   Diagnosis ICD-10-CM Associated Orders   1. Segmental dysfunction of pelvic region  M99.05       2. Lumbar radiculopathy  M54.16       3. Segmental dysfunction of sacral region  M99.04       4. Segmental dysfunction of lumbar region  M99.03       5. Acute bilateral low back pain with right-sided sciatica  M54.41                 Treatment: 28768  Manipulation to the right innominate, sacrum, L5 via Mart drop maneuver well-tolerated. Manipulation T4 C5 C 1 well-tolerated. Discussion:  Daily icing flexion-based stretching for the lower back I will see him back for follow-up.

## 2023-08-25 ENCOUNTER — PROCEDURE VISIT (OUTPATIENT)
Age: 48
End: 2023-08-25
Payer: COMMERCIAL

## 2023-08-25 VITALS
HEART RATE: 83 BPM | WEIGHT: 253 LBS | SYSTOLIC BLOOD PRESSURE: 150 MMHG | HEIGHT: 75 IN | BODY MASS INDEX: 31.46 KG/M2 | DIASTOLIC BLOOD PRESSURE: 89 MMHG

## 2023-08-25 DIAGNOSIS — M54.16 LUMBAR RADICULOPATHY: ICD-10-CM

## 2023-08-25 DIAGNOSIS — M99.05 SEGMENTAL DYSFUNCTION OF PELVIC REGION: Primary | ICD-10-CM

## 2023-08-25 DIAGNOSIS — M54.41 ACUTE BILATERAL LOW BACK PAIN WITH RIGHT-SIDED SCIATICA: ICD-10-CM

## 2023-08-25 DIAGNOSIS — M99.03 SEGMENTAL DYSFUNCTION OF LUMBAR REGION: ICD-10-CM

## 2023-08-25 DIAGNOSIS — S76.311A STRAIN OF RIGHT HAMSTRING MUSCLE, INITIAL ENCOUNTER: ICD-10-CM

## 2023-08-25 DIAGNOSIS — M99.04 SEGMENTAL DYSFUNCTION OF SACRAL REGION: ICD-10-CM

## 2023-08-25 PROCEDURE — 98942 CHIROPRACTIC MANJ 5 REGIONS: CPT | Performed by: CHIROPRACTOR

## 2023-08-25 NOTE — PROGRESS NOTES
Date of first visit: 4/11/2023       HPI:  Elif Olvera returns for treatment today right-sided low back right posterior thigh pain as well as mid back and neck stiffness. He reports a flareup of right-sided low back pain into the right posterior thigh. Denies any weaknesses denies any bowel bladder changes reports no other associated symptomatology. VPAS 6  Neck    VPAS  8  Low back      The following portions of the patient's history were reviewed and updated as appropriate: allergies, current medications, past family history, past medical history, past social history, past surgical history, and problem list.    Review of Systems    Physical Exam:  Pelvic obliquity is noted leg with inequality is present. Misalignment of the right innominate on sacrum. Biomechanically joint dysfunction right SI L5-S1 motion unit he still has erector spinae hypertonicity noted on the right. Joint dysfunction T4-T5 C5-C6 C1-C2 paracervical hypertonicity noted with increased range of motion over initial presentation. Assessment:   Diagnosis ICD-10-CM Associated Orders   1. Segmental dysfunction of pelvic region  M99.05       2. Lumbar radiculopathy  M54.16       3. Segmental dysfunction of sacral region  M99.04       4. Segmental dysfunction of lumbar region  M99.03       5. Acute bilateral low back pain with right-sided sciatica  M54.41       6. Strain of right hamstring muscle, initial encounter  S76.311A                 Treatment: 83005  Manipulation to the right innominate, sacrum, L5 via Mart drop maneuver well-tolerated. Manipulation T4 C5 C 1 well-tolerated. Discussion:  Daily icing flexion-based stretching for the lower back I will see him back for follow-up.

## 2023-09-08 ENCOUNTER — PROCEDURE VISIT (OUTPATIENT)
Age: 48
End: 2023-09-08
Payer: COMMERCIAL

## 2023-09-08 VITALS
BODY MASS INDEX: 31.46 KG/M2 | HEIGHT: 75 IN | WEIGHT: 253 LBS | SYSTOLIC BLOOD PRESSURE: 144 MMHG | HEART RATE: 74 BPM | DIASTOLIC BLOOD PRESSURE: 94 MMHG

## 2023-09-08 DIAGNOSIS — M54.16 LUMBAR RADICULOPATHY: ICD-10-CM

## 2023-09-08 DIAGNOSIS — M54.41 ACUTE BILATERAL LOW BACK PAIN WITH RIGHT-SIDED SCIATICA: ICD-10-CM

## 2023-09-08 DIAGNOSIS — M99.04 SEGMENTAL DYSFUNCTION OF SACRAL REGION: ICD-10-CM

## 2023-09-08 DIAGNOSIS — M99.03 SEGMENTAL DYSFUNCTION OF LUMBAR REGION: ICD-10-CM

## 2023-09-08 DIAGNOSIS — M99.05 SEGMENTAL DYSFUNCTION OF PELVIC REGION: Primary | ICD-10-CM

## 2023-09-08 PROCEDURE — 98942 CHIROPRACTIC MANJ 5 REGIONS: CPT | Performed by: CHIROPRACTOR

## 2023-09-08 RX ORDER — GABAPENTIN 300 MG/1
CAPSULE ORAL
COMMUNITY
Start: 2023-08-22

## 2023-09-11 NOTE — PROGRESS NOTES
Date of first visit: 4/11/2023       HPI:  Ernesto Martin returns for treatment today right-sided low back right posterior thigh pain as well as mid back and neck stiffness. He reports a flareup of right-sided low back pain into the right posterior thigh. Denies any weaknesses denies any bowel bladder changes reports no other associated symptomatology. VPAS 6  Neck    VPAS  8  Low back      The following portions of the patient's history were reviewed and updated as appropriate: allergies, current medications, past family history, past medical history, past social history, past surgical history, and problem list.    Review of Systems    Physical Exam:  Pelvic obliquity is noted leg with inequality is present. Misalignment of the right innominate on sacrum. Biomechanically joint dysfunction right SI L5-S1 motion unit he still has erector spinae hypertonicity noted on the right. Joint dysfunction T4-T5 C5-C6 C1-C2 paracervical hypertonicity noted with increased range of motion over initial presentation. Assessment:   Diagnosis ICD-10-CM Associated Orders   1. Segmental dysfunction of pelvic region  M99.05       2. Lumbar radiculopathy  M54.16       3. Segmental dysfunction of sacral region  M99.04       4. Segmental dysfunction of lumbar region  M99.03       5. Acute bilateral low back pain with right-sided sciatica  M54.41                 Treatment: 98373  Manipulation to the right innominate, sacrum, L5 via Mart drop maneuver well-tolerated. Manipulation T4 C5 C 1 well-tolerated. Discussion:  Daily icing flexion-based stretching for the lower back I will see him back for follow-up.

## 2023-09-21 ENCOUNTER — PROCEDURE VISIT (OUTPATIENT)
Age: 48
End: 2023-09-21
Payer: COMMERCIAL

## 2023-09-21 VITALS
BODY MASS INDEX: 31.46 KG/M2 | HEIGHT: 75 IN | HEART RATE: 81 BPM | SYSTOLIC BLOOD PRESSURE: 150 MMHG | WEIGHT: 253 LBS | DIASTOLIC BLOOD PRESSURE: 92 MMHG

## 2023-09-21 DIAGNOSIS — M54.41 ACUTE BILATERAL LOW BACK PAIN WITH RIGHT-SIDED SCIATICA: ICD-10-CM

## 2023-09-21 DIAGNOSIS — M54.16 LUMBAR RADICULOPATHY: ICD-10-CM

## 2023-09-21 DIAGNOSIS — M99.05 SEGMENTAL DYSFUNCTION OF PELVIC REGION: Primary | ICD-10-CM

## 2023-09-21 DIAGNOSIS — M99.03 SEGMENTAL DYSFUNCTION OF LUMBAR REGION: ICD-10-CM

## 2023-09-21 DIAGNOSIS — M99.04 SEGMENTAL DYSFUNCTION OF SACRAL REGION: ICD-10-CM

## 2023-09-21 PROCEDURE — 98942 CHIROPRACTIC MANJ 5 REGIONS: CPT | Performed by: CHIROPRACTOR

## 2023-09-21 RX ORDER — OMEPRAZOLE 40 MG/1
40 CAPSULE, DELAYED RELEASE ORAL DAILY
COMMUNITY
Start: 2023-09-11

## 2023-09-21 RX ORDER — BUTALBITAL, ACETAMINOPHEN AND CAFFEINE 50; 325; 40 MG/1; MG/1; MG/1
TABLET ORAL
COMMUNITY
Start: 2023-09-11

## 2023-09-28 ENCOUNTER — PROCEDURE VISIT (OUTPATIENT)
Age: 48
End: 2023-09-28
Payer: COMMERCIAL

## 2023-09-28 VITALS — HEIGHT: 75 IN | WEIGHT: 253 LBS | BODY MASS INDEX: 31.46 KG/M2

## 2023-09-28 DIAGNOSIS — M54.41 ACUTE BILATERAL LOW BACK PAIN WITH RIGHT-SIDED SCIATICA: ICD-10-CM

## 2023-09-28 DIAGNOSIS — M99.04 SEGMENTAL DYSFUNCTION OF SACRAL REGION: ICD-10-CM

## 2023-09-28 DIAGNOSIS — M99.05 SEGMENTAL DYSFUNCTION OF PELVIC REGION: Primary | ICD-10-CM

## 2023-09-28 DIAGNOSIS — M99.03 SEGMENTAL DYSFUNCTION OF LUMBAR REGION: ICD-10-CM

## 2023-09-28 DIAGNOSIS — M54.16 LUMBAR RADICULOPATHY: ICD-10-CM

## 2023-09-28 PROCEDURE — 98941 CHIROPRACT MANJ 3-4 REGIONS: CPT | Performed by: CHIROPRACTOR

## 2023-09-28 NOTE — PROGRESS NOTES
Date of first visit: 4/11/2023       HPI:  Christian Gibbs returns for treatment today right-sided low back right posterior thigh pain as well as mid back and neck stiffness. He reports a flareup of right-sided low back pain into the right posterior thigh. Denies any weaknesses denies any bowel bladder changes reports no other associated symptomatology. VPAS 5  Neck    VPAS  5  Low back      The following portions of the patient's history were reviewed and updated as appropriate: allergies, current medications, past family history, past medical history, past social history, past surgical history, and problem list.    Review of Systems    Physical Exam:  Pelvic obliquity is noted leg length inequality is present. Misalignment of the right innominate on sacrum. Biomechanically joint dysfunction right SI L5-S1 motion unit he still has erector spinae hypertonicity noted on the right. Joint dysfunction T4-T5. Assessment:   Diagnosis ICD-10-CM Associated Orders   1. Segmental dysfunction of pelvic region  M99.05       2. Lumbar radiculopathy  M54.16       3. Segmental dysfunction of sacral region  M99.04       4. Segmental dysfunction of lumbar region  M99.03       5. Acute bilateral low back pain with right-sided sciatica  M54.41                 Treatment: 04015  Manipulation to the right innominate, sacrum, L5 via Mart drop maneuver well-tolerated. Manipulation T4  well-tolerated. Discussion:  Daily icing flexion-based stretching for the lower back I will see him back for follow-up.

## 2023-09-28 NOTE — PROGRESS NOTES
Date of first visit: 4/11/2023       HPI:  Boubacar Hunt returns for treatment today right-sided low back right posterior thigh pain as well as mid back and neck stiffness. He reports a flareup of right-sided low back pain into the right posterior thigh. Denies any weaknesses denies any bowel bladder changes reports no other associated symptomatology. VPAS 3  Neck    VPAS  9  Low back      The following portions of the patient's history were reviewed and updated as appropriate: allergies, current medications, past family history, past medical history, past social history, past surgical history, and problem list.    Review of Systems    Physical Exam:  Pelvic obliquity is noted leg with inequality is present. Misalignment of the right innominate on sacrum. Biomechanically joint dysfunction right SI L5-S1 motion unit he still has erector spinae hypertonicity noted on the right. Joint dysfunction T4-T5 C5-C6 C1-C2 paracervical hypertonicity noted with increased range of motion over initial presentation. Assessment:   Diagnosis ICD-10-CM Associated Orders   1. Segmental dysfunction of pelvic region  M99.05       2. Lumbar radiculopathy  M54.16       3. Segmental dysfunction of sacral region  M99.04       4. Segmental dysfunction of lumbar region  M99.03       5. Acute bilateral low back pain with right-sided sciatica  M54.41                 Treatment: 44338  Manipulation to the right innominate, sacrum, L5 via Mart drop maneuver well-tolerated. Manipulation T4 C5 C 1 well-tolerated. Discussion:  Daily icing flexion-based stretching for the lower back I will see him back for follow-up.

## 2023-10-06 RX ORDER — KETOROLAC TROMETHAMINE 5 MG/ML
SOLUTION OPHTHALMIC
COMMUNITY
Start: 2023-09-27

## 2023-10-06 RX ORDER — MIRTAZAPINE 30 MG/1
TABLET, FILM COATED ORAL
COMMUNITY
Start: 2023-09-27

## 2023-10-06 RX ORDER — KETOROLAC TROMETHAMINE 5 MG/ML
1 SOLUTION OPHTHALMIC 4 TIMES DAILY
COMMUNITY
Start: 2023-09-27 | End: 2023-10-07

## 2023-10-09 ENCOUNTER — OFFICE VISIT (OUTPATIENT)
Dept: GASTROENTEROLOGY | Facility: CLINIC | Age: 48
End: 2023-10-09
Payer: COMMERCIAL

## 2023-10-09 VITALS
HEIGHT: 71 IN | TEMPERATURE: 98.9 F | SYSTOLIC BLOOD PRESSURE: 145 MMHG | WEIGHT: 253 LBS | BODY MASS INDEX: 35.42 KG/M2 | DIASTOLIC BLOOD PRESSURE: 82 MMHG

## 2023-10-09 DIAGNOSIS — R10.13 EPIGASTRIC PAIN: ICD-10-CM

## 2023-10-09 PROCEDURE — 99244 OFF/OP CNSLTJ NEW/EST MOD 40: CPT | Performed by: INTERNAL MEDICINE

## 2023-10-09 NOTE — PROGRESS NOTES
[facility] - Outpatient Consultation  Harini Cannon 50 y.o. male MRN: [id number]  Encounter: [id number]          ASSESSMENT AND PLAN:        1. Epigastric pain  Ambulatory Referral to Gastroenterology        Doing well with discomfort. No reflux or pain at this time. Hold off on any intervention at this time. Continue taking omperazole 40 mg once daily. Will have him take it half hour before his first meal of the day as he takes it first thing in the morning currently.    ______________________________________________________________    HPI:  Harini Cannon is a 50y.o. year old male with history of HTN, DM, CHF who presents to the office for evaluation of epigastric pain. He has had ED visits for abdominal pain and chest pain in the last several months. Cardiac work up has been relatively negative. Pain localized ot the epigastric region. Typically he notices sharp pains intermittently but this has resolved over the past couple weeks since he started taking omeprazole. He takes 40 mg once daily first thing in the morning when he wakes up . He has his first meal of the day around 11/12. He doesn't have much appetite for dinner and has been trying to eat less overall anyways. He feels well at this time. He had lost weight but this has been intentional. He went from 330 to ~250 lbs. REVIEW OF SYSTEMS:    CONSTITUTIONAL: Denies any fever, chills, rigors, and weight loss. HEENT: No earache or tinnitus. Denies hearing loss or visual disturbances. CARDIOVASCULAR: No chest pain or palpitations. RESPIRATORY: Denies any cough, hemoptysis, shortness of breath or dyspnea on exertion. GASTROINTESTINAL: As noted in the History of Present Illness. GENITOURINARY: No problems with urination. Denies any hematuria or dysuria. NEUROLOGIC: No dizziness or vertigo, denies headaches. MUSCULOSKELETAL: Denies any muscle or joint pain. SKIN: Denies skin rashes or itching.    ENDOCRINE: Denies excessive thirst. Denies intolerance to heat or cold. PSYCHOSOCIAL: Denies depression or anxiety. Denies any recent memory loss. Historical Information   Past Medical History:   Diagnosis Date   • Congestive heart failure ([address])    • Diabetes mellitus ([address])    • Hyperlipidemia    • Hypertension    • Obesity      History reviewed. No pertinent surgical history.   Social History   Social History     Substance and Sexual Activity   Alcohol Use Not Currently     Social History     Substance and Sexual Activity   Drug Use Never     Social History     Tobacco Use   Smoking Status Never   Smokeless Tobacco Never     Family History   Problem Relation Age of Onset   • Diabetes Mother    • Stroke Mother    • Stroke Father        Meds/Allergies       Current Outpatient Medications:   •  aspirin (ECOTRIN LOW STRENGTH) 81 mg EC tablet  •  Blood Glucose Monitoring Suppl (OneTouch Verio) w/Device KIT  •  Blood Pressure KIT  •  butalbital-acetaminophen-caffeine (FIORICET,ESGIC) -40 mg per tablet  •  candesartan (ATACAND) 32 MG tablet  •  Diclofenac Sodium (VOLTAREN) 1 %  •  gabapentin (NEURONTIN) 300 mg capsule  •  glucose blood (OneTouch Verio) test strip  •  insulin glargine (LANTUS) 100 units/mL subcutaneous injection  •  Insulin Glargine Solostar (Lantus SoloStar) 100 UNIT/ML SOPN  •  insulin lispro (HumaLOG KwikPen) 100 units/mL injection pen  •  Insulin Pen Needle (BD Pen Needle Ann 2nd Gen) 32G X 4 MM MISC  •  ketorolac (ACULAR) 0.5 % ophthalmic solution  •  ketorolac (ACULAR) 0.5 % ophthalmic solution  •  Lancets (onetouch ultrasoft) lancets  •  losartan (COZAAR) 100 MG tablet  •  metFORMIN (GLUCOPHAGE) 1000 MG tablet  •  metoprolol succinate (TOPROL-XL) 50 mg 24 hr tablet  •  mirtazapine (REMERON) 30 mg tablet  •  omeprazole (PriLOSEC) 40 MG capsule  •  ranolazine (RANEXA) 500 mg 12 hr tablet  •  rosuvastatin (CRESTOR) 5 mg tablet  •  verapamil (VERELAN PM) 120 MG 24 hr capsule  •  glucose blood (OneTouch Verio) test strip  •  Insulin Pen Needle (BD Pen Needle Ann 2nd Gen) 32G X 4 MM MISC  •  metFORMIN (GLUCOPHAGE) 500 mg tablet  •  mirtazapine (REMERON) 15 mg tablet  •  omeprazole (PriLOSEC) 20 mg delayed release capsule  •  sucralfate (CARAFATE) 1 g tablet    No Known Allergies        Objective     Blood pressure 145/82, temperature 98.9 °F (37.2 °C), temperature source Tympanic, height 5' 11" (1.803 m), weight 115 kg (253 lb). Body mass index is 35.29 kg/m². PHYSICAL EXAM:      General Appearance:   Alert, cooperative, no distress   HEENT:   Normocephalic, atraumatic, anicteric. Lungs:   Equal chest rise and unlabored breathing, normal cough   Heart:   No visualized JVD   Abdomen:   Soft, non-tender, non-distended; no masses, no organomegaly    Genitalia:   Deferred    Rectal:   Deferred    Extremities:  No cyanosis, clubbing or edema    Pulses:  Musculoskeletal:  2+ and symmetric  Normal range of motion visualized    Skin:  Neuro:  No jaundice, rashes, or lesions   Alert and appropriate       Lab Results:   No visits with results within 1 Day(s) from this visit.    Latest known visit with results is:   Admission on 08/13/2023, Discharged on 08/13/2023   Component Date Value   • Ventricular Rate 08/13/2023 82    • Atrial Rate 08/13/2023 82    • IA Interval 08/13/2023 184    • QRSD Interval 08/13/2023 108    • QT Interval 08/13/2023 400    • QTC Interval 08/13/2023 467    • P Axis 08/13/2023 44    • QRS Axis 08/13/2023 -48    • T Wave Axis 08/13/2023 140    • WBC 08/13/2023 5.63    • RBC 08/13/2023 4.83    • Hemoglobin 08/13/2023 14.3    • Hematocrit 08/13/2023 42.1    • MCV 08/13/2023 87    • MCH 08/13/2023 29.6    • MCHC 08/13/2023 34.0    • RDW 08/13/2023 12.2    • MPV 08/13/2023 9.9    • Platelets 63/64/1015 227    • nRBC 08/13/2023 0    • Neutrophils Relative 08/13/2023 51    • Immat GRANS % 08/13/2023 0    • Lymphocytes Relative 08/13/2023 32    • Monocytes Relative 08/13/2023 12    • Eosinophils Relative 08/13/2023 4    • Basophils Relative 08/13/2023 1    • Neutrophils Absolute 08/13/2023 2.83    • Immature Grans Absolute 08/13/2023 0.02    • Lymphocytes Absolute 08/13/2023 1.82    • Monocytes Absolute 08/13/2023 0.69    • Eosinophils Absolute 08/13/2023 0.22    • Basophils Absolute 08/13/2023 0.05    • Sodium 08/13/2023 139    • Potassium 08/13/2023 3.7    • Chloride 08/13/2023 107    • CO2 08/13/2023 28    • ANION GAP 08/13/2023 4    • BUN 08/13/2023 14    • Creatinine 08/13/2023 0.98    • Glucose 08/13/2023 132    • Calcium 08/13/2023 9.4    • Corrected Calcium 08/13/2023 10.0    • AST 08/13/2023 31    • ALT 08/13/2023 42    • Alkaline Phosphatase 08/13/2023 96    • Total Protein 08/13/2023 7.9    • Albumin 08/13/2023 3.3 (L)    • Total Bilirubin 08/13/2023 0.73    • eGFR 08/13/2023 90    • Lipase 08/13/2023 331    • hs TnI 0hr 08/13/2023 9    • hs TnI 2hr 08/13/2023 8    • Delta 2hr hsTnI 08/13/2023 -1    • H Pylori IgG 08/13/2023 6.36 (H)          Radiology Results:   DIABETIC RETINOPATHY SCREENING    Result Date: 9/12/2023  Narrative: This result has an attachment that is not available.

## 2023-10-12 ENCOUNTER — PROCEDURE VISIT (OUTPATIENT)
Age: 48
End: 2023-10-12
Payer: COMMERCIAL

## 2023-10-12 VITALS
DIASTOLIC BLOOD PRESSURE: 91 MMHG | HEART RATE: 103 BPM | SYSTOLIC BLOOD PRESSURE: 158 MMHG | HEIGHT: 75 IN | BODY MASS INDEX: 31.46 KG/M2 | WEIGHT: 253 LBS

## 2023-10-12 DIAGNOSIS — M54.41 ACUTE BILATERAL LOW BACK PAIN WITH RIGHT-SIDED SCIATICA: ICD-10-CM

## 2023-10-12 DIAGNOSIS — M99.04 SEGMENTAL DYSFUNCTION OF SACRAL REGION: ICD-10-CM

## 2023-10-12 DIAGNOSIS — M54.16 LUMBAR RADICULOPATHY: ICD-10-CM

## 2023-10-12 DIAGNOSIS — M99.03 SEGMENTAL DYSFUNCTION OF LUMBAR REGION: ICD-10-CM

## 2023-10-12 DIAGNOSIS — M99.05 SEGMENTAL DYSFUNCTION OF PELVIC REGION: Primary | ICD-10-CM

## 2023-10-12 PROCEDURE — 98941 CHIROPRACT MANJ 3-4 REGIONS: CPT | Performed by: CHIROPRACTOR

## 2023-10-12 NOTE — PROGRESS NOTES
Date of first visit: 4/11/2023       HPI:  Kaila Hernández returns for treatment today right-sided low back right posterior thigh pain as well as mid back and neck stiffness. Pain has not been too bad stretching on a daily basis feels better. Denies any weaknesses denies any bowel bladder changes reports no other associated symptomatology. VPAS 0  Neck    VPAS  0  Low back      The following portions of the patient's history were reviewed and updated as appropriate: allergies, current medications, past family history, past medical history, past social history, past surgical history, and problem list.    Review of Systems    Physical Exam:  Pelvic obliquity is noted leg length inequality is present. Misalignment of the right innominate on sacrum. Biomechanically joint dysfunction right SI L5-S1 motion unit he still has erector spinae hypertonicity noted on the right. Joint dysfunction T4-T5. Assessment:   Diagnosis ICD-10-CM Associated Orders   1. Segmental dysfunction of pelvic region  M99.05       2. Lumbar radiculopathy  M54.16       3. Segmental dysfunction of sacral region  M99.04       4. Segmental dysfunction of lumbar region  M99.03       5. Acute bilateral low back pain with right-sided sciatica  M54.41                 Treatment: 64261  Manipulation to the right innominate, sacrum, L5 via Mart drop maneuver well-tolerated. Manipulation T4  well-tolerated. Discussion:  Daily icing flexion-based stretching for the lower back I will see him back for follow-up.

## 2023-10-18 NOTE — PROGRESS NOTES
[address] [facility] Follow-up Visit- Cardiology   Thuy Jarvis 50 y.o. male MRN: [id number]  Unit/Bed#:  Encounter: [id number]    Patient Active Problem List    Diagnosis Date Noted    Abdominal pain 03/22/2023    Leg pain 03/22/2023    Current use of insulin ([address]) 12/16/2021    Obstructive sleep apnea (adult) (pediatric)     Obesity with serious comorbidity 08/04/2020    Hypertrophic cardiomyopathy ([address]) 06/10/2020    Type 2 diabetes mellitus ([address]) 05/05/2020    Essential hypertension 05/05/2020    Other hyperlipidemia 05/05/2020    Coronary artery disease of native artery of native heart with stable angina pectoris ([address]) 05/05/2020    History of coronary angioplasty with insertion of stent 05/05/2020       Plan: Since his last office visit on 5-9-2023 he has been seen in the ED in May and August for chest pain and continued chronic dyspnea on exertion. He describes the chest pain as a stabbing sensation on the left side of the chest with no radiation but has associated dyspnea. He did not trial any alleviating factors at home and he does not specifically discuss any exacerbating factors, he states that this discomfort occurs sporadically. EKG's at the time showed now acute ischemia and troponin levels were normal.  He was also seen in the ED on 8/13/2023 with complaints of epigastric pain that he noted was sometimes worse with eating and had been improved with Pepto-Bismol at home. He was ultimately referred to GI as an outpatient and has since started on omeprazole with some improvement in discomfort. Today, the patient states that he does have several physical complaints. Ultimately, he has neuropathy in both his feet and his hands and the neuropathy in his feet is sometimes debilitating. Certain days he has trouble walking and he typically does use a cane for gait stability. He states he has fallen a few times at home which he feels is a combination of lightheadedness from exertion and also gait instability. The patient states he does get lightheaded both with position change and after walking for periods of time. He does state that he is well-hydrated drinking 4-5 bottles of water a day. However, the patient states that secondary to his continued GI upset/discomfort he does not eat much throughout the day. Sometimes he will have a small breakfast and a very small dinner and that is all throughout the day. I have encouraged him to attempt to snack a little more frequently as he is on glucose lowering medication for his diabetes and I am concerned that he might be getting hypoglycemic at times which could potentially be causing his lightheadedness. I have encouraged him to check his glucose levels when he gets lightheaded as he has not done this before. The patient also describes 2 different types of chest discomfort. One type of chest discomfort does seem to be musculoskeletal in nature as the patient states that he has discomfort across his chest even while in the shower when the water hits it and also with palpation. I note in his chart that he has had costochondritis type symptoms with reproducible chest wall tenderness for at least the past few years. However, he also describes a chest discomfort that is left-sided and stabbing in nature with no particular exacerbating factors occurring both at rest and with exertion intermittently. He states it does not happen every day but does happen a few times a week. It is nonradiating but is associated with dyspnea and will typically resolve on its own. He has not trialed any alleviating treatments at home. He states that this type of discomfort he has had in the past and was improved slightly with the start of Ranexa. Therefore, we will increase Ranexa to 1000 mg twice daily and we will order a bike exercise stress echo to evaluate for any progression of CAD and also LVOTO.   With regards to his dyspnea with exertion he states this is chronic and has not recently increased in severity or frequency. He denies any palpitations. He denies any lower extremity edema or orthopnea and denies any marquise syncope. His prior falls I do believe are possibly combination of lightheadedness but also gait instability secondary to neuropathy. The patient also states he does get headaches throughout the week and does note he has poor sleep quality. He states he feels he has insomnia often waking up at 1:00 in the morning and only sleeping intermittently throughout the remainder of the night. He did have a sleep study in 2020 which showed mild sleep apnea but he was unable to tolerate CPAP. He is unsure if he would like to pursue a another sleep study. I have asked him to think about this and inform his family doctor of his decision during his office visit in December. I have asked the patient to monitor his blood pressure at times of having a headache and inform us if his blood pressure is elevated. Today, his blood pressure and heart rate are acceptable on his current medication regimen. Lipid panel from March of this year shows his LDL is at 78 and he will continue rosuvastatin 5 mg daily. I encouraged him to remain as active as he can given his gait instability and neuropathy. I have asked him to continue following a low-fat/cholesterol/sodium diet. Physician Requesting Consult: Clarice Dallas DO  Reason for Consult / Principal Problem: Hypertrophic cardiomyopathy    History of Present Illness     HPI: Kae Chan is a 50y.o. year old male seen at [address] [place] [address] at age 55. He had a past medical history of hyperlipidemia, type II diabetes and hypertension and was recently diagnosed as having hypertrophic cardiomyopathy phenotype on a transthoracic echocardiogram. The study was performed during an admission in May 2020 for chest pain and showed marked asymmetric septal hypertrophy without left ventricular outflow tract obstruction.  Apical views were suboptimal and provocative maneuvers were not performed to elicit any intracavitary or outflow tract gradients. He then underwent an exercise myocardial perfusion study that showed reduced exercise capacity, exaggerated heart rate response, normal blood pressure response and no ischemic changes or significant rhythm abnormality. A cardiac MRI performed on 2020 showed an anteroseptal wall thickness of 29 mm and inferolateral wall thickness of 18 mm. The left atrium was mildly dilated and there was no evidence of late gadolinium enhancing lesions. He is currently on carvedilol 20 mg daily. On 2020: Mr Ivan Hazel has had no further chest pain since hospital discharge. He also denies shortness of breath, palpitation, dizziness, syncope or lower extremity swelling. He does snore and wife has noted that he does periodically stop breathing during sleep. His physical activity is limited but he does notice daytime sleepiness and takes hour long naps during the day. He works for [employer] and often has to lift heavy objects (up to 150 lbs). His family history is relatively benign. His father  at age 39 when he was only 15year old but the circumstances of his death is unclear. There is no history of sudden death at a young age in his first degree relatives. His risk stratification is incomplete. I have scheduled him for an exercise stress echocardiogram (to evaluate for latent outflow tract obstruction) and a 48 hour ambulatory monitor for assessment of arrhythmias. He has agreed to have genetic testing done. This would be important to particularly exclude HCM phenocopies such as hTTR amyloidosis. I have advised Mr Ivan Hazel to adopt a heart healthy diet low in sodium, saturated fat and processed food. I have also discussed potential restrictions on lifting heavy weights. After completion of risk stratification, he would be advised to start daily exercises with the hope of improving cardiovascular conditioning.  He states that he has been evaluated for sleep apnea several times (last time 4 years ago) that has been negative. He may need to be reassessed. He will also need evaluation by weight management. 9-: Mr Arvell Phalen has been doing well on his current medications. He denies chest pain, shortness of breath, palpitation, dizziness, syncope or lower extremity swelling. He does snore and wife has noted that he does periodically stop breathing during sleep. He has not returned to work and is looking for partial disability due to the requirement for lifting heavy objects at work ([employer]). He is now referred to nutritionist and sleep medicine for further evaluation as weight management and correction of nocturnal hypoxemia would be essential for favorable outcomes. He is motivated to achieve both goals. 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 His two daughters and 2 siblings can now undergo targeted gene testing. His cardiac MRI results were reviewed with him. 10-: Mr Arvell Phalen has done very well since last office visit. He denies shortness of breath, palpitation, dizziness, syncope, chest pain or lower extremity swelling. He has increased his daily physical activities and does short distance walks 4 to 5 times a week. He is tolerating his medications and will be returning to work on November 4 with restrictions to his activities. He is scheduled to have sleep study and see the nutritionist in December. Both daughters have been gene tested but results have not been received yet. He does not know if his brother ans sister have done their targeted gene testings. His diet has improved and his blood pressure and heart rate are well controlled. 3-: Mr Arvell Phalen complains of exertional chest discomfort and shortness of breath for the few weeks. He states that the symptoms have prevented him from physical activity intended to help weight loss.  As the result, he has gained some weight (now 310 lbs). He denies dizziness, syncope, lower extremity edema or palpitation. His blood pressure is borderline controlled on metoprolol 50 mg twice daily and he states that he has been watching his diet for excess salt. His diabetes is not optimally controlled with home blood sugars around 175 mg/dL on 3 oral agents, insulin and semaglutide. He also has mild obstructive sleep apnea. He will be seen by sleep specialist on April 19. His cholesterol was last evaluated on 5/5/2020 and showed an HDL of 26, LDL of 43 and a triglyceride level of 265. He is on rosuvastatin 5 mg daily. He is now on long term disability and does not have to lift heavy objects any longer. I have recommended that he undergoes a stress echo study to evaluate his chest pain and shortness of breath as well as his blood pressure response to exercise. He is also referred to nutritionist to help and direct him to lose substantial amount of weight (including referral to weight management team) in order to improve management of his cardiac disease as well as hypertension, diabetes and sleep apnea. Unfortunately, his 2 daughters, and his siblings did not choose to do the targeted gene testing. 6-: Mr. Americo Milton had Stress Echocardiography done on 4/8/2021 which showed significantly reduced exercise capacity, shortness of breath at peak exercise, normal heart rate and blood pressure response to exercise, frequent isolated and paired ventricular premature complexes at peak exercise. It also showed severe left ventricular hypertrophy with asymmetrical hypertrophy of the septum (maximum wall thickness 20 mm), systolic anterior motion of the anterior mitral valve leaflet and subvalvular apparatus was present with pressure gradient of 22 and 42 mmHg at rest and during Valsalva maneuver, with gradient increasing to 60 mmHg immediately after the exercise, findings consistent with a latent obstructive hypertrophic cardiomyopathy.  Trivial tricuspid regurgitation with normal estimated pulmonary systolic pressure was also noted. Verapamil 120 mg daily was added to his regimen of metoprolol succinate 50 mg b.i.d. Today Mr Lila Cordoba admits to occasional palpitation with exertion, but no dizziness, shortness of breath, or chest pain. Mr. Lila Cordoba continues to follow with Sleep clinic, he had an AHI of 5.2 and was recommended to continue with APAP for at lease 4 hours per night. He denies daytime sleepiness although does take occasional naps during the day. He believes at this time he is getting quality sleep of up to 7 hours per night. His daughters Nilam and Jeniffer Tran did not choose to undergo genetic testing and have not had screening echocardiograms. He checks his blood glucose at home and has been obtaining values between 120 (fasting), around 220 (post-prandial). Dapagliflozin-metformin was added to his diabetics regime . He has not had routine labs since 2019 and will order them today. He has received both doses of Pfizer COVID-19 vaccine, along with his wife and daughters. He checks his blood pressure occasionally and the most recent blood pressure was 113/78 mmHg. He has lost 3 pounds since last visit. He has not seen a nutritionist yet. We will obtain the contact information for the clinic. We again discussed the importance of weight loss for overall cardiovascular health. Although his dietary choices have improved he still needs counselling on total caloric intake and choice of food items. Reinforced the importance of the heart-healthy diet. Mr Lila Cordoba will be seen in [address] [place] [address] in 11 month. 10-: Mr Lila Cordoba continues having occasional exertional chest pain and exertional dyspnea (NYHA Class 2-3) as well as occasional headache. He does not check blood pressure at home. We ordered blood pressure cuff and instructed him to check blood pressure twice daily once he get the cuff, keep blood pressure and heart rate logs, and call us with results.  At this time will continue with the current medication regime and once he gets blood pressure cuff, will plan to stop candesartan and increase verapamil dosage to help with blood pressure as well as HCM symptom control. Recent labs showed Hgb A1C 11% and we reffered him to Endocrinology clinic. He has not been seen their yet and at this time PCP manages his diabetic medications. Will contact Endocrinology and Nutrition clinics to get him appointments to help with DM management as well as weight management. Mr. Sussy Brown also complained of the left shoulder bursitis pain, for which topical diclofenac is ordered. We again stressed the importance of weight loss, blood pressure control, and heart-healthy diet. He will be seen in [address] [place] [address] in 6 months. 12-: Mr Sussy Brown was admitted to hospital on 10- for chest pain. He had no troponin elevation or ischemic chest discomfort and chest pain resolved spontaneously. He was discharged on his home medications after a day of observation. Since then he has not have any further chest pain. He also denies palpitation, dizziness, syncope or lower extremity edema although has had occasional dyspnea on exertion. His home blood pressure measurements have been mostly in the acceptable range and his blood pressure in the office is 114/78 mmHg. Laboratory work during hospitalization shows normal K, BUN and Cr. His blood sugar has been elevated and he is going to be switched to a new insulin preparation and will be seeing endocrinology on 12/16/2021. He has lost a few lbs and is determined to voluntarily loose more weight by reducing snacking and cutting back on carbohydrates. His left shoulder pain has improved after applying topical diclofenac. I did not change his antihypertensive regimen at this visit. He follows with sleep medicine for his mild obstructive sleep apnea. His family did not choose to be genetically screened for HCM.      10-4-2022: Mr Sussy Brown was last seen in the [address] [place] [address] on 12-. Today, he is seen along with Dr Teresa Umanzor, cardiology fellow. Mr Dayana Lira remains symptomatic with continued chest discomfort that occurs intermittently on a daily basis for brief period of time. It is not associated with other symptoms but he does have exertional dyspnea as well with symptoms suggestive of class II-III severity. He does sleep using 2 pillows but has no paroxysmal nocturnal dyspnea. He continues to have bendopnea that is at times associated with dizziness as well. His does regularly check his blood pressure but not his weight at home. Today, his blood pressure is well controlled and his weight is 250 lbs. He has lost substantial weight recently down from 305 lbs. He has lost his insurance but so far has been able to afford his medication and has been compliant. Examination does not reveal objective evidence of volume overload. He is started on ranolazine to manage chest pain possibly related to microvascular disease. He also states that he would drink as much as 1.5 gallon of water every day and was asked to reduce that by half to see if that helps the shortness of breath. An echocardiogram was ordered (last study 4-8-2021) pending insurance reactivation. 5-9-2023: Mr Dayana Lira was last seen in the [address] [place] [address] on 10-4-2022. He was admitted to hospital on 3- for acute gastroenteritis that was thought to be viral and was associated with elevated lipase level consistent with pancreatitis. There was no gallstone on ultrasound. His semiglutide was discontinued and he is now on insulin with latest HgbA1C of 12.7%. He needs aggressive management of his diabetes at this point. Home glucose measurements have ranged from 117-300 mg/dL. He has had some chest discomfort thought to be due to coronary microvascular disease that has responded to ranolazine. Mr Dayana Lira denies palpitation, dizziness, syncope, lower extremity edema or exertional chest pain.  He experiences chest discomfort that is localized to the left costochondral region and is reproduced by touch likely secondary to costochondritis. He is quite limited in his ambulation due to severe peripheral neuropathy and uses a cane to keep his balance. He does get short of breath with minimal exertion. His blood pressure is well controlled and exam does not indicate any evidence of cardiac decompensation. He has been found to have a positive occult blood test in his stool and will be undergoing colonoscopy soon. His latest echocardiogram performed on 2023 has shown:       Left Ventricle: Wall thickness is severely increased. There is severe asymmetric hypertrophy of the septal wall. The left ventricular ejection fraction is 70%. Systolic function is hyperdynamic. Wall motion is normal. Diastolic function is mildly abnormal, consistent with grade I (abnormal) relaxation. There is no LV dynamic obstruction with a peak gradient of 21.0 mmHg. There is outflow tract dynamic obstruction with valsalva with a peak gradient of 41.0 mmHg. Left Atrium: The atrium is mildly dilated. Mitral Valve: There is systolic anterior motion without late peaking gradient. He has no murmur on cardiac auscultation today. He is recently started on Mirtazapine and feels that his depression has improved and his sleep is more regulated. He will continue candesartan, metoprolol, verapamil, ranolazine and rosuvastatin at this time. He has had no recent lipid profile. Family history: Father  at age 39 for uncertain cause and the circumstances of his death is not known to the patient who was 15years old at the time. Mother  in 2019 and had diabetes, hypertension and heart failure. He has 3 siblings. The older sister  of complications of drug use at age 32. Another sister Sheila Barrios)  is 50year old and has no known medical condition. A younger brother Kathy Gann) is 39years old and has no heart disease.  Mr Lila Cordoba has 2 daughters (Isaias Elizabeth 9- and Priscial Damon 4-2-2003) who are healthy. There is no history of sudden cardiac death or hypertrophic cardiomyopathy in the family. Genetic testing Eve Rupesh 8-): Positive for a pathogenic mutation in the MYH7 gene. Risk stratification:  Nonsustained ventricular tachycardia-no  Severe left ventricular hypertrophy-no  Family history of sudden death-no  Unexplained syncope-no  LVOT obstruction-no  Atrial fibrillation and left atrial dilation-yes  Age- 50years old  NYHA-class II  Myocardial fibrosis-no  LV systolic dysfunction-no  Apical aneurysm-no    Review of systems: Patient notes chest discomfort both with exertion and at rest that is intermittent throughout the week; he notes chronic dyspnea on exertion; denies palpitations; denies lower extremity edema and orthopnea; notes lightheadedness both with position change and exertion at times    Genetic testing: MYH7     Devices: none      Historical Information   Past Medical History:   Diagnosis Date    Congestive heart failure ([address])     Diabetes mellitus ([address])     Hyperlipidemia     Hypertension     Obesity      No past surgical history on file. Family History   Problem Relation Age of Onset    Diabetes Mother     Stroke Mother     Stroke Father      Current Outpatient Medications on File Prior to Visit   Medication Sig Dispense Refill    aspirin (ECOTRIN LOW STRENGTH) 81 mg EC tablet Take 1 tablet (81 mg total) by mouth daily 30 tablet 0    Blood Glucose Monitoring Suppl (OneTouch Verio) w/Device KIT Use to test 3-4x daily 1 kit 1    Blood Pressure KIT Use 2 (two) times a day 1 kit 2    butalbital-acetaminophen-caffeine (FIORICET,ESGIC) -40 mg per tablet TAKE 2 TABLETS BY MOUTH EVERY 8 HOURS AS NEEDED FOR HEADACHES      candesartan (ATACAND) 32 MG tablet       gabapentin (NEURONTIN) 300 mg capsule take 1 capsule by mouth twice a day START BY TAKING AT BEDTIME FI. ..  (REFER TO PRESCRIPTION NOTES).       glucose blood (OneTouch Verio) test strip Use to test 3-4x daily 400 strip 2    insulin glargine (LANTUS) 100 units/mL subcutaneous injection Inject 30 Units under the skin daily at bedtime 10 mL 0    Insulin Glargine Solostar (Lantus SoloStar) 100 UNIT/ML SOPN Inject 0.3 mL (30 Units total) under the skin daily at bedtime 5 mL 0    insulin lispro (HumaLOG KwikPen) 100 units/mL injection pen Inject 10 Units under the skin 3 (three) times a day with meals 5 mL 0    Insulin Pen Needle (BD Pen Needle Ann 2nd Gen) 32G X 4 MM MISC For use with insulin pen. Pharmacy may dispense brand covered by insurance. 100 each 0    Insulin Pen Needle (BD Pen Needle Ann 2nd Gen) 32G X 4 MM MISC For use with insulin pen. Pharmacy may dispense brand covered by insurance. 100 each 0    Lancets (onetouch ultrasoft) lancets Use to tests 3-4x daily 400 each 2    losartan (COZAAR) 100 MG tablet Take 100 mg by mouth daily      metFORMIN (GLUCOPHAGE) 1000 MG tablet Take 1 tablet (1,000 mg total) by mouth 2 (two) times a day with meals 60 tablet 0    metoprolol succinate (TOPROL-XL) 50 mg 24 hr tablet TAKE 1 TABLET BY MOUTH TWICE A  tablet 0    omeprazole (PriLOSEC) 40 MG capsule Take 40 mg by mouth daily      rosuvastatin (CRESTOR) 5 mg tablet Take 5 mg by mouth daily      verapamil (VERELAN PM) 120 MG 24 hr capsule TAKE 1 CAPSULE (120 MG TOTAL) BY MOUTH DAILY AT BEDTIME 90 capsule 0    [DISCONTINUED] ranolazine (RANEXA) 500 mg 12 hr tablet take 1 tablet by mouth twice a day 60 tablet 2    Diclofenac Sodium (VOLTAREN) 1 % Apply 2 g topically 4 (four) times a day 100 g 2    glucose blood (OneTouch Verio) test strip May substitute brand based on insurance coverage. Check glucose TID.  (Patient not taking: Reported on 10/9/2023) 100 each 0    ketorolac (ACULAR) 0.5 % ophthalmic solution instill 1 drop into both eyes four times a day for 10 days      ketorolac (ACULAR) 0.5 % ophthalmic solution Apply 1 drop to eye 4 (four) times a day      metFORMIN (GLUCOPHAGE) 500 mg tablet take 2 tablets by mouth twice a day with meals (Patient not taking: Reported on 10/9/2023)      mirtazapine (REMERON) 15 mg tablet Take 15 mg by mouth every evening      mirtazapine (REMERON) 30 mg tablet take 1/2 tablet by mouth nightly      omeprazole (PriLOSEC) 20 mg delayed release capsule Take 1 capsule (20 mg total) by mouth daily 30 capsule 0    sucralfate (CARAFATE) 1 g tablet Take 1 tablet (1 g total) by mouth 4 (four) times a day 120 tablet 0     No current facility-administered medications on file prior to visit. No Known Allergies  Social History     Substance and Sexual Activity   Alcohol Use Not Currently     Social History     Substance and Sexual Activity   Drug Use Never     Social History     Tobacco Use   Smoking Status Never   Smokeless Tobacco Never         Objective   Vitals: Blood pressure 116/80, pulse 94, height 5' 11" (1.803 m), weight 115 kg (253 lb), SpO2 98 %. , Body mass index is 35.29 kg/m². ,   Vitals:    10/23/23 1117   BP: 116/80   Pulse: 94   SpO2: 98%   Weight: 115 kg (253 lb)   Height: 5' 11" (1.803 m)      Body mass index is 35.29 kg/m². Body surface area is 2.33 meters squared.       Invasive Devices       None                   Physical Exam:  GEN: [place] Friend appears well, alert and oriented x 3, pleasant and cooperative   HEENT: pupils equal, round, and reactive to light; extraocular muscles intact  NECK: supple, no carotid bruits   HEART: regular rhythm, normal S1 and S2, 2/6 murmur noted, clicks, gallops or rubs   LUNGS: clear to auscultation bilaterally; no wheezes, rales, or rhonchi   ABDOMEN: normal bowel sounds, soft, no tenderness, no distention  EXTREMITIES: peripheral pulses normal; no clubbing, cyanosis, or edema  NEURO: no focal findings   SKIN: normal without suspicious lesions on exposed skin    Lab Results:   Lab Results   Component Value Date    WBC 5.63 08/13/2023    RBC 4.83 08/13/2023    HGB 14.3 08/13/2023    HCT 42.1 08/13/2023    MCV 87 08/13/2023     08/13/2023    RDW 12.2 08/13/2023     Lab Results   Component Value Date    K 3.7 08/13/2023     08/13/2023    CO2 28 08/13/2023    BUN 14 08/13/2023    CREATININE 0.98 08/13/2023    EGFR 90 08/13/2023    CALCIUM 9.4 08/13/2023    AST 31 08/13/2023    ALT 42 08/13/2023    ALKPHOS 96 08/13/2023     Lab Results   Component Value Date    MG 2.1 03/22/2023     Lab Results   Component Value Date    HDL 32 (L) 06/18/2021    TRIG 146 06/18/2021    LDLCALC 79 06/18/2021     Lab Results   Component Value Date    [id number] 2.100 10/26/2021       Imaging:   I have personally reviewed pertinent films in PACS  No results found. Cardiac testing:     Echo 2-    Interpretation Summary         Left Ventricle: Wall thickness is severely increased. There is severe asymmetric hypertrophy of the septal wall. The left ventricular ejection fraction is 70%. Systolic function is hyperdynamic. Wall motion is normal. Diastolic function is mildly abnormal, consistent with grade I (abnormal) relaxation. There is no LV dynamic obstruction with a peak gradient of 21.0 mmHg. There is outflow tract dynamic obstruction with valsalva with a peak gradient of 41.0 mmHg. Left Atrium: The atrium is mildly dilated. Mitral Valve: There is systolic anterior motion without late peaking gradient. Strain was performed to quantify interventricular dyssynchrony and evaluate components of myocardial function due to HCM. Results from the utilization of Strain Analysis are listed in the report below. Findings    Left Ventricle Left ventricular cavity size is small. Wall thickness is severely increased. There is severe asymmetric hypertrophy of the septal wall. The left ventricular ejection fraction is 70%. Systolic function is hyperdynamic. Wall motion is normal. Diastolic function is mildly abnormal, consistent with grade I (abnormal) relaxation.   There is no LV dynamic obstruction with a peak gradient of 21.0 mmHg. There is outflow tract dynamic obstruction with valsalva with a peak gradient of 41.0 mmHg. Right Ventricle Right ventricular cavity size is normal. Systolic function is normal.      Left Atrium The atrium is mildly dilated. Right Atrium The atrium is normal in size. Aortic Valve The aortic valve is trileaflet. The leaflets are not thickened. The leaflets are mildly calcified. The leaflets exhibit normal mobility. There is no evidence of regurgitation. The aortic valve has no significant stenosis. Mitral Valve Mitral valve structure is normal. There is mild. There is trace regurgitation. There is no evidence of stenosis. There is systolic anterior motion without late peaking gradient. Tricuspid Valve Tricuspid valve structure is normal. There is trace regurgitation. There is no evidence of stenosis. Pulmonic Valve Pulmonic valve structure is normal. There is trace regurgitation. There is no evidence of stenosis. Ascending Aorta The aortic root is normal in size. The ascending aorta is normal in size. IVC/SVC The inferior vena cava is normal in size. Respirophasic changes were normal.      Pericardium There is no pericardial effusion.         Left Ventricle Measurements    Function/Volumes   A4C EF 79 %         Dimensions   LVIDd 4 cm         LVIDS 2.2 cm         IVSd 2.2 cm         LVPWd 1.1 cm         FS 45         Diastolic Filling   MV E' Tissue Velocity Septal 6 cm/s         MV E' Tissue Velocity Lateral 9 cm/s         E wave deceleration time 171 ms         MV Peak E Randell 61 cm/s         MV Peak A Randell 0.9 m/s          Other Measurements   Peak gradient (Rest) 21 mmHg         Peak Gradient (Valsalva) 41 mmHg              Right Ventricle Measurements    Dimensions   RVID d 3.4 cm         Tricuspid annular plane systolic excursion 1.9 cm               Left Atrium Measurements    Dimensions   LA size 4.5 cm               Right Atrium Measurements    Dimensions   RAA A4C 14.1 cm2               Mitral Valve Measurements    Stenosis   MV stenosis pressure 1/2 time 50 ms         MV valve area p 1/2 method 4.4               Tricuspid Valve Measurements    RVSP Parameters   TR Peak Randell 1.9 m/s         Triscuspid Valve Regurgitation Peak Gradient 15 mmHg               Aorta Measurements    Aortic Dimensions   Ao root 2.8 cm         Asc Ao 3 cm               Exam Details    Performed Procedure Technologist Supporting Staff Performing Physician   Echo complete w/ contrast and strain Denver Ronit Montejo           Appointment Date/Status Modality Department    2/27/2023     Completed BE ECHO RM 2 BE CAR NON INV           Begin Exam End Exam  End Exam Questionnaires   2/27/2023 10:30 AM 2/27/2023 12:03 PM  PATIENT EDUCATION            All Reviewers List    Spencer Morris MD on 2/28/2023  1:25 PM     Signed    Electronically signed by Walt Elena MD on 2/27/23 at [address] [address] EST   Electronically signed by Joce Cottrell MD on 2/28/23 at 1009 EST         Counseling / Coordination of Care  Total floor / unit time spent today 45 minutes. Greater than 50% of total time was spent with the patient and / or family counseling and / or coordination of care. A description of the counseling / coordination of care  .

## 2023-10-23 ENCOUNTER — OFFICE VISIT (OUTPATIENT)
Dept: CARDIAC SURGERY | Facility: CLINIC | Age: 48
End: 2023-10-23
Payer: COMMERCIAL

## 2023-10-23 VITALS
WEIGHT: 253 LBS | HEIGHT: 71 IN | DIASTOLIC BLOOD PRESSURE: 80 MMHG | HEART RATE: 94 BPM | SYSTOLIC BLOOD PRESSURE: 116 MMHG | OXYGEN SATURATION: 98 % | BODY MASS INDEX: 35.42 KG/M2

## 2023-10-23 DIAGNOSIS — R06.09 DOE (DYSPNEA ON EXERTION): ICD-10-CM

## 2023-10-23 DIAGNOSIS — I42.2 HYPERTROPHIC CARDIOMYOPATHY (HCC): Primary | ICD-10-CM

## 2023-10-23 DIAGNOSIS — I20.89 ANGINA AT REST: ICD-10-CM

## 2023-10-23 DIAGNOSIS — R07.9 CHEST PAIN, UNSPECIFIED TYPE: ICD-10-CM

## 2023-10-23 PROCEDURE — 99214 OFFICE O/P EST MOD 30 MIN: CPT | Performed by: NURSE PRACTITIONER

## 2023-10-23 RX ORDER — RANOLAZINE 1000 MG/1
1000 TABLET, EXTENDED RELEASE ORAL 2 TIMES DAILY
Qty: 180 TABLET | Refills: 3 | Status: SHIPPED | OUTPATIENT
Start: 2023-10-23

## 2023-11-13 ENCOUNTER — PROCEDURE VISIT (OUTPATIENT)
Age: 48
End: 2023-11-13
Payer: COMMERCIAL

## 2023-11-13 VITALS
DIASTOLIC BLOOD PRESSURE: 86 MMHG | HEIGHT: 71 IN | SYSTOLIC BLOOD PRESSURE: 174 MMHG | BODY MASS INDEX: 35.42 KG/M2 | WEIGHT: 253 LBS

## 2023-11-13 DIAGNOSIS — M54.16 LUMBAR RADICULOPATHY: ICD-10-CM

## 2023-11-13 DIAGNOSIS — M54.41 ACUTE BILATERAL LOW BACK PAIN WITH RIGHT-SIDED SCIATICA: ICD-10-CM

## 2023-11-13 DIAGNOSIS — M99.05 SEGMENTAL DYSFUNCTION OF PELVIC REGION: Primary | ICD-10-CM

## 2023-11-13 DIAGNOSIS — M99.03 SEGMENTAL DYSFUNCTION OF LUMBAR REGION: ICD-10-CM

## 2023-11-13 DIAGNOSIS — M99.04 SEGMENTAL DYSFUNCTION OF SACRAL REGION: ICD-10-CM

## 2023-11-13 PROCEDURE — 98941 CHIROPRACT MANJ 3-4 REGIONS: CPT | Performed by: CHIROPRACTOR

## 2023-11-13 NOTE — PROGRESS NOTES
Date of first visit: 4/11/2023       HPI:  Ab Donis returns for treatment today right-sided low back right posterior thigh pain as well as mid back and neck stiffness. Pain has not been too bad stretching on a daily basis feels better. Denies any weaknesses denies any bowel bladder changes reports no other associated symptomatology. VPAS 5      The following portions of the patient's history were reviewed and updated as appropriate: allergies, current medications, past family history, past medical history, past social history, past surgical history, and problem list.    Review of Systems    Physical Exam:  Pelvic obliquity is noted leg length inequality is present. Misalignment of the right innominate on sacrum. Biomechanically joint dysfunction right SI L5-S1 motion unit he still has erector spinae hypertonicity noted on the right. Joint dysfunction T4-T5. Assessment:   Diagnosis ICD-10-CM Associated Orders   1. Segmental dysfunction of pelvic region  M99.05       2. Lumbar radiculopathy  M54.16       3. Segmental dysfunction of sacral region  M99.04       4. Segmental dysfunction of lumbar region  M99.03       5. Acute bilateral low back pain with right-sided sciatica  M54.41                 Treatment: 96151  Manipulation to the right innominate, sacrum, L5 via Mart drop maneuver well-tolerated. Manipulation T4  well-tolerated. Discussion:  Daily icing flexion-based stretching for the lower back I will see him back for follow-up.

## 2023-11-24 ENCOUNTER — HOSPITAL ENCOUNTER (OUTPATIENT)
Dept: NON INVASIVE DIAGNOSTICS | Facility: HOSPITAL | Age: 48
Discharge: HOME | End: 2023-11-24
Payer: COMMERCIAL

## 2023-11-24 VITALS
SYSTOLIC BLOOD PRESSURE: 142 MMHG | HEART RATE: 94 BPM | BODY MASS INDEX: 35.42 KG/M2 | DIASTOLIC BLOOD PRESSURE: 96 MMHG | WEIGHT: 253 LBS | HEIGHT: 71 IN

## 2023-11-24 DIAGNOSIS — I42.2 HYPERTROPHIC CARDIOMYOPATHY (HCC): ICD-10-CM

## 2023-11-24 DIAGNOSIS — R06.09 DOE (DYSPNEA ON EXERTION): ICD-10-CM

## 2023-11-24 DIAGNOSIS — R07.9 CHEST PAIN, UNSPECIFIED TYPE: ICD-10-CM

## 2023-11-24 LAB
AORTIC ROOT: 3.5 CM
AV LVOT MEAN GRADIENT: 5 MMHG
AV LVOT PEAK GRADIENT: 9 MMHG
DOP CALC LVOT AREA: 3.8 CM2
DOP CALC LVOT CARDIAC INDEX: 3.66 L/MIN/M2
DOP CALC LVOT CARDIAC OUTPUT: 8.52 L/MIN
DOP CALC LVOT DIAMETER: 2.2 CM
DOP CALC LVOT PEAK VEL VTI: 23.05 CM
DOP CALC LVOT PEAK VEL: 1.52 M/S
DOP CALC LVOT STROKE INDEX: 36.9 ML/M2
DOP CALC LVOT STROKE VOLUME: 87.58
FRACTIONAL SHORTENING: 22 (ref 28–44)
GLOBAL LONGITUIDAL STRAIN: -8 %
INTERVENTRICULAR SEPTUM IN DIASTOLE (PARASTERNAL SHORT AXIS VIEW): 2.3 CM
INTERVENTRICULAR SEPTUM: 2.3 CM (ref 0.6–1.1)
LEFT ATRIUM SIZE: 3.4 CM
LEFT INTERNAL DIMENSION IN SYSTOLE: 2.8 CM (ref 2.1–4)
LEFT VENTRICULAR INTERNAL DIMENSION IN DIASTOLE: 3.6 CM (ref 3.5–6)
LEFT VENTRICULAR POSTERIOR WALL IN END DIASTOLE: 1.5 CM
LEFT VENTRICULAR STROKE VOLUME: 26 ML
LVSV (TEICH): 26 ML
MAX HR PERCENT: 100 %
MAX HR PERCENT: 64 %
MAX HR: 102 BPM
MAX HR: 111 BPM
RATE PRESSURE PRODUCT: NORMAL
SL CV ECHO LV DYNAMIC OBSTRUCTION PEAK GRADIENT (REST): 36 MMHG
SL CV ECHO LV DYNAMIC OBSTRUCTION PEAK GRADIENT (VALSAL: 47 MMHG
SL CV LV EF: 80
SL CV PED ECHO LEFT VENTRICLE DIASTOLIC VOLUME (MOD BIPLANE) 2D: 55 ML
SL CV PED ECHO LEFT VENTRICLE SYSTOLIC VOLUME (MOD BIPLANE) 2D: 29 ML
SL CV STRESS RECOVERY BP: NORMAL MMHG
SL CV STRESS RECOVERY HR: 92 BPM
SL CV STRESS RECOVERY O2 SAT: 97 %
STRESS ANGINA INDEX: 1
STRESS BASELINE BP: NORMAL MMHG
STRESS BASELINE BP: NORMAL MMHG
STRESS BASELINE HR: 95 BPM
STRESS BASELINE HR: 96 BPM
STRESS O2 SAT REST: 96 %
STRESS PEAK HR: 111 BPM
STRESS POST ESTIMATED WORKLOAD: 1 METS
STRESS POST ESTIMATED WORKLOAD: 3.7 METS
STRESS POST EXERCISE DUR MIN: 7 MIN
STRESS POST EXERCISE DUR SEC: 11 SEC
STRESS POST O2 SAT PEAK: 97 %
STRESS POST PEAK BP: 160 MMHG
STRESS POST PEAK HR: 111 BPM
STRESS POST PEAK SYSTOLIC BP: 170 MMHG
TR MAX PG: 24 MMHG
TR PEAK VELOCITY: 2.5 M/S
TRICUSPID VALVE PEAK REGURGITATION VELOCITY: 2.45 M/S

## 2023-11-24 PROCEDURE — 93350 STRESS TTE ONLY: CPT | Performed by: INTERNAL MEDICINE

## 2023-11-24 PROCEDURE — 93356 MYOCRD STRAIN IMG SPCKL TRCK: CPT

## 2023-11-24 PROCEDURE — 93350 STRESS TTE ONLY: CPT

## 2023-11-24 PROCEDURE — 93356 MYOCRD STRAIN IMG SPCKL TRCK: CPT | Performed by: INTERNAL MEDICINE

## 2023-11-28 ENCOUNTER — PROCEDURE VISIT (OUTPATIENT)
Age: 48
End: 2023-11-28
Payer: COMMERCIAL

## 2023-11-28 VITALS
HEART RATE: 104 BPM | SYSTOLIC BLOOD PRESSURE: 143 MMHG | BODY MASS INDEX: 35.42 KG/M2 | WEIGHT: 253 LBS | HEIGHT: 71 IN | DIASTOLIC BLOOD PRESSURE: 88 MMHG

## 2023-11-28 DIAGNOSIS — M54.41 ACUTE BILATERAL LOW BACK PAIN WITH RIGHT-SIDED SCIATICA: ICD-10-CM

## 2023-11-28 DIAGNOSIS — M99.04 SEGMENTAL DYSFUNCTION OF SACRAL REGION: ICD-10-CM

## 2023-11-28 DIAGNOSIS — M99.05 SEGMENTAL DYSFUNCTION OF PELVIC REGION: Primary | ICD-10-CM

## 2023-11-28 DIAGNOSIS — M54.16 LUMBAR RADICULOPATHY: ICD-10-CM

## 2023-11-28 DIAGNOSIS — M99.03 SEGMENTAL DYSFUNCTION OF LUMBAR REGION: ICD-10-CM

## 2023-11-28 PROCEDURE — 98941 CHIROPRACT MANJ 3-4 REGIONS: CPT | Performed by: CHIROPRACTOR

## 2023-11-28 NOTE — PROGRESS NOTES
Date of first visit: 4/11/2023       HPI:  Zaheer Molina returns for treatment today right-sided low back right posterior thigh pain as well as mid back and neck stiffness. Pain has not been too bad stretching on a daily basis feels better. Denies any weaknesses denies any bowel bladder changes reports no other associated symptomatology. VPAS 6      The following portions of the patient's history were reviewed and updated as appropriate: allergies, current medications, past family history, past medical history, past social history, past surgical history, and problem list.    Review of Systems    Physical Exam:  Pelvic obliquity is noted leg length inequality is present. Misalignment of the right innominate on sacrum. Biomechanically joint dysfunction right SI L5-S1 motion unit he still has erector spinae hypertonicity noted on the right. Joint dysfunction T4-T5. Assessment:   Diagnosis ICD-10-CM Associated Orders   1. Segmental dysfunction of pelvic region  M99.05       2. Lumbar radiculopathy  M54.16       3. Segmental dysfunction of sacral region  M99.04       4. Segmental dysfunction of lumbar region  M99.03       5. Acute bilateral low back pain with right-sided sciatica  M54.41                 Treatment: 50798  Manipulation to the right innominate, sacrum, L5 via Mart drop maneuver well-tolerated. Manipulation T4  well-tolerated. Discussion:  Daily icing flexion-based stretching for the lower back I will see him back for follow-up.

## 2023-11-29 ENCOUNTER — TELEPHONE (OUTPATIENT)
Dept: CARDIAC SURGERY | Facility: CLINIC | Age: 48
End: 2023-11-29

## 2023-11-29 DIAGNOSIS — K21.9 GASTROESOPHAGEAL REFLUX DISEASE WITHOUT ESOPHAGITIS: Primary | ICD-10-CM

## 2023-11-29 RX ORDER — PANTOPRAZOLE SODIUM 40 MG/1
40 TABLET, DELAYED RELEASE ORAL DAILY
Qty: 30 TABLET | Refills: 5 | Status: SHIPPED | OUTPATIENT
Start: 2023-11-29

## 2023-11-29 NOTE — TELEPHONE ENCOUNTER
Per Dr. James Clark patient will be started on mavacamten for symptomatic obstructive hypertrophic cardiomyopathy. After review of patient's medication list omeprazole is a major interaction with mavacamten and will be discontinued and we will start pantoprazole 40 mg once daily. Patient has been instructed to contact our office once he obtains mavacamten via delivery to his home. He has been instructed not to start the medication yet but instead to call our office. At that time he will be instructed to stop verapamil for 5 days prior to starting [facility]. He will be asked to monitor his blood pressure during that 5-day interim to ensure he does not need other antihypertensive coverage. There is a potential interaction between Ranexa and mavacamten noting that mavacamten may decrease the effectiveness of Ranexa. This was reviewed with Dr. James Clark and for now we will continue Ranexa but will start patient on lowest dose of mavacamten, using 2.5 mg once daily. I reviewed all of these instructions with the patient and he verbalizes an understanding. He will call us when he obtains sample dose of mavacamten.

## 2023-12-05 ENCOUNTER — TELEPHONE (OUTPATIENT)
Dept: CARDIOLOGY CLINIC | Facility: CLINIC | Age: 48
End: 2023-12-05

## 2023-12-05 NOTE — TELEPHONE ENCOUNTER
Camzyos 2.5 mg prior auth submitted to [facility] [id number]   [phone] phone

## 2023-12-12 ENCOUNTER — PROCEDURE VISIT (OUTPATIENT)
Age: 48
End: 2023-12-12
Payer: COMMERCIAL

## 2023-12-12 VITALS — WEIGHT: 253 LBS | HEIGHT: 71 IN | BODY MASS INDEX: 35.42 KG/M2

## 2023-12-12 DIAGNOSIS — M54.16 LUMBAR RADICULOPATHY: ICD-10-CM

## 2023-12-12 DIAGNOSIS — M99.04 SEGMENTAL DYSFUNCTION OF SACRAL REGION: ICD-10-CM

## 2023-12-12 DIAGNOSIS — M99.03 SEGMENTAL DYSFUNCTION OF LUMBAR REGION: ICD-10-CM

## 2023-12-12 DIAGNOSIS — M99.05 SEGMENTAL DYSFUNCTION OF PELVIC REGION: Primary | ICD-10-CM

## 2023-12-12 DIAGNOSIS — M54.41 ACUTE BILATERAL LOW BACK PAIN WITH RIGHT-SIDED SCIATICA: ICD-10-CM

## 2023-12-12 PROCEDURE — 98941 CHIROPRACT MANJ 3-4 REGIONS: CPT | Performed by: CHIROPRACTOR

## 2023-12-12 NOTE — PROGRESS NOTES
Date of first visit: 4/11/2023       HPI:  Puja Bailey returns for treatment today right-sided low back right posterior thigh pain as well as mid back and neck stiffness. Pain has not been too bad stretching on a daily basis feels better. Denies any weaknesses denies any bowel bladder changes reports no other associated symptomatology. VPAS 5      The following portions of the patient's history were reviewed and updated as appropriate: allergies, current medications, past family history, past medical history, past social history, past surgical history, and problem list.    Review of Systems    Physical Exam:  Pelvic obliquity is noted leg length inequality is present. Misalignment of the right innominate on sacrum. Biomechanically joint dysfunction right SI L5-S1 motion unit he still has erector spinae hypertonicity noted on the right. Joint dysfunction T4-T5. Assessment:   Diagnosis ICD-10-CM Associated Orders   1. Segmental dysfunction of pelvic region  M99.05       2. Lumbar radiculopathy  M54.16       3. Segmental dysfunction of sacral region  M99.04       4. Segmental dysfunction of lumbar region  M99.03       5. Acute bilateral low back pain with right-sided sciatica  M54.41                 Treatment: 85712  Manipulation to the right innominate, sacrum, L5 via Mart drop maneuver well-tolerated. Manipulation T4  well-tolerated. Discussion:  Daily icing flexion-based stretching for the lower back I will see him back for follow-up.

## 2023-12-15 ENCOUNTER — TELEPHONE (OUTPATIENT)
Dept: CARDIAC SURGERY | Facility: CLINIC | Age: 48
End: 2023-12-15

## 2023-12-15 NOTE — TELEPHONE ENCOUNTER
L/M/M letting pt know the ppl from [address] [place] [address] will be calling today to schedule a delivery date for 2.5 mg camzyos. Also reminded patient to stop  verapamil for 5 days prior to starting Camzyos and to please monitor his blood pressure during that 5-day interim to ensure he does not need other antihypertensive coverage.

## 2023-12-15 NOTE — TELEPHONE ENCOUNTER
Spoke w Lois Shepard at [facility] # [phone]  12/6/23 to 12/6/24  Includes dose changes- need to call ins to let them know there is a change in dose  [facility]

## 2023-12-28 ENCOUNTER — OFFICE VISIT (OUTPATIENT)
Dept: ENDOCRINOLOGY | Facility: CLINIC | Age: 48
End: 2023-12-28
Payer: COMMERCIAL

## 2023-12-28 VITALS
HEART RATE: 108 BPM | HEIGHT: 71 IN | DIASTOLIC BLOOD PRESSURE: 82 MMHG | WEIGHT: 250 LBS | OXYGEN SATURATION: 96 % | BODY MASS INDEX: 35 KG/M2 | SYSTOLIC BLOOD PRESSURE: 140 MMHG

## 2023-12-28 DIAGNOSIS — Z79.4 TYPE 2 DIABETES MELLITUS WITH HYPERGLYCEMIA, WITH LONG-TERM CURRENT USE OF INSULIN (HCC): ICD-10-CM

## 2023-12-28 DIAGNOSIS — E11.65 TYPE 2 DIABETES MELLITUS WITH HYPERGLYCEMIA, WITH LONG-TERM CURRENT USE OF INSULIN (HCC): ICD-10-CM

## 2023-12-28 DIAGNOSIS — Z79.4 CURRENT USE OF INSULIN (HCC): Primary | ICD-10-CM

## 2023-12-28 PROCEDURE — 99214 OFFICE O/P EST MOD 30 MIN: CPT | Performed by: INTERNAL MEDICINE

## 2023-12-28 RX ORDER — BLOOD SUGAR DIAGNOSTIC
STRIP MISCELLANEOUS
Qty: 100 STRIP | Refills: 3 | Status: SHIPPED | OUTPATIENT
Start: 2023-12-28

## 2023-12-28 RX ORDER — ACYCLOVIR 400 MG/1
1 TABLET ORAL
Qty: 3 EACH | Refills: 5 | Status: SHIPPED | OUTPATIENT
Start: 2023-12-28

## 2023-12-28 RX ORDER — ACYCLOVIR 400 MG/1
1 TABLET ORAL DAILY
Qty: 1 EACH | Refills: 1 | Status: SHIPPED | OUTPATIENT
Start: 2023-12-28

## 2023-12-28 NOTE — PROGRESS NOTES
Est Patient Progress Note      Chief Complaint   Patient presents with    Diabetes Type 2      Referring Provider  Larry Cerda Md  [address]   [address]  [place] [address]     History of Present Illness:   John Arevalo is a 48 y.o. male with a history of type 2 diabetes with long term use of insulin seen in f/u. He was seen once in the office in 12/2021.  Care was interupted due to insurance lapses.  He sees Dr. Norris for his hypertrophic CM.     Recalls being diagnosed with diabetes over 20yrs ago. He recalls being at work with paresthesias, followed by weakness and increased thirst. Had an admission on diagnosis for hyperglycemia, but denies DKA. He was given metformin and started insulin about 9yrs ago.       Reports complications of CAD with angioplasty. He states he has NOT had stenting. He denies retinopathy, nephropathy, or neuropathy. Denies any issues with his current regimen.   Fhx diabetes with Mother and sister    At the time of his initial visit, his regimen was Tresiba 70units once daily, glimiperide 2mg bf and dinner, Xigduo 10/1000 once daily, and Ozempic 1mg once daily.   He had an admission for suspected pancreatitis 3/2023. Lipase was 1295 and Ozempic stopped.     Current regimen: Lantus 30units, Humalog 10units, Metformin 1g twice daily    Injects in abdomen and rotates sites.    further diabetic ROS: only when Bgs are high, he can note the thirst    Home blood glucose readings:   Not available       Diabetes education: None  Diet: 1-2 meals per day due to poor patient  Activity: ambulates with cane    Ophthamology: seen this year; no retinopathy, no macular edema  Podiatry: not seeing currently (about 5yrs)  Dental: not recently  Infuenza vaccine: 21-22  COVID: has had 2 shots, needs to get booster    Has hypertension: followed by Cards; on ACE    Has hyperlipidemia: followed by Cards; on statin     Thyroid disorders: none  History of pancreatitis: none    Patient Active  Problem List   Diagnosis    Type 2 diabetes mellitus (HCC)    Essential hypertension    Other hyperlipidemia    Coronary artery disease of native artery of native heart with stable angina pectoris (HCC)    History of coronary angioplasty with insertion of stent    Hypertrophic cardiomyopathy (HCC)    Obesity with serious comorbidity    Obstructive sleep apnea (adult) (pediatric)    Current use of insulin (HCC)    Abdominal pain    Leg pain      Past Medical History:   Diagnosis Date    Congestive heart failure (HCC)     Diabetes mellitus (HCC)     Hyperlipidemia     Hypertension     Obesity       No past surgical history on file.   Family History   Problem Relation Age of Onset    Diabetes Mother     Stroke Mother     Stroke Father      Social History     Tobacco Use    Smoking status: Never    Smokeless tobacco: Never   Substance Use Topics    Alcohol use: Not Currently     No Known Allergies      Current Outpatient Medications:     Continuous Blood Gluc  (Dexcom G7 ) HENRY, Use 1 each in the morning, Disp: 1 each, Rfl: 1    Continuous Blood Gluc Sensor (Dexcom G7 Sensor), Use 1 Device every 10 days, Disp: 3 each, Rfl: 5    Empagliflozin-metFORMIN HCl 12.5-1000 MG TABS, Take one pill by mouth twice daily, Disp: 60 tablet, Rfl: 5    glucose blood (OneTouch Verio) test strip, Use to test 3x daily, Disp: 100 strip, Rfl: 3    Lancets 30G MISC, Use to test 3x daily, Disp: 100 each, Rfl: 2    aspirin (ECOTRIN LOW STRENGTH) 81 mg EC tablet, Take 1 tablet (81 mg total) by mouth daily, Disp: 30 tablet, Rfl: 0    Blood Glucose Monitoring Suppl (OneTouch Verio) w/Device KIT, Use to test 3-4x daily, Disp: 1 kit, Rfl: 1    Blood Pressure KIT, Use 2 (two) times a day, Disp: 1 kit, Rfl: 2    butalbital-acetaminophen-caffeine (FIORICET,ESGIC) -40 mg per tablet, TAKE 2 TABLETS BY MOUTH EVERY 8 HOURS AS NEEDED FOR HEADACHES, Disp: , Rfl:     Diclofenac Sodium (VOLTAREN) 1 %, Apply 2 g topically 4 (four) times a  day, Disp: 100 g, Rfl: 2    gabapentin (NEURONTIN) 300 mg capsule, take 1 capsule by mouth twice a day START BY TAKING AT BEDTIME FI...  (REFER TO PRESCRIPTION NOTES)., Disp: , Rfl:     Insulin Glargine Solostar (Lantus SoloStar) 100 UNIT/ML SOPN, Inject 0.3 mL (30 Units total) under the skin daily at bedtime, Disp: 5 mL, Rfl: 0    insulin lispro (HumaLOG KwikPen) 100 units/mL injection pen, Inject 10 Units under the skin 3 (three) times a day with meals, Disp: 5 mL, Rfl: 0    Insulin Pen Needle (BD Pen Needle Ann 2nd Gen) 32G X 4 MM MISC, For use with insulin pen. Pharmacy may dispense brand covered by insurance., Disp: 100 each, Rfl: 0    ketorolac (ACULAR) 0.5 % ophthalmic solution, instill 1 drop into both eyes four times a day for 10 days, Disp: , Rfl:     ketorolac (ACULAR) 0.5 % ophthalmic solution, Apply 1 drop to eye 4 (four) times a day, Disp: , Rfl:     losartan (COZAAR) 100 MG tablet, Take 100 mg by mouth daily, Disp: , Rfl:     metoprolol succinate (TOPROL-XL) 50 mg 24 hr tablet, TAKE 1 TABLET BY MOUTH TWICE A DAY, Disp: 180 tablet, Rfl: 0    mirtazapine (REMERON) 30 mg tablet, take 1/2 tablet by mouth nightly, Disp: , Rfl:     pantoprazole (PROTONIX) 40 mg tablet, Take 1 tablet (40 mg total) by mouth daily, Disp: 30 tablet, Rfl: 5    ranolazine (RANEXA) 1000 MG SR tablet, Take 1 tablet (1,000 mg total) by mouth 2 (two) times a day, Disp: 180 tablet, Rfl: 3    rosuvastatin (CRESTOR) 5 mg tablet, Take 5 mg by mouth daily, Disp: , Rfl:     verapamil (VERELAN PM) 120 MG 24 hr capsule, TAKE 1 CAPSULE (120 MG TOTAL) BY MOUTH DAILY AT BEDTIME, Disp: 90 capsule, Rfl: 0  Review of Systems   Constitutional:  Negative for unexpected weight change.   HENT:  Negative for hearing loss, trouble swallowing and voice change.    Gastrointestinal:  Negative for constipation, diarrhea and nausea.   Endocrine: Negative for polydipsia and polyuria.   Musculoskeletal:  Positive for gait problem.   Neurological:  Positive  "for dizziness and numbness. Negative for tremors.   Psychiatric/Behavioral:  The patient is not nervous/anxious.    see also HPI    Physical Exam:  Body mass index is 34.87 kg/m².  /82 (BP Location: Left arm, Patient Position: Sitting, Cuff Size: Adult)   Pulse (!) 108   Ht 5' 11\" (1.803 m)   Wt 113 kg (250 lb)   SpO2 96%   BMI 34.87 kg/m²    Wt Readings from Last 3 Encounters:   12/28/23 113 kg (250 lb)   12/12/23 115 kg (253 lb)   11/28/23 115 kg (253 lb)         Physical Exam   Gen: appears well-developed and well-nourished. No apparent distress.   Head: Normocephalic and atraumatic.   Eyes: no stare or proptosis, no periorbital edema  E/N/M nl facies, hearing grossly intact  Neck: range of motion.   Pulmonary/Chest: breathing  comfortably, no accessory muscle use, effort normal.   Musculoskeletal: moves upper extremities  Neurological: alert and oriented to person, place, and time. No upper ext tremor appreciated  Skin: does not appear diaphoretic, no facial plethora  Psychiatric: normal mood and affect; behavior is normal; no gross lapses in memory, answer questions appropriately        Labs:   Lab Results   Component Value Date    HGBA1C 12.0 (H) 12/22/2023         Lab Results   Component Value Date    CREATININE 0.98 08/13/2023    CREATININE 1.36 (H) 05/30/2023    CREATININE 0.84 03/23/2023    BUN 14 08/13/2023    K 3.7 08/13/2023     08/13/2023    CO2 28 08/13/2023     eGFR   Date Value Ref Range Status   08/13/2023 90 ml/min/1.73sq m Final     No components found for: \"MALBCRER\"    Lab Results   Component Value Date    HDL 32 (L) 06/18/2021    TRIG 146 06/18/2021       Lab Results   Component Value Date    ALT 42 08/13/2023    AST 31 08/13/2023    ALKPHOS 96 08/13/2023       No results found for: \"TSH\", \"FREET4\", \"TSI\"    Impression:  1. Current use of insulin (HCC)    2. Type 2 diabetes mellitus with hyperglycemia, with long-term current use of insulin (HCC)             Plan:    John" was seen today for diabetes type 2.    Diagnoses and all orders for this visit:    Current use of insulin ([facility])  -     Empagliflozin-metFORMIN HCl 12.5-1000 MG TABS; Take one pill by mouth twice daily  -     Continuous Blood Gluc Sensor (Dexcom G7 Sensor); Use 1 Device every 10 days  -     Continuous Blood Gluc  (Dexcom G7 ) HENRY; Use 1 each in the morning  -     Ambulatory referral to Diabetic Education; Future  -     Lancets 30G MISC; Use to test 3x daily  -     glucose blood (OneTouch Verio) test strip; Use to test 3x daily    Type 2 diabetes mellitus with hyperglycemia, with long-term current use of insulin ([facility])  -     Empagliflozin-metFORMIN HCl 12.5-1000 MG TABS; Take one pill by mouth twice daily  -     Continuous Blood Gluc Sensor (Dexcom G7 Sensor); Use 1 Device every 10 days  -     Continuous Blood Gluc  (Dexcom G7 ) HENRY; Use 1 each in the morning  -     Ambulatory referral to Diabetic Education; Future  -     Lancets 30G MISC; Use to test 3x daily  -     glucose blood (OneTouch Verio) test strip; Use to test 3x daily            T2DM, uncontrolled, complicated by CAD: No longer a GLP candidate. He will continue on the Lantus 30units daily Humalog 10units with meals. Will replace metformin with Synjardy (see below). Discussed CGM use and will send RX for Dexcom g7. However he treva need to use a DME        Hyperlipidemia: Taking and tolerating statin well.     CAD: will rsume SGLT2 inihibtor. It appears Synjardy is covered, so will RX this      Discussed with the patient and all questioned fully answered. He will call me if any problems arise.    Counseled patient on diagnostic results, prognosis, risk and benefit of treatment options, instruction for management, importance of treatment compliance, Risk  factor reduction and impressions      Melanie Lamb MD

## 2023-12-28 NOTE — PATIENT INSTRUCTIONS
I sent the Synjardy to your pharmacy. This will replace metformin. It will be 1 pill twice daily.  I have ordered Dexcom and we will see if it is covered through your pharmacy or needs to go through a durable medical supply company    Please send in 2 weeks.

## 2023-12-29 ENCOUNTER — PROCEDURE VISIT (OUTPATIENT)
Age: 48
End: 2023-12-29
Payer: COMMERCIAL

## 2023-12-29 VITALS
WEIGHT: 250 LBS | HEART RATE: 95 BPM | HEIGHT: 71 IN | DIASTOLIC BLOOD PRESSURE: 91 MMHG | BODY MASS INDEX: 35 KG/M2 | SYSTOLIC BLOOD PRESSURE: 140 MMHG

## 2023-12-29 DIAGNOSIS — M54.16 LUMBAR RADICULOPATHY: ICD-10-CM

## 2023-12-29 DIAGNOSIS — M99.03 SEGMENTAL DYSFUNCTION OF LUMBAR REGION: ICD-10-CM

## 2023-12-29 DIAGNOSIS — M99.04 SEGMENTAL DYSFUNCTION OF SACRAL REGION: ICD-10-CM

## 2023-12-29 DIAGNOSIS — M54.41 ACUTE BILATERAL LOW BACK PAIN WITH RIGHT-SIDED SCIATICA: ICD-10-CM

## 2023-12-29 DIAGNOSIS — M99.05 SEGMENTAL DYSFUNCTION OF PELVIC REGION: Primary | ICD-10-CM

## 2023-12-29 PROCEDURE — 98941 CHIROPRACT MANJ 3-4 REGIONS: CPT | Performed by: CHIROPRACTOR

## 2023-12-29 NOTE — PROGRESS NOTES
Date of first visit: 4/11/2023       HPI:  Ramos returns for treatment today right-sided low back right posterior thigh pain as well as mid back and neck stiffness.   Pain has not been too bad stretching on a daily basis feels better.  Denies any weaknesses denies any bowel bladder changes reports no other associated symptomatology.        VPAS 6      The following portions of the patient's history were reviewed and updated as appropriate: allergies, current medications, past family history, past medical history, past social history, past surgical history, and problem list.    Review of Systems    Physical Exam:  Pelvic obliquity is noted leg length inequality is present.  Misalignment of the right innominate on sacrum.  Biomechanically joint dysfunction right SI L5-S1 motion unit he still has erector spinae hypertonicity noted on the right.    Joint dysfunction T4-T5.  Assessment:   Diagnosis ICD-10-CM Associated Orders   1. Segmental dysfunction of pelvic region  M99.05       2. Lumbar radiculopathy  M54.16       3. Segmental dysfunction of sacral region  M99.04       4. Segmental dysfunction of lumbar region  M99.03       5. Acute bilateral low back pain with right-sided sciatica  M54.41                 Treatment: 76289  Manipulation to the right innominate, sacrum, L5 via Mart drop maneuver well-tolerated.  Manipulation T4  well-tolerated.    Discussion:  Daily icing flexion-based stretching for the lower back I will see him back for follow-up.

## 2024-01-02 ENCOUNTER — APPOINTMENT (EMERGENCY)
Dept: RADIOLOGY | Facility: HOSPITAL | Age: 49
End: 2024-01-02
Payer: COMMERCIAL

## 2024-01-02 ENCOUNTER — HOSPITAL ENCOUNTER (EMERGENCY)
Facility: HOSPITAL | Age: 49
Discharge: HOME | End: 2024-01-02
Attending: EMERGENCY MEDICINE
Payer: COMMERCIAL

## 2024-01-02 VITALS
TEMPERATURE: 97.8 F | SYSTOLIC BLOOD PRESSURE: 123 MMHG | DIASTOLIC BLOOD PRESSURE: 73 MMHG | OXYGEN SATURATION: 98 % | RESPIRATION RATE: 18 BRPM | HEART RATE: 88 BPM

## 2024-01-02 DIAGNOSIS — T07.XXXA MULTIPLE ABRASIONS: ICD-10-CM
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
## 2024-01-02 DIAGNOSIS — M25.552 LEFT HIP PAIN: Primary | ICD-10-CM

## 2024-01-02 LAB
ATRIAL RATE: 86 BPM
P AXIS: 53 DEGREES
PR INTERVAL: 188 MS
QRS AXIS: -58 DEGREES
QRSD INTERVAL: 114 MS
QT INTERVAL: 396 MS
QTC INTERVAL: 473 MS
T WAVE AXIS: 132 DEGREES
VENTRICULAR RATE: 86 BPM

## 2024-01-02 PROCEDURE — 93005 ELECTROCARDIOGRAM TRACING: CPT

## 2024-01-02 PROCEDURE — 99284 EMERGENCY DEPT VISIT MOD MDM: CPT

## 2024-01-02 PROCEDURE — 73502 X-RAY EXAM HIP UNI 2-3 VIEWS: CPT

## 2024-01-02 PROCEDURE — 99284 EMERGENCY DEPT VISIT MOD MDM: CPT | Performed by: EMERGENCY MEDICINE

## 2024-01-02 RX ORDER — LIDOCAINE 50 MG/G
1 PATCH TOPICAL ONCE
Status: DISCONTINUED | OUTPATIENT
Start: 2024-01-02 | End: 2024-01-03 | Stop reason: HOSPADM

## 2024-01-02 RX ORDER — METHOCARBAMOL 500 MG/1
500 TABLET, FILM COATED ORAL ONCE AS NEEDED
Qty: 10 TABLET | Refills: 0 | Status: SHIPPED | OUTPATIENT
Start: 2024-01-02 | End: 2024-01-09

## 2024-01-02 RX ORDER — ACETAMINOPHEN 325 MG/1
650 TABLET ORAL ONCE
Status: COMPLETED | OUTPATIENT
Start: 2024-01-02 | End: 2024-01-02

## 2024-01-02 RX ORDER — IBUPROFEN 400 MG/1
400 TABLET ORAL ONCE
Status: COMPLETED | OUTPATIENT
Start: 2024-01-02 | End: 2024-01-02

## 2024-01-02 RX ADMIN — LIDOCAINE 1 PATCH: 50 PATCH TOPICAL at 22:45

## 2024-01-02 RX ADMIN — ACETAMINOPHEN 650 MG: 325 TABLET, FILM COATED ORAL at 22:45

## 2024-01-02 RX ADMIN — IBUPROFEN 400 MG: 400 TABLET, FILM COATED ORAL at 19:38

## 2024-01-03 NOTE — ED PROVIDER NOTES
History  Chief Complaint   Patient presents with    Fall     Pt was coming back from dinner when he fell down 5 stairs, pain in his back and left side, no LOC, no headstrike.     HPI  ED Course as of 01/04/24 0243 Tue Jan 02, 2024 2210 HPI: Patient is a 48 y.o. male with PMHx HOCM, T2DM, who presents to the ED for evaluation of mechanical trip and fall onto left hip and arm. Patient states he was walking up stairs when he didn't lift his leg up high enough and fell down five steps. Denies any chest pain, shortness of breath, lightheadedness, head strike, loss of conscious, dizziness, abdominal pain, nausea or vomiting, or any other complaints or concerns at this time.   2221 ASSESSMENT: Patient is a 48 y.o. male who presents with left hip pain s/p fall.   DDX includes but not limited to: Left hip contusion, left hip fracture, doubt left hip dislocation, abrasions to left elbow and right thumb.   PLAN: x-ray left hip, EKG for arrhythmia. Treated with motrin, lidocaine patch.   2305 XR hip/pelv 2-3 vws left if performed  No acute fracture or dislocation as read by me   2312 Patient reevaluated, reports improvement in symptoms. Denies any new or worsening complaints or concerns at this time. Discussed results and plan with patient. Advised on need for outpatient follow up, given information. Given return precautions verbally and in discharge instructions, confirmed with teach back method. All questions answered prior to discharge. Patient expressed verbal understanding and is agreeable with plan for discharge with outpatient follow up.       Prior to Admission Medications   Prescriptions Last Dose Informant Patient Reported? Taking?   Blood Glucose Monitoring Suppl (OneTouch Verio) w/Device KIT  Self No No   Sig: Use to test 3-4x daily   Blood Pressure KIT  Self No No   Sig: Use 2 (two) times a day   Continuous Blood Gluc  (Dexcom G7 ) HENRY   No No   Sig: Use 1 each in the morning   Continuous Blood  Gluc Sensor (Dexcom G7 Sensor)   No No   Sig: Use 1 Device every 10 days   Diclofenac Sodium (VOLTAREN) 1 %  Self No No   Sig: Apply 2 g topically 4 (four) times a day   Empagliflozin-metFORMIN HCl 12.5-1000 MG TABS   No No   Sig: Take one pill by mouth twice daily   Insulin Glargine Solostar (Lantus SoloStar) 100 UNIT/ML SOPN  Self No No   Sig: Inject 0.3 mL (30 Units total) under the skin daily at bedtime   Insulin Pen Needle (BD Pen Needle Ann 2nd Gen) 32G X 4 MM MISC  Self No No   Sig: For use with insulin pen. Pharmacy may dispense brand covered by insurance.   Lancets 30G MISC   No No   Sig: Use to test 3x daily   aspirin (ECOTRIN LOW STRENGTH) 81 mg EC tablet  Self No No   Sig: Take 1 tablet (81 mg total) by mouth daily   butalbital-acetaminophen-caffeine (FIORICET,ESGIC) -40 mg per tablet  Self Yes No   Sig: TAKE 2 TABLETS BY MOUTH EVERY 8 HOURS AS NEEDED FOR HEADACHES   gabapentin (NEURONTIN) 300 mg capsule  Self Yes No   Sig: take 1 capsule by mouth twice a day START BY TAKING AT BEDTIME FI...  (REFER TO PRESCRIPTION NOTES).   glucose blood (OneTouch Verio) test strip   No No   Sig: Use to test 3x daily   insulin lispro (HumaLOG KwikPen) 100 units/mL injection pen  Self No No   Sig: Inject 10 Units under the skin 3 (three) times a day with meals   ketorolac (ACULAR) 0.5 % ophthalmic solution  Self Yes No   Sig: instill 1 drop into both eyes four times a day for 10 days   ketorolac (ACULAR) 0.5 % ophthalmic solution   Yes No   Sig: Apply 1 drop to eye 4 (four) times a day   losartan (COZAAR) 100 MG tablet  Self Yes No   Sig: Take 100 mg by mouth daily   metoprolol succinate (TOPROL-XL) 50 mg 24 hr tablet  Self No No   Sig: TAKE 1 TABLET BY MOUTH TWICE A DAY   mirtazapine (REMERON) 30 mg tablet  Self Yes No   Sig: take 1/2 tablet by mouth nightly   pantoprazole (PROTONIX) 40 mg tablet   No No   Sig: Take 1 tablet (40 mg total) by mouth daily   ranolazine (RANEXA) 1000 MG SR tablet   No No   Sig: Take  1 tablet (1,000 mg total) by mouth 2 (two) times a day   rosuvastatin (CRESTOR) 5 mg tablet  Self Yes No   Sig: Take 5 mg by mouth daily   verapamil (VERELAN PM) 120 MG 24 hr capsule  Self No No   Sig: TAKE 1 CAPSULE (120 MG TOTAL) BY MOUTH DAILY AT BEDTIME      Facility-Administered Medications: None       Past Medical History:   Diagnosis Date    Congestive heart failure (HCC)     Diabetes mellitus (HCC)     Hyperlipidemia     Hypertension     Obesity        History reviewed. No pertinent surgical history.    Family History   Problem Relation Age of Onset    Diabetes Mother     Stroke Mother     Stroke Father      I have reviewed and agree with the history as documented.    E-Cigarette/Vaping    E-Cigarette Use Never User      E-Cigarette/Vaping Substances    Nicotine No     THC No     CBD No     Flavoring No      Social History     Tobacco Use    Smoking status: Never    Smokeless tobacco: Never   Vaping Use    Vaping status: Never Used   Substance Use Topics    Alcohol use: Not Currently    Drug use: Never        Review of Systems   Musculoskeletal:  Positive for arthralgias.       Physical Exam  ED Triage Vitals [01/02/24 1935]   Temperature Pulse Respirations Blood Pressure SpO2   97.8 °F (36.6 °C) 67 20 154/89 98 %      Temp Source Heart Rate Source Patient Position - Orthostatic VS BP Location FiO2 (%)   Tympanic Monitor Sitting Right arm --      Pain Score       8             Orthostatic Vital Signs  Vitals:    01/02/24 1935 01/02/24 2200   BP: 154/89 123/73   Pulse: 67 88   Patient Position - Orthostatic VS: Sitting Lying       Physical Exam  Vitals and nursing note reviewed.   Constitutional:       General: He is not in acute distress.  HENT:      Head: Normocephalic and atraumatic.      Mouth/Throat:      Mouth: Mucous membranes are moist.   Eyes:      General: No scleral icterus.     Conjunctiva/sclera: Conjunctivae normal.   Cardiovascular:      Rate and Rhythm: Normal rate and regular rhythm.       Heart sounds: Normal heart sounds.   Pulmonary:      Effort: Pulmonary effort is normal. No respiratory distress.      Breath sounds: Normal breath sounds.   Abdominal:      Palpations: Abdomen is soft.      Tenderness: There is no abdominal tenderness. There is no guarding or rebound.   Musculoskeletal:         General: No deformity. Normal range of motion.      Cervical back: Normal range of motion and neck supple. No bony tenderness.      Thoracic back: No bony tenderness.      Lumbar back: No bony tenderness.      Comments: +TTP to left anterior and lateral hip with pain on ROM   Skin:     General: Skin is warm.      Capillary Refill: Capillary refill takes less than 2 seconds.      Findings: Abrasion (left elbow and right thumb) present. No bruising or rash.   Neurological:      Mental Status: He is alert. Mental status is at baseline.   Psychiatric:         Mood and Affect: Mood normal.         Behavior: Behavior normal.         ED Medications  Medications   ibuprofen (MOTRIN) tablet 400 mg (400 mg Oral Given 1/2/24 1938)   acetaminophen (TYLENOL) tablet 650 mg (650 mg Oral Given 1/2/24 2245)       Diagnostic Studies  Results Reviewed       None                   XR hip/pelv 2-3 vws left if performed   Final Result by Timohty Cristina MD (01/03 1552)      No acute osseous abnormality.            Workstation performed: [id number]               Procedures  Procedures      ED Course  ED Course as of 01/04/24 0243   Tue Jan 02, 2024 2210 HPI: Patient is a 48 y.o. male with PMHx HOCM, T2DM, who presents to the ED for evaluation of mechanical trip and fall onto left hip and arm. Patient states he was walking up stairs when he didn't lift his leg up high enough and fell down five steps. Denies any chest pain, shortness of breath, lightheadedness, head strike, loss of conscious, dizziness, abdominal pain, nausea or vomiting, or any other complaints or concerns at this time.   2221 ASSESSMENT: Patient is a 48  y.o. male who presents with left hip pain s/p fall.   DDX includes but not limited to: Left hip contusion, left hip fracture, doubt left hip dislocation, abrasions to left elbow and right thumb.   PLAN: x-ray left hip, EKG for arrhythmia. Treated with motrin, lidocaine patch.   2305 XR hip/pelv 2-3 vws left if performed  No acute fracture or dislocation as read by me   2312 Patient reevaluated, reports improvement in symptoms. Denies any new or worsening complaints or concerns at this time. Discussed results and plan with patient. Advised on need for outpatient follow up, given information. Given return precautions verbally and in discharge instructions, confirmed with teach back method. All questions answered prior to discharge. Patient expressed verbal understanding and is agreeable with plan for discharge with outpatient follow up.                             SBIRT 22yo+      Flowsheet Row Most Recent Value   Initial Alcohol Screen: US AUDIT-C     1. How often do you have a drink containing alcohol? 0 Filed at: 01/02/2024 2212   2. How many drinks containing alcohol do you have on a typical day you are drinking?  0 Filed at: 01/02/2024 2212   3a. Male UNDER 65: How often do you have five or more drinks on one occasion? 0 Filed at: 01/02/2024 2212   3b. FEMALE Any Age, or MALE 65+: How often do you have 4 or more drinks on one occassion? 0 Filed at: 01/02/2024 2212   Audit-C Score 0 Filed at: 01/02/2024 2212   DARIAN: How many times in the past year have you...    Used an illegal drug or used a prescription medication for non-medical reasons? Never Filed at: 01/02/2024 2212                  Medical Decision Making  Problems Addressed:  Fall, initial encounter: acute illness or injury  Left hip pain: acute illness or injury  Multiple abrasions: acute illness or injury    Amount and/or Complexity of Data Reviewed  Radiology: ordered. Decision-making details documented in ED Course.    Risk  OTC drugs.  Prescription  drug management.        See ED course above for additional details including HPI, MDM including PLAN, and disposition.    Disposition  Final diagnoses:   Left hip pain   Multiple abrasions   Fall, initial encounter     Time reflects when diagnosis was documented in both MDM as applicable and the Disposition within this note       Time User Action Codes Description Comment    1/2/2024 11:10 PM Lauryn Jose Add [M25.552] Left hip pain     1/2/2024 11:10 PM Lauryn Jose Add [T07.XXXA] Multiple abrasions     1/2/2024 11:10 PM Lauryn Jose Add [W19.XXXA] Fall, initial encounter           ED Disposition       ED Disposition   Discharge    Condition   Stable    Date/Time   Tue Jan 2, 2024 2310    Comment   John Arevalo discharge to home/self care.                   Follow-up Information       Follow up With Specialties Details Why Contact Info Additional Information    Larry Cerda MD Family Medicine Schedule an appointment as soon as possible for a visit   [address] [facility]  [address] [place] [address] [id number] [phone]       [facility] Emergency Department Emergency Medicine Go to  If symptoms worsen [address] [facility] [address]  [phone] [facility] Emergency Department, [address], [place], [address]   [phone]            Discharge Medication List as of 1/2/2024 11:11 PM        START taking these medications    Details   methocarbamol (ROBAXIN) 500 mg tablet Take 1 tablet (500 mg total) by mouth once as needed for muscle spasms for up to 7 days, Starting Tue 1/2/2024, Until Tue 1/9/2024 at 2359, Normal           CONTINUE these medications which have NOT CHANGED    Details   aspirin (ECOTRIN LOW STRENGTH) 81 mg EC tablet Take 1 tablet (81 mg total) by mouth daily, Starting Wed 5/6/2020, No Print      Blood Glucose Monitoring Suppl (OneTouch Verio) w/Device KIT Use to test 3-4x daily, Normal      Blood Pressure KIT Use 2  (two) times a day, Starting Tue 10/12/2021, Normal      butalbital-acetaminophen-caffeine (FIORICET,ESGIC) -40 mg per tablet TAKE 2 TABLETS BY MOUTH EVERY 8 HOURS AS NEEDED FOR HEADACHES, Historical Med      Continuous Blood Gluc  (Dexcom G7 ) HENRY Use 1 each in the morning, Starting Thu 12/28/2023, Normal      Continuous Blood Gluc Sensor (Dexcom G7 Sensor) Use 1 Device every 10 days, Starting Thu 12/28/2023, Normal      Diclofenac Sodium (VOLTAREN) 1 % Apply 2 g topically 4 (four) times a day, Starting Tue 10/12/2021, Normal      Empagliflozin-metFORMIN HCl 12.5-1000 MG TABS Take one pill by mouth twice daily, Normal      gabapentin (NEURONTIN) 300 mg capsule take 1 capsule by mouth twice a day START BY TAKING AT BEDTIME FI...  (REFER TO PRESCRIPTION NOTES)., Historical Med      glucose blood (OneTouch Verio) test strip Use to test 3x daily, Normal      Insulin Glargine Solostar (Lantus SoloStar) 100 UNIT/ML SOPN Inject 0.3 mL (30 Units total) under the skin daily at bedtime, Starting Thu 3/23/2023, Normal      insulin lispro (HumaLOG KwikPen) 100 units/mL injection pen Inject 10 Units under the skin 3 (three) times a day with meals, Starting Thu 3/23/2023, Normal      Insulin Pen Needle (BD Pen Needle Ann 2nd Gen) 32G X 4 MM MISC For use with insulin pen. Pharmacy may dispense brand covered by insurance., Normal      ketorolac (ACULAR) 0.5 % ophthalmic solution instill 1 drop into both eyes four times a day for 10 days, Historical Med      Lancets 30G MISC Use to test 3x daily, Normal      losartan (COZAAR) 100 MG tablet Take 100 mg by mouth daily, Starting Wed 4/19/2023, Historical Med      metoprolol succinate (TOPROL-XL) 50 mg 24 hr tablet TAKE 1 TABLET BY MOUTH TWICE A DAY, Normal      mirtazapine (REMERON) 30 mg tablet take 1/2 tablet by mouth nightly, Historical Med      pantoprazole (PROTONIX) 40 mg tablet Take 1 tablet (40 mg total) by mouth daily, Starting Wed 11/29/2023, Normal       ranolazine (RANEXA) 1000 MG SR tablet Take 1 tablet (1,000 mg total) by mouth 2 (two) times a day, Starting Mon 10/23/2023, Normal      rosuvastatin (CRESTOR) 5 mg tablet Take 5 mg by mouth daily, Historical Med      verapamil (VERELAN PM) 120 MG 24 hr capsule TAKE 1 CAPSULE (120 MG TOTAL) BY MOUTH DAILY AT BEDTIME, Starting Fri 11/25/2022, Normal           No discharge procedures on file.    PDMP Review         Value Time User    PDMP Reviewed  Yes 5/6/2020  2:27 PM Roxana Gamez PA-C             ED Provider  Attending physically available and evaluated John Arevalo. I managed the patient along with the ED Attending.    Electronically Signed by           Lauryn Baker DO  01/04/24 0702

## 2024-01-03 NOTE — DISCHARGE INSTRUCTIONS
You have been prescribed Robaxin (muscle relaxer). Do not take with other sedative medications (sleep aids; benzodiazepines such as Valium, Ativan, Xanax; opioids, muscle relaxers, etc.). Do not drive, operate heavy equipment, work, hold infants/young kids, or do any other exertional or taxing activities while using these medications.    Please follow up with your PCP.  Continue to monitor symptoms at home.  Please return to the Emergency Department if you experience worsening of your current symptoms or any new/other concerning symptoms.

## 2024-01-03 NOTE — ED ATTENDING ATTESTATION
1/2/2024  I, Dannielle Reece MD, saw and evaluated the patient. I have discussed the patient with the resident/non-physician practitioner and agree with the resident's/non-physician practitioner's findings, Plan of Care, and MDM as documented in the resident's/non-physician practitioner's note, except where noted. All available labs and Radiology studies were reviewed.  I was present for key portions of any procedure(s) performed by the resident/non-physician practitioner and I was immediately available to provide assistance.       At this point I agree with the current assessment done in the Emergency Department.  I have conducted an independent evaluation of this patient a history and physical is as follows:    ED Course         Critical Care Time  Procedures    49 yo male with hx of chf, dm, htn, hld, had a mechanical fall today and fell down five steps. Pt with no head trauma, no loc. Pt with abrasions to left arm and right hand. Pt landed on left hip and having pain. Pt with some relief with motrin. Pt able to ambulate after fall with cane.  Pt on asa, no other blood thinners.  Vss, afebrile, lungs cta, rrr, abdomen soft nontender, left hip tenderness, full rom, nvi, abrasions on right thumb, left arm.  Xray pelvis, left hip, pain meds. Likely contusion.

## 2024-01-08 DIAGNOSIS — I42.2 HYPERTROPHIC CARDIOMYOPATHY (HCC): Primary | ICD-10-CM

## 2024-01-11 ENCOUNTER — PROCEDURE VISIT (OUTPATIENT)
Age: 49
End: 2024-01-11
Payer: COMMERCIAL

## 2024-01-11 VITALS
DIASTOLIC BLOOD PRESSURE: 79 MMHG | BODY MASS INDEX: 35 KG/M2 | WEIGHT: 250 LBS | HEIGHT: 71 IN | SYSTOLIC BLOOD PRESSURE: 127 MMHG

## 2024-01-11 DIAGNOSIS — M99.03 SEGMENTAL DYSFUNCTION OF LUMBAR REGION: ICD-10-CM

## 2024-01-11 DIAGNOSIS — M54.16 LUMBAR RADICULOPATHY: ICD-10-CM

## 2024-01-11 DIAGNOSIS — M99.05 SEGMENTAL DYSFUNCTION OF PELVIC REGION: Primary | ICD-10-CM

## 2024-01-11 DIAGNOSIS — M54.41 ACUTE BILATERAL LOW BACK PAIN WITH RIGHT-SIDED SCIATICA: ICD-10-CM

## 2024-01-11 DIAGNOSIS — M99.04 SEGMENTAL DYSFUNCTION OF SACRAL REGION: ICD-10-CM

## 2024-01-11 PROCEDURE — 98941 CHIROPRACT MANJ 3-4 REGIONS: CPT | Performed by: CHIROPRACTOR

## 2024-01-11 NOTE — PROGRESS NOTES
Date of first visit: 4/11/2023       HPI:  Ramos returns for treatment today right-sided low back right posterior thigh pain as well as mid back and neck stiffness.   Relates that on January 2, 2024 while coming home from dinner was going up steps tripped and fell onto his left side.  Landed onto the left hip and arm.  Presented to the ER where he was evaluated and released.  I had opportunity to review the ER records.    Denies any weaknesses denies any bowel bladder changes reports no other associated symptomatology.        VPAS 6      The following portions of the patient's history were reviewed and updated as appropriate: allergies, current medications, past family history, past medical history, past social history, past surgical history, and problem list.    Review of Systems    Physical Exam:  Pelvic obliquity is noted leg length inequality is present.  Misalignment of the right innominate on sacrum.  Biomechanically joint dysfunction right SI L5-S1 motion unit he still has erector spinae hypertonicity noted on the right.    Joint dysfunction T4-T5.  Assessment:   Diagnosis ICD-10-CM Associated Orders   1. Segmental dysfunction of pelvic region  M99.05       2. Lumbar radiculopathy  M54.16       3. Segmental dysfunction of sacral region  M99.04       4. Segmental dysfunction of lumbar region  M99.03       5. Acute bilateral low back pain with right-sided sciatica  M54.41                 Treatment: 19745  Manipulation to the right innominate, sacrum, L5 via Mart drop maneuver well-tolerated.  Manipulation T4  well-tolerated.    Discussion:  Daily icing flexion-based stretching for the lower back I will see him back for follow-up.  Discussed case with patient today reviewed the ER records radiographic evaluation left hip fails to reveal any evidence of acute osseous pathology.

## 2024-01-18 ENCOUNTER — PROCEDURE VISIT (OUTPATIENT)
Age: 49
End: 2024-01-18
Payer: COMMERCIAL

## 2024-01-18 VITALS
HEIGHT: 71 IN | BODY MASS INDEX: 35 KG/M2 | HEART RATE: 88 BPM | DIASTOLIC BLOOD PRESSURE: 84 MMHG | WEIGHT: 250 LBS | SYSTOLIC BLOOD PRESSURE: 123 MMHG

## 2024-01-18 DIAGNOSIS — M99.05 SEGMENTAL DYSFUNCTION OF PELVIC REGION: Primary | ICD-10-CM

## 2024-01-18 DIAGNOSIS — M99.04 SEGMENTAL DYSFUNCTION OF SACRAL REGION: ICD-10-CM

## 2024-01-18 DIAGNOSIS — M54.16 LUMBAR RADICULOPATHY: ICD-10-CM

## 2024-01-18 DIAGNOSIS — M99.03 SEGMENTAL DYSFUNCTION OF LUMBAR REGION: ICD-10-CM

## 2024-01-18 DIAGNOSIS — M54.41 ACUTE BILATERAL LOW BACK PAIN WITH RIGHT-SIDED SCIATICA: ICD-10-CM

## 2024-01-18 PROCEDURE — 98941 CHIROPRACT MANJ 3-4 REGIONS: CPT | Performed by: CHIROPRACTOR

## 2024-01-18 NOTE — PROGRESS NOTES
Date of first visit: 1/11/2024       HPI:  Ramos returns for treatment today right-sided low back right posterior thigh pain as well as mid back and neck stiffness.   Slightly better from recent fall injury.    Denies any weaknesses denies any bowel bladder changes reports no other associated symptomatology.        VPAS 6      The following portions of the patient's history were reviewed and updated as appropriate: allergies, current medications, past family history, past medical history, past social history, past surgical history, and problem list.    Review of Systems    Physical Exam:  Pelvic obliquity is noted leg length inequality is present.  Misalignment of the right innominate on sacrum.  Biomechanically joint dysfunction right SI L5-S1 motion unit he still has erector spinae hypertonicity noted on the right.    Joint dysfunction T4-T5.  Assessment:   Diagnosis ICD-10-CM Associated Orders   1. Segmental dysfunction of pelvic region  M99.05       2. Lumbar radiculopathy  M54.16       3. Segmental dysfunction of sacral region  M99.04       4. Segmental dysfunction of lumbar region  M99.03       5. Acute bilateral low back pain with right-sided sciatica  M54.41                 Treatment: 26492  Manipulation to the right innominate, sacrum, L5 via Mart drop maneuver well-tolerated.  Manipulation T4  well-tolerated.    Discussion:  Daily icing flexion-based stretching for the lower back I will see him back for follow-up.

## 2024-01-19 ENCOUNTER — TELEPHONE (OUTPATIENT)
Dept: CARDIOLOGY CLINIC | Facility: CLINIC | Age: 49
End: 2024-01-19

## 2024-01-23 DIAGNOSIS — I42.2 HYPERTROPHIC CARDIOMYOPATHY (HCC): ICD-10-CM

## 2024-01-25 ENCOUNTER — APPOINTMENT (EMERGENCY)
Dept: RADIOLOGY | Facility: HOSPITAL | Age: 49
End: 2024-01-25
Payer: COMMERCIAL

## 2024-01-25 ENCOUNTER — HOSPITAL ENCOUNTER (EMERGENCY)
Facility: HOSPITAL | Age: 49
Discharge: HOME | End: 2024-01-25
Attending: EMERGENCY MEDICINE
Payer: COMMERCIAL

## 2024-01-25 VITALS
RESPIRATION RATE: 20 BRPM | OXYGEN SATURATION: 100 % | SYSTOLIC BLOOD PRESSURE: 147 MMHG | DIASTOLIC BLOOD PRESSURE: 89 MMHG | TEMPERATURE: 98.7 F | HEART RATE: 98 BPM

## 2024-01-25 DIAGNOSIS — M54.50 ACUTE MIDLINE LOW BACK PAIN WITHOUT SCIATICA: ICD-10-CM
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
## 2024-01-25 DIAGNOSIS — M25.552 LEFT HIP PAIN: ICD-10-CM

## 2024-01-25 DIAGNOSIS — S09.90XA CLOSED HEAD INJURY, INITIAL ENCOUNTER: ICD-10-CM

## 2024-01-25 PROCEDURE — 99284 EMERGENCY DEPT VISIT MOD MDM: CPT

## 2024-01-25 PROCEDURE — 72125 CT NECK SPINE W/O DYE: CPT

## 2024-01-25 PROCEDURE — 99284 EMERGENCY DEPT VISIT MOD MDM: CPT | Performed by: EMERGENCY MEDICINE

## 2024-01-25 PROCEDURE — G1004 CDSM NDSC: HCPCS

## 2024-01-25 PROCEDURE — 70450 CT HEAD/BRAIN W/O DYE: CPT

## 2024-01-25 PROCEDURE — 73502 X-RAY EXAM HIP UNI 2-3 VIEWS: CPT

## 2024-01-25 PROCEDURE — 72131 CT LUMBAR SPINE W/O DYE: CPT

## 2024-01-25 RX ORDER — LIDOCAINE 50 MG/G
1 PATCH TOPICAL ONCE
Status: DISCONTINUED | OUTPATIENT
Start: 2024-01-25 | End: 2024-01-25 | Stop reason: HOSPADM

## 2024-01-25 RX ORDER — IBUPROFEN 400 MG/1
400 TABLET ORAL ONCE
Status: COMPLETED | OUTPATIENT
Start: 2024-01-25 | End: 2024-01-25

## 2024-01-25 RX ADMIN — IBUPROFEN 400 MG: 400 TABLET, FILM COATED ORAL at 19:42

## 2024-01-25 RX ADMIN — LIDOCAINE 5% 1 PATCH: 700 PATCH TOPICAL at 19:42

## 2024-01-25 NOTE — ED ATTENDING ATTESTATION
I saw and evaluated the patient. I have discussed the patient with the resident physician and agree with the resident's findings, assessment and plan as documented in the resident physician's note, unless otherwise documented below. All available laboratory and imaging studies were reviewed by myself.  I was present for key portions of any procedure(s) performed by the resident and I was immediately available to provide assistance.     I agree with the current assessment done in the Emergency Department. I have conducted an independent evaluation of this patient    Final Diagnosis:  1. Fall, initial encounter    2. Closed head injury, initial encounter    3. Acute midline low back pain without sciatica    4. Left hip pain             Chief Complaint   Patient presents with    Fall     Pt stated that he fell down 6 steps.  + head strike.  + baby ASA - LOC     This is a 49 y.o. male presenting for evaluation of fall with head strike. Patient level C trauma. Reports mechanical fall, no LOC. + baby aspirin. No other anticoagulant or antiplatelet use. Complains of pain to low back and left hip. Denies headache, neck pain, chest pain, abdominal pain, extremity pain, focal neurologic symptoms, or any other injuries or complaints.    PMH:   has a past medical history of Congestive heart failure (HCC), Diabetes mellitus (HCC), Hyperlipidemia, Hypertension, and Obesity.    PSH:   has no past surgical history on file.    Social:  Social History     Substance and Sexual Activity   Alcohol Use Not Currently     Social History     Tobacco Use   Smoking Status Never   Smokeless Tobacco Never     Social History     Substance and Sexual Activity   Drug Use Never     PE:  Vitals:    01/25/24 1831 01/25/24 1832 01/25/24 1839   BP:  152/92 147/89   Pulse:  97 98   Resp:  20 20   Temp: 98.7 °F (37.1 °C)     TempSrc: Oral     SpO2:  98% 100%       - 13 point ROS was performed and all are normal unless stated in the history above.   ROS:  Negative for fever, chills, cough, CP, SOB, n/v/d, abdominal pain, headache, rash  - Nursing note reviewed. Vitals reviewed.     Physical exam:  GENERAL APPEARANCE: Appears comfortable, no acute distress, calm and cooperative   NEURO: GCS 15, no focal deficits, cranial nerves grossly intact, clear fluent speech, no facial asymmetry   HEENT: No craniofacial ecchymosis, crepitus, or deformity. No logan's sign. No raccoon eyes. No hemotympanum.   Neck: Supple, no spinal tenderness, stepoffs, deformities, full ROM  CV: RRR, no murmurs, rubs, or gallops  LUNGS: CTAB, no wheezing, rales, or rhonchi  GI: Abdomen soft, non-tender, no rebound or guarding   MSK: Extremities non-tender, no pitting edema  SKIN: Warm and dry      Assessment and plan: This is a 49 y.o. male presenting for evaluation of fall with head strike, now with low back pain and left hip pain. CT imaging and hip xray to further evaluate.     Final assessment: Imaging reassuring. Patient able to ambulate. Strict ED return precautions provided should symptoms worsen and patient can otherwise follow up outpatient. Patient expresses an understanding and agreement with the plan and remains in good condition for discharge.       Code Status: Prior  Advance Directive and Living Will:      Power of :    POLST:      Medications   lidocaine (LIDODERM) 5 % patch 1 patch (1 patch Topical Medication Applied 1/25/24 1942)   ibuprofen (MOTRIN) tablet 400 mg (400 mg Oral Given 1/25/24 1942)     XR hip/pelv 2-3 vws left if performed   ED Interpretation   No acute osseous injury        CT spine cervical wo contrast   Final Result      No cervical spine fracture or traumatic malalignment.                  Workstation performed: [id number]         CT head without contrast   Final Result      No acute intracranial abnormality.                  Workstation performed: [id number]         CT spine lumbar without contrast   Final Result      No acute lumbar spine fracture  or subluxation.      Diffuse disc bulge at L4/L5 with mild bilateral facet arthropathy causing minimal to mild spinal canal stenosis and moderate bilateral neuroforaminal narrowing.         Workstation performed: [id number]           Orders Placed This Encounter   Procedures    CT head without contrast    CT spine lumbar without contrast    XR hip/pelv 2-3 vws left if performed    CT spine cervical wo contrast     Labs Reviewed - No data to display      Time reflects when diagnosis was documented in both MDM as applicable and the Disposition within this note       Time User Action Codes Description Comment    1/25/2024  8:21 PM Thong Garcia [W19.XXXA] Fall, initial encounter     1/25/2024  8:21 PM Thong Garcia [S09.90XA] Closed head injury, initial encounter     1/25/2024  8:21 PM Thong Garcia [M54.50] Acute midline low back pain without sciatica     1/25/2024  8:22 PM Thong Garcia [M25.552] Left hip pain           ED Disposition       ED Disposition   Discharge    Condition   Stable    Date/Time   Thu Jan 25, 2024  8:28 PM    Comment   John Arevalo discharge to home/self care.                   Follow-up Information       Follow up With Specialties Details Why Contact Info    Larry eCrda MD Family Medicine   [address]  [address]  [place] [id number] [phone]            Patient's Medications   Discharge Prescriptions    No medications on file     No discharge procedures on file.  Prior to Admission Medications   Prescriptions Last Dose Informant Patient Reported? Taking?   Blood Glucose Monitoring Suppl (OneTouch Verio) w/Device KIT  Self No No   Sig: Use to test 3-4x daily   Blood Pressure KIT  Self No No   Sig: Use 2 (two) times a day   Continuous Blood Gluc  (Dexcom G7 ) HENRY   No No   Sig: Use 1 each in the morning   Continuous Blood Gluc Sensor (Dexcom G7 Sensor)   No No   Sig: Use 1 Device every 10 days   Diclofenac Sodium (VOLTAREN) 1 %  Self No No   Sig:  Apply 2 g topically 4 (four) times a day   Empagliflozin-metFORMIN HCl 12.5-1000 MG TABS   No No   Sig: Take one pill by mouth twice daily   Insulin Glargine Solostar (Lantus SoloStar) 100 UNIT/ML SOPN  Self No No   Sig: Inject 0.3 mL (30 Units total) under the skin daily at bedtime   Insulin Pen Needle (BD Pen Needle Ann 2nd Gen) 32G X 4 MM MISC  Self No No   Sig: For use with insulin pen. Pharmacy may dispense brand covered by insurance.   Lancets 30G MISC   No No   Sig: Use to test 3x daily   Mavacamten 2.5 MG CAPS   No No   Sig: Take 2.5 mg by mouth in the morning   aspirin (ECOTRIN LOW STRENGTH) 81 mg EC tablet  Self No No   Sig: Take 1 tablet (81 mg total) by mouth daily   butalbital-acetaminophen-caffeine (FIORICET,ESGIC) -40 mg per tablet  Self Yes No   Sig: TAKE 2 TABLETS BY MOUTH EVERY 8 HOURS AS NEEDED FOR HEADACHES   gabapentin (NEURONTIN) 300 mg capsule  Self Yes No   Sig: take 1 capsule by mouth twice a day START BY TAKING AT BEDTIME FI...  (REFER TO PRESCRIPTION NOTES).   glucose blood (OneTouch Verio) test strip   No No   Sig: Use to test 3x daily   insulin lispro (HumaLOG KwikPen) 100 units/mL injection pen  Self No No   Sig: Inject 10 Units under the skin 3 (three) times a day with meals   ketorolac (ACULAR) 0.5 % ophthalmic solution  Self Yes No   Sig: instill 1 drop into both eyes four times a day for 10 days   ketorolac (ACULAR) 0.5 % ophthalmic solution   Yes No   Sig: Apply 1 drop to eye 4 (four) times a day   losartan (COZAAR) 100 MG tablet  Self Yes No   Sig: Take 100 mg by mouth daily   methocarbamol (ROBAXIN) 500 mg tablet   No No   Sig: Take 1 tablet (500 mg total) by mouth once as needed for muscle spasms for up to 7 days   metoprolol succinate (TOPROL-XL) 50 mg 24 hr tablet  Self No No   Sig: TAKE 1 TABLET BY MOUTH TWICE A DAY   mirtazapine (REMERON) 30 mg tablet  Self Yes No   Sig: take 1/2 tablet by mouth nightly   pantoprazole (PROTONIX) 40 mg tablet   No No   Sig: Take 1  "tablet (40 mg total) by mouth daily   ranolazine (RANEXA) 1000 MG SR tablet   No No   Sig: Take 1 tablet (1,000 mg total) by mouth 2 (two) times a day   rosuvastatin (CRESTOR) 5 mg tablet  Self Yes No   Sig: Take 5 mg by mouth daily   verapamil (VERELAN PM) 120 MG 24 hr capsule  Self No No   Sig: TAKE 1 CAPSULE (120 MG TOTAL) BY MOUTH DAILY AT BEDTIME      Facility-Administered Medications: None         Portions of the record may have been created with voice recognition software. Occasional wrong word or \"sound a like\" substitutions may have occurred due to the inherent limitations of voice recognition software. Read the chart carefully and recognize, using context, where substitutions have occurred.    Electronically signed by:  Edelmira Magallanes    "

## 2024-01-25 NOTE — ED PROVIDER NOTES
Emergency Department Trauma Note  John Arevalo 49 y.o. male MRN: [id number]  Unit/Bed#: ED 20/ED 20 Encounter: [id number]      Trauma Alert: Trauma Acuity: C  Model of Arrival: Mode of Arrival: Other (Comment) via    Trauma Team: Current Providers  Attending Provider: Edelmira Magallanes MD  Registered Nurse: Maryann Allen, RN  Resident: Thong Garcia DO  Registered Nurse: Geraldine Nunn, RASHEED  Consultants:     None      History of Present Illness     Chief Complaint:   Chief Complaint   Patient presents with    Fall     Pt stated that he fell down 6 steps.  + head strike.  + baby ASA - LOC     HPI:  John Arevalo is a 49 y.o. male who presents status post fall.  Mechanism:Details of Incident: fall down steps Injury Date: 01/25/24        49-year-old male presents to ED for evaluation status post mechanical fall.  Patient is on aspirin 81 mg daily but no blood thinners.  Patient states he was walking down the steps when he slipped and fell down 6 steps at his home today.  Patient states there was a positive head strike but no loss of consciousness.  Patient currently complains of left-sided head pain, but denies nausea, vomiting, vision changes, hearing changes, focal weakness, numbness, dizziness, lightheadedness.  Denies neck pain. Patient currently complains of lower back pain and left hip pain worsened with movement.  Patient reports he normally walks with a cane.  Patient reports he had a similar fall several months ago.      Review of Systems   Constitutional:  Negative for chills and fever.   Respiratory:  Negative for cough and shortness of breath.    Cardiovascular:  Negative for chest pain, palpitations and leg swelling.   Gastrointestinal:  Negative for abdominal pain, nausea and vomiting.   Genitourinary:  Negative for difficulty urinating.   Musculoskeletal:  Positive for back pain (lumbar spine). Negative for neck pain and neck stiffness.   Skin:  Negative for color change and wound.   Neurological:  Positive  for headaches. Negative for dizziness, weakness, light-headedness and numbness.   Psychiatric/Behavioral:  Negative for behavioral problems and confusion.    All other systems reviewed and are negative.      Historical Information     Immunizations:   Immunization History   Administered Date(s) Administered    COVID-19 PFIZER VACCINE 0.3 ML IM 04/09/2021, 04/30/2021    COVID-19 Pfizer Vac BIVALENT Joao-sucrose 12 Yr+ IM 03/10/2023    COVID-19 Pfizer vac (Joao-sucrose, gray cap) 12 yr+ IM 02/11/2022    Influenza, injectable, quadrivalent, preservative free 0.5 mL 11/04/2020       Past Medical History:   Diagnosis Date    Congestive heart failure (HCC)     Diabetes mellitus (HCC)     Hyperlipidemia     Hypertension     Obesity        Family History   Problem Relation Age of Onset    Diabetes Mother     Stroke Mother     Stroke Father      History reviewed. No pertinent surgical history.  Social History     Tobacco Use    Smoking status: Never    Smokeless tobacco: Never   Vaping Use    Vaping status: Never Used   Substance Use Topics    Alcohol use: Not Currently    Drug use: Never     E-Cigarette/Vaping    E-Cigarette Use Never User      E-Cigarette/Vaping Substances    Nicotine No     THC No     CBD No     Flavoring No        Family History: non-contributory    Meds/Allergies   Prior to Admission Medications   Prescriptions Last Dose Informant Patient Reported? Taking?   Blood Glucose Monitoring Suppl (OneTouch Verio) w/Device KIT  Self No No   Sig: Use to test 3-4x daily   Blood Pressure KIT  Self No No   Sig: Use 2 (two) times a day   Continuous Blood Gluc  (Dexcom G7 ) HENRY   No No   Sig: Use 1 each in the morning   Continuous Blood Gluc Sensor (Dexcom G7 Sensor)   No No   Sig: Use 1 Device every 10 days   Diclofenac Sodium (VOLTAREN) 1 %  Self No No   Sig: Apply 2 g topically 4 (four) times a day   Empagliflozin-metFORMIN HCl 12.5-1000 MG TABS   No No   Sig: Take one pill by mouth twice daily    Insulin Glargine Solostar (Lantus SoloStar) 100 UNIT/ML SOPN  Self No No   Sig: Inject 0.3 mL (30 Units total) under the skin daily at bedtime   Insulin Pen Needle (BD Pen Needle Ann 2nd Gen) 32G X 4 MM MISC  Self No No   Sig: For use with insulin pen. Pharmacy may dispense brand covered by insurance.   Lancets 30G MISC   No No   Sig: Use to test 3x daily   Mavacamten 2.5 MG CAPS   No No   Sig: Take 2.5 mg by mouth in the morning   aspirin (ECOTRIN LOW STRENGTH) 81 mg EC tablet  Self No No   Sig: Take 1 tablet (81 mg total) by mouth daily   butalbital-acetaminophen-caffeine (FIORICET,ESGIC) -40 mg per tablet  Self Yes No   Sig: TAKE 2 TABLETS BY MOUTH EVERY 8 HOURS AS NEEDED FOR HEADACHES   gabapentin (NEURONTIN) 300 mg capsule  Self Yes No   Sig: take 1 capsule by mouth twice a day START BY TAKING AT BEDTIME FI...  (REFER TO PRESCRIPTION NOTES).   glucose blood (OneTouch Verio) test strip   No No   Sig: Use to test 3x daily   insulin lispro (HumaLOG KwikPen) 100 units/mL injection pen  Self No No   Sig: Inject 10 Units under the skin 3 (three) times a day with meals   ketorolac (ACULAR) 0.5 % ophthalmic solution  Self Yes No   Sig: instill 1 drop into both eyes four times a day for 10 days   ketorolac (ACULAR) 0.5 % ophthalmic solution   Yes No   Sig: Apply 1 drop to eye 4 (four) times a day   losartan (COZAAR) 100 MG tablet  Self Yes No   Sig: Take 100 mg by mouth daily   methocarbamol (ROBAXIN) 500 mg tablet   No No   Sig: Take 1 tablet (500 mg total) by mouth once as needed for muscle spasms for up to 7 days   metoprolol succinate (TOPROL-XL) 50 mg 24 hr tablet  Self No No   Sig: TAKE 1 TABLET BY MOUTH TWICE A DAY   mirtazapine (REMERON) 30 mg tablet  Self Yes No   Sig: take 1/2 tablet by mouth nightly   pantoprazole (PROTONIX) 40 mg tablet   No No   Sig: Take 1 tablet (40 mg total) by mouth daily   ranolazine (RANEXA) 1000 MG SR tablet   No No   Sig: Take 1 tablet (1,000 mg total) by mouth 2 (two)  times a day   rosuvastatin (CRESTOR) 5 mg tablet  Self Yes No   Sig: Take 5 mg by mouth daily   verapamil (VERELAN PM) 120 MG 24 hr capsule  Self No No   Sig: TAKE 1 CAPSULE (120 MG TOTAL) BY MOUTH DAILY AT BEDTIME      Facility-Administered Medications: None       No Known Allergies    PHYSICAL EXAM    Objective   Vitals:   First set: Temperature: 98.7 °F (37.1 °C) (01/25/24 1831)  Pulse: 97 (01/25/24 1832)  Respirations: 20 (01/25/24 1832)  Blood Pressure: 152/92 (01/25/24 1832)  SpO2: 98 % (01/25/24 1832)    Primary Survey:   (A) Airway: Intact  (B) Breathing: Breath sounds bilaterally  (C) Circulation: Pulses:   normal  (D) Disabliity:  GCS Total:  15  (E) Expose:  Completed    Secondary Survey: (Click on Physical Exam tab above)  Physical Exam  Vitals and nursing note reviewed.   Constitutional:       General: He is not in acute distress.     Appearance: Normal appearance. He is normal weight. He is not ill-appearing or toxic-appearing.   HENT:      Head: Normocephalic and atraumatic.      Comments: No hemotympanum.  No Liu sign, no raccoon eyes.  Mild tenderness palpation of the left temporal region without swelling, ecchymosis, or abrasion.     Right Ear: Tympanic membrane, ear canal and external ear normal.      Left Ear: Tympanic membrane, ear canal and external ear normal.      Nose: No rhinorrhea.      Mouth/Throat:      Mouth: Mucous membranes are moist.   Eyes:      Extraocular Movements: Extraocular movements intact.      Conjunctiva/sclera: Conjunctivae normal.      Pupils: Pupils are equal, round, and reactive to light.   Cardiovascular:      Rate and Rhythm: Normal rate and regular rhythm.      Pulses: Normal pulses.      Heart sounds: Normal heart sounds.   Pulmonary:      Effort: Pulmonary effort is normal. No respiratory distress.      Breath sounds: Normal breath sounds. No wheezing.   Abdominal:      General: There is no distension.      Palpations: Abdomen is soft.      Tenderness: There is  no abdominal tenderness. There is no right CVA tenderness or left CVA tenderness.   Musculoskeletal:         General: No deformity.      Cervical back: Normal range of motion and neck supple. No rigidity or tenderness.      Right lower leg: No edema.      Left lower leg: No edema.      Comments: No tenderness to palpation of the cervical or thoracic spine.  Mild tenderness palpation of the lumbar spine with no step-offs or deformities.  Tenderness to palpation of the left hip with tenderness to palpation of the left hip on range of motion.   Skin:     General: Skin is warm and dry.   Neurological:      General: No focal deficit present.      Mental Status: He is alert and oriented to person, place, and time.      GCS: GCS eye subscore is 4. GCS verbal subscore is 5. GCS motor subscore is 6.      Cranial Nerves: Cranial nerves 2-12 are intact. No cranial nerve deficit.      Motor: No weakness.      Comments: 5/5 strength in all extremities   Psychiatric:         Mood and Affect: Mood normal.         Behavior: Behavior normal.         Cervical spine cleared by clinical criteria? Yes     Invasive Devices       Peripheral Intravenous Line  Duration             Peripheral IV 01/25/24 Left;Ventral (anterior) Forearm <1 day                    Lab Results:   Results Reviewed       None                   Imaging Studies:   Direct to CT: Yes  XR hip/pelv 2-3 vws left if performed   ED Interpretation by Edelmira Magallanes MD (01/25 2024)   No acute osseous injury        CT spine cervical wo contrast   Final Result by Christiano Ibanez MD (01/25 1957)      No cervical spine fracture or traumatic malalignment.                  Workstation performed: [id number]         CT head without contrast   Final Result by Christiano Ibanez MD (01/25 1951)      No acute intracranial abnormality.                  Workstation performed: [id number]         CT spine lumbar without contrast   Final Result by Christiano Ibanez MD (01/25 2002)       No acute lumbar spine fracture or subluxation.      Diffuse disc bulge at L4/L5 with mild bilateral facet arthropathy causing minimal to mild spinal canal stenosis and moderate bilateral neuroforaminal narrowing.         Workstation performed: [id number]               Procedures  Procedures         ED Course  ED Course as of 01/25/24 2127   Thu Jan 25, 2024 2013 CT spine lumbar without contrast  No acute lumbar spine fracture or subluxation.     Diffuse disc bulge at L4/L5 with mild bilateral facet arthropathy causing minimal to mild spinal canal stenosis and moderate bilateral neuroforaminal narrowing.   2013 CT spine cervical wo contrast  No cervical spine fracture or malalignment   2013 CT head without contrast  No acute intracranial abnormality   2016 XR hip/pelv 2-3 vws left if performed  No acute fracture   2028 Will discharge patient to home. Return precautions discussed with patient. Pt states he understands           Medical Decision Making  John Arevalo is a 49 y.o. male who presents to ED for evaluation status post mechanical fall.  Patient is on aspirin 81 mg.  Patient is not on anticoagulation.  Patient complaining of left-sided head pain, lumbar back pain, abdominal pain.    Physical exam: Vitals stable.  Pupils equal round reactive to light, extraocular muscles intact.  Head normocephalic atraumatic.  No hemotympanum.  No Liu sign.  No raccoon eyes.  Cranial nerves II through XII intact.  5 out of 5 strength in extremities.  No cervical tenderness to palpation.  No thoracic tenderness palpation.  Mild lumbar spine tenderness to palpation and tenderness palpation of the left hip.    Differential diagnoses include: Muscle strain, rule out lumbar fracture, rule out hip fracture, rule out intracranial bleed    Workup includes: CT head, CT C-spine, CT L-spine, x-ray left hip.  Will give Motrin and Lidoderm patch for pain    If imaging negative, will likely discharge to home    Please see ED Course  for additional information.      Amount and/or Complexity of Data Reviewed  Radiology: ordered and independent interpretation performed. Decision-making details documented in ED Course.    Risk  Prescription drug management.                Disposition  [facility] Transfer: No  Final diagnoses:   Fall, initial encounter   Closed head injury, initial encounter   Acute midline low back pain without sciatica   Left hip pain     Time reflects when diagnosis was documented in both MDM as applicable and the Disposition within this note       Time User Action Codes Description Comment    1/25/2024  8:21 PM Thong Garcia Add [W19.XXXA] Fall, initial encounter     1/25/2024  8:21 PM Thong Garcia Add [S09.90XA] Closed head injury, initial encounter     1/25/2024  8:21 PM Stanley Garciaothy Add [M54.50] Acute midline low back pain without sciatica     1/25/2024  8:22 PM Thong Garcia Add [M25.552] Left hip pain           ED Disposition       ED Disposition   Discharge    Condition   Stable    Date/Time   Thu Jan 25, 2024  8:28 PM    Comment   John Arevalo discharge to home/self care.                   Follow-up Information       Follow up With Specialties Details Why Contact Info    Larry Cerda MD Family Medicine   [address]  [address]  [place] [address]  [phone]            Discharge Medication List as of 1/25/2024  8:28 PM        CONTINUE these medications which have NOT CHANGED    Details   aspirin (ECOTRIN LOW STRENGTH) 81 mg EC tablet Take 1 tablet (81 mg total) by mouth daily, Starting Wed 5/6/2020, No Print      Blood Glucose Monitoring Suppl (OneTouch Verio) w/Device KIT Use to test 3-4x daily, Normal      Blood Pressure KIT Use 2 (two) times a day, Starting Tue 10/12/2021, Normal      butalbital-acetaminophen-caffeine (FIORICET,ESGIC) -40 mg per tablet TAKE 2 TABLETS BY MOUTH EVERY 8 HOURS AS NEEDED FOR HEADACHES, Historical Med      Continuous Blood Gluc  (Dexcom G7 ) HENRY Use 1  each in the morning, Starting Thu 12/28/2023, Normal      Continuous Blood Gluc Sensor (Dexcom G7 Sensor) Use 1 Device every 10 days, Starting Thu 12/28/2023, Normal      Diclofenac Sodium (VOLTAREN) 1 % Apply 2 g topically 4 (four) times a day, Starting Tue 10/12/2021, Normal      Empagliflozin-metFORMIN HCl 12.5-1000 MG TABS Take one pill by mouth twice daily, Normal      gabapentin (NEURONTIN) 300 mg capsule take 1 capsule by mouth twice a day START BY TAKING AT BEDTIME FI...  (REFER TO PRESCRIPTION NOTES)., Historical Med      glucose blood (OneTouch Verio) test strip Use to test 3x daily, Normal      Insulin Glargine Solostar (Lantus SoloStar) 100 UNIT/ML SOPN Inject 0.3 mL (30 Units total) under the skin daily at bedtime, Starting Thu 3/23/2023, Normal      insulin lispro (HumaLOG KwikPen) 100 units/mL injection pen Inject 10 Units under the skin 3 (three) times a day with meals, Starting Thu 3/23/2023, Normal      Insulin Pen Needle (BD Pen Needle Ann 2nd Gen) 32G X 4 MM MISC For use with insulin pen. Pharmacy may dispense brand covered by insurance., Normal      ketorolac (ACULAR) 0.5 % ophthalmic solution instill 1 drop into both eyes four times a day for 10 days, Historical Med      Lancets 30G MISC Use to test 3x daily, Normal      losartan (COZAAR) 100 MG tablet Take 100 mg by mouth daily, Starting Wed 4/19/2023, Historical Med      Mavacamten 2.5 MG CAPS Take 2.5 mg by mouth in the morning, Starting Tue 1/23/2024, Normal      methocarbamol (ROBAXIN) 500 mg tablet Take 1 tablet (500 mg total) by mouth once as needed for muscle spasms for up to 7 days, Starting Tue 1/2/2024, Until Tue 1/9/2024 at 2359, Normal      metoprolol succinate (TOPROL-XL) 50 mg 24 hr tablet TAKE 1 TABLET BY MOUTH TWICE A DAY, Normal      mirtazapine (REMERON) 30 mg tablet take 1/2 tablet by mouth nightly, Historical Med      pantoprazole (PROTONIX) 40 mg tablet Take 1 tablet (40 mg total) by mouth daily, Starting Wed 11/29/2023,  Normal      ranolazine (RANEXA) 1000 MG SR tablet Take 1 tablet (1,000 mg total) by mouth 2 (two) times a day, Starting Mon 10/23/2023, Normal      rosuvastatin (CRESTOR) 5 mg tablet Take 5 mg by mouth daily, Historical Med      verapamil (VERELAN PM) 120 MG 24 hr capsule TAKE 1 CAPSULE (120 MG TOTAL) BY MOUTH DAILY AT BEDTIME, Starting Fri 11/25/2022, Normal           No discharge procedures on file.    PDMP Review         Value Time User    PDMP Reviewed  Yes 5/6/2020  2:27 PM Roxana Gamez PA-C            ED Provider  Electronically Signed by           Thong Garcia DO  01/25/24 [id number]

## 2024-01-26 NOTE — DISCHARGE INSTRUCTIONS
Follow up with your primary care doctor as an outpatient. You can take motrin and tylenol for pain as needed. Return to the emergency department if your pain changes in nature or worsens despite medication, or if you develop fevers, chills, weakness, numbness, or generally feel worse.

## 2024-02-01 ENCOUNTER — PROCEDURE VISIT (OUTPATIENT)
Age: 49
End: 2024-02-01
Payer: COMMERCIAL

## 2024-02-01 VITALS — BODY MASS INDEX: 35 KG/M2 | HEIGHT: 71 IN | WEIGHT: 250 LBS

## 2024-02-01 DIAGNOSIS — M99.05 SEGMENTAL DYSFUNCTION OF PELVIC REGION: Primary | ICD-10-CM

## 2024-02-01 DIAGNOSIS — M99.03 SEGMENTAL DYSFUNCTION OF LUMBAR REGION: ICD-10-CM

## 2024-02-01 DIAGNOSIS — M99.04 SEGMENTAL DYSFUNCTION OF SACRAL REGION: ICD-10-CM

## 2024-02-01 DIAGNOSIS — M54.16 LUMBAR RADICULOPATHY: ICD-10-CM

## 2024-02-01 DIAGNOSIS — M54.41 ACUTE BILATERAL LOW BACK PAIN WITH RIGHT-SIDED SCIATICA: ICD-10-CM

## 2024-02-01 PROCEDURE — 98941 CHIROPRACT MANJ 3-4 REGIONS: CPT | Performed by: CHIROPRACTOR

## 2024-02-01 NOTE — PROGRESS NOTES
Date of first visit: 1/11/2024       HPI:  Ramos returns for treatment today right-sided low back right posterior thigh pain as well as mid back and neck stiffness.       Denies any weaknesses denies any bowel bladder changes reports no other associated symptomatology.        VPAS 6      The following portions of the patient's history were reviewed and updated as appropriate: allergies, current medications, past family history, past medical history, past social history, past surgical history, and problem list.    Review of Systems    Physical Exam:  Pelvic obliquity is noted leg length inequality is present.  Misalignment of the right innominate on sacrum.  Biomechanically joint dysfunction right SI L5-S1 motion unit he still has erector spinae hypertonicity noted on the right.    Joint dysfunction T4-T5.  Assessment:   Diagnosis ICD-10-CM Associated Orders   1. Segmental dysfunction of pelvic region  M99.05       2. Lumbar radiculopathy  M54.16       3. Segmental dysfunction of sacral region  M99.04       4. Segmental dysfunction of lumbar region  M99.03       5. Acute bilateral low back pain with right-sided sciatica  M54.41                 Treatment: 43115  Manipulation to the right innominate, sacrum, L5 via Mart drop maneuver well-tolerated.  Manipulation T4  well-tolerated.    Discussion:  Daily icing flexion-based stretching for the lower back I will see him back for follow-up.

## 2024-02-02 ENCOUNTER — TELEPHONE (OUTPATIENT)
Dept: CARDIAC SURGERY | Facility: CLINIC | Age: 49
End: 2024-02-02

## 2024-02-02 NOTE — TELEPHONE ENCOUNTER
Spoke jacob Lopez- per Dr Norris -stop verpamil and ranolazine 5 days prior to starting camzyos. Take blood pressure 2x daily  after stopping verapamil and ranolazine and call with readings.  Pt verbally acknowledged

## 2024-02-07 DIAGNOSIS — I42.2 HYPERTROPHIC CARDIOMYOPATHY (HCC): Primary | ICD-10-CM

## 2024-02-14 ENCOUNTER — OFFICE VISIT (OUTPATIENT)
Dept: DIABETES SERVICES | Facility: CLINIC | Age: 49
End: 2024-02-14
Payer: COMMERCIAL

## 2024-02-14 DIAGNOSIS — E11.65 TYPE 2 DIABETES MELLITUS WITH HYPERGLYCEMIA, WITH LONG-TERM CURRENT USE OF INSULIN (HCC): Primary | ICD-10-CM

## 2024-02-14 DIAGNOSIS — Z79.4 CURRENT USE OF INSULIN (HCC): ICD-10-CM

## 2024-02-14 DIAGNOSIS — Z79.4 TYPE 2 DIABETES MELLITUS WITH HYPERGLYCEMIA, WITH LONG-TERM CURRENT USE OF INSULIN (HCC): Primary | ICD-10-CM

## 2024-02-14 PROCEDURE — 95249 CONT GLUC MNTR PT PROV EQP: CPT

## 2024-02-14 NOTE — PROGRESS NOTES
Dexcom G7 Personal Training    Met with John Arevalo for Dexcom G7 personal training. Patient comes in today with there own unit to be trained on. Completed all aspects of training, including site selection on rotation, not infusing insulin near the sensor site, proper insertion technique, inserting codes into the , charging, waterproof sensor, range of 20ft, setting high low alarms that can be adjusted based on their preferences. They put on their first sensor by themselves with no issue.  Left my office today with sensor on and in 30 min warm up mode.      John Arevalo will be running the dexcom through their .    Discussed creating a Clarity account to be able to link and upload from home or auto upload from their phone. Patient was added to our clarity account today while in office and invited to link to us if using their phone. Patient understands that reciever will be downloaded while in office at time of visit and/or cell phone will automatically upload to our clarity for them. They understand that their blood sugars are not monitored by us on a regular basis, but that we can access them as needed or desired by the patient and provider.     [employer]ex[employer]'s phone number is in their paperwork, encouraged John to reach out to [employer] if they have any issues after hours, 24/7. Training completed, will call with questions.     Lab Results   Component Value Date    HGBA1C 12.0 (H) 12/22/2023       Lab Results   Component Value Date    SODIUM 139 08/13/2023    K 3.7 08/13/2023     08/13/2023    CO2 28 08/13/2023    AGAP 4 08/13/2023    BUN 14 08/13/2023    CREATININE 0.98 08/13/2023    GLUC 132 08/13/2023    GLUF 131 (H) 03/23/2023    CALCIUM 9.4 08/13/2023    AST 31 08/13/2023    ALT 42 08/13/2023    ALKPHOS 96 08/13/2023    TP 7.9 08/13/2023    TBILI 0.73 08/13/2023    EGFR 90 08/13/2023           Patient response to instruction    Comprehension: good  Motivation: good  Expected Compliance:  good  Response to Teachback: 100%, demonstrated understanding    Start- Stop: 3:40-4:00  Total Minutes: 20 Minutes  Group or Individual Instruction: DSME  Other: Melanie Lamb MD    Thank you for referring your patient to [facility], it was a pleasure working with them today. Please feel free to call with any questions or concerns.

## 2024-02-22 DIAGNOSIS — M25.512 LEFT SHOULDER PAIN, UNSPECIFIED CHRONICITY: ICD-10-CM

## 2024-02-23 ENCOUNTER — APPOINTMENT (EMERGENCY)
Dept: RADIOLOGY | Facility: HOSPITAL | Age: 49
End: 2024-02-23
Payer: COMMERCIAL

## 2024-02-23 ENCOUNTER — HOSPITAL ENCOUNTER (EMERGENCY)
Facility: HOSPITAL | Age: 49
Discharge: HOME | End: 2024-02-23
Attending: EMERGENCY MEDICINE
Payer: COMMERCIAL

## 2024-02-23 VITALS
OXYGEN SATURATION: 96 % | HEART RATE: 90 BPM | TEMPERATURE: 97.6 F | DIASTOLIC BLOOD PRESSURE: 87 MMHG | SYSTOLIC BLOOD PRESSURE: 133 MMHG | RESPIRATION RATE: 20 BRPM

## 2024-02-23 DIAGNOSIS — I10 HTN (HYPERTENSION): ICD-10-CM

## 2024-02-23 DIAGNOSIS — R07.9 CHEST PAIN: Primary | ICD-10-CM

## 2024-02-23 DIAGNOSIS — I25.10 CAD (CORONARY ARTERY DISEASE): ICD-10-CM

## 2024-02-23 DIAGNOSIS — E11.9 DM (DIABETES MELLITUS) (HCC): ICD-10-CM

## 2024-02-23 DIAGNOSIS — I48.91 A-FIB (HCC): ICD-10-CM

## 2024-02-23 DIAGNOSIS — R10.13 EPIGASTRIC ABDOMINAL PAIN: ICD-10-CM

## 2024-02-23 LAB
2HR DELTA HS TROPONIN: 3 NG/L
ALBUMIN SERPL BCP-MCNC: 3.7 G/DL (ref 3.5–5)
ALP SERPL-CCNC: 190 U/L (ref 34–104)
ALT SERPL W P-5'-P-CCNC: 31 U/L (ref 7–52)
ANION GAP SERPL CALCULATED.3IONS-SCNC: 9 MMOL/L
AST SERPL W P-5'-P-CCNC: 38 U/L (ref 13–39)
BASOPHILS # BLD AUTO: 0.06 THOUSANDS/ÂΜL (ref 0–0.1)
BASOPHILS NFR BLD AUTO: 1 % (ref 0–1)
BILIRUB SERPL-MCNC: 0.61 MG/DL (ref 0.2–1)
BUN SERPL-MCNC: 13 MG/DL (ref 5–25)
CALCIUM SERPL-MCNC: 9.9 MG/DL (ref 8.4–10.2)
CARDIAC TROPONIN I PNL SERPL HS: 12 NG/L
CARDIAC TROPONIN I PNL SERPL HS: 9 NG/L
CHLORIDE SERPL-SCNC: 101 MMOL/L (ref 96–108)
CO2 SERPL-SCNC: 24 MMOL/L (ref 21–32)
CREAT SERPL-MCNC: 0.92 MG/DL (ref 0.6–1.3)
EOSINOPHIL # BLD AUTO: 0.21 THOUSAND/ÂΜL (ref 0–0.61)
EOSINOPHIL NFR BLD AUTO: 4 % (ref 0–6)
ERYTHROCYTE [DISTWIDTH] IN BLOOD BY AUTOMATED COUNT: 12.8 % (ref 11.6–15.1)
GFR SERPL CREATININE-BSD FRML MDRD: 97 ML/MIN/1.73SQ M
GLUCOSE SERPL-MCNC: 121 MG/DL (ref 65–140)
HCT VFR BLD AUTO: 47.1 % (ref 36.5–49.3)
HGB BLD-MCNC: 15.2 G/DL (ref 12–17)
IMM GRANULOCYTES # BLD AUTO: 0.02 THOUSAND/UL (ref 0–0.2)
IMM GRANULOCYTES NFR BLD AUTO: 0 % (ref 0–2)
LIPASE SERPL-CCNC: 86 U/L (ref 11–82)
LYMPHOCYTES # BLD AUTO: 1.8 THOUSANDS/ÂΜL (ref 0.6–4.47)
LYMPHOCYTES NFR BLD AUTO: 31 % (ref 14–44)
MCH RBC QN AUTO: 29.2 PG (ref 26.8–34.3)
MCHC RBC AUTO-ENTMCNC: 32.3 G/DL (ref 31.4–37.4)
MCV RBC AUTO: 90 FL (ref 82–98)
MONOCYTES # BLD AUTO: 0.67 THOUSAND/ÂΜL (ref 0.17–1.22)
MONOCYTES NFR BLD AUTO: 12 % (ref 4–12)
NEUTROPHILS # BLD AUTO: 3.09 THOUSANDS/ÂΜL (ref 1.85–7.62)
NEUTS SEG NFR BLD AUTO: 52 % (ref 43–75)
NRBC BLD AUTO-RTO: 0 /100 WBCS
PLATELET # BLD AUTO: 254 THOUSANDS/UL (ref 149–390)
PMV BLD AUTO: 9.8 FL (ref 8.9–12.7)
POTASSIUM SERPL-SCNC: 4.2 MMOL/L (ref 3.5–5.3)
PROT SERPL-MCNC: 8.3 G/DL (ref 6.4–8.4)
RBC # BLD AUTO: 5.21 MILLION/UL (ref 3.88–5.62)
SODIUM SERPL-SCNC: 134 MMOL/L (ref 135–147)
WBC # BLD AUTO: 5.85 THOUSAND/UL (ref 4.31–10.16)

## 2024-02-23 PROCEDURE — 84484 ASSAY OF TROPONIN QUANT: CPT | Performed by: EMERGENCY MEDICINE

## 2024-02-23 PROCEDURE — 99285 EMERGENCY DEPT VISIT HI MDM: CPT | Performed by: EMERGENCY MEDICINE

## 2024-02-23 PROCEDURE — 85025 COMPLETE CBC W/AUTO DIFF WBC: CPT | Performed by: EMERGENCY MEDICINE

## 2024-02-23 PROCEDURE — 96374 THER/PROPH/DIAG INJ IV PUSH: CPT

## 2024-02-23 PROCEDURE — 93005 ELECTROCARDIOGRAM TRACING: CPT

## 2024-02-23 PROCEDURE — 83690 ASSAY OF LIPASE: CPT

## 2024-02-23 PROCEDURE — 80053 COMPREHEN METABOLIC PANEL: CPT | Performed by: EMERGENCY MEDICINE

## 2024-02-23 PROCEDURE — 71045 X-RAY EXAM CHEST 1 VIEW: CPT

## 2024-02-23 PROCEDURE — 99285 EMERGENCY DEPT VISIT HI MDM: CPT

## 2024-02-23 PROCEDURE — 36415 COLL VENOUS BLD VENIPUNCTURE: CPT

## 2024-02-23 RX ORDER — MAGNESIUM HYDROXIDE/ALUMINUM HYDROXICE/SIMETHICONE 120; 1200; 1200 MG/30ML; MG/30ML; MG/30ML
30 SUSPENSION ORAL ONCE
Status: COMPLETED | OUTPATIENT
Start: 2024-02-23 | End: 2024-02-23

## 2024-02-23 RX ORDER — FAMOTIDINE 10 MG/ML
20 INJECTION, SOLUTION INTRAVENOUS ONCE
Status: COMPLETED | OUTPATIENT
Start: 2024-02-23 | End: 2024-02-23

## 2024-02-23 RX ADMIN — FAMOTIDINE 20 MG: 10 INJECTION INTRAVENOUS at 21:22

## 2024-02-23 RX ADMIN — ALUMINUM HYDROXIDE, MAGNESIUM HYDROXIDE, AND SIMETHICONE 30 ML: 200; 200; 20 SUSPENSION ORAL at 21:21

## 2024-02-23 NOTE — ED NOTES
Patient sitting in waiting without complaints at this time. Respiratory rate is even and unlabored      Lucinda Stout RN  02/23/24 [id number]

## 2024-02-24 LAB
ATRIAL RATE: 92 BPM
P AXIS: 57 DEGREES
PR INTERVAL: 180 MS
QRS AXIS: -46 DEGREES
QRSD INTERVAL: 116 MS
QT INTERVAL: 380 MS
QTC INTERVAL: 469 MS
T WAVE AXIS: 134 DEGREES
VENTRICULAR RATE: 92 BPM

## 2024-02-24 PROCEDURE — 93010 ELECTROCARDIOGRAM REPORT: CPT | Performed by: INTERNAL MEDICINE

## 2024-02-24 NOTE — DISCHARGE INSTRUCTIONS
You were seen and evaluated in the emergency department for chest pain and abdominal pain.  Symptoms consistent with gastritis please follow-up with GI.  No lab abnormalities to suggest acute coronary syndrome or other cardiac pathology for chest pain.  Will send a referral to cardiology given risk factors.  Please follow-up with your PCP.  If your symptoms worsen or persist please return to the emergency department for further evaluation and management

## 2024-02-24 NOTE — ED PROVIDER NOTES
History  Chief Complaint   Patient presents with    Chest Pain     Patient arrives with chest pain and abdominal pain. This started 3 days ago. -nausea -vomiting      Patient is a 49-year-old male past medical history CHF DM hypertension A-fib that presents for evaluation of chest pain and abdominal pain.  Patient reports that his symptoms began 3 days ago describes the chest pain as a left-sided sharp chest pain that is intermittent and exertional.  Is nonradiating.  Describes the abdominal pain as upper worse in the epigastrium that is constant nonradiating exacerbated by food.  States that he had this pain before is due to see GI next month.  Was taking omeprazole but stopped because it was interfering with some of his heart medications.  He is denying any other complaints at this time          Prior to Admission Medications   Prescriptions Last Dose Informant Patient Reported? Taking?   Blood Glucose Monitoring Suppl (OneTouch Verio) w/Device KIT  Self No No   Sig: Use to test 3-4x daily   Blood Pressure KIT  Self No No   Sig: Use 2 (two) times a day   Continuous Blood Gluc  (Dexcom G7 ) HENRY   No No   Sig: Use 1 each in the morning   Continuous Blood Gluc Sensor (Dexcom G7 Sensor)   No No   Sig: Use 1 Device every 10 days   Diclofenac Sodium (VOLTAREN) 1 %   No No   Sig: Apply 2 g topically 4 (four) times a day   Empagliflozin-metFORMIN HCl 12.5-1000 MG TABS   No No   Sig: Take one pill by mouth twice daily   Insulin Glargine Solostar (Lantus SoloStar) 100 UNIT/ML SOPN  Self No No   Sig: Inject 0.3 mL (30 Units total) under the skin daily at bedtime   Insulin Pen Needle (BD Pen Needle Ann 2nd Gen) 32G X 4 MM MISC  Self No No   Sig: For use with insulin pen. Pharmacy may dispense brand covered by insurance.   Lancets 30G MISC   No No   Sig: Use to test 3x daily   Mavacamten 2.5 MG CAPS   No No   Sig: Take 2.5 mg by mouth in the morning   aspirin (ECOTRIN LOW STRENGTH) 81 mg EC tablet  Self No No    Sig: Take 1 tablet (81 mg total) by mouth daily   butalbital-acetaminophen-caffeine (FIORICET,ESGIC) -40 mg per tablet  Self Yes No   Sig: TAKE 2 TABLETS BY MOUTH EVERY 8 HOURS AS NEEDED FOR HEADACHES   gabapentin (NEURONTIN) 300 mg capsule  Self Yes No   Sig: take 1 capsule by mouth twice a day START BY TAKING AT BEDTIME FI...  (REFER TO PRESCRIPTION NOTES).   glucose blood (OneTouch Verio) test strip   No No   Sig: Use to test 3x daily   insulin lispro (HumaLOG KwikPen) 100 units/mL injection pen  Self No No   Sig: Inject 10 Units under the skin 3 (three) times a day with meals   ketorolac (ACULAR) 0.5 % ophthalmic solution  Self Yes No   Sig: instill 1 drop into both eyes four times a day for 10 days   ketorolac (ACULAR) 0.5 % ophthalmic solution   Yes No   Sig: Apply 1 drop to eye 4 (four) times a day   losartan (COZAAR) 100 MG tablet  Self Yes No   Sig: Take 100 mg by mouth daily   methocarbamol (ROBAXIN) 500 mg tablet   No No   Sig: Take 1 tablet (500 mg total) by mouth once as needed for muscle spasms for up to 7 days   metoprolol succinate (TOPROL-XL) 50 mg 24 hr tablet  Self No No   Sig: TAKE 1 TABLET BY MOUTH TWICE A DAY   mirtazapine (REMERON) 30 mg tablet  Self Yes No   Sig: take 1/2 tablet by mouth nightly   pantoprazole (PROTONIX) 40 mg tablet   No No   Sig: Take 1 tablet (40 mg total) by mouth daily   ranolazine (RANEXA) 1000 MG SR tablet   No No   Sig: Take 1 tablet (1,000 mg total) by mouth 2 (two) times a day   rosuvastatin (CRESTOR) 5 mg tablet  Self Yes No   Sig: Take 5 mg by mouth daily   verapamil (VERELAN PM) 120 MG 24 hr capsule  Self No No   Sig: TAKE 1 CAPSULE (120 MG TOTAL) BY MOUTH DAILY AT BEDTIME      Facility-Administered Medications: None       Past Medical History:   Diagnosis Date    Congestive heart failure (HCC)     Diabetes mellitus (HCC)     Hyperlipidemia     Hypertension     Obesity        No past surgical history on file.    Family History   Problem Relation Age of  Onset    Diabetes Mother     Stroke Mother     Stroke Father      I have reviewed and agree with the history as documented.    E-Cigarette/Vaping    E-Cigarette Use Never User      E-Cigarette/Vaping Substances    Nicotine No     THC No     CBD No     Flavoring No      Social History     Tobacco Use    Smoking status: Never    Smokeless tobacco: Never   Vaping Use    Vaping status: Never Used   Substance Use Topics    Alcohol use: Not Currently    Drug use: Never        Review of Systems   Constitutional:  Negative for chills and fever.   Respiratory:  Negative for shortness of breath.    Cardiovascular:  Positive for chest pain.   Gastrointestinal:  Positive for abdominal pain. Negative for nausea and vomiting.   Musculoskeletal:  Negative for back pain and neck pain.   Neurological:  Negative for weakness, numbness and headaches.   All other systems reviewed and are negative.      Physical Exam  ED Triage Vitals [02/23/24 1707]   Temperature Pulse Respirations Blood Pressure SpO2   97.6 °F (36.4 °C) 95 20 169/90 96 %      Temp Source Heart Rate Source Patient Position - Orthostatic VS BP Location FiO2 (%)   Temporal Monitor Sitting Right arm --      Pain Score       --             Orthostatic Vital Signs  Vitals:    02/23/24 1707 02/23/24 2100   BP: 169/90 133/87   Pulse: 95 90   Patient Position - Orthostatic VS: Sitting Lying       Physical Exam  Vitals and nursing note reviewed.   Constitutional:       General: He is not in acute distress.     Appearance: He is well-developed. He is not ill-appearing or diaphoretic.   HENT:      Head: Normocephalic and atraumatic.      Mouth/Throat:      Mouth: Mucous membranes are moist.   Eyes:      Extraocular Movements: Extraocular movements intact.      Conjunctiva/sclera: Conjunctivae normal.   Cardiovascular:      Rate and Rhythm: Normal rate and regular rhythm.      Heart sounds: No murmur heard.  Pulmonary:      Effort: Pulmonary effort is normal. No respiratory  distress.      Breath sounds: Normal breath sounds.   Abdominal:      Palpations: Abdomen is soft.      Tenderness: There is abdominal tenderness in the right upper quadrant, epigastric area and left upper quadrant. There is no guarding or rebound.   Musculoskeletal:         General: No swelling. Normal range of motion.      Cervical back: Neck supple.      Right lower leg: No edema.      Left lower leg: No edema.   Skin:     General: Skin is warm and dry.   Neurological:      General: No focal deficit present.      Mental Status: He is alert and oriented to person, place, and time.         ED Medications  Medications   Famotidine (PF) (PEPCID) injection 20 mg (20 mg Intravenous Given 2/23/24 2122)   aluminum-magnesium hydroxide-simethicone (MAALOX) oral suspension 30 mL (30 mL Oral Given 2/23/24 2121)       Diagnostic Studies  Results Reviewed       Procedure Component Value Units Date/Time    Lipase [[id number]]  (Abnormal) Collected: 02/23/24 2122    Lab Status: Final result Specimen: Blood from Arm, Left Updated: 02/23/24 2155     Lipase 86 u/L     HS Troponin I 2hr [[id number]]  (Normal) Collected: 02/23/24 2046    Lab Status: Final result Specimen: Blood from Arm, Left Updated: 02/23/24 2125     hs TnI 2hr 12 ng/L      Delta 2hr hsTnI 3 ng/L     HS Troponin 0hr (reflex protocol) [[id number]]  (Normal) Collected: 02/23/24 1813    Lab Status: Final result Specimen: Blood from Arm, Left Updated: 02/23/24 1850     hs TnI 0hr 9 ng/L     Comprehensive metabolic panel [[id number]]  (Abnormal) Collected: 02/23/24 1813    Lab Status: Final result Specimen: Blood from Arm, Left Updated: 02/23/24 1845     Sodium 134 mmol/L      Potassium 4.2 mmol/L      Chloride 101 mmol/L      CO2 24 mmol/L      ANION GAP 9 mmol/L      BUN 13 mg/dL      Creatinine 0.92 mg/dL      Glucose 121 mg/dL      Calcium 9.9 mg/dL      AST 38 U/L      ALT 31 U/L      Alkaline Phosphatase 190 U/L      Total Protein 8.3 g/dL      Albumin 3.7 g/dL       Total Bilirubin 0.61 mg/dL      eGFR 97 ml/min/1.73sq m     Narrative:      [employer] guidelines for Chronic Kidney Disease (CKD):     Stage 1 with normal or high GFR (GFR > 90 mL/min/1.73 square meters)    Stage 2 Mild CKD (GFR = 60-89 mL/min/1.73 square meters)    Stage 3A Moderate CKD (GFR = 45-59 mL/min/1.73 square meters)    Stage 3B Moderate CKD (GFR = 30-44 mL/min/1.73 square meters)    Stage 4 Severe CKD (GFR = 15-29 mL/min/1.73 square meters)    Stage 5 End Stage CKD (GFR <15 mL/min/1.73 square meters)  Note: GFR calculation is accurate only with a steady state creatinine    CBC and differential [[id number]] Collected: 02/23/24 1813    Lab Status: Final result Specimen: Blood from Arm, Left Updated: 02/23/24 1825     WBC 5.85 Thousand/uL      RBC 5.21 Million/uL      Hemoglobin 15.2 g/dL      Hematocrit 47.1 %      MCV 90 fL      MCH 29.2 pg      MCHC 32.3 g/dL      RDW 12.8 %      MPV 9.8 fL      Platelets 254 Thousands/uL      nRBC 0 /100 WBCs      Neutrophils Relative 52 %      Immat GRANS % 0 %      Lymphocytes Relative 31 %      Monocytes Relative 12 %      Eosinophils Relative 4 %      Basophils Relative 1 %      Neutrophils Absolute 3.09 Thousands/µL      Immature Grans Absolute 0.02 Thousand/uL      Lymphocytes Absolute 1.80 Thousands/µL      Monocytes Absolute 0.67 Thousand/µL      Eosinophils Absolute 0.21 Thousand/µL      Basophils Absolute 0.06 Thousands/µL                    XR chest 1 view portable   ED Interpretation by Rioc Roe DO (02/23 2102)   Chest x-ray interpreted by me shows no acute cardiopulmonary disease            Procedures  ECG 12 Lead Documentation Only    Date/Time: 2/24/2024 12:44 AM    Performed by: Chris Hill DO  Authorized by: Chris Hill DO    Indications / Diagnosis:  Chest pain  ECG reviewed by me, the ED Provider: yes    Patient location:  ED  Previous ECG:     Previous ECG:  Compared to current    Similarity:  No  change  Interpretation:     Interpretation: abnormal    Rate:     ECG rate:  91    ECG rate assessment: normal    Rhythm:     Rhythm: sinus rhythm    Ectopy:     Ectopy: none    QRS:     QRS axis:  Normal    QRS intervals:  Normal  Conduction:     Conduction: normal    ST segments:     ST segments:  Normal  T waves:     T waves: inverted      Inverted:  I and aVL  Other findings:     Other findings: LVH          ED Course             HEART Risk Score      Flowsheet Row Most Recent Value   Heart Score Risk Calculator    History 1 Filed at: 02/23/2024 2149   ECG 1 Filed at: 02/23/2024 2149   Age 1 Filed at: 02/23/2024 2149   Risk Factors 2 Filed at: 02/23/2024 2149   Troponin 0 Filed at: 02/23/2024 2149   HEART Score 5 Filed at: 02/23/2024 2149                        SBIRT 22yo+      Flowsheet Row Most Recent Value   DARIAN: How many times in the past year have you...    Used an illegal drug or used a prescription medication for non-medical reasons? Never Filed at: 02/23/2024 1706                  Medical Decision Making  Patient is a 49-year-old male presenting for evaluation of chest pain and abdominal pain    Differential: ACS versus anemia versus electrolyte abnormality versus arrhythmia versus gastritis/PUD versus cholecystitis versus pancreatitis    Plan: Cardiac and abdominal labs, chest x-ray EKG, analgesics, monitor and reassess.  Final dispo pending anticipate discharge    Labs notable for mild elevation of initial troponin 9 will obtain 2-hour delta.  Otherwise abdominal labs unremarkable    2-hour delta +3.  Patient is hemodynamically stable and cleared for discharge with outpatient follow-up with PCP will send referral to cardiology given heart score of 5.  Return precautions given patient verbalized understanding    Amount and/or Complexity of Data Reviewed  Labs: ordered.  Radiology: ordered and independent interpretation performed.    Risk  OTC drugs.  Prescription drug  management.          Disposition  Final diagnoses:   Chest pain   A-fib (HCC)   CAD (coronary artery disease)   DM (diabetes mellitus) (HCC)   HTN (hypertension)   Epigastric abdominal pain     Time reflects when diagnosis was documented in both MDM as applicable and the Disposition within this note       Time User Action Codes Description Comment    2/23/2024  9:50 PM Bollard, Chris Add [R07.9] Chest pain     2/23/2024  9:50 PM Bollard, Chris Add [I48.91] A-fib (HCC)     2/23/2024  9:50 PM Bollard, Chris Add [I25.10] CAD (coronary artery disease)     2/23/2024  9:50 PM Bollard, Chris Add [E11.9] DM (diabetes mellitus) (HCC)     2/23/2024  9:50 PM Bollard, Chris Add [I10] HTN (hypertension)     2/23/2024  9:51 PM Bollard, Chris Add [R10.13] Epigastric abdominal pain           ED Disposition       ED Disposition   Discharge    Condition   Stable    Date/Time   Fri Feb 23, 2024 2231    Comment   John Mickey discharge to home/self care.                   Follow-up Information       Follow up With Specialties Details Why Contact Info    Larry Cerda MD Family Medicine Call in 2 days  [address] [facility]  [address]  [facility] [address] [phone]              Discharge Medication List as of 2/23/2024 10:32 PM        CONTINUE these medications which have NOT CHANGED    Details   aspirin (ECOTRIN LOW STRENGTH) 81 mg EC tablet Take 1 tablet (81 mg total) by mouth daily, Starting Wed 5/6/2020, No Print      Blood Glucose Monitoring Suppl (OneTouch Verio) w/Device KIT Use to test 3-4x daily, Normal      Blood Pressure KIT Use 2 (two) times a day, Starting Tue 10/12/2021, Normal      butalbital-acetaminophen-caffeine (FIORICET,ESGIC) -40 mg per tablet TAKE 2 TABLETS BY MOUTH EVERY 8 HOURS AS NEEDED FOR HEADACHES, Historical Med      Continuous Blood Gluc  (Dexcom G7 ) HENRY Use 1 each in the morning, Starting Thu 12/28/2023, Normal      Continuous Blood Gluc Sensor (Dexcom G7 Sensor) Use 1  Device every 10 days, Starting Thu 12/28/2023, Normal      Diclofenac Sodium (VOLTAREN) 1 % Apply 2 g topically 4 (four) times a day, Starting Fri 2/23/2024, Normal      Empagliflozin-metFORMIN HCl 12.5-1000 MG TABS Take one pill by mouth twice daily, Normal      gabapentin (NEURONTIN) 300 mg capsule take 1 capsule by mouth twice a day START BY TAKING AT BEDTIME FI...  (REFER TO PRESCRIPTION NOTES)., Historical Med      glucose blood (OneTouch Verio) test strip Use to test 3x daily, Normal      Insulin Glargine Solostar (Lantus SoloStar) 100 UNIT/ML SOPN Inject 0.3 mL (30 Units total) under the skin daily at bedtime, Starting Thu 3/23/2023, Normal      insulin lispro (HumaLOG KwikPen) 100 units/mL injection pen Inject 10 Units under the skin 3 (three) times a day with meals, Starting Thu 3/23/2023, Normal      Insulin Pen Needle (BD Pen Needle Ann 2nd Gen) 32G X 4 MM MISC For use with insulin pen. Pharmacy may dispense brand covered by insurance., Normal      ketorolac (ACULAR) 0.5 % ophthalmic solution instill 1 drop into both eyes four times a day for 10 days, Historical Med      Lancets 30G MISC Use to test 3x daily, Normal      losartan (COZAAR) 100 MG tablet Take 100 mg by mouth daily, Starting Wed 4/19/2023, Historical Med      Mavacamten 2.5 MG CAPS Take 2.5 mg by mouth in the morning, Starting Tue 1/23/2024, Normal      methocarbamol (ROBAXIN) 500 mg tablet Take 1 tablet (500 mg total) by mouth once as needed for muscle spasms for up to 7 days, Starting Tue 1/2/2024, Until Tue 1/9/2024 at 2359, Normal      metoprolol succinate (TOPROL-XL) 50 mg 24 hr tablet TAKE 1 TABLET BY MOUTH TWICE A DAY, Normal      mirtazapine (REMERON) 30 mg tablet take 1/2 tablet by mouth nightly, Historical Med      pantoprazole (PROTONIX) 40 mg tablet Take 1 tablet (40 mg total) by mouth daily, Starting Wed 11/29/2023, Normal      ranolazine (RANEXA) 1000 MG SR tablet Take 1 tablet (1,000 mg total) by mouth 2 (two) times a day,  Starting Mon 10/23/2023, Normal      rosuvastatin (CRESTOR) 5 mg tablet Take 5 mg by mouth daily, Historical Med      verapamil (VERELAN PM) 120 MG 24 hr capsule TAKE 1 CAPSULE (120 MG TOTAL) BY MOUTH DAILY AT BEDTIME, Starting Fri 11/25/2022, Normal               PDMP Review         Value Time User    PDMP Reviewed  Yes 5/6/2020  2:27 PM Roxana Gamez PA-C             ED Provider  Attending physically available and evaluated John Arevalo. I managed the patient along with the ED Attending.    Electronically Signed by           Chris Hill DO  02/24/24 0044

## 2024-02-24 NOTE — ED ATTENDING ATTESTATION
2/23/2024  I, Rico Roe DO, saw and evaluated the patient. I have discussed the patient with the resident/non-physician practitioner and agree with the resident's/non-physician practitioner's findings, Plan of Care, and MDM as documented in the resident's/non-physician practitioner's note, except where noted. All available labs and Radiology studies were reviewed.  I was present for key portions of any procedure(s) performed by the resident/non-physician practitioner and I was immediately available to provide assistance.       At this point I agree with the current assessment done in the Emergency Department.  I have conducted an independent evaluation of this patient a history and physical is as follows:    Patient is a 49-year-old male with a history of diabetes, hypertension, hyperlipidemia, coronary artery disease status post stenting, says about 3 days ago he began having some occasional left-sided chest pain, last about 20 minutes and resolved.  Sometimes exertional related but other times not, sometimes happens randomly.  When it is there it is not pleuritic in nature, not knifelike, ripping, or tearing.  He is also noticed some chronic dyspepsia for about 11 months.  He had previously been seen by his primary care physician, placed on omeprazole which was effective however that has been changed to Protonix due to reported medication interactions with his heart medications.  Schedule see GI next month for this.  He said the cough recurs with any food or liquid. Patient denies any prolonged travel history, no recent long travel, no immobilizations, or hospitalizations.    General:  Patient is well-appearing  Head:  Atraumatic  Eyes:  Conjunctiva pink  ENT:  Mucous membranes are moist  Neck:  Supple  Cardiac:  S1-S2, without murmurs  Lungs:  Clear to auscultation bilaterally  Abdomen: Mild epigastric tenderness, no tympany, no rigidity, no guarding, no CVA tenderness.  Extremities:  Normal range of  motion, no pedal edema or calf asymmetry  Neurologic:  Awake, fluent speech, normal comprehension, AAOx3  Skin:  Pink warm and dry  Psychiatric:  Alert, pleasant, cooperative      ED Course  ED Course as of 02/23/24 2147 Fri Feb 23, 2024 2103 ECG interpreted by me, sinus rhythm, rate of 92, LVH with repolarization abnormalities, right bundle bundle block, no acute change from January 2, 2024     XR chest 1 view portable   ED Interpretation   Chest x-ray interpreted by me shows no acute cardiopulmonary disease        HEART Risk Score      Flowsheet Row Most Recent Value   Heart Score Risk Calculator    History 1 Filed at: 02/23/2024 2149   ECG 1 Filed at: 02/23/2024 2149   Age 1 Filed at: 02/23/2024 2149   Risk Factors 2 Filed at: 02/23/2024 2149   Troponin 0 Filed at: 02/23/2024 2149   HEART Score 5 Filed at: 02/23/2024 2149            Labs Reviewed   COMPREHENSIVE METABOLIC PANEL - Abnormal       Result Value Ref Range Status    Sodium 134 (*) 135 - 147 mmol/L Final    Potassium 4.2  3.5 - 5.3 mmol/L Final    Chloride 101  96 - 108 mmol/L Final    CO2 24  21 - 32 mmol/L Final    ANION GAP 9  mmol/L Final    BUN 13  5 - 25 mg/dL Final    Creatinine 0.92  0.60 - 1.30 mg/dL Final    Comment: Standardized to IDMS reference method    Glucose 121  65 - 140 mg/dL Final    Comment: If the patient is fasting, the ADA then defines impaired fasting glucose as > 100 mg/dL and diabetes as > or equal to 123 mg/dL.    Calcium 9.9  8.4 - 10.2 mg/dL Final    AST 38  13 - 39 U/L Final    ALT 31  7 - 52 U/L Final    Comment: Specimen collection should occur prior to Sulfasalazine administration due to the potential for falsely depressed results.     Alkaline Phosphatase 190 (*) 34 - 104 U/L Final    Total Protein 8.3  6.4 - 8.4 g/dL Final    Albumin 3.7  3.5 - 5.0 g/dL Final    Total Bilirubin 0.61  0.20 - 1.00 mg/dL Final    Comment: Use of this assay is not recommended for patients undergoing treatment with eltrombopag due to  "the potential for falsely elevated results.  N-acetyl-p-benzoquinone imine (metabolite of Acetaminophen) will generate erroneously low results in samples for patients that have taken an overdose of Acetaminophen.    eGFR 97  ml/min/1.73sq m Final    Narrative:     [employer] guidelines for Chronic Kidney Disease (CKD):     Stage 1 with normal or high GFR (GFR > 90 mL/min/1.73 square meters)    Stage 2 Mild CKD (GFR = 60-89 mL/min/1.73 square meters)    Stage 3A Moderate CKD (GFR = 45-59 mL/min/1.73 square meters)    Stage 3B Moderate CKD (GFR = 30-44 mL/min/1.73 square meters)    Stage 4 Severe CKD (GFR = 15-29 mL/min/1.73 square meters)    Stage 5 End Stage CKD (GFR <15 mL/min/1.73 square meters)  Note: GFR calculation is accurate only with a steady state creatinine   LIPASE - Abnormal    Lipase 86 (*) 11 - 82 u/L Final   HS TROPONIN I 0HR - Normal    hs TnI 0hr 9  \"Refer to ACS Flowchart\"- see link ng/L Final    Comment:                                              Initial (time 0) result  If >=50 ng/L, Myocardial injury suggested ;  Type of myocardial injury and treatment strategy  to be determined.  If 5-49 ng/L, a delta result at 2 hours and or 4 hours will be needed to further evaluate.  If <4 ng/L, and chest pain has been >3 hours since onset, patient may qualify for discharge based on the HEART score in the ED.  If <5 ng/L and <3hours since onset of chest pain, a delta result at 2 hours will be needed to further evaluate.    HS Troponin 99th Percentile URL of a Health Population=12 ng/L with a 95% Confidence Interval of 8-18 ng/L.    Second Troponin (time 2 hours)  If calculated delta >= 20 ng/L,  Myocardial injury suggested ; Type of myocardial injury and treatment strategy to be determined.  If 5-49 ng/L and the calculated delta is 5-19 ng/L, consult medical service for evaluation.  Continue evaluation for ischemia on ecg and other possible etiology and repeat hs troponin at 4 " "hours.  If delta is <5 ng/L at 2 hours, consider discharge based on risk stratification via the HEART score (if in ED), or GIOVANNA risk score in IP/Observation.    HS Troponin 99th Percentile URL of a Health Population=12 ng/L with a 95% Confidence Interval of 8-18 ng/L.   HS TROPONIN I 2HR - Normal    hs TnI 2hr 12  \"Refer to ACS Flowchart\"- see link ng/L Final    Comment:                                              Initial (time 0) result  If >=50 ng/L, Myocardial injury suggested ;  Type of myocardial injury and treatment strategy  to be determined.  If 5-49 ng/L, a delta result at 2 hours and or 4 hours will be needed to further evaluate.  If <4 ng/L, and chest pain has been >3 hours since onset, patient may qualify for discharge based on the HEART score in the ED.  If <5 ng/L and <3hours since onset of chest pain, a delta result at 2 hours will be needed to further evaluate.    HS Troponin 99th Percentile URL of a Health Population=12 ng/L with a 95% Confidence Interval of 8-18 ng/L.    Second Troponin (time 2 hours)  If calculated delta >= 20 ng/L,  Myocardial injury suggested ; Type of myocardial injury and treatment strategy to be determined.  If 5-49 ng/L and the calculated delta is 5-19 ng/L, consult medical service for evaluation.  Continue evaluation for ischemia on ecg and other possible etiology and repeat hs troponin at 4 hours.  If delta is <5 ng/L at 2 hours, consider discharge based on risk stratification via the HEART score (if in ED), or GIOVANNA risk score in IP/Observation.    HS Troponin 99th Percentile URL of a Health Population=12 ng/L with a 95% Confidence Interval of 8-18 ng/L.    Delta 2hr hsTnI 3  <20 ng/L Final   CBC AND DIFFERENTIAL    WBC 5.85  4.31 - 10.16 Thousand/uL Final    RBC 5.21  3.88 - 5.62 Million/uL Final    Hemoglobin 15.2  12.0 - 17.0 g/dL Final    Hematocrit 47.1  36.5 - 49.3 % Final    MCV 90  82 - 98 fL Final    MCH 29.2  26.8 - 34.3 pg Final    MCHC 32.3  31.4 - 37.4 g/dL " Final    RDW 12.8  11.6 - 15.1 % Final    MPV 9.8  8.9 - 12.7 fL Final    Platelets 254  149 - 390 Thousands/uL Final    nRBC 0  /100 WBCs Final    Neutrophils Relative 52  43 - 75 % Final    Immat GRANS % 0  0 - 2 % Final    Lymphocytes Relative 31  14 - 44 % Final    Monocytes Relative 12  4 - 12 % Final    Eosinophils Relative 4  0 - 6 % Final    Basophils Relative 1  0 - 1 % Final    Neutrophils Absolute 3.09  1.85 - 7.62 Thousands/µL Final    Immature Grans Absolute 0.02  0.00 - 0.20 Thousand/uL Final    Lymphocytes Absolute 1.80  0.60 - 4.47 Thousands/µL Final    Monocytes Absolute 0.67  0.17 - 1.22 Thousand/µL Final    Eosinophils Absolute 0.21  0.00 - 0.61 Thousand/µL Final    Basophils Absolute 0.06  0.00 - 0.10 Thousands/µL Final       XR chest 1 view portable   ED Interpretation   Chest x-ray interpreted by me shows no acute cardiopulmonary disease        On reassessment there is no change in the findings, patient having no chest pain.  At this point the cause the patient's symptoms is unclear but unlikely represents an acute emergency.  ACS considered unlikely given unremarkable serial cardiac biomarkers.I do not believe this patient's complaints are from pulmonary embolism and I believe they would most likely be harmed through false positive test results and other complications of testing by further pursuing the diagnosis of pulmonary embolism.Do not believe this is acute aortic dissection.  His postprandial abdominal pain is chronic, suspect this is most likely gastritis versus an ulcer, do not believe this is cholelithiasis or cholecystitis.Supportive care, importance of follow-up and return precautions were discussed with the patient, who expressed understanding.      DIAGNOSIS:  Acute atypical chest pain, chronic postprandial pain, history of GERD    MEDICAL DECISION MAKING CODING    Patient presents with acute new problem with:  Threat to life or bodily function    Chronic conditions affecting  care: As per HPI    COLLECTION AND INTERPRETATION OF DATA  I reviewed prior external notes, including January 2, 2024 ECG as noted above      I ordered each unique test  Tests reviewed personally by me:  ECG: See my ED course  Labs: See above  Imaging: I independently reviewed the x-ray as noted above    Tests considered but not ordered: See above regarding PE    RISK  All of the patient's current prescription medications should be continued.    Treatment:  Consideration of admission: Admission considered however given [facility] chest pain protocol, will discharge with outpatient follow-up      Surgery  -I considered surgery may be necessary prior to completion of the work up but afterwards there is no indication for immediate surgery    Social Determinants of Health:  Presentation to ED outside of business hours or on night shift          Critical Care Time  Procedures

## 2024-02-27 ENCOUNTER — PROCEDURE VISIT (OUTPATIENT)
Age: 49
End: 2024-02-27
Payer: COMMERCIAL

## 2024-02-27 VITALS
WEIGHT: 250 LBS | DIASTOLIC BLOOD PRESSURE: 91 MMHG | HEIGHT: 71 IN | SYSTOLIC BLOOD PRESSURE: 140 MMHG | BODY MASS INDEX: 35 KG/M2

## 2024-02-27 DIAGNOSIS — M99.04 SEGMENTAL DYSFUNCTION OF SACRAL REGION: ICD-10-CM

## 2024-02-27 DIAGNOSIS — M54.41 ACUTE BILATERAL LOW BACK PAIN WITH RIGHT-SIDED SCIATICA: ICD-10-CM

## 2024-02-27 DIAGNOSIS — M99.03 SEGMENTAL DYSFUNCTION OF LUMBAR REGION: ICD-10-CM

## 2024-02-27 DIAGNOSIS — M54.16 LUMBAR RADICULOPATHY: ICD-10-CM

## 2024-02-27 DIAGNOSIS — M99.05 SEGMENTAL DYSFUNCTION OF PELVIC REGION: Primary | ICD-10-CM

## 2024-02-27 PROCEDURE — 98941 CHIROPRACT MANJ 3-4 REGIONS: CPT | Performed by: CHIROPRACTOR

## 2024-02-27 NOTE — PROGRESS NOTES
Date of first visit: 1/11/2024       HPI:  Ramos returns for treatment today right-sided low back right posterior thigh pain as well as mid back stiffness.       Denies any weaknesses denies any bowel bladder changes reports no other associated symptomatology.        VPAS 8-9      The following portions of the patient's history were reviewed and updated as appropriate: allergies, current medications, past family history, past medical history, past social history, past surgical history, and problem list.    Review of Systems    Physical Exam:  Pelvic obliquity is noted leg length inequality is present.  Misalignment of the right innominate on sacrum.  Biomechanically joint dysfunction right SI L5-S1 motion unit he still has erector spinae hypertonicity noted on the right.    Joint dysfunction T4-T5.  Assessment:   Diagnosis ICD-10-CM Associated Orders   1. Segmental dysfunction of pelvic region  M99.05       2. Lumbar radiculopathy  M54.16       3. Segmental dysfunction of sacral region  M99.04       4. Segmental dysfunction of lumbar region  M99.03       5. Acute bilateral low back pain with right-sided sciatica  M54.41                 Treatment: 15138  Manipulation to the right innominate, sacrum, L5 via Mart drop maneuver well-tolerated.  Manipulation T4  well-tolerated.    Discussion:  Daily icing flexion-based stretching for the lower back I will see him back for follow-up.

## 2024-03-04 ENCOUNTER — HOSPITAL ENCOUNTER (OUTPATIENT)
Dept: NON INVASIVE DIAGNOSTICS | Facility: HOSPITAL | Age: 49
Discharge: HOME | End: 2024-03-04
Payer: COMMERCIAL

## 2024-03-04 VITALS
HEIGHT: 71 IN | DIASTOLIC BLOOD PRESSURE: 91 MMHG | HEART RATE: 90 BPM | BODY MASS INDEX: 35 KG/M2 | WEIGHT: 250 LBS | SYSTOLIC BLOOD PRESSURE: 140 MMHG

## 2024-03-04 DIAGNOSIS — I10 ESSENTIAL HYPERTENSION: ICD-10-CM

## 2024-03-04 DIAGNOSIS — I42.2 HYPERTROPHIC CARDIOMYOPATHY (HCC): Primary | ICD-10-CM

## 2024-03-04 DIAGNOSIS — I42.2 HYPERTROPHIC CARDIOMYOPATHY (HCC): ICD-10-CM

## 2024-03-04 LAB
AORTIC ROOT: 3.1 CM
AORTIC VALVE MEAN VELOCITY: 11.4 M/S
APICAL FOUR CHAMBER EJECTION FRACTION: 69 %
AV AREA BY CONTINUOUS VTI: 3 CM2
AV AREA PEAK VELOCITY: 2.8 CM2
AV LVOT MEAN GRADIENT: 3 MMHG
AV LVOT PEAK GRADIENT: 7 MMHG
AV MEAN GRADIENT: 6 MMHG
AV PEAK GRADIENT: 10 MMHG
AV VALVE AREA: 2.97 CM2
AV VELOCITY RATIO: 0.8
BSA FOR ECHO PROCEDURE: 2.32 M2
DOP CALC AO PEAK VEL: 1.62 M/S
DOP CALC AO VTI: 28.64 CM
DOP CALC LVOT AREA: 3.46 CM2
DOP CALC LVOT CARDIAC INDEX: 3.11 L/MIN/M2
DOP CALC LVOT CARDIAC OUTPUT: 7.22 L/MIN
DOP CALC LVOT DIAMETER: 2.1 CM
DOP CALC LVOT PEAK VEL VTI: 24.59 CM
DOP CALC LVOT PEAK VEL: 1.29 M/S
DOP CALC LVOT STROKE INDEX: 37.9 ML/M2
DOP CALC LVOT STROKE VOLUME: 85.13
FRACTIONAL SHORTENING: 41 (ref 28–44)
GLOBAL LONGITUIDAL STRAIN: -11 %
INTERVENTRICULAR SEPTUM IN DIASTOLE (PARASTERNAL SHORT AXIS VIEW): 2.2 CM
INTERVENTRICULAR SEPTUM: 2.2 CM (ref 0.6–1.1)
LAAS-AP2: 23.8 CM2
LAAS-AP4: 19.7 CM2
LEFT ATRIUM SIZE: 5.3 CM
LEFT ATRIUM VOLUME (MOD BIPLANE): 72 ML
LEFT ATRIUM VOLUME INDEX (MOD BIPLANE): 31 ML/M2
LEFT INTERNAL DIMENSION IN SYSTOLE: 2.2 CM (ref 2.1–4)
LEFT VENTRICULAR INTERNAL DIMENSION IN DIASTOLE: 3.7 CM (ref 3.5–6)
LEFT VENTRICULAR POSTERIOR WALL IN END DIASTOLE: 1.3 CM
LEFT VENTRICULAR STROKE VOLUME: 40 ML
LVSV (TEICH): 40 ML
MV E'TISSUE VEL-LAT: 6 CM/S
MV E'TISSUE VEL-SEP: 4 CM/S
RIGHT VENTRICLE ID DIMENSION: 3.1 CM
SL CV ECHO LV DYNAMIC OBSTRUCTION PEAK GRADIENT (REST): 12 MMHG
SL CV ECHO LV DYNAMIC OBSTRUCTION PEAK GRADIENT (VALSAL: 58 MMHG
SL CV LEFT ATRIUM LENGTH A2C: 5.5 CM
SL CV LV EF: 70
SL CV PED ECHO LEFT VENTRICLE DIASTOLIC VOLUME (MOD BIPLANE) 2D: 57 ML
SL CV PED ECHO LEFT VENTRICLE SYSTOLIC VOLUME (MOD BIPLANE) 2D: 17 ML

## 2024-03-04 PROCEDURE — 93308 TTE F-UP OR LMTD: CPT

## 2024-03-04 PROCEDURE — 93325 DOPPLER ECHO COLOR FLOW MAPG: CPT

## 2024-03-04 PROCEDURE — 93308 TTE F-UP OR LMTD: CPT | Performed by: INTERNAL MEDICINE

## 2024-03-04 PROCEDURE — 93321 DOPPLER ECHO F-UP/LMTD STD: CPT | Performed by: INTERNAL MEDICINE

## 2024-03-04 PROCEDURE — 93325 DOPPLER ECHO COLOR FLOW MAPG: CPT | Performed by: INTERNAL MEDICINE

## 2024-03-04 PROCEDURE — 93321 DOPPLER ECHO F-UP/LMTD STD: CPT

## 2024-03-04 PROCEDURE — 93356 MYOCRD STRAIN IMG SPCKL TRCK: CPT | Performed by: INTERNAL MEDICINE

## 2024-03-04 RX ORDER — BISOPROLOL FUMARATE 10 MG/1
10 TABLET, FILM COATED ORAL DAILY
Qty: 30 TABLET | Refills: 1 | Status: SHIPPED | OUTPATIENT
Start: 2024-03-04 | End: 2024-03-07 | Stop reason: SDUPTHER

## 2024-03-06 ENCOUNTER — TELEPHONE (OUTPATIENT)
Dept: CARDIOLOGY CLINIC | Facility: CLINIC | Age: 49
End: 2024-03-06

## 2024-03-06 NOTE — TELEPHONE ENCOUNTER
Received a call from patient, he running out of medication.He has 1 pill left of previously prescribed medication CAMZYOS 2.5 MG CAPSULE , recently prescribed Mavacamten 5 MG CAPS, he stated the script was called into wrong pharmacy. I assured him that it was sent to [address] ([facility]). He remained very much upset and worried about not having the medication in time. Please call back to further advise. Thank you.  Pt. Phone number  [phone]

## 2024-03-07 DIAGNOSIS — I10 ESSENTIAL HYPERTENSION: ICD-10-CM

## 2024-03-07 RX ORDER — BISOPROLOL FUMARATE 10 MG/1
10 TABLET, FILM COATED ORAL DAILY
Qty: 30 TABLET | Refills: 1 | Status: SHIPPED | OUTPATIENT
Start: 2024-03-07 | End: 2024-05-06

## 2024-03-08 ENCOUNTER — TELEPHONE (OUTPATIENT)
Dept: CARDIOLOGY CLINIC | Facility: CLINIC | Age: 49
End: 2024-03-08

## 2024-03-12 ENCOUNTER — PROCEDURE VISIT (OUTPATIENT)
Age: 49
End: 2024-03-12
Payer: COMMERCIAL

## 2024-03-12 VITALS
HEART RATE: 88 BPM | SYSTOLIC BLOOD PRESSURE: 151 MMHG | WEIGHT: 250 LBS | HEIGHT: 71 IN | BODY MASS INDEX: 35 KG/M2 | DIASTOLIC BLOOD PRESSURE: 89 MMHG

## 2024-03-12 DIAGNOSIS — M54.16 LUMBAR RADICULOPATHY: ICD-10-CM

## 2024-03-12 DIAGNOSIS — M99.04 SEGMENTAL DYSFUNCTION OF SACRAL REGION: ICD-10-CM

## 2024-03-12 DIAGNOSIS — M99.05 SEGMENTAL DYSFUNCTION OF PELVIC REGION: Primary | ICD-10-CM

## 2024-03-12 DIAGNOSIS — M99.03 SEGMENTAL DYSFUNCTION OF LUMBAR REGION: ICD-10-CM

## 2024-03-12 DIAGNOSIS — S76.311A STRAIN OF RIGHT HAMSTRING MUSCLE, INITIAL ENCOUNTER: ICD-10-CM

## 2024-03-12 DIAGNOSIS — M54.41 ACUTE BILATERAL LOW BACK PAIN WITH RIGHT-SIDED SCIATICA: ICD-10-CM

## 2024-03-12 PROCEDURE — 98941 CHIROPRACT MANJ 3-4 REGIONS: CPT | Performed by: CHIROPRACTOR

## 2024-03-12 RX ORDER — MOMETASONE FUROATE 1 MG/G
1 CREAM TOPICAL 2 TIMES DAILY
COMMUNITY
Start: 2024-02-26

## 2024-03-14 NOTE — PROGRESS NOTES
Date of first visit: 1/11/2024       HPI:  Ramos returns for treatment today right-sided low back right posterior thigh pain as well as mid back stiffness.       Denies any weaknesses denies any bowel bladder changes reports no other associated symptomatology.        VPAS 8      The following portions of the patient's history were reviewed and updated as appropriate: allergies, current medications, past family history, past medical history, past social history, past surgical history, and problem list.    Review of Systems    Physical Exam:  Pelvic obliquity is noted leg length inequality is present.  Misalignment of the right innominate on sacrum.  Biomechanically joint dysfunction right SI L5-S1 motion unit he still has erector spinae hypertonicity noted on the right.    Joint dysfunction T4-T5.  Assessment:   Diagnosis ICD-10-CM Associated Orders   1. Segmental dysfunction of pelvic region  M99.05       2. Lumbar radiculopathy  M54.16       3. Segmental dysfunction of sacral region  M99.04       4. Segmental dysfunction of lumbar region  M99.03       5. Acute bilateral low back pain with right-sided sciatica  M54.41       6. Strain of right hamstring muscle, initial encounter  S76.311A                 Treatment: 37990  Manipulation to the right innominate, sacrum, L5 via Mart drop maneuver well-tolerated.  Manipulation T4  well-tolerated.    Discussion:  Daily icing flexion-based stretching for the lower back I will see him back for follow-up.

## 2024-03-26 ENCOUNTER — PROCEDURE VISIT (OUTPATIENT)
Age: 49
End: 2024-03-26
Payer: COMMERCIAL

## 2024-03-26 VITALS
HEART RATE: 82 BPM | DIASTOLIC BLOOD PRESSURE: 83 MMHG | SYSTOLIC BLOOD PRESSURE: 123 MMHG | BODY MASS INDEX: 35 KG/M2 | WEIGHT: 250 LBS | HEIGHT: 71 IN

## 2024-03-26 DIAGNOSIS — M54.16 LUMBAR RADICULOPATHY: ICD-10-CM

## 2024-03-26 DIAGNOSIS — M99.04 SEGMENTAL DYSFUNCTION OF SACRAL REGION: ICD-10-CM

## 2024-03-26 DIAGNOSIS — M99.03 SEGMENTAL DYSFUNCTION OF LUMBAR REGION: ICD-10-CM

## 2024-03-26 DIAGNOSIS — M54.41 ACUTE BILATERAL LOW BACK PAIN WITH RIGHT-SIDED SCIATICA: ICD-10-CM

## 2024-03-26 DIAGNOSIS — M99.05 SEGMENTAL DYSFUNCTION OF PELVIC REGION: Primary | ICD-10-CM

## 2024-03-26 PROCEDURE — 98941 CHIROPRACT MANJ 3-4 REGIONS: CPT | Performed by: CHIROPRACTOR

## 2024-03-26 NOTE — PROGRESS NOTES
Date of first visit: 1/11/2024       HPI:  Ramos returns for treatment today right-sided low back right posterior thigh pain as well as mid back stiffness.       Denies any weaknesses denies any bowel bladder changes reports no other associated symptomatology.        VPAS 6-7      The following portions of the patient's history were reviewed and updated as appropriate: allergies, current medications, past family history, past medical history, past social history, past surgical history, and problem list.    Review of Systems    Physical Exam:  Pelvic obliquity is noted leg length inequality is present.  Misalignment of the right innominate on sacrum.  Biomechanically joint dysfunction right SI L5-S1 motion unit he still has erector spinae hypertonicity noted on the right.    Joint dysfunction T4-T5.  Assessment:   Diagnosis ICD-10-CM Associated Orders   1. Segmental dysfunction of pelvic region  M99.05       2. Lumbar radiculopathy  M54.16       3. Segmental dysfunction of sacral region  M99.04       4. Segmental dysfunction of lumbar region  M99.03       5. Acute bilateral low back pain with right-sided sciatica  M54.41                 Treatment: 81288  Manipulation to the right innominate, sacrum, L5 via Mart drop maneuver well-tolerated.  Manipulation T4  well-tolerated.    Discussion:  Daily icing flexion-based stretching for the lower back I will see him back for follow-up.

## 2024-03-28 ENCOUNTER — HOSPITAL ENCOUNTER (OUTPATIENT)
Dept: NON INVASIVE DIAGNOSTICS | Facility: HOSPITAL | Age: 49
Discharge: HOME | End: 2024-03-28
Payer: COMMERCIAL

## 2024-03-28 VITALS
SYSTOLIC BLOOD PRESSURE: 123 MMHG | DIASTOLIC BLOOD PRESSURE: 83 MMHG | WEIGHT: 250 LBS | HEART RATE: 82 BPM | HEIGHT: 71 IN | BODY MASS INDEX: 35 KG/M2

## 2024-03-28 DIAGNOSIS — I42.2 HYPERTROPHIC CARDIOMYOPATHY (HCC): ICD-10-CM

## 2024-03-28 LAB
APICAL FOUR CHAMBER EJECTION FRACTION: 60 %
AV LVOT MEAN GRADIENT: 7 MMHG
AV LVOT PEAK GRADIENT: 11 MMHG
BSA FOR ECHO PROCEDURE: 2.32 M2
DOP CALC LVOT AREA: 3.46 CM2
DOP CALC LVOT CARDIAC INDEX: 3.85 L/MIN/M2
DOP CALC LVOT CARDIAC OUTPUT: 8.94 L/MIN
DOP CALC LVOT DIAMETER: 2.1 CM
DOP CALC LVOT PEAK VEL VTI: 30.77 CM
DOP CALC LVOT PEAK VEL: 1.67 M/S
DOP CALC LVOT STROKE INDEX: 45.3 ML/M2
DOP CALC LVOT STROKE VOLUME: 106.52
FRACTIONAL SHORTENING: 41 (ref 28–44)
GLOBAL LONGITUIDAL STRAIN: -12 %
INTERVENTRICULAR SEPTUM IN DIASTOLE (PARASTERNAL SHORT AXIS VIEW): 1.9 CM
INTERVENTRICULAR SEPTUM: 1.9 CM (ref 0.6–1.1)
LAAS-AP2: 20.6 CM2
LAAS-AP4: 21 CM2
LEFT ATRIUM SIZE: 4.9 CM
LEFT ATRIUM VOLUME (MOD BIPLANE): 62 ML
LEFT ATRIUM VOLUME INDEX (MOD BIPLANE): 26.7 ML/M2
LEFT INTERNAL DIMENSION IN SYSTOLE: 2.3 CM (ref 2.1–4)
LEFT VENTRICULAR INTERNAL DIMENSION IN DIASTOLE: 3.9 CM (ref 3.5–6)
LEFT VENTRICULAR POSTERIOR WALL IN END DIASTOLE: 1.5 CM
LEFT VENTRICULAR STROKE VOLUME: 47 ML
LVSV (TEICH): 47 ML
SL CV ECHO LV DYNAMIC OBSTRUCTION PEAK GRADIENT (REST): 40 MMHG
SL CV ECHO LV DYNAMIC OBSTRUCTION PEAK GRADIENT (VALSAL: 57 MMHG
SL CV LEFT ATRIUM LENGTH A2C: 5.7 CM
SL CV LV EF: 68
SL CV PED ECHO LEFT VENTRICLE DIASTOLIC VOLUME (MOD BIPLANE) 2D: 66 ML
SL CV PED ECHO LEFT VENTRICLE SYSTOLIC VOLUME (MOD BIPLANE) 2D: 19 ML
TR MAX PG: 18 MMHG
TR PEAK VELOCITY: 2.1 M/S
TRICUSPID VALVE PEAK REGURGITATION VELOCITY: 2.12 M/S

## 2024-03-28 PROCEDURE — 93325 DOPPLER ECHO COLOR FLOW MAPG: CPT | Performed by: INTERNAL MEDICINE

## 2024-03-28 PROCEDURE — 93308 TTE F-UP OR LMTD: CPT | Performed by: INTERNAL MEDICINE

## 2024-03-28 PROCEDURE — 93321 DOPPLER ECHO F-UP/LMTD STD: CPT | Performed by: INTERNAL MEDICINE

## 2024-03-28 PROCEDURE — 93325 DOPPLER ECHO COLOR FLOW MAPG: CPT

## 2024-03-28 PROCEDURE — 93308 TTE F-UP OR LMTD: CPT

## 2024-03-28 PROCEDURE — 93321 DOPPLER ECHO F-UP/LMTD STD: CPT

## 2024-04-04 DIAGNOSIS — I42.2 HYPERTROPHIC CARDIOMYOPATHY (HCC): ICD-10-CM

## 2024-04-08 ENCOUNTER — PROCEDURE VISIT (OUTPATIENT)
Age: 49
End: 2024-04-08
Payer: COMMERCIAL

## 2024-04-08 VITALS
DIASTOLIC BLOOD PRESSURE: 92 MMHG | HEART RATE: 88 BPM | SYSTOLIC BLOOD PRESSURE: 136 MMHG | WEIGHT: 250 LBS | HEIGHT: 71 IN | BODY MASS INDEX: 35 KG/M2

## 2024-04-08 DIAGNOSIS — M99.05 SEGMENTAL DYSFUNCTION OF PELVIC REGION: Primary | ICD-10-CM

## 2024-04-08 DIAGNOSIS — M54.41 ACUTE BILATERAL LOW BACK PAIN WITH RIGHT-SIDED SCIATICA: ICD-10-CM

## 2024-04-08 DIAGNOSIS — M99.04 SEGMENTAL DYSFUNCTION OF SACRAL REGION: ICD-10-CM

## 2024-04-08 DIAGNOSIS — M99.03 SEGMENTAL DYSFUNCTION OF LUMBAR REGION: ICD-10-CM

## 2024-04-08 DIAGNOSIS — M54.16 LUMBAR RADICULOPATHY: ICD-10-CM

## 2024-04-08 PROCEDURE — 98941 CHIROPRACT MANJ 3-4 REGIONS: CPT | Performed by: CHIROPRACTOR

## 2024-04-08 NOTE — PROGRESS NOTES
"Date of first visit: 1/11/2024       HPI:  Ramos returns for treatment today right-sided low back right posterior thigh pain as well as mid back stiffness.   In addition he relates right arm \"weakness\".  This is described more when he is trying to do things with his arm feels difficult to the left.  Was told by PCP possible arthritis.    Denies any weaknesses denies any bowel bladder changes reports no other associated symptomatology.        VPAS 7      The following portions of the patient's history were reviewed and updated as appropriate: allergies, current medications, past family history, past medical history, past social history, past surgical history, and problem list.    Review of Systems    Physical Exam:  Pelvic obliquity is noted leg length inequality is present.  Misalignment of the right innominate on sacrum.  Biomechanically joint dysfunction right SI L5-S1 motion unit he still has erector spinae hypertonicity noted on the right.    Joint dysfunction T4-T5.    Examination of the right shoulder reveals tightness on external rotation he does have some capsular stiffness noted as well biomechanically cervical spine is reduced in extension right lateral bending and right rotation there is a C5-C6 segmental dysfunction noted.  I did review earlier CT scan of the cervical spine does reveal some degenerative changes seen in the lower cervical levels some mild narrowing noted of the foramina and mild disc bulging as well.      Assessment:   Diagnosis ICD-10-CM Associated Orders   1. Segmental dysfunction of pelvic region  M99.05       2. Lumbar radiculopathy  M54.16       3. Segmental dysfunction of sacral region  M99.04       4. Segmental dysfunction of lumbar region  M99.03       5. Acute bilateral low back pain with right-sided sciatica  M54.41                 Treatment: 12989  Manipulation to the right innominate, sacrum, L5 via Mart drop maneuver well-tolerated.  Manipulation T4  well-tolerated.  "     Discussion:  Daily icing flexion-based stretching for the lower back I will see him back for follow-up.

## 2024-04-19 ENCOUNTER — TELEPHONE (OUTPATIENT)
Dept: GASTROENTEROLOGY | Facility: CLINIC | Age: 49
End: 2024-04-19

## 2024-04-19 ENCOUNTER — OFFICE VISIT (OUTPATIENT)
Dept: GASTROENTEROLOGY | Facility: CLINIC | Age: 49
End: 2024-04-19
Payer: COMMERCIAL

## 2024-04-19 VITALS
SYSTOLIC BLOOD PRESSURE: 132 MMHG | BODY MASS INDEX: 34.89 KG/M2 | WEIGHT: 249.2 LBS | DIASTOLIC BLOOD PRESSURE: 80 MMHG | HEIGHT: 71 IN | TEMPERATURE: 98.2 F

## 2024-04-19 DIAGNOSIS — Z12.11 SCREENING FOR COLON CANCER: ICD-10-CM

## 2024-04-19 DIAGNOSIS — R79.89 ABNORMAL LFTS: ICD-10-CM

## 2024-04-19 DIAGNOSIS — R10.13 EPIGASTRIC PAIN: Primary | ICD-10-CM

## 2024-04-19 DIAGNOSIS — R94.31 ABNORMAL EKG: ICD-10-CM

## 2024-04-19 PROCEDURE — 99214 OFFICE O/P EST MOD 30 MIN: CPT | Performed by: INTERNAL MEDICINE

## 2024-04-19 NOTE — PROGRESS NOTES
[facility] - Outpatient Follow-up Note  John Arevalo 49 y.o. male MRN: [id number]  Encounter: [id number]          ASSESSMENT AND PLAN:      1. Epigastric pain  2.  Abnormal LFTs  3.  Fatty liver  4.  Abnormal EKG  5.  Colon cancer screening    Patient continues to have symptoms despite pantoprazole intake.  Differentials include peptic ulcer disease, gastritis, H. pylori infection.  Continue pantoprazole.  Patient agreeable to get EGD.  Will get cardiac clearance given abnormal EKG and cardiac history before pursuing EGD.  Patient agreeable.  Patient had one-time elevation of liver enzymes based on recent labs.  Will repeat labs in 3 months along with hepatitis B and C screening test.  Patient had colonoscopy last year where 2 polyps were removed.  Repeat recommended in 5 years which will be due in 2028.  Further management plan based on investigation results.    RTC 4 to 6 months.    Carlos A Montgomery MD  Gastroenterology  [facility]  Date: April 19, 2024    - EGD; Future  - Comprehensive metabolic panel; Future  - Hepatitis B surface antigen; Future  - Hepatitis B surface antibody; Future  - Hepatitis B core antibody, total; Future  - Hepatitis C antibody; Future  - Comprehensive metabolic panel  - Hepatitis B surface antigen  - Hepatitis B surface antibody  - Hepatitis B core antibody, total  - Hepatitis C antibody      ______________________________________________________________________    SUBJECTIVE: 49-year-old with diabetes, hypertension, CHF, cardiomyopathy, unstable angina (sees cardiology) presents for follow-up.    During last office visit with Dr. Mercado patient reported abdominal pain and chest pain.  He had had a ER visit just prior to seeing Dr. Mercado where EKG showed more pronounced T wave inversion compared to past EKGs while troponins were negative.  His symptoms had improved with omeprazole 40 mg daily.  He did have some decreased p.o. intake and  weight loss which was intentional at that time.    Currently patient reports that he has intermittent abdominal pain in the epigastric area despite taking pantoprazole 40 mg daily.  No heartburn, chest pain, nausea.  Weight has stabilized.  Oral intake has improved.  Bowel movements are normal.  No blood in stools.    Patient had colonoscopy in 2023 at outside facility the report of which was reviewed by me today.  There were 2 polyps removed.  Prep was adequate.  Repeat was recommended in 5 years.    Patient had labs done last month which were reviewed by me today and showed acute elevation of liver enzymes which is mild at AST 58, ALT 64 while other LFTs, renal function were normal.  His blood counts done in February 2024 were reviewed by me today and found to be normal.  Patient had ultrasound abdomen done in 2023 which showed fatty liver.  No gallstones were seen.      REVIEW OF SYSTEMS IS OTHERWISE NEGATIVE.      Historical Information   Past Medical History:   Diagnosis Date    Congestive heart failure (HCC)     Diabetes mellitus (HCC)     Hyperlipidemia     Hypertension     Obesity      Past Surgical History:   Procedure Laterality Date    COLONOSCOPY       Social History   Social History     Substance and Sexual Activity   Alcohol Use Not Currently     Social History     Substance and Sexual Activity   Drug Use Never     Social History     Tobacco Use   Smoking Status Never   Smokeless Tobacco Never     Family History   Problem Relation Age of Onset    Diabetes Mother     Stroke Mother     Stroke Father        Meds/Allergies       Current Outpatient Medications:     aspirin (ECOTRIN LOW STRENGTH) 81 mg EC tablet    bisoprolol (ZEBETA) 10 MG tablet    Blood Glucose Monitoring Suppl (OneTouch Verio) w/Device KIT    Blood Pressure KIT    butalbital-acetaminophen-caffeine (FIORICET,ESGIC) -40 mg per tablet    Continuous Blood Gluc  (Dexcom G7 ) HENRY    Continuous Blood Gluc Sensor (Dexcom  "G7 Sensor)    Diclofenac Sodium (VOLTAREN) 1 %    Empagliflozin-metFORMIN HCl 12.5-1000 MG TABS    gabapentin (NEURONTIN) 300 mg capsule    glucose blood (OneTouch Verio) test strip    Insulin Glargine Solostar (Lantus SoloStar) 100 UNIT/ML SOPN    insulin lispro (HumaLOG KwikPen) 100 units/mL injection pen    Insulin Pen Needle (BD Pen Needle Ann 2nd Gen) 32G X 4 MM MISC    ketorolac (ACULAR) 0.5 % ophthalmic solution    Lancets 30G MISC    losartan (COZAAR) 100 MG tablet    Mavacamten 5 MG CAPS    mirtazapine (REMERON) 30 mg tablet    mometasone (ELOCON) 0.1 % cream    pantoprazole (PROTONIX) 40 mg tablet    rosuvastatin (CRESTOR) 5 mg tablet    No Known Allergies        Objective     Blood pressure 132/80, temperature 98.2 °F (36.8 °C), height 5' 11\" (1.803 m), weight 113 kg (249 lb 3.2 oz). Body mass index is 34.76 kg/m².      PHYSICAL EXAM:      General Appearance:   Alert, cooperative, no distress   HEENT:   Normocephalic, atraumatic, anicteric.     Neck:  Supple, symmetrical, trachea midline   Lungs:   Clear to auscultation bilaterally; no rales, rhonchi or wheezing; respirations unlabored    Heart::   Regular rate and rhythm; no murmur, rub, or gallop.   Abdomen:   Soft, non-tender, non-distended; normal bowel sounds; no masses, no organomegaly    Genitalia:   Deferred    Rectal:   Deferred    Extremities:  No cyanosis, clubbing or edema    Pulses:  2+ and symmetric    Skin:  No jaundice, rashes, or lesions    Lymph nodes:  No palpable cervical lymphadenopathy        Lab Results:   No visits with results within 1 Day(s) from this visit.   Latest known visit with results is:   Hospital Outpatient Visit on 03/28/2024   Component Date Value    BSA 03/28/2024 2.32     A4C EF 03/28/2024 60     LVOT stroke volume 03/28/2024 106.52     LVOT stroke volume index 03/28/2024 45.30     LVOT Cardiac Output 03/28/2024 8.94     LVOT Cardiac Index 03/28/2024 3.85     LVIDd 03/28/2024 3.90     LVIDS 03/28/2024 2.30     IVSd " 03/28/2024 1.90     LVPWd 03/28/2024 1.50     LVOT diameter 03/28/2024 2.1     LVOT peak VTI 03/28/2024 30.77     FS 03/28/2024 41     LA Volume Index (BP) 03/28/2024 26.7     AV LVOT peak gradient 03/28/2024 11     LVOT peak randell 03/28/2024 1.67     LA size 03/28/2024 4.9     LA length (A2C) 03/28/2024 5.70     LA volume (BP) 03/28/2024 62     LVOT mn grad 03/28/2024 7.0     TR Peak Randell 03/28/2024 2.1     Triscuspid Valve Regurgi* 03/28/2024 18.0     Tricuspid valve peak reg* 03/28/2024 2.12     Left ventricular stroke * 03/28/2024 47.00     IVS 03/28/2024 1.9     LEFT VENTRICLE SYSTOLIC * 03/28/2024 19     LV DIASTOLIC VOLUME (MOD* 03/28/2024 66     Left Atrium Area-systoli* 03/28/2024 21     Left Atrium Area-systoli* 03/28/2024 20.6     LVSV, 2D 03/28/2024 47     LVOT area 03/28/2024 3.46     GLS 03/28/2024 -12     LV EF 03/28/2024 68     Peak gradient (Rest) 03/28/2024 40.0     Peak Gradient (Valsalva) 03/28/2024 57.0          Radiology Results:   Echo follow up/limited w/ contrast if indicated    Result Date: 3/28/2024  Narrative:   Left Ventricle: Wall thickness is severely increased. There is severe asymmetric hypertrophy of the septal wall. The left ventricular ejection fraction is 68%. Systolic function is vigorous. Global longitudinal strain is reduced at -12%. Wall motion is normal. Diastolic function is mildly abnormal, consistent with grade I (abnormal) relaxation. There is  outflow tract dynamic obstruction at rest with a peak gradient of 40.0 mmHg. There is outflow tract dynamic obstruction with valsalva with a peak gradient of 57.0 mmHg.   Left Atrium: The atrium is mildly dilated.   Aortic Valve: There is aortic valve sclerosis.   Mitral Valve: There is mild annular calcification. There is systolic anterior motion with late peaking gradient. Strain was performed to quantify interventricular dyssynchrony and evaluate components of myocardial function due to HCM. Results from the utilization of Strain  Analysis are listed in the report below.    Answers submitted by the patient for this visit:  Abdominal Pain Questionnaire (Submitted on 4/18/2024)  Chief Complaint: Abdominal pain  Chronicity: new  Onset: more than 1 month ago  Onset quality: sudden  Frequency: 2 to 4 times per day  Progression since onset: waxing and waning  Pain location: LLQ, RLQ  Pain - numeric: 5/10  Pain quality: a sensation of fullness  Radiates to: chest, left flank, right flank  anorexia: Yes  arthralgias: No  belching: No  constipation: Yes  diarrhea: Yes  dysuria: No  fever: No  flatus: Yes  frequency: No  headaches: No  hematochezia: No  hematuria: No  melena: No  myalgias: No  nausea: No  weight loss: No  vomiting: No  Aggravated by: coughing, deep breathing, eating  Relieved by: certain positions, palpation, recumbency  Diagnostic workup: CT scan, lower endoscopy, ultrasound

## 2024-04-22 ENCOUNTER — PROCEDURE VISIT (OUTPATIENT)
Age: 49
End: 2024-04-22
Payer: COMMERCIAL

## 2024-04-22 VITALS — WEIGHT: 249 LBS | BODY MASS INDEX: 34.86 KG/M2 | HEIGHT: 71 IN

## 2024-04-22 DIAGNOSIS — M99.03 SEGMENTAL DYSFUNCTION OF LUMBAR REGION: ICD-10-CM

## 2024-04-22 DIAGNOSIS — M54.41 ACUTE BILATERAL LOW BACK PAIN WITH RIGHT-SIDED SCIATICA: ICD-10-CM

## 2024-04-22 DIAGNOSIS — M99.05 SEGMENTAL DYSFUNCTION OF PELVIC REGION: Primary | ICD-10-CM

## 2024-04-22 DIAGNOSIS — M54.16 LUMBAR RADICULOPATHY: ICD-10-CM

## 2024-04-22 DIAGNOSIS — M99.04 SEGMENTAL DYSFUNCTION OF SACRAL REGION: ICD-10-CM

## 2024-04-22 PROCEDURE — 98941 CHIROPRACT MANJ 3-4 REGIONS: CPT | Performed by: CHIROPRACTOR

## 2024-04-22 NOTE — PROGRESS NOTES
"Date of first visit: 1/11/2024       HPI:  Ramos returns for treatment today right-sided low back right posterior thigh pain as well as mid back stiffness.   Overall feels better right arm has no pain or \"weakness\" today.    Denies any weaknesses denies any bowel bladder changes reports no other associated symptomatology.        VPAS 8      The following portions of the patient's history were reviewed and updated as appropriate: allergies, current medications, past family history, past medical history, past social history, past surgical history, and problem list.    Review of Systems    Physical Exam:  Pelvic obliquity is noted leg length inequality is present.  Misalignment of the right innominate on sacrum.  Biomechanically joint dysfunction right SI L5-S1 motion unit he still has erector spinae hypertonicity noted on the right.    Joint dysfunction T4-T5.  C5/C6          Assessment:   Diagnosis ICD-10-CM Associated Orders   1. Segmental dysfunction of pelvic region  M99.05       2. Lumbar radiculopathy  M54.16       3. Segmental dysfunction of sacral region  M99.04       4. Segmental dysfunction of lumbar region  M99.03       5. Acute bilateral low back pain with right-sided sciatica  M54.41                 Treatment: 19498  Manipulation to the right innominate, sacrum, L5 via Mart drop maneuver well-tolerated.  Manipulation T4  well-tolerated.      Discussion:  Daily icing flexion-based stretching for the lower back I will see him back for follow-up.    "

## 2024-05-02 ENCOUNTER — HOSPITAL ENCOUNTER (OUTPATIENT)
Dept: NON INVASIVE DIAGNOSTICS | Facility: HOSPITAL | Age: 49
Discharge: HOME | End: 2024-05-02
Payer: COMMERCIAL

## 2024-05-02 VITALS
DIASTOLIC BLOOD PRESSURE: 80 MMHG | WEIGHT: 249 LBS | SYSTOLIC BLOOD PRESSURE: 132 MMHG | HEART RATE: 88 BPM | HEIGHT: 71 IN | BODY MASS INDEX: 34.86 KG/M2

## 2024-05-02 DIAGNOSIS — I42.2 HYPERTROPHIC CARDIOMYOPATHY (HCC): ICD-10-CM

## 2024-05-02 LAB
AORTIC ROOT: 3.4 CM
APICAL FOUR CHAMBER EJECTION FRACTION: 62 %
AV LVOT MEAN GRADIENT: 3 MMHG
AV LVOT PEAK GRADIENT: 4 MMHG
BSA FOR ECHO PROCEDURE: 2.31 M2
DOP CALC LVOT AREA: 3.46 CM2
DOP CALC LVOT CARDIAC INDEX: 2.83 L/MIN/M2
DOP CALC LVOT CARDIAC OUTPUT: 6.54 L/MIN
DOP CALC LVOT DIAMETER: 2.1 CM
DOP CALC LVOT PEAK VEL VTI: 25.04 CM
DOP CALC LVOT PEAK VEL: 1.06 M/S
DOP CALC LVOT STROKE INDEX: 36.4 ML/M2
DOP CALC LVOT STROKE VOLUME: 86.68
E WAVE DECELERATION TIME: 203 MS
E/A RATIO: 0.75
FRACTIONAL SHORTENING: 45 (ref 28–44)
GLOBAL LONGITUIDAL STRAIN: -15 %
INTERVENTRICULAR SEPTUM IN DIASTOLE (PARASTERNAL SHORT AXIS VIEW): 1.9 CM
INTERVENTRICULAR SEPTUM: 1.9 CM (ref 0.6–1.1)
LAAS-AP2: 25.7 CM2
LAAS-AP4: 21.2 CM2
LEFT ATRIUM SIZE: 5.3 CM
LEFT ATRIUM VOLUME (MOD BIPLANE): 78 ML
LEFT ATRIUM VOLUME INDEX (MOD BIPLANE): 33.8 ML/M2
LEFT INTERNAL DIMENSION IN SYSTOLE: 2.1 CM (ref 2.1–4)
LEFT VENTRICULAR INTERNAL DIMENSION IN DIASTOLE: 3.8 CM (ref 3.5–6)
LEFT VENTRICULAR POSTERIOR WALL IN END DIASTOLE: 1.7 CM
LEFT VENTRICULAR STROKE VOLUME: 47 ML
LVSV (TEICH): 47 ML
MV E'TISSUE VEL-LAT: 8 CM/S
MV PEAK A VEL: 1.11 M/S
MV PEAK E VEL: 83 CM/S
MV STENOSIS PRESSURE HALF TIME: 59 MS
MV VALVE AREA P 1/2 METHOD: 3.73
SL CV ECHO LV DYNAMIC OBSTRUCTION PEAK GRADIENT (REST): 9 MMHG
SL CV ECHO LV DYNAMIC OBSTRUCTION PEAK GRADIENT (VALSAL: 24 MMHG
SL CV LEFT ATRIUM LENGTH A2C: 5.9 CM
SL CV LV EF: 62
SL CV PED ECHO LEFT VENTRICLE DIASTOLIC VOLUME (MOD BIPLANE) 2D: 62 ML
SL CV PED ECHO LEFT VENTRICLE SYSTOLIC VOLUME (MOD BIPLANE) 2D: 15 ML
TR MAX PG: 19 MMHG
TR PEAK VELOCITY: 2.2 M/S
TRICUSPID VALVE PEAK REGURGITATION VELOCITY: 2.15 M/S

## 2024-05-02 PROCEDURE — 93325 DOPPLER ECHO COLOR FLOW MAPG: CPT

## 2024-05-02 PROCEDURE — 93321 DOPPLER ECHO F-UP/LMTD STD: CPT | Performed by: INTERNAL MEDICINE

## 2024-05-02 PROCEDURE — 93308 TTE F-UP OR LMTD: CPT

## 2024-05-02 PROCEDURE — 93325 DOPPLER ECHO COLOR FLOW MAPG: CPT | Performed by: INTERNAL MEDICINE

## 2024-05-02 PROCEDURE — 93321 DOPPLER ECHO F-UP/LMTD STD: CPT

## 2024-05-02 PROCEDURE — 93308 TTE F-UP OR LMTD: CPT | Performed by: INTERNAL MEDICINE

## 2024-05-03 DIAGNOSIS — I10 ESSENTIAL HYPERTENSION: ICD-10-CM

## 2024-05-06 ENCOUNTER — PROCEDURE VISIT (OUTPATIENT)
Age: 49
End: 2024-05-06
Payer: COMMERCIAL

## 2024-05-06 VITALS
BODY MASS INDEX: 34.86 KG/M2 | WEIGHT: 249 LBS | SYSTOLIC BLOOD PRESSURE: 158 MMHG | HEIGHT: 71 IN | DIASTOLIC BLOOD PRESSURE: 100 MMHG | HEART RATE: 92 BPM

## 2024-05-06 DIAGNOSIS — M99.04 SEGMENTAL DYSFUNCTION OF SACRAL REGION: ICD-10-CM

## 2024-05-06 DIAGNOSIS — M99.05 SEGMENTAL DYSFUNCTION OF PELVIC REGION: Primary | ICD-10-CM

## 2024-05-06 DIAGNOSIS — M54.16 LUMBAR RADICULOPATHY: ICD-10-CM

## 2024-05-06 DIAGNOSIS — M99.03 SEGMENTAL DYSFUNCTION OF LUMBAR REGION: ICD-10-CM

## 2024-05-06 DIAGNOSIS — M54.41 ACUTE BILATERAL LOW BACK PAIN WITH RIGHT-SIDED SCIATICA: ICD-10-CM

## 2024-05-06 PROCEDURE — 98941 CHIROPRACT MANJ 3-4 REGIONS: CPT | Performed by: CHIROPRACTOR

## 2024-05-06 RX ORDER — BISOPROLOL FUMARATE 10 MG/1
10 TABLET, FILM COATED ORAL DAILY
Qty: 30 TABLET | Refills: 1 | Status: SHIPPED | OUTPATIENT
Start: 2024-05-06

## 2024-05-06 NOTE — PROGRESS NOTES
[facility] Follow-up Visit - Cardiology   John Arevalo 49 y.o. male MRN: [id number]  Unit/Bed#:  Encounter: [id number]    Patient Active Problem List    Diagnosis Date Noted    Obesity with serious comorbidity 08/04/2020    Obstructive sleep apnea (adult) (pediatric)     Hypertrophic cardiomyopathy (HCC) 06/10/2020    Type 2 diabetes mellitus (HCC) 05/05/2020    Essential hypertension 05/05/2020    Other hyperlipidemia 05/05/2020    Coronary artery disease of native artery of native heart with stable angina pectoris (HCC) 05/05/2020    History of coronary angioplasty with insertion of stent 05/05/2020    Abdominal pain 03/22/2023    Leg pain 03/22/2023    Current use of insulin ([facility]) 12/16/2021     Plan: Mr Arevalo was last seen in the [facility] on 10-. Since then he has been admitted to ED 3 times, the 1st two episodes occurred in January and was due to mechanical falls due to balance problem from peripheral neuropathy. The first episode occurred outside the house and the second one indoors. He is now consistently using a cane for balance. Unfortunately, that has cause severe strain on his right forearm muscles. He was also admitted to ED for chest pain in February for which a cardiac cause was not found at the time. He does have evidence for costochondritis on examination and also has possible GERD for which he will be scheduling an upper endoscopy soon. He is not using his cPAP ever since the device was recalled. I am referring him back to sleep medicine for reevaluation as he does continue to have problems with his sleep. His diabetes is under better control on a regimen of insulin, empagliflozin and metformin and hemoglobin A1C is down to 8.1% (3-) with normal renal function (creatinine 0.9, BUN 15). He is now monitoring his blood sugar at home.and the level is 131 mg/dL in the office. His blood pressure is well controlled on bisoprolol ans losartan with home measurements consistently between  110-120 mmHg systolic. He continues to take mavacamten 5 mg daily with significant reduction in LVOT gradients on a recent echo:    Transthoracic echocardiogram (5-2-2024):      Left Ventricle: Wall thickness is severely increased. There is severe asymmetric hypertrophy of the septal wall. The left ventricular ejection fraction is 62%. Systolic function is normal. Global longitudinal strain is reduced at -15%. Wall motion is normal. Diastolic function is mildly abnormal, consistent with grade I (abnormal) relaxation. There is no LV dynamic obstruction with a peak gradient of 9.0 mmHg at rest and 24.0 mmHg with Valsalva.    Left Atrium: The atrium is mildly dilated.    Aortic Valve: There is aortic valve sclerosis.    Mitral Valve: There is mild annular calcification. There is systolic anterior motion without late peaking gradient.    He has no murmur of LVOT obstruction on examination today. He denies shortness of breath, edema or syncope. His episodes of chest pain have improved lately and occur no more than twice weekly, are triggered by stress and resolve within minutes. He does have occasional orthostatic dizziness and is instructed to perform leg exercises before standing up from prolonged sitting or lying down. His transaminases are elevated and need further evaluation.     Physician Requesting Consult: Bennie Garcias DO  Reason for Consult / Principal Problem: Hypertrophic cardiomyopathy    HPI: John Arevalo is a 49 year old male seen at HCM office at age 46. He had a past medical history of hyperlipidemia, type II diabetes and hypertension and was recently diagnosed as having hypertrophic cardiomyopathy phenotype on a transthoracic echocardiogram. The study was performed during an admission in May 2020 for chest pain and showed marked asymmetric septal hypertrophy without left ventricular outflow tract obstruction. Apical views were suboptimal and provocative maneuvers were not performed to elicit any  intracavitary or outflow tract gradients. He then underwent an exercise myocardial perfusion study that showed reduced exercise capacity, exaggerated heart rate response, normal blood pressure response and no ischemic changes or significant rhythm abnormality. A cardiac MRI performed on 2020 showed an anteroseptal wall thickness of 29 mm and inferolateral wall thickness of 18 mm. The left atrium was mildly dilated and there was no evidence of late gadolinium enhancing lesions. He is currently on carvedilol 20 mg daily.     On 2020: Mr Arevalo has had no further chest pain since hospital discharge. He also denies shortness of breath, palpitation, dizziness, syncope or lower extremity swelling. He does snore and wife has noted that he does periodically stop breathing during sleep. His physical activity is limited but he does notice daytime sleepiness and takes hour long naps during the day. He works for [employer] and often has to lift heavy objects (up to 150 lbs). His family history is relatively benign. His father  at age 41 when he was only 12 year old but the circumstances of his death is unclear. There is no history of sudden death at a young age in his first degree relatives. His risk stratification is incomplete. I have scheduled him for an exercise stress echocardiogram (to evaluate for latent outflow tract obstruction) and a 48 hour ambulatory monitor for assessment of arrhythmias. He has agreed to have genetic testing done. This would be important to particularly exclude HCM phenocopies such as hTTR amyloidosis. I have advised Mr Arevalo to adopt a heart healthy diet low in sodium, saturated fat and processed food. I have also discussed potential restrictions on lifting heavy weights. After completion of risk stratification, he would be advised to start daily exercises with the hope of improving cardiovascular conditioning. He states that he has been evaluated for sleep apnea several times (last time 4  years ago) that has been negative. He may need to be reassessed. He will also need evaluation by weight management.     9-: Mr Arevalo has been doing well on his current medications. He denies chest pain, shortness of breath, palpitation, dizziness, syncope or lower extremity swelling. He does snore and wife has noted that he does periodically stop breathing during sleep. He has not returned to work and is looking for partial disability due to the requirement for lifting heavy objects at work ([employer]). He is now referred to nutritionist and sleep medicine for further evaluation as weight management and correction of nocturnal hypoxemia would be essential for favorable outcomes. He is motivated to achieve both goals. 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 His two daughters and 2 siblings can now undergo targeted gene testing. His cardiac MRI results were reviewed with him.     10-: Mr Arevalo has done very well since last office visit. He denies shortness of breath, palpitation, dizziness, syncope, chest pain or lower extremity swelling. He has increased his daily physical activities and does short distance walks 4 to 5 times a week. He is tolerating his medications and will be returning to work on November 4 with restrictions to his activities. He is scheduled to have sleep study and see the nutritionist in December. Both daughters have been gene tested but results have not been received yet. He does not know if his brother ans sister have done their targeted gene testings. His diet has improved and his blood pressure and heart rate are well controlled.     3-: Mr Arevalo complains of exertional chest discomfort and shortness of breath for the few weeks. He states that the symptoms have prevented him from physical activity intended to help weight loss. As the result, he has gained some weight (now 310 lbs). He denies dizziness, syncope, lower  extremity edema or palpitation. His blood pressure is borderline controlled on metoprolol 50 mg twice daily and he states that he has been watching his diet for excess salt. His diabetes is not optimally controlled with home blood sugars around 175 mg/dL on 3 oral agents, insulin and semaglutide. He also has mild obstructive sleep apnea. He will be seen by sleep specialist on April 19. His cholesterol was last evaluated on 5/5/2020 and showed an HDL of 26, LDL of 43 and a triglyceride level of 265. He is on rosuvastatin 5 mg daily. He is now on long term disability and does not have to lift heavy objects any longer. I have recommended that he undergoes a stress echo study to evaluate his chest pain and shortness of breath as well as his blood pressure response to exercise. He is also referred to nutritionist to help and direct him to lose substantial amount of weight (including referral to weight management team) in order to improve management of his cardiac disease as well as hypertension, diabetes and sleep apnea. Unfortunately, his 2 daughters, and his siblings did not choose to do the targeted gene testing.     6-: Mr. Arevalo had Stress Echocardiography done on 4/8/2021 which showed significantly reduced exercise capacity, shortness of breath at peak exercise, normal heart rate and blood pressure response to exercise, frequent isolated and paired ventricular premature complexes at peak exercise. It also showed severe left ventricular hypertrophy with asymmetrical hypertrophy of the septum (maximum wall thickness 20 mm), systolic anterior motion of the anterior mitral valve leaflet and subvalvular apparatus was present with pressure gradient of 22 and 42 mmHg at rest and during Valsalva maneuver, with gradient increasing to 60 mmHg immediately after the exercise, findings consistent with a latent obstructive hypertrophic cardiomyopathy. Trivial tricuspid regurgitation with normal estimated pulmonary systolic  pressure was also noted.  Verapamil 120 mg daily was added to his regimen of metoprolol succinate 50 mg b.i.d. Today Mr Arevalo admits to occasional palpitation with exertion, but no dizziness, shortness of breath, or chest pain.  Mr. Arevalo continues to follow with Sleep clinic, he had an AHI of 5.2 and was recommended to continue with APAP for at lease 4 hours per night. He denies daytime sleepiness although does take occasional naps during the day. He believes at this time he is getting quality sleep of up to 7 hours per night. His daughters Jame did not choose to undergo genetic testing and have not had screening echocardiograms. He checks his blood glucose at home and has been obtaining values between 120 (fasting), around 220 (post-prandial). Dapagliflozin-metformin was added to his diabetics regime . He has not had routine labs since 2019 and will order them today. He has received both doses of Pfizer COVID-19 vaccine, along with his wife and daughters. He checks his blood pressure occasionally and the most recent blood pressure was 113/78 mmHg. He has lost 3 pounds since last visit.  He has not seen a nutritionist yet. We will obtain the contact information for the clinic. We again discussed the importance of weight loss for overall cardiovascular health. Although his dietary choices have improved he still needs counselling on total caloric intake and choice of food items.  Reinforced the importance of the heart-healthy diet. Mr Arevalo will be seen in HCM clinic in 6 month.      10-: Mr Arevalo continues having occasional exertional chest pain and exertional dyspnea (NYHA Class 2-3) as well as occasional headache. He does not check blood pressure at home. We ordered blood pressure cuff and instructed him to check blood pressure twice daily once he get the cuff, keep blood pressure and heart rate logs, and call us with results. At this time will continue with the current medication regime and once  he gets blood pressure cuff, will plan to stop candesartan and increase verapamil dosage to help with blood pressure as well as HCM symptom control. Recent labs showed Hgb A1C 11% and we reffered him to Endocrinology clinic. He has not been seen their yet and at this time PCP manages his diabetic medications. Will contact Endocrinology and Nutrition clinics to get him appointments to help with DM management as well as weight management. Mr. Arevalo also complained of the left shoulder bursitis pain, for which topical diclofenac is ordered. We again stressed the importance of weight loss, blood pressure control, and heart-healthy diet. He will be seen in HCM clinic in 6 months.      12-: Mr Arevalo was admitted to hospital on 10- for chest pain. He had no troponin elevation or ischemic chest discomfort and chest pain resolved spontaneously. He was discharged on his home medications after a day of observation. Since then he has not have any further chest pain. He also denies palpitation, dizziness, syncope or lower extremity edema although has had occasional dyspnea on exertion. His home blood pressure measurements have been mostly in the acceptable range and his blood pressure in the office is 114/78 mmHg. Laboratory work during hospitalization shows normal K, BUN and Cr. His blood sugar has been elevated and he is going to be switched to a new insulin preparation and will be seeing endocrinology on 12/16/2021. He has lost a few lbs and is determined to voluntarily loose more weight by reducing snacking and cutting back on carbohydrates. His left shoulder pain has improved after applying topical diclofenac. I did not change his antihypertensive regimen at this visit. He follows with sleep medicine for his mild obstructive sleep apnea. His family did not choose to be genetically screened for HCM.     10-4-2022: Mr Arevalo was last seen in the [facility] Clinic on 12-. Today, he is seen along with Dr Brower  Petra, cardiology fellow. Mr Arevalo remains symptomatic with continued chest discomfort that occurs intermittently on a daily basis for brief period of time. It is not associated with other symptoms but he does have exertional dyspnea as well with symptoms suggestive of class II-III severity. He does sleep using 2 pillows but has no paroxysmal nocturnal dyspnea. He continues to have bendopnea that is at times associated with dizziness as well. His does regularly check his blood pressure but not his weight at home. Today, his blood pressure is well controlled and his weight is 250 lbs. He has lost substantial weight recently down from 305 lbs. He has lost his insurance but so far has been able to afford his medication and has been compliant. Examination does not reveal objective evidence of volume overload. He is started on ranolazine to manage chest pain possibly related to microvascular disease. He also states that he would drink as much as 1.5 gallon of water every day and was asked to reduce that by half to see if that helps the shortness of breath. An echocardiogram was ordered (last study 4-8-2021) pending insurance reactivation.       5-9-2023: Mr Arevalo was last seen in the [facility] Clinic on 10-4-2022. He was admitted to hospital on 3- for acute gastroenteritis that was thought to be viral and was associated with elevated lipase level consistent with pancreatitis. There was no gallstone on ultrasound. His semiglutide was discontinued and he is now on insulin with latest HgbA1C of 12.7%. He needs aggressive management of his diabetes at this point. Home glucose measurements have ranged from 117-300 mg/dL. He has had some chest discomfort thought to be due to coronary microvascular disease that has responded to ranolazine. Mr Arevalo denies palpitation, dizziness, syncope, lower extremity edema or exertional chest pain. He experiences chest discomfort that is localized to the left costochondral region and is  reproduced by touch likely secondary to costochondritis. He is quite limited in his ambulation due to severe peripheral neuropathy and uses a cane to keep his balance. He does get short of breath with minimal exertion. His blood pressure is well controlled and exam does not indicate any evidence of cardiac decompensation. He has been found to have a positive occult blood test in his stool and will be undergoing colonoscopy soon. His latest echocardiogram performed on 2- has shown:       Left Ventricle: Wall thickness is severely increased. There is severe asymmetric hypertrophy of the septal wall. The left ventricular ejection fraction is 70%. Systolic function is hyperdynamic. Wall motion is normal. Diastolic function is mildly abnormal, consistent with grade I (abnormal) relaxation. There is no LV dynamic obstruction with a peak gradient of 21.0 mmHg. There is outflow tract dynamic obstruction with valsalva with a peak gradient of 41.0 mmHg.    Left Atrium: The atrium is mildly dilated.    Mitral Valve: There is systolic anterior motion without late peaking gradient.     He has no murmur on cardiac auscultation today. He is recently started on Mirtazapine and feels that his depression has improved and his sleep is more regulated. He will continue candesartan, metoprolol, verapamil, ranolazine and rosuvastatin at this time. He has had no recent lipid profile.     10-: Since his last office visit on 5-9-2023 he has been seen in the ED in May and August for chest pain and continued chronic dyspnea on exertion.  He describes the chest pain as a stabbing sensation on the left side of the chest with no radiation but has associated dyspnea.  He did not trial any alleviating factors at home and he does not specifically discuss any exacerbating factors, he states that this discomfort occurs sporadically.  EKG's at the time showed now acute ischemia and troponin levels were normal.  He was also seen in the ED  on 8/13/2023 with complaints of epigastric pain that he noted was sometimes worse with eating and had been improved with Pepto-Bismol at home.  He was ultimately referred to GI as an outpatient and has since started on omeprazole with some improvement in discomfort.  Today, the patient states that he does have several physical complaints.  Ultimately, he has neuropathy in both his feet and his hands and the neuropathy in his feet is sometimes debilitating.  Certain days he has trouble walking and he typically does use a cane for gait stability.  He states he has fallen a few times at home which he feels is a combination of lightheadedness from exertion and also gait instability.  The patient states he does get lightheaded both with position change and after walking for periods of time.  He does state that he is well-hydrated drinking 4-5 bottles of water a day.  However, the patient states that secondary to his continued GI upset/discomfort he does not eat much throughout the day.  Sometimes he will have a small breakfast and a very small dinner and that is all throughout the day.  I have encouraged him to attempt to snack a little more frequently as he is on glucose lowering medication for his diabetes and I am concerned that he might be getting hypoglycemic at times which could potentially be causing his lightheadedness.  I have encouraged him to check his glucose levels when he gets lightheaded as he has not done this before.  The patient also describes 2 different types of chest discomfort. One type of chest discomfort does seem to be musculoskeletal in nature as the patient states that he has discomfort across his chest even while in the shower when the water hits it and also with palpation.  I note in his chart that he has had costochondritis type symptoms with reproducible chest wall tenderness for at least the past few years.  However, he also describes a chest discomfort that is left-sided and stabbing in  nature with no particular exacerbating factors occurring both at rest and with exertion intermittently.  He states it does not happen every day but does happen a few times a week.  It is nonradiating but is associated with dyspnea and will typically resolve on its own.  He has not trialed any alleviating treatments at home.  He states that this type of discomfort he has had in the past and was improved slightly with the start of Ranexa.  Therefore, we will increase Ranexa to 1000 mg twice daily and we will order a bike exercise stress echo to evaluate for any progression of CAD and also LVOTO.  With regards to his dyspnea with exertion he states this is chronic and has not recently increased in severity or frequency.  He denies any palpitations.  He denies any lower extremity edema or orthopnea and denies any marquise syncope.  His prior falls I do believe are possibly combination of lightheadedness but also gait instability secondary to neuropathy.  The patient also states he does get headaches throughout the week and does note he has poor sleep quality.  He states he feels he has insomnia often waking up at 1:00 in the morning and only sleeping intermittently throughout the remainder of the night.  He did have a sleep study in 2020 which showed mild sleep apnea but he was unable to tolerate CPAP.  He is unsure if he would like to pursue a another sleep study.  I have asked him to think about this and inform his family doctor of his decision during his office visit in December.  I have asked the patient to monitor his blood pressure at times of having a headache and inform us if his blood pressure is elevated.  Today, his blood pressure and heart rate are acceptable on his current medication regimen.  Lipid panel from March of this year shows his LDL is at 79 and he will continue rosuvastatin 5 mg daily. I encouraged him to remain as active as he can given his gait instability and neuropathy.  I have asked him to  continue following a low-fat/cholesterol/sodium diet.      Family history: Father  at age 41 for uncertain cause and the circumstances of his death is not known to the patient who was 12 years old at the time. Mother  in 2019 and had diabetes, hypertension and heart failure. He has 3 siblings. The older sister  of complications of drug use at age 31. Another sister (Stephen)  is 48 year old and has no known medical condition. A younger brother (Brando) is 36 years old and has no heart disease. Mr Arevalo has 2 daughters (Rae Arevalo 1995 and Rosa Arevalo 2003) who are healthy. There is no history of sudden cardiac death or hypertrophic cardiomyopathy in the family.     Genetic testing ([employer] 2020): Positive for a pathogenic mutation in the MYH7 gene.      Risk stratification:  Nonsustained ventricular tachycardia-no  Severe left ventricular hypertrophy-no  Family history of sudden death-no  Unexplained syncope-no  LVOT obstruction-no  Atrial fibrillation and left atrial dilation-yes  Age- 48 years old  NYHA-class II  Myocardial fibrosis-no  LV systolic dysfunction-no  Apical aneurysm-no     Review of systems: Patient notes chest discomfort both with exertion and at rest that is intermittent throughout the week with recent reduction in frequency; denies dyspnea on limited exertion; denies palpitations; denies lower extremity edema and orthopnea; notes lightheadedness both with position change and exertion at times      Devices: none    Historical Information   Past Medical History:   Diagnosis Date    Congestive heart failure (HCC)     Diabetes mellitus (HCC)     Hyperlipidemia     Hypertension     Obesity      Past Surgical History:   Procedure Laterality Date    COLONOSCOPY       Family History   Problem Relation Age of Onset    Diabetes Mother     Stroke Mother     Stroke Father      Current Outpatient Medications on File Prior to Visit   Medication Sig Dispense Refill    aspirin  (ECOTRIN LOW STRENGTH) 81 mg EC tablet Take 1 tablet (81 mg total) by mouth daily 30 tablet 0    bisoprolol (ZEBETA) 10 MG tablet take 1 tablet by mouth once daily 30 tablet 1    Blood Glucose Monitoring Suppl (OneTouch Verio) w/Device KIT Use to test 3-4x daily 1 kit 1    Blood Pressure KIT Use 2 (two) times a day 1 kit 2    butalbital-acetaminophen-caffeine (FIORICET,ESGIC) -40 mg per tablet TAKE 2 TABLETS BY MOUTH EVERY 8 HOURS AS NEEDED FOR HEADACHES      Continuous Blood Gluc  (Dexcom G7 ) HENRY Use 1 each in the morning 1 each 1    Continuous Blood Gluc Sensor (Dexcom G7 Sensor) Use 1 Device every 10 days 3 each 5    Diclofenac Sodium (VOLTAREN) 1 % Apply 2 g topically 4 (four) times a day 100 g 0    Empagliflozin-metFORMIN HCl 12.5-1000 MG TABS Take one pill by mouth twice daily 60 tablet 5    gabapentin (NEURONTIN) 300 mg capsule take 1 capsule by mouth twice a day START BY TAKING AT BEDTIME FI...  (REFER TO PRESCRIPTION NOTES).      glucose blood (OneTouch Verio) test strip Use to test 3x daily 100 strip 3    Insulin Glargine Solostar (Lantus SoloStar) 100 UNIT/ML SOPN Inject 0.3 mL (30 Units total) under the skin daily at bedtime 5 mL 0    insulin lispro (HumaLOG KwikPen) 100 units/mL injection pen Inject 10 Units under the skin 3 (three) times a day with meals 5 mL 0    Insulin Pen Needle (BD Pen Needle Ann 2nd Gen) 32G X 4 MM MISC For use with insulin pen. Pharmacy may dispense brand covered by insurance. 100 each 0    ketorolac (ACULAR) 0.5 % ophthalmic solution instill 1 drop into both eyes four times a day for 10 days      Lancets 30G MISC Use to test 3x daily 100 each 2    losartan (COZAAR) 100 MG tablet Take 100 mg by mouth daily      Mavacamten 5 MG CAPS Take 5 mg by mouth in the morning 30 capsule 1    mirtazapine (REMERON) 30 mg tablet take 1/2 tablet by mouth nightly      mometasone (ELOCON) 0.1 % cream Apply 1 Application topically 2 (two) times a day To affected area       "pantoprazole (PROTONIX) 40 mg tablet Take 1 tablet (40 mg total) by mouth daily 30 tablet 5    rosuvastatin (CRESTOR) 5 mg tablet Take 5 mg by mouth daily      [DISCONTINUED] bisoprolol (ZEBETA) 10 MG tablet Take 1 tablet (10 mg total) by mouth daily 30 tablet 1     No current facility-administered medications on file prior to visit.     No Known Allergies  Social History     Substance and Sexual Activity   Alcohol Use Not Currently     Social History     Substance and Sexual Activity   Drug Use Never     Social History     Tobacco Use   Smoking Status Never   Smokeless Tobacco Never     Objective   Vitals:   Vitals:    05/07/24 0915   BP: 130/84   BP Location: Left arm   Patient Position: Sitting   Cuff Size: Large   Pulse: 78   SpO2: 98%   Weight: 114 kg (250 lb 11.2 oz)   Height: 5' 11\" (1.803 m)   Body mass index is 34.97 kg/m².    Invasive Devices       None                 Physical Exam:  GEN: John Arevalo appears well, alert and oriented x 3, pleasant and cooperative   HEENT: pupils equal, round, and reactive to light; extraocular muscles intact  NECK: supple, no carotid bruits   HEART: regular rhythm, normal S1 and S2, +S4, no murmurs, clicks, or rubs   LUNGS: clear to auscultation bilaterally; no wheezes, rales, or rhonchi   ABDOMEN: normal bowel sounds, soft, no tenderness, no distention  EXTREMITIES: peripheral pulses normal; no clubbing, cyanosis, or edema  NEURO: no focal findings   SKIN: normal without suspicious lesions on exposed skin    Lab Results:   Lab Results   Component Value Date    WBC 5.85 02/23/2024    RBC 5.21 02/23/2024    HGB 15.2 02/23/2024    HCT 47.1 02/23/2024    MCV 90 02/23/2024     02/23/2024    RDW 12.8 02/23/2024     Lab Results   Component Value Date    K 4.3 03/28/2024    CL 99 (L) 03/28/2024    CO2 25 03/28/2024    BUN 15 03/28/2024    CREATININE 0.91 03/28/2024    EGFR 103 03/28/2024    CALCIUM 10.1 03/28/2024    AST 58 (H) 03/28/2024    ALT 64 (H) 03/28/2024    " ALKPHOS 249 (H) 2024     Lab Results   Component Value Date    MG 2.1 2023     Lab Results   Component Value Date    HDL 32 (L) 2021    TRIG 146 2021    LDLCALC 79 2021     Lab Results   Component Value Date    [id number] 2.100 10/26/2021       Imaging:   I have personally reviewed pertinent films in PACS  Echo follow up/limited w/ contrast if indicated    Result Date: 2024  Narrative:   Left Ventricle: Wall thickness is severely increased. There is severe asymmetric hypertrophy of the septal wall. The left ventricular ejection fraction is 62%. Systolic function is normal. Global longitudinal strain is reduced at -15%. Wall motion is normal. Diastolic function is mildly abnormal, consistent with grade I (abnormal) relaxation. There is no LV dynamic obstruction with a peak gradient of 9.0 mmHg at rest and 24.0 mmHg with Valsalva.   Left Atrium: The atrium is mildly dilated.   Aortic Valve: There is aortic valve sclerosis.   Mitral Valve: There is mild annular calcification. There is systolic anterior motion without late peaking gradient. Strain was performed to quantify interventricular dyssynchrony and evaluate components of myocardial function due to HCM. Results from the utilization of Strain Analysis are listed in the report below.     Cardiac testing:   Results for orders placed during the hospital encounter of 20    Echo complete with contrast if indicated    Narrative  [place] [id number] [phone]    Transthoracic Echocardiogram  2D, M-mode, Doppler, and Color Doppler    Study date:  05-May-2020    Patient: JESUS BURT  MR number: [id number]  Account number: [id number]  : 1975  Age: 45 years  Gender: Male  Status: Outpatient  Location: Bedside  Height: 71 in  Weight: 295.5 lb  BP: 140/ 82 mmHg    Indications: Exertional Chest Pain.    Diagnoses: R07.9 - Chest pain,  unspecified    Sonographer:  Clara Leblanc  Referring Physician:  BRIDGET Grayson  Group:  [facility]  Cardiology Fellow:  Pancho Calloway MD  Interpreting Physician:  Jd Sam MD    IMPRESSIONS:  There appears to be mild concentric hypertrophy with more moderately thickened septum.  In the absence of hypertension, consider the possibility of Hypertrophic cardiomyopathy    SUMMARY    LEFT VENTRICLE:  Systolic function was hyperdynamic. Ejection fraction was estimated to be 75 %.  There were no regional wall motion abnormalities.  Wall thickness was mild-moderately increased.  There was mild concentric hypertrophy. There was moderate assymetrical hypertrophy of the septum.  There was dynamic obstruction at rest in the mid cavity, with a peak velocity of 200 cm/s.  Doppler parameters were consistent with abnormal left ventricular relaxation (grade 1 diastolic dysfunction).    TRICUSPID VALVE:  There was trace regurgitation.    HISTORY: PRIOR HISTORY: HTN;Dyslipidemia;morbid obesity;DM;CHF.    PROCEDURE: The procedure was performed at the bedside. This was a routine study. The transthoracic approach was used. The study included complete 2D imaging, M-mode, complete spectral Doppler, and color Doppler. The heart rate was 87 bpm,  at the start of the study. Images were obtained from the parasternal, apical, subcostal, and suprasternal notch acoustic windows. Echocardiographic views were limited due to decreased penetration and lung interference. This was a  technically difficult study.    LEFT VENTRICLE: Size was normal. Systolic function was hyperdynamic. Ejection fraction was estimated to be 75 %. There were no regional wall motion abnormalities. Wall thickness was mild-moderately increased. There was mild concentric  hypertrophy. There was moderate assymetrical hypertrophy of the septum. DOPPLER: There was dynamic obstruction at rest in the mid cavity, with a peak velocity of 200 cm/s.  Doppler parameters were consistent with abnormal left ventricular  relaxation (grade 1 diastolic dysfunction).    RIGHT VENTRICLE: The size was normal. Systolic function was normal. Wall thickness was normal.    LEFT ATRIUM: Size was normal.    RIGHT ATRIUM: Size was normal.    MITRAL VALVE: Valve structure was normal. There was normal leaflet separation. DOPPLER: The transmitral velocity was within the normal range. There was no evidence for stenosis. There was no regurgitation.    AORTIC VALVE: The valve was trileaflet. Leaflets exhibited normal thickness and normal cuspal separation. DOPPLER: Transaortic velocity was within the normal range. There was no evidence for stenosis. There was no regurgitation.    TRICUSPID VALVE: The valve structure was normal. There was normal leaflet separation. DOPPLER: The transtricuspid velocity was within the normal range. There was no evidence for stenosis. There was trace regurgitation.    PULMONIC VALVE: Leaflets exhibited normal thickness, no calcification, and normal cuspal separation. DOPPLER: The transpulmonic velocity was within the normal range. There was trace regurgitation.    PERICARDIUM: There was no pericardial effusion. The pericardium was normal in appearance.    AORTA: The root exhibited normal size.    SYSTEM MEASUREMENT TABLES    2D  %FS: 32.23 %  Ao Diam: 2.56 cm  EDV(Teich): 98.26 ml  EF(Cube): 68.87 %  EF(Teich): 60.49 %  ESV(Cube): 30.67 ml  ESV(Teich): 38.82 ml  IVSd: 1.71 cm  LA Area: 19.82 cm2  LA Diam: 4.97 cm  LVEDV MOD A4C: 145.82 ml  LVEF MOD A4C: 50.05 %  LVESV MOD A4C: 72.84 ml  LVIDd: 4.62 cm  LVIDs: 3.13 cm  LVLd A4C: 9.34 cm  LVLs A4C: 8.11 cm  LVPWd: 1.65 cm  RA Area: 14.38 cm2  SI(Cube): 27.25 ml/m2  SI(Teich): 23.87 ml/m2  SV MOD A4C: 72.98 ml  SV(Cube): 67.86 ml  SV(Teich): 59.44 ml  rv diam: 3.67 cm    CW  AV Env.Ti: 273.57 ms  AV VTI: 31.4 cm  AV Vmax: 1.55 m/s  AV Vmean: 1.15 m/s  AV maxP.63 mmHg  AV meanP.81 mmHg  MR VTI: 156.12  cm  MR Vmax: 3.8 m/s  MR Vmean: 3.25 m/s  MR maxP.84 mmHg  MR meanP.92 mmHg  PRend P.06 mmHg  PRend Vmax: 1.33 m/s  TR Vmax: 2.79 m/s  TR maxP.17 mmHg    MM  TAPSE: 2.5 cm    PW  E': 0.12 m/s  E/E': 5.62  LVOT Env.Ti: 273.57 ms  LVOT VTI: 30.55 cm  LVOT Vmax: 1.48 m/s  LVOT Vmean: 1.12 m/s  LVOT maxP.77 mmHg  LVOT meanP.43 mmHg  MV A Randell: 0.79 m/s  MV Dec Garland: 2.97 m/s2  MV DecT: 224.72 ms  MV E Randell: 0.67 m/s  MV E/A Ratio: 0.84    [facility]    Prepared and electronically signed by    Jd Sam MD  Signed 05-May-2020 16:21:39    No results found for this or any previous visit.    No results found for this or any previous visit.    Results for orders placed during the hospital encounter of 20    NM Myocardial Perfusion Spect (Exercise Induced Stress and/or Rest)    Narrative  [place] [id number] [phone]    Stress Gated SPECT Myocardial Perfusion Imaging after Exercise    Patient: JESUS BURT  MR number: [id number]  Account number: [id number]  : 1975  Age: 45 years  Gender: Male  Status: Outpatient  Location: Stress lab  Height: 71 in  Weight: 196 lb  BP: 132/ 80 mmHg    Allergies: NO KNOWN ALLERGIES    Diagnosis: R07.89 - Other chest pain    Interpreting Physician:  Vinnie Crenshaw MD  Referring Physician:  BRIDGET Grayson  Primary Physician:  RACHEL ARREDONDO  Technician:  Cinthia Davenport  RN:  Cinthia Malagon RN  Group:  [facility]  Cardiology Fellow:  Pancho Calloway MD  Report Prepared by::  Cinthia Malagon RN    INDICATIONS: Coronary artery disease.    HISTORY: The patient is a 45 year old  male. Chest pain status: chest pain. Coronary artery disease risk factors: dyslipidemia, hypertension, and diabetes mellitus. Cardiovascular history: congestive heart failure.  Medications: a beta blocker,  aspirin, and a lipid lowering agent.    PHYSICAL EXAM: Baseline physical exam screening: normal and no wheezes audible.    REST ECG: Normal sinus rhythm. The ECG showed occasional premature ventricular contractions. T wave inversions were observed in leads I, II, aVL, aVF, V5 and V6. Nonspecific ST abnormalities were present.    PROCEDURE: The study was performed in the the Stress lab. Treadmill exercise testing was performed, using the Tin protocol. Gated SPECT myocardial perfusion imaging was performed during stress. Systolic blood pressure was 132 mmHg, at  the start of the study. Diastolic blood pressure was 80 mmHg, at the start of the study. The heart rate was 96 bpm, at the start of the study. IV double checked.    TIN PROTOCOL:  HR bpm SBP mmHg DBP mmHg Symptoms  Baseline 96 132 80 none  Stage 1 134 142 80 moderate dyspnea, mild fatigue  Stage 2 139 178 88 moderate dyspnea, moderate fatigue  Immediate 139 180 80 subsiding  Recovery 1 105 146 80 subsiding  Recovery 2 103 134 80 none  No medications or fluids given.    STRESS SUMMARY: Duration of exercise was 6 min and 0 sec. Functional capacity was decreased (greater than 40%). Maximal heart rate during stress was 148 bpm ( 85 % of maximal predicted heart rate). The heart rate response to stress was  normal. There was normal resting blood pressure with an appropriate response to stress. The rate-pressure product for the peak heart rate and blood pressure was 31292. There was no chest pain during stress. The stress test was terminated  due to achievement of maximal (symptom limited) exercise. Pre oxygen saturation: 95 %. Peak oxygen saturation: 98 %. The stress ECG was negative for ischemia and normal. Arrhythmia during stress: isolated atrial premature beats.    ISOTOPE ADMINISTRATION:  Stress isotope administration Resting isotope administration  Agent Tetrofosmin Tetrofosmin  Dose 16.3 mCi 49.2 mCi  Date 05/06/2020 05/06/2020  Injection time 07:05  "09:43  Injection-image interval 30 min 49 min    The radiopharmaceutical was injected one minute before the end of exercise.    MYOCARDIAL PERFUSION IMAGING:  The image quality was fair. Rotating projection images reveal moderate diaphragmatic attenuation. The TID ratio was 1.2.    PERFUSION DEFECTS:  -  No ischemia visualized. There was a small, moderately severe, fixed myocardial perfusion defect of the basal inferior wall likely due to diaphragm attenuation. The defect was worse on rest images.    PERFUSION QUANTITATION:  The summed perfusion score measured 2 during stress.    GATED SPECT:  Left ventricular ejection fraction was within normal limits by visual estimate. There was no left ventricular regional abnormality.    SUMMARY:  -  Stress results: Duration of exercise was 6 min and 0 sec. Functional capacity was decreased (greater than 40%). Target heart rate was achieved. There was no chest pain during stress.  -  ECG conclusions: The stress ECG was negative for ischemia and normal. Arrhythmia during stress: isolated atrial premature beats.  -  Perfusion imaging: No ischemia visualized. There was a small, moderately severe, fixed myocardial perfusion defect of the basal inferior wall likely due to diaphragm attenuation. The defect was worse on rest images.  -  Gated SPECT: Left ventricular ejection fraction was within normal limits by visual estimate. There was no left ventricular regional abnormality.    IMPRESSIONS: There was image artifact, without diagnostic evidence for perfusion abnormality. Left ventricular systolic function was normal.    Prepared and signed by    Vinnie Crenshaw MD  Signed 2020 12:51:16    Name: John Arevalo                       : 1975  MRN: [id number]                       Age: 49 y.o.  Patient Status: Outpatient          Gender: male  Echo follow up/limited w/ color and doppler    Height: 5' 11\" (1.803 m)   Weight: 113 kg (249 lb)   BSA: 2.32 m²   Blood Pressure: " "132/80    Date of Study: 5/2/24   Ordering Provider: Joey Norris MD   Clinical Indications: Hypertrophic cardiomyopathy (HCC) [I42.2 (ICD-10-CM)]       Reading Physicians  Performing Staff   Cardiology: Joey Norris MD    Tech: Monica Ring, RS        Vitals    Height Weight BSA (Calculated - m2) BP Pulse   5' 11\" (1.803 m) 113 kg (249 lb) 2.31 sq meters 132/80 88     PACS Images     Show images for Echo follow up/limited w/ contrast if indicated  Study Details    This transthoracic echocardiogram was performed in the echo lab. This was a routine, outpatient study. Study quality was adequate. A limited 2D, color flow Doppler, spectral Doppler, strain, 2D, color flow Doppler and spectral Doppler transthoracic echocardiogram was performed.  The apical, parasternal and subcostal views were obtained.     History    Diabetes. Obstructive sleep apnea. Hypertension. CAD. Hypertrophic cardiomyopathy. HLD.     Interpretation Summary         Left Ventricle: Wall thickness is severely increased. There is severe asymmetric hypertrophy of the septal wall. The left ventricular ejection fraction is 62%. Systolic function is normal. Global longitudinal strain is reduced at -15%. Wall motion is normal. Diastolic function is mildly abnormal, consistent with grade I (abnormal) relaxation. There is no LV dynamic obstruction with a peak gradient of 9.0 mmHg at rest and 24.0 mmHg with Valsalva.    Left Atrium: The atrium is mildly dilated.    Aortic Valve: There is aortic valve sclerosis.    Mitral Valve: There is mild annular calcification. There is systolic anterior motion without late peaking gradient.     Strain was performed to quantify interventricular dyssynchrony and evaluate components of myocardial function due to HCM. Results from the utilization of Strain Analysis are listed in the report below.     Findings    Left Ventricle Left ventricular cavity size is small. Wall thickness is severely increased. There is " severe asymmetric hypertrophy of the septal wall. The left ventricular ejection fraction is 62%. Systolic function is normal. Global longitudinal strain is reduced at -15%.  Wall motion is normal. Diastolic function is mildly abnormal, consistent with grade I (abnormal) relaxation.  There is no LV dynamic obstruction with a peak gradient of 9.0 mmHg at rest and 24.0 mmHg with Valsalva.   Right Ventricle Right ventricular cavity size is normal. Systolic function is normal.   Left Atrium The atrium is mildly dilated.   Right Atrium The atrium is normal in size.   Aortic Valve The aortic valve is trileaflet. The leaflets are mildly thickened. The leaflets are mildly calcified. The leaflets exhibit normal mobility. There is no evidence of regurgitation. There is aortic valve sclerosis.   Mitral Valve The leaflets are not thickened. The leaflets are not calcified. The leaflets exhibit normal mobility. There is mild annular calcification.  There is trace regurgitation. There is no evidence of stenosis. There is systolic anterior motion without late peaking gradient.   Tricuspid Valve Tricuspid valve structure is normal. There is trace regurgitation. There is no evidence of stenosis. The right ventricular systolic pressure is normal.   Pulmonic Valve Pulmonic valve structure is normal. There is trace regurgitation. There is no evidence of stenosis.   Ascending Aorta The aortic root is normal in size. The ascending aorta is normal in size.   IVC/SVC The inferior vena cava is normal in size. Respirophasic changes were normal.   Pericardium There is no pericardial effusion.     Left Ventricle Measurements    Function/Volumes   A4C EF 62 %         LVOT stroke volume 86.68         LVOT stroke volume index 36.4 ml/m2         LVOT Cardiac Output 6.54 l/min         LVOT Cardiac Index 2.83 l/min/m2         Dimensions   LVIDd 3.8 cm         LVIDS 2.1 cm         IVSd 1.9 cm         LVPWd 1.7 cm         LVOT area 3.46 cm2         FS  45         Diastolic Filling   MV E' Tissue Velocity Lateral 8 cm/s         LA Volume Index (BP) 33.8 mL/m2         E/A ratio 0.75         E wave deceleration time 203 ms         MV Peak E Randell 83 cm/s         MV Peak A Randell 1.11 m/s         Strain   GLS -15 %          Report Measurements   AV LVOT peak gradient 4 mmHg         Other Measurements   Peak gradient (Rest) 9 mmHg         Peak Gradient (Valsalva) 24 mmHg              Interventricular Septum Measurements    Shunt Ratio   LVOT peak VTI 25.04 cm         LVOT peak randell 1.06 m/s              Left Atrium Measurements    Dimensions   LA size 5.3 cm         LA length (A2C) 5.9 cm         Volumes   LA volume (BP) 78 mL         LA Volume Index (BP) 33.8 mL/m2               Atrial Septum Measurements    Shunt Ratio   LVOT peak VTI 25.04 cm         LVOT peak randell 1.06 m/s               Aortic Valve Measurements    Stenosis   LVOT peak randell 1.06 m/s         LVOT peak VTI 25.04 cm         LVOT mn grad 3 mmHg         AV LVOT peak gradient 4 mmHg         Area/Dimensions   LVOT diameter 2.1 cm         LVOT area 3.46 cm2               Mitral Valve Measurements    Stenosis   MV stenosis pressure 1/2 time 59 ms         MV valve area p 1/2 method 3.73               Tricuspid Valve Measurements    RVSP Parameters   TR Peak Randell 2.2 m/s         Triscuspid Valve Regurgitation Peak Gradient 19 mmHg               Aorta Measurements    Aortic Dimensions   Ao root 3.4 cm               Exam Details    Performed Procedure Technologist Supporting Staff Performing Physician   Echo follow up/limited w/ color and doppler BLANKA Delong           Appointment Date/Status Modality Department    5/2/2024     Completed BE ECHO RM 1 BE CAR NON INV           Begin Exam End Exam  End Exam Questionnaires   5/2/2024 10:15 AM 5/2/2024 10:57 AM  PATIENT EDUCATION            All Reviewers List    Joey Norris MD on 5/2/2024  2:51 PM     Signed    Electronically signed by Joey Norris MD on  5/2/24 at 1125 EDT     Counseling / Coordination of Care  Total floor / unit time spent today 30 minutes.  Greater than 50% of total time was spent with the patient and / or family counseling and / or coordination of care.

## 2024-05-07 ENCOUNTER — OFFICE VISIT (OUTPATIENT)
Dept: CARDIOLOGY CLINIC | Facility: CLINIC | Age: 49
End: 2024-05-07
Payer: COMMERCIAL

## 2024-05-07 VITALS
WEIGHT: 250.7 LBS | DIASTOLIC BLOOD PRESSURE: 84 MMHG | OXYGEN SATURATION: 98 % | HEIGHT: 71 IN | SYSTOLIC BLOOD PRESSURE: 130 MMHG | HEART RATE: 78 BPM | BODY MASS INDEX: 35.1 KG/M2

## 2024-05-07 DIAGNOSIS — G47.33 OBSTRUCTIVE SLEEP APNEA: ICD-10-CM

## 2024-05-07 DIAGNOSIS — I10 ESSENTIAL HYPERTENSION: Primary | ICD-10-CM

## 2024-05-07 DIAGNOSIS — I42.2 HYPERTROPHIC CARDIOMYOPATHY (HCC): ICD-10-CM

## 2024-05-07 DIAGNOSIS — I25.118 CORONARY ARTERY DISEASE OF NATIVE ARTERY OF NATIVE HEART WITH STABLE ANGINA PECTORIS (HCC): ICD-10-CM

## 2024-05-07 DIAGNOSIS — R07.9 CHEST PAIN: ICD-10-CM

## 2024-05-07 DIAGNOSIS — E78.49 OTHER HYPERLIPIDEMIA: ICD-10-CM

## 2024-05-07 PROCEDURE — 99214 OFFICE O/P EST MOD 30 MIN: CPT | Performed by: INTERNAL MEDICINE

## 2024-05-07 NOTE — PROGRESS NOTES
Date of first visit: 1/11/2024       HPI:  Ramos returns for treatment today right-sided low back right posterior thigh pain as well as mid back stiffness.   Right-sided hand and wrist pain most likely secondary to his usage of a cane.    Denies any weaknesses denies any bowel bladder changes reports no other associated symptomatology.        VPAS 6      The following portions of the patient's history were reviewed and updated as appropriate: allergies, current medications, past family history, past medical history, past social history, past surgical history, and problem list.    Review of Systems    Physical Exam:  Pelvic obliquity is noted leg length inequality is present.  Misalignment of the right innominate on sacrum.  Biomechanically joint dysfunction right SI L5-S1 motion unit he still has erector spinae hypertonicity noted on the right.    Joint dysfunction T4-T5.  C5/C6          Assessment:   Diagnosis ICD-10-CM Associated Orders   1. Segmental dysfunction of pelvic region  M99.05       2. Lumbar radiculopathy  M54.16       3. Segmental dysfunction of sacral region  M99.04       4. Segmental dysfunction of lumbar region  M99.03       5. Acute bilateral low back pain with right-sided sciatica  M54.41                 Treatment: 67601  Manipulation to the right innominate, sacrum, L5 via Mart drop maneuver well-tolerated.  Manipulation T4  well-tolerated.      Discussion:  Daily icing flexion-based stretching for the lower back I will see him back for follow-up.

## 2024-05-14 ENCOUNTER — OFFICE VISIT (OUTPATIENT)
Dept: DIABETES SERVICES | Facility: CLINIC | Age: 49
End: 2024-05-14

## 2024-05-14 DIAGNOSIS — E11.65 TYPE 2 DIABETES MELLITUS WITH HYPERGLYCEMIA, WITH LONG-TERM CURRENT USE OF INSULIN (HCC): Primary | ICD-10-CM

## 2024-05-14 DIAGNOSIS — Z79.4 TYPE 2 DIABETES MELLITUS WITH HYPERGLYCEMIA, WITH LONG-TERM CURRENT USE OF INSULIN (HCC): Primary | ICD-10-CM

## 2024-05-14 NOTE — PROGRESS NOTES
"Personal CGM Follow-up    Met with John Ford, 49 -yr old patient here today requesting Personal Continuous Glucose Monitoring follow-up: Dexcom G7 .     The patient/family report his Dexcom seemed to be giving false readings bouncing between mid 50's and 200's. He states that nine days into wearing the sensor the reader told him that \"it could take up to three hours to get readings\". Possibly, the sensor was malfunctioning. He put a new sensor on at today's visit and left in warm-up mode. Advised him to contact the company should this happen again and request a replacement sensor.  Sensor was removed by patient.   Site condition was: irritated.    John performed sensor insertion using his left arm under my supervision/with my assistance.  The patient tolerance of the procedure: good.    Other notes: John denies adhesive tape allergy. However, he reports that his skin has scabbed under the sensor tape at times. Patient's skin was irritated when he removed the  sensor at today's visit.      "

## 2024-05-20 ENCOUNTER — PROCEDURE VISIT (OUTPATIENT)
Age: 49
End: 2024-05-20
Payer: COMMERCIAL

## 2024-05-20 VITALS
SYSTOLIC BLOOD PRESSURE: 143 MMHG | DIASTOLIC BLOOD PRESSURE: 90 MMHG | WEIGHT: 250 LBS | BODY MASS INDEX: 35 KG/M2 | HEART RATE: 80 BPM | HEIGHT: 71 IN

## 2024-05-20 DIAGNOSIS — M99.03 SEGMENTAL DYSFUNCTION OF LUMBAR REGION: ICD-10-CM

## 2024-05-20 DIAGNOSIS — M99.05 SEGMENTAL DYSFUNCTION OF PELVIC REGION: Primary | ICD-10-CM

## 2024-05-20 DIAGNOSIS — M99.04 SEGMENTAL DYSFUNCTION OF SACRAL REGION: ICD-10-CM

## 2024-05-20 DIAGNOSIS — M54.41 ACUTE BILATERAL LOW BACK PAIN WITH RIGHT-SIDED SCIATICA: ICD-10-CM

## 2024-05-20 DIAGNOSIS — M54.16 LUMBAR RADICULOPATHY: ICD-10-CM

## 2024-05-20 PROCEDURE — 98941 CHIROPRACT MANJ 3-4 REGIONS: CPT | Performed by: CHIROPRACTOR

## 2024-05-21 NOTE — PROGRESS NOTES
Date of first visit: 1/11/2024       HPI:  Ramos returns for treatment today right-sided low back right posterior thigh pain as well as mid back stiffness.   Right-sided hand and wrist pain most likely secondary to his usage of a cane.    Denies any weaknesses denies any bowel bladder changes reports no other associated symptomatology.        VPAS 7      The following portions of the patient's history were reviewed and updated as appropriate: allergies, current medications, past family history, past medical history, past social history, past surgical history, and problem list.    Review of Systems    Physical Exam:  Pelvic obliquity is noted leg length inequality is present.  Misalignment of the right innominate on sacrum.  Biomechanically joint dysfunction right SI L5-S1 motion unit he still has erector spinae hypertonicity noted on the right.    Joint dysfunction T4-T5.  C5/C6          Assessment:   Diagnosis ICD-10-CM Associated Orders   1. Segmental dysfunction of pelvic region  M99.05       2. Lumbar radiculopathy  M54.16       3. Segmental dysfunction of sacral region  M99.04       4. Segmental dysfunction of lumbar region  M99.03       5. Acute bilateral low back pain with right-sided sciatica  M54.41                 Treatment: 52419  Manipulation to the right innominate, sacrum, L5 via Mart drop maneuver well-tolerated.  Manipulation T4  well-tolerated.      Discussion:  Daily icing flexion-based stretching for the lower back I will see him back for follow-up.

## 2024-05-23 DIAGNOSIS — K21.9 GASTROESOPHAGEAL REFLUX DISEASE WITHOUT ESOPHAGITIS: ICD-10-CM

## 2024-05-24 RX ORDER — PANTOPRAZOLE SODIUM 40 MG/1
40 TABLET, DELAYED RELEASE ORAL DAILY
Qty: 30 TABLET | Refills: 5 | Status: SHIPPED | OUTPATIENT
Start: 2024-05-24

## 2024-06-03 ENCOUNTER — APPOINTMENT (OUTPATIENT)
Dept: LAB | Facility: CLINIC | Age: 49
End: 2024-06-03
Payer: COMMERCIAL

## 2024-06-03 ENCOUNTER — HOSPITAL ENCOUNTER (OUTPATIENT)
Dept: NON INVASIVE DIAGNOSTICS | Facility: HOSPITAL | Age: 49
Discharge: HOME | End: 2024-06-03
Payer: COMMERCIAL

## 2024-06-03 ENCOUNTER — PROCEDURE VISIT (OUTPATIENT)
Age: 49
End: 2024-06-03
Payer: COMMERCIAL

## 2024-06-03 VITALS
DIASTOLIC BLOOD PRESSURE: 86 MMHG | BODY MASS INDEX: 35 KG/M2 | HEIGHT: 71 IN | WEIGHT: 250 LBS | SYSTOLIC BLOOD PRESSURE: 136 MMHG | HEART RATE: 84 BPM

## 2024-06-03 VITALS
SYSTOLIC BLOOD PRESSURE: 136 MMHG | BODY MASS INDEX: 35 KG/M2 | HEIGHT: 71 IN | DIASTOLIC BLOOD PRESSURE: 86 MMHG | HEART RATE: 84 BPM | WEIGHT: 250 LBS

## 2024-06-03 DIAGNOSIS — R94.31 ABNORMAL EKG: ICD-10-CM

## 2024-06-03 DIAGNOSIS — M99.04 SEGMENTAL DYSFUNCTION OF SACRAL REGION: ICD-10-CM

## 2024-06-03 DIAGNOSIS — Z12.11 SCREENING FOR COLON CANCER: ICD-10-CM

## 2024-06-03 DIAGNOSIS — M54.41 ACUTE BILATERAL LOW BACK PAIN WITH RIGHT-SIDED SCIATICA: ICD-10-CM

## 2024-06-03 DIAGNOSIS — R79.89 ABNORMAL LFTS: ICD-10-CM

## 2024-06-03 DIAGNOSIS — M99.05 SEGMENTAL DYSFUNCTION OF PELVIC REGION: Primary | ICD-10-CM

## 2024-06-03 DIAGNOSIS — I42.2 HYPERTROPHIC CARDIOMYOPATHY (HCC): ICD-10-CM

## 2024-06-03 DIAGNOSIS — R10.13 EPIGASTRIC PAIN: Primary | ICD-10-CM

## 2024-06-03 DIAGNOSIS — M99.03 SEGMENTAL DYSFUNCTION OF LUMBAR REGION: ICD-10-CM

## 2024-06-03 DIAGNOSIS — I42.2 HYPERTROPHIC CARDIOMYOPATHY (HCC): Primary | ICD-10-CM

## 2024-06-03 DIAGNOSIS — M54.16 LUMBAR RADICULOPATHY: ICD-10-CM

## 2024-06-03 LAB
ALBUMIN SERPL BCP-MCNC: 3.3 G/DL (ref 3.5–5)
ALP SERPL-CCNC: 220 U/L (ref 34–104)
ALT SERPL W P-5'-P-CCNC: 32 U/L (ref 7–52)
ANION GAP SERPL CALCULATED.3IONS-SCNC: 10 MMOL/L (ref 4–13)
AORTIC ROOT: 3.2 CM
AORTIC VALVE MEAN VELOCITY: 10.1 M/S
APICAL FOUR CHAMBER EJECTION FRACTION: 64 %
AST SERPL W P-5'-P-CCNC: 42 U/L (ref 13–39)
AV AREA BY CONTINUOUS VTI: 2.8 CM2
AV AREA PEAK VELOCITY: 2.8 CM2
AV LVOT MEAN GRADIENT: 3 MMHG
AV LVOT PEAK GRADIENT: 5 MMHG
AV MEAN GRADIENT: 5 MMHG
AV PEAK GRADIENT: 8 MMHG
AV VALVE AREA: 2.82 CM2
AV VELOCITY RATIO: 0.82
BILIRUB SERPL-MCNC: 0.85 MG/DL (ref 0.2–1)
BSA FOR ECHO PROCEDURE: 2.32 M2
BUN SERPL-MCNC: 11 MG/DL (ref 5–25)
CALCIUM ALBUM COR SERPL-MCNC: 10.4 MG/DL (ref 8.3–10.1)
CALCIUM SERPL-MCNC: 9.8 MG/DL (ref 8.4–10.2)
CHLORIDE SERPL-SCNC: 100 MMOL/L (ref 96–108)
CO2 SERPL-SCNC: 27 MMOL/L (ref 21–32)
CREAT SERPL-MCNC: 0.75 MG/DL (ref 0.6–1.3)
DOP CALC AO PEAK VEL: 1.43 M/S
DOP CALC AO VTI: 31.35 CM
DOP CALC LVOT AREA: 3.46 CM2
DOP CALC LVOT CARDIAC INDEX: 2.89 L/MIN/M2
DOP CALC LVOT CARDIAC OUTPUT: 6.71 L/MIN
DOP CALC LVOT DIAMETER: 2.1 CM
DOP CALC LVOT PEAK VEL VTI: 25.51 CM
DOP CALC LVOT PEAK VEL: 1.17 M/S
DOP CALC LVOT STROKE INDEX: 38.8 ML/M2
DOP CALC LVOT STROKE VOLUME: 88.31
FRACTIONAL SHORTENING: 44 (ref 28–44)
GFR SERPL CREATININE-BSD FRML MDRD: 107 ML/MIN/1.73SQ M
GLOBAL LONGITUIDAL STRAIN: -14 %
GLUCOSE P FAST SERPL-MCNC: 119 MG/DL (ref 65–99)
HBV CORE AB SER QL: NORMAL
HBV SURFACE AB SER-ACNC: <3 MIU/ML
HBV SURFACE AG SER QL: NORMAL
HCV AB SER QL: NORMAL
INTERVENTRICULAR SEPTUM IN DIASTOLE (PARASTERNAL SHORT AXIS VIEW): 1.9 CM
INTERVENTRICULAR SEPTUM: 1.9 CM (ref 0.6–1.1)
LAAS-AP2: 26.9 CM2
LAAS-AP4: 21.7 CM2
LEFT ATRIUM SIZE: 5.1 CM
LEFT ATRIUM VOLUME (MOD BIPLANE): 79 ML
LEFT ATRIUM VOLUME INDEX (MOD BIPLANE): 34.1 ML/M2
LEFT INTERNAL DIMENSION IN SYSTOLE: 2.2 CM (ref 2.1–4)
LEFT VENTRICULAR INTERNAL DIMENSION IN DIASTOLE: 3.9 CM (ref 3.5–6)
LEFT VENTRICULAR POSTERIOR WALL IN END DIASTOLE: 1.2 CM
LEFT VENTRICULAR STROKE VOLUME: 52 ML
LVSV (TEICH): 52 ML
MV E'TISSUE VEL-LAT: 8 CM/S
MV E'TISSUE VEL-SEP: 5 CM/S
POTASSIUM SERPL-SCNC: 4.4 MMOL/L (ref 3.5–5.3)
PROT SERPL-MCNC: 8.7 G/DL (ref 6.4–8.4)
RIGHT ATRIAL 2D VOLUME: 45 ML
RIGHT ATRIUM AREA SYSTOLE A4C: 17.1 CM2
RIGHT VENTRICLE ID DIMENSION: 3.5 CM
SL CV LEFT ATRIUM LENGTH A2C: 6.2 CM
SL CV LV EF: 64
SL CV PED ECHO LEFT VENTRICLE DIASTOLIC VOLUME (MOD BIPLANE) 2D: 68 ML
SL CV PED ECHO LEFT VENTRICLE SYSTOLIC VOLUME (MOD BIPLANE) 2D: 16 ML
SODIUM SERPL-SCNC: 137 MMOL/L (ref 135–147)
TR MAX PG: 21 MMHG
TR PEAK VELOCITY: 2.3 M/S
TRICUSPID VALVE PEAK REGURGITATION VELOCITY: 2.28 M/S

## 2024-06-03 PROCEDURE — 86706 HEP B SURFACE ANTIBODY: CPT

## 2024-06-03 PROCEDURE — 93321 DOPPLER ECHO F-UP/LMTD STD: CPT | Performed by: INTERNAL MEDICINE

## 2024-06-03 PROCEDURE — 93321 DOPPLER ECHO F-UP/LMTD STD: CPT

## 2024-06-03 PROCEDURE — 93308 TTE F-UP OR LMTD: CPT | Performed by: INTERNAL MEDICINE

## 2024-06-03 PROCEDURE — 86704 HEP B CORE ANTIBODY TOTAL: CPT

## 2024-06-03 PROCEDURE — 98941 CHIROPRACT MANJ 3-4 REGIONS: CPT | Performed by: CHIROPRACTOR

## 2024-06-03 PROCEDURE — 87340 HEPATITIS B SURFACE AG IA: CPT

## 2024-06-03 PROCEDURE — 93308 TTE F-UP OR LMTD: CPT

## 2024-06-03 PROCEDURE — 36415 COLL VENOUS BLD VENIPUNCTURE: CPT

## 2024-06-03 PROCEDURE — 80053 COMPREHEN METABOLIC PANEL: CPT

## 2024-06-03 PROCEDURE — 93325 DOPPLER ECHO COLOR FLOW MAPG: CPT

## 2024-06-03 PROCEDURE — 86803 HEPATITIS C AB TEST: CPT

## 2024-06-03 PROCEDURE — 93325 DOPPLER ECHO COLOR FLOW MAPG: CPT | Performed by: INTERNAL MEDICINE

## 2024-06-03 NOTE — PROGRESS NOTES
"Date of first visit: 1/11/2024       HPI:  Ramos returns for treatment today right-sided low back right posterior thigh pain as well as mid back stiffness.   Finds it hard to bend over.  \"Popping\" sounds when bending.    Denies any weaknesses denies any bowel bladder changes reports no other associated symptomatology.        VPAS 7      The following portions of the patient's history were reviewed and updated as appropriate: allergies, current medications, past family history, past medical history, past social history, past surgical history, and problem list.    Review of Systems    Physical Exam:  Pelvic obliquity is noted leg length inequality is present.  Misalignment of the right innominate on sacrum.  Biomechanically joint dysfunction right SI L5-S1 motion unit he still has erector spinae hypertonicity noted on the right.    Joint dysfunction T4-T5.  C5/C6          Assessment:   Diagnosis ICD-10-CM Associated Orders   1. Segmental dysfunction of pelvic region  M99.05       2. Lumbar radiculopathy  M54.16       3. Segmental dysfunction of sacral region  M99.04       4. Segmental dysfunction of lumbar region  M99.03       5. Acute bilateral low back pain with right-sided sciatica  M54.41                 Treatment: 23982  Manipulation to the right innominate, sacrum, L5 via Mart drop maneuver well-tolerated.  Manipulation T4  well-tolerated.      Discussion:  Daily icing flexion-based stretching for the lower back I will see him back for follow-up.    "

## 2024-07-01 DIAGNOSIS — Z79.4 TYPE 2 DIABETES MELLITUS WITH HYPERGLYCEMIA, WITH LONG-TERM CURRENT USE OF INSULIN (HCC): ICD-10-CM

## 2024-07-01 DIAGNOSIS — E11.65 TYPE 2 DIABETES MELLITUS WITH HYPERGLYCEMIA, WITH LONG-TERM CURRENT USE OF INSULIN (HCC): ICD-10-CM

## 2024-07-01 DIAGNOSIS — Z79.4 CURRENT USE OF INSULIN (HCC): ICD-10-CM

## 2024-07-01 RX ORDER — EMPAGLIFLOZIN AND METFORMIN HYDROCHLORIDE 12.5; 1 MG/1; MG/1
TABLET ORAL
Qty: 60 TABLET | Refills: 5 | Status: SHIPPED | OUTPATIENT
Start: 2024-07-01

## 2024-07-09 DIAGNOSIS — I10 ESSENTIAL HYPERTENSION: ICD-10-CM

## 2024-07-09 RX ORDER — BISOPROLOL FUMARATE 10 MG/1
10 TABLET, FILM COATED ORAL DAILY
Qty: 100 TABLET | Refills: 1 | Status: SHIPPED | OUTPATIENT
Start: 2024-07-09

## 2024-07-09 NOTE — TELEPHONE ENCOUNTER
Reason for call:   [x] Refill   [] Prior Auth  [] Other:     Office:   [] PCP/Provider -   [x] Specialty/Provider - Joey Norris MD /[facility]     Medication: : bisoprolol (ZEBETA) 10 MG tablet   Dose/Frequency: : take 1 tablet by mouth once daily, St     Quantity: 30    Pharmacy: [address] - [facility] [address]     Does the patient have enough for 3 days?   [] Yes   [x] No - Send as HP to POD

## 2024-08-02 ENCOUNTER — TELEPHONE (OUTPATIENT)
Age: 49
End: 2024-08-02

## 2024-08-02 DIAGNOSIS — E11.65 TYPE 2 DIABETES MELLITUS WITH HYPERGLYCEMIA, WITH LONG-TERM CURRENT USE OF INSULIN (HCC): ICD-10-CM

## 2024-08-02 DIAGNOSIS — Z79.4 CURRENT USE OF INSULIN (HCC): ICD-10-CM

## 2024-08-02 DIAGNOSIS — Z79.4 TYPE 2 DIABETES MELLITUS WITH HYPERGLYCEMIA, WITH LONG-TERM CURRENT USE OF INSULIN (HCC): ICD-10-CM

## 2024-08-02 NOTE — TELEPHONE ENCOUNTER
LMOM stating that we do not have insurance information here in the office and we do not have a copy of his x ray.

## 2024-08-04 RX ORDER — ACYCLOVIR 400 MG/1
TABLET ORAL
Qty: 3 EACH | Refills: 5 | Status: SHIPPED | OUTPATIENT
Start: 2024-08-04

## 2024-08-12 ENCOUNTER — TELEPHONE (OUTPATIENT)
Age: 49
End: 2024-08-12

## 2024-08-12 NOTE — TELEPHONE ENCOUNTER
Caller: Mary     Doctor: Joey Norris MD     Reason for call: Mary calling in regards to prior- auth medication for pt (Camzyos 5 MG)     Call back#: [phone]  ext [phone]

## 2024-08-12 NOTE — TELEPHONE ENCOUNTER
Prior auth is needed for medication please call pt for more informatios ad He did not have the name of the medication at the moment and was driving. Stated he recently changed medical ins and is now needing prior auth for medications. Please reach out at [phone]

## 2024-08-15 ENCOUNTER — TELEPHONE (OUTPATIENT)
Dept: CARDIOLOGY CLINIC | Facility: CLINIC | Age: 49
End: 2024-08-15

## 2024-08-15 ENCOUNTER — TELEPHONE (OUTPATIENT)
Age: 49
End: 2024-08-15

## 2024-08-15 NOTE — TELEPHONE ENCOUNTER
Camzyos does not seem to be an active medication. If appropriate for the patient please put in a new script so we can pursue a PA if needed as well any testing may be need to get medication approved.      Thank you

## 2024-08-16 ENCOUNTER — TELEPHONE (OUTPATIENT)
Dept: CARDIOLOGY CLINIC | Facility: CLINIC | Age: 49
End: 2024-08-16

## 2024-08-16 DIAGNOSIS — I42.2 HYPERTROPHIC CARDIOMYOPATHY (HCC): Primary | ICD-10-CM

## 2024-08-16 NOTE — TELEPHONE ENCOUNTER
PA for  GENERIC CAMZYOS 5 MSUBMITTED     via    []CMM-KEY:   [x]Leda-Case ID #  [id number]     []Faxed to plan   []Other website   []Phone call Case ID #     Office notes sent, clinical questions answered. Awaiting determination    Turnaround time for your insurance to make a decision on your Prior Authorization can take 7-21 business days.

## 2024-08-16 NOTE — TELEPHONE ENCOUNTER
Patient needs a prior auth for Camzyos 5 mg- taken once a day   The prior auth form is scanned into the chart. I do not know why the medication does not show as current meds, but if you take that check leeann off  you can see it is a current medication  Dr Norris is placing a new order.   Per the patient- has been out of medication for a couple of weeks.

## 2024-08-19 ENCOUNTER — TELEPHONE (OUTPATIENT)
Dept: CARDIOLOGY CLINIC | Facility: CLINIC | Age: 49
End: 2024-08-19

## 2024-08-19 NOTE — TELEPHONE ENCOUNTER
Patient was denied because they said Dr Norris was not enrolled in [employer] which is false information.   Dr Norris is and has been part of the [employer] program since 2022

## 2024-08-19 NOTE — TELEPHONE ENCOUNTER
PA for CAMZYOS Denied    Reason:(Screenshot if applicable)        Message sent to office clinical pool Yes    Denial letter scanned into Media Yes      **Please follow up with your patient regarding denial and next steps**

## 2024-08-20 DIAGNOSIS — I10 ESSENTIAL HYPERTENSION: ICD-10-CM

## 2024-08-20 RX ORDER — BISOPROLOL FUMARATE 10 MG/1
10 TABLET, FILM COATED ORAL DAILY
Qty: 90 TABLET | Refills: 1 | Status: SHIPPED | OUTPATIENT
Start: 2024-08-20

## 2024-08-20 NOTE — TELEPHONE ENCOUNTER
Reason for call:   [x] Refill   [] Prior Auth  [x] Other: PHARMACY CHANGE - NOT A DUPLICATE     Office:   [] PCP/Provider -   [x] Specialty/Provider - Joey Norris MD /[facility] /    Medication:     bisoprolol (ZEBETA) 10 MG tablet       Dose/Frequency: Take 1 tablet (10 mg total) by mouth daily, S     Quantity: 90    Pharmacy:   [id number]  [address]        Does the patient have enough for 3 days?   [] Yes   [x] No - Send as HP to POD

## 2024-08-21 NOTE — TELEPHONE ENCOUNTER
PA for CAMZYOS Approved     Date(s) approved UNTIL 08/20/2025        Patient advised by          [x] MyChart Message  [] Phone call   [x]LMOM  []L/M to call office as no active Communication consent on file  []Unable to leave detailed message as VM not approved on Communication consent       Pharmacy advised by    [x]Fax  []Phone call    Approval letter scanned into Media Yes

## 2024-08-22 ENCOUNTER — HOSPITAL ENCOUNTER (OUTPATIENT)
Dept: NON INVASIVE DIAGNOSTICS | Facility: HOSPITAL | Age: 49
Discharge: HOME | End: 2024-08-22
Payer: COMMERCIAL

## 2024-08-22 VITALS
DIASTOLIC BLOOD PRESSURE: 86 MMHG | BODY MASS INDEX: 35 KG/M2 | WEIGHT: 250 LBS | HEIGHT: 71 IN | HEART RATE: 98 BPM | SYSTOLIC BLOOD PRESSURE: 136 MMHG

## 2024-08-22 DIAGNOSIS — I42.2 HYPERTROPHIC CARDIOMYOPATHY (HCC): ICD-10-CM

## 2024-08-22 LAB
AORTIC ROOT: 3.2 CM
AORTIC VALVE MEAN VELOCITY: 12.8 M/S
APICAL FOUR CHAMBER EJECTION FRACTION: 72 %
AV AREA BY CONTINUOUS VTI: 3.6 CM2
AV AREA PEAK VELOCITY: 3.7 CM2
AV LVOT MEAN GRADIENT: 7 MMHG
AV LVOT PEAK GRADIENT: 14 MMHG
AV MEAN GRADIENT: 7 MMHG
AV PEAK GRADIENT: 12 MMHG
AV VALVE AREA: 3.59 CM2
AV VELOCITY RATIO: 1.06
BSA FOR ECHO PROCEDURE: 2.32 M2
DOP CALC AO PEAK VEL: 1.75 M/S
DOP CALC AO VTI: 33.87 CM
DOP CALC LVOT AREA: 3.46 CM2
DOP CALC LVOT CARDIAC INDEX: 5.1 L/MIN/M2
DOP CALC LVOT CARDIAC OUTPUT: 11.83 L/MIN
DOP CALC LVOT DIAMETER: 2.1 CM
DOP CALC LVOT PEAK VEL VTI: 35.17 CM
DOP CALC LVOT PEAK VEL: 1.85 M/S
DOP CALC LVOT STROKE INDEX: 53.4 ML/M2
DOP CALC LVOT STROKE VOLUME: 124
E WAVE DECELERATION TIME: 127 MS
E/A RATIO: 3.46
FRACTIONAL SHORTENING: 41 (ref 28–44)
GLOBAL LONGITUIDAL STRAIN: -14 %
INTERVENTRICULAR SEPTUM IN DIASTOLE (PARASTERNAL SHORT AXIS VIEW): 2.1 CM
INTERVENTRICULAR SEPTUM: 2.1 CM (ref 0.6–1.1)
LAAS-AP2: 22.9 CM2
LAAS-AP4: 21.8 CM2
LEFT ATRIUM SIZE: 4.3 CM
LEFT ATRIUM VOLUME (MOD BIPLANE): 73 ML
LEFT ATRIUM VOLUME INDEX (MOD BIPLANE): 31.5 ML/M2
LEFT INTERNAL DIMENSION IN SYSTOLE: 2.2 CM (ref 2.1–4)
LEFT VENTRICULAR INTERNAL DIMENSION IN DIASTOLE: 3.7 CM (ref 3.5–6)
LEFT VENTRICULAR POSTERIOR WALL IN END DIASTOLE: 1.4 CM
LEFT VENTRICULAR STROKE VOLUME: 40 ML
LVSV (TEICH): 40 ML
MV E'TISSUE VEL-SEP: 10 CM/S
MV PEAK A VEL: 0.39 M/S
MV PEAK E VEL: 135 CM/S
MV STENOSIS PRESSURE HALF TIME: 37 MS
MV VALVE AREA P 1/2 METHOD: 5.9
SL CV ECHO LV DYNAMIC OBSTRUCTION PEAK GRADIENT (REST): 34 MMHG
SL CV ECHO LV DYNAMIC OBSTRUCTION PEAK GRADIENT (VALSAL: 82 MMHG
SL CV LEFT ATRIUM LENGTH A2C: 5.7 CM
SL CV LV EF: 72
SL CV PED ECHO LEFT VENTRICLE DIASTOLIC VOLUME (MOD BIPLANE) 2D: 57 ML
SL CV PED ECHO LEFT VENTRICLE SYSTOLIC VOLUME (MOD BIPLANE) 2D: 17 ML
TR MAX PG: 26 MMHG
TR PEAK VELOCITY: 2.6 M/S
TRICUSPID VALVE PEAK REGURGITATION VELOCITY: 2.56 M/S

## 2024-08-22 PROCEDURE — 93321 DOPPLER ECHO F-UP/LMTD STD: CPT | Performed by: INTERNAL MEDICINE

## 2024-08-22 PROCEDURE — 93325 DOPPLER ECHO COLOR FLOW MAPG: CPT | Performed by: INTERNAL MEDICINE

## 2024-08-22 PROCEDURE — 93325 DOPPLER ECHO COLOR FLOW MAPG: CPT

## 2024-08-22 PROCEDURE — 93308 TTE F-UP OR LMTD: CPT | Performed by: INTERNAL MEDICINE

## 2024-08-22 PROCEDURE — 93308 TTE F-UP OR LMTD: CPT

## 2024-08-22 PROCEDURE — 93321 DOPPLER ECHO F-UP/LMTD STD: CPT

## 2024-08-27 ENCOUNTER — TELEPHONE (OUTPATIENT)
Dept: CARDIOLOGY CLINIC | Facility: CLINIC | Age: 49
End: 2024-08-27

## 2024-08-27 NOTE — TELEPHONE ENCOUNTER
[facility] patient has not gotten a refill of camzyos since 6/10/24  L/M/M asking patient to call [phone]  [facility] line and let us know the last dose of camzyos taken

## 2024-08-27 NOTE — TELEPHONE ENCOUNTER
Per pharmacy Patient was instructed not to take butalbital-acetaminophen-caffeine (FIORICET,ESGIC)  as there is an interaction w Catherine  Patient verbally acknowledged, stated he has not taken any in at least 3 weeks and only took as needed   Patient was instructed call if need another medication to replace it with  Pt verbally acknowledged

## 2024-09-07 ENCOUNTER — APPOINTMENT (EMERGENCY)
Dept: RADIOLOGY | Facility: HOSPITAL | Age: 49
End: 2024-09-07
Payer: COMMERCIAL

## 2024-09-07 ENCOUNTER — HOSPITAL ENCOUNTER (EMERGENCY)
Facility: HOSPITAL | Age: 49
Discharge: HOME | End: 2024-09-07
Attending: EMERGENCY MEDICINE
Payer: COMMERCIAL

## 2024-09-07 VITALS
HEART RATE: 84 BPM | DIASTOLIC BLOOD PRESSURE: 114 MMHG | SYSTOLIC BLOOD PRESSURE: 185 MMHG | OXYGEN SATURATION: 98 % | RESPIRATION RATE: 18 BRPM | TEMPERATURE: 98.1 F

## 2024-09-07 DIAGNOSIS — R07.9 CHEST PAIN, UNSPECIFIED: Primary | ICD-10-CM

## 2024-09-07 DIAGNOSIS — I42.1 HOCM (HYPERTROPHIC OBSTRUCTIVE CARDIOMYOPATHY) (HCC): ICD-10-CM

## 2024-09-07 LAB
2HR DELTA HS TROPONIN: -2 NG/L
ALBUMIN SERPL BCG-MCNC: 3.5 G/DL (ref 3.5–5)
ALP SERPL-CCNC: 168 U/L (ref 34–104)
ALT SERPL W P-5'-P-CCNC: 34 U/L (ref 7–52)
ANION GAP SERPL CALCULATED.3IONS-SCNC: 6 MMOL/L (ref 4–13)
AST SERPL W P-5'-P-CCNC: 36 U/L (ref 13–39)
ATRIAL RATE: 85 BPM
BASOPHILS # BLD AUTO: 0.05 THOUSANDS/ÂΜL (ref 0–0.1)
BASOPHILS NFR BLD AUTO: 1 % (ref 0–1)
BILIRUB SERPL-MCNC: 0.88 MG/DL (ref 0.2–1)
BUN SERPL-MCNC: 8 MG/DL (ref 5–25)
CALCIUM SERPL-MCNC: 9.1 MG/DL (ref 8.4–10.2)
CARDIAC TROPONIN I PNL SERPL HS: 13 NG/L
CARDIAC TROPONIN I PNL SERPL HS: 15 NG/L
CHLORIDE SERPL-SCNC: 101 MMOL/L (ref 96–108)
CO2 SERPL-SCNC: 30 MMOL/L (ref 21–32)
CREAT SERPL-MCNC: 0.73 MG/DL (ref 0.6–1.3)
EOSINOPHIL # BLD AUTO: 0.24 THOUSAND/ÂΜL (ref 0–0.61)
EOSINOPHIL NFR BLD AUTO: 4 % (ref 0–6)
ERYTHROCYTE [DISTWIDTH] IN BLOOD BY AUTOMATED COUNT: 13 % (ref 11.6–15.1)
GFR SERPL CREATININE-BSD FRML MDRD: 108 ML/MIN/1.73SQ M
GLUCOSE SERPL-MCNC: 211 MG/DL (ref 65–140)
HCT VFR BLD AUTO: 40.9 % (ref 36.5–49.3)
HGB BLD-MCNC: 14 G/DL (ref 12–17)
IMM GRANULOCYTES # BLD AUTO: 0.02 THOUSAND/UL (ref 0–0.2)
IMM GRANULOCYTES NFR BLD AUTO: 0 % (ref 0–2)
LYMPHOCYTES # BLD AUTO: 2.54 THOUSANDS/ÂΜL (ref 0.6–4.47)
LYMPHOCYTES NFR BLD AUTO: 42 % (ref 14–44)
MCH RBC QN AUTO: 29 PG (ref 26.8–34.3)
MCHC RBC AUTO-ENTMCNC: 34.2 G/DL (ref 31.4–37.4)
MCV RBC AUTO: 85 FL (ref 82–98)
MONOCYTES # BLD AUTO: 0.55 THOUSAND/ÂΜL (ref 0.17–1.22)
MONOCYTES NFR BLD AUTO: 9 % (ref 4–12)
NEUTROPHILS # BLD AUTO: 2.6 THOUSANDS/ÂΜL (ref 1.85–7.62)
NEUTS SEG NFR BLD AUTO: 44 % (ref 43–75)
NRBC BLD AUTO-RTO: 0 /100 WBCS
P AXIS: 51 DEGREES
PLATELET # BLD AUTO: 123 THOUSANDS/UL (ref 149–390)
PMV BLD AUTO: 11.2 FL (ref 8.9–12.7)
POTASSIUM SERPL-SCNC: 3.6 MMOL/L (ref 3.5–5.3)
PR INTERVAL: 204 MS
PROT SERPL-MCNC: 7.9 G/DL (ref 6.4–8.4)
QRS AXIS: -53 DEGREES
QRSD INTERVAL: 108 MS
QT INTERVAL: 414 MS
QTC INTERVAL: 492 MS
RBC # BLD AUTO: 4.83 MILLION/UL (ref 3.88–5.62)
SODIUM SERPL-SCNC: 137 MMOL/L (ref 135–147)
T WAVE AXIS: 140 DEGREES
VENTRICULAR RATE: 85 BPM
WBC # BLD AUTO: 6 THOUSAND/UL (ref 4.31–10.16)

## 2024-09-07 PROCEDURE — 80053 COMPREHEN METABOLIC PANEL: CPT

## 2024-09-07 PROCEDURE — 93005 ELECTROCARDIOGRAM TRACING: CPT

## 2024-09-07 PROCEDURE — 85025 COMPLETE CBC W/AUTO DIFF WBC: CPT

## 2024-09-07 PROCEDURE — 36415 COLL VENOUS BLD VENIPUNCTURE: CPT

## 2024-09-07 PROCEDURE — 99285 EMERGENCY DEPT VISIT HI MDM: CPT

## 2024-09-07 PROCEDURE — 71045 X-RAY EXAM CHEST 1 VIEW: CPT

## 2024-09-07 PROCEDURE — 84484 ASSAY OF TROPONIN QUANT: CPT

## 2024-09-07 PROCEDURE — 93010 ELECTROCARDIOGRAM REPORT: CPT | Performed by: INTERNAL MEDICINE

## 2024-09-07 NOTE — DISCHARGE INSTRUCTIONS
You were seen in the ER for chest pain. No changes to EKG. No lab abnormalities. Normal CXR. Follow up with your PCP and Cardiologist. If symptoms worsen or persist return to the ER

## 2024-09-07 NOTE — ED PROVIDER NOTES
History  Chief Complaint   Patient presents with    Chest Pain     Pt reports having CP for the last 3 days but states it feels different now pt describes it pressure. +SOB -N/V/D     Patient is a 49-year-old male past medical history of CHF DM hypertension and HOCM that presents for evaluation of chest pain x 3 days.  Patient reports that patient had gradual onset of left-sided chest pain that is nonradiating.  It is exertional but nonpositional nonpleuritic.  States that pain will come intermittently even at rest and will last for a few minutes before spontaneously resolving happens multiple times a day.  Patient currently having some chest pain.  There is also some associated shortness of breath.  No fever chills palpitations lower extremity edema abdominal pain nausea vomiting back pain or any other complaints at this time.  Follows with cardiology for HOCM last echo was approximately 6 weeks ago          Prior to Admission Medications   Prescriptions Last Dose Informant Patient Reported? Taking?   Blood Glucose Monitoring Suppl (OneTouch Verio) w/Device KIT  Self No No   Sig: Use to test 3-4x daily   Blood Pressure KIT  Self No No   Sig: Use 2 (two) times a day   Continuous Blood Gluc  (Dexcom G7 ) HENRY  Self No No   Sig: Use 1 each in the morning   Continuous Glucose Sensor (Dexcom G7 Sensor)   No No   Sig: use every 10 days   Diclofenac Sodium (VOLTAREN) 1 %  Self No No   Sig: Apply 2 g topically 4 (four) times a day   Patient not taking: Reported on 5/7/2024   Empagliflozin-metFORMIN HCl (Synjardy) 12.5-1000 MG TABS   No No   Sig: take 1 tablet by mouth twice a day   Insulin Glargine Solostar (Lantus SoloStar) 100 UNIT/ML SOPN  Self No No   Sig: Inject 0.3 mL (30 Units total) under the skin daily at bedtime   Insulin Pen Needle (BD Pen Needle Ann 2nd Gen) 32G X 4 MM MISC  Self No No   Sig: For use with insulin pen. Pharmacy may dispense brand covered by insurance.   Lancets 30G MISC  Self No  No   Sig: Use to test 3x daily   Mavacamten 5 MG CAPS   No No   Sig: Take 5 mg by mouth in the morning   Mavacamten 5 MG CAPS   No No   Sig: Take 5 mg by mouth in the morning   aspirin (ECOTRIN LOW STRENGTH) 81 mg EC tablet  Self No No   Sig: Take 1 tablet (81 mg total) by mouth daily   bisoprolol (ZEBETA) 10 MG tablet   No No   Sig: Take 1 tablet (10 mg total) by mouth daily   butalbital-acetaminophen-caffeine (FIORICET,ESGIC) -40 mg per tablet  Self Yes No   Sig: TAKE 2 TABLETS BY MOUTH EVERY 8 HOURS AS NEEDED FOR HEADACHES   gabapentin (NEURONTIN) 300 mg capsule  Self Yes No   Sig: take 1 capsule by mouth twice a day START BY TAKING AT BEDTIME FI...  (REFER TO PRESCRIPTION NOTES).   glucose blood (OneTouch Verio) test strip  Self No No   Sig: Use to test 3x daily   insulin lispro (HumaLOG KwikPen) 100 units/mL injection pen  Self No No   Sig: Inject 10 Units under the skin 3 (three) times a day with meals   ketorolac (ACULAR) 0.5 % ophthalmic solution  Self Yes No   Sig: instill 1 drop into both eyes four times a day for 10 days   losartan (COZAAR) 100 MG tablet  Self Yes No   Sig: Take 100 mg by mouth daily   mirtazapine (REMERON) 30 mg tablet  Self Yes No   Sig: take 1/2 tablet by mouth nightly   mometasone (ELOCON) 0.1 % cream  Self Yes No   Sig: Apply 1 Application topically 2 (two) times a day To affected area   pantoprazole (PROTONIX) 40 mg tablet   No No   Sig: take 1 tablet by mouth once daily   rosuvastatin (CRESTOR) 5 mg tablet  Self Yes No   Sig: Take 5 mg by mouth daily      Facility-Administered Medications: None       Past Medical History:   Diagnosis Date    Congestive heart failure (HCC)     Diabetes mellitus (HCC)     Hyperlipidemia     Hypertension     Obesity        Past Surgical History:   Procedure Laterality Date    COLONOSCOPY         Family History   Problem Relation Age of Onset    Diabetes Mother     Stroke Mother     Stroke Father      I have reviewed and agree with the history as  documented.    E-Cigarette/Vaping    E-Cigarette Use Never User      E-Cigarette/Vaping Substances    Nicotine No     THC No     CBD No     Flavoring No      Social History     Tobacco Use    Smoking status: Never    Smokeless tobacco: Never   Vaping Use    Vaping status: Never Used   Substance Use Topics    Alcohol use: Not Currently    Drug use: Never        Review of Systems   Constitutional:  Negative for chills and fever.   HENT:  Negative for ear pain and sore throat.    Eyes:  Negative for pain and visual disturbance.   Respiratory:  Positive for shortness of breath. Negative for cough.    Cardiovascular:  Positive for chest pain. Negative for palpitations.   Gastrointestinal:  Negative for abdominal pain, nausea and vomiting.   Genitourinary:  Negative for dysuria and hematuria.   Musculoskeletal:  Negative for arthralgias and back pain.   Skin:  Negative for color change and rash.   Neurological:  Negative for seizures and syncope.   All other systems reviewed and are negative.      Physical Exam  ED Triage Vitals [09/07/24 0257]   Temperature Pulse Respirations Blood Pressure SpO2   98.1 °F (36.7 °C) 84 18 (!) 185/114 98 %      Temp Source Heart Rate Source Patient Position - Orthostatic VS BP Location FiO2 (%)   Temporal Monitor Sitting Left arm --      Pain Score       8             Orthostatic Vital Signs  Vitals:    09/07/24 0257   BP: (!) 185/114   Pulse: 84   Patient Position - Orthostatic VS: Sitting       Physical Exam  Vitals and nursing note reviewed.   Constitutional:       General: He is not in acute distress.     Appearance: He is well-developed. He is not ill-appearing or diaphoretic.   HENT:      Head: Normocephalic and atraumatic.      Mouth/Throat:      Mouth: Mucous membranes are moist.   Eyes:      Extraocular Movements: Extraocular movements intact.      Conjunctiva/sclera: Conjunctivae normal.   Cardiovascular:      Rate and Rhythm: Normal rate and regular rhythm.      Heart sounds: No  murmur heard.  Pulmonary:      Effort: Pulmonary effort is normal. No respiratory distress.      Breath sounds: Normal breath sounds.   Abdominal:      Palpations: Abdomen is soft.      Tenderness: There is no abdominal tenderness.   Musculoskeletal:         General: Normal range of motion.      Cervical back: Neck supple.      Right lower leg: No edema.      Left lower leg: No edema.   Skin:     General: Skin is warm and dry.   Neurological:      General: No focal deficit present.      Mental Status: He is alert.         ED Medications  Medications - No data to display    Diagnostic Studies  Results Reviewed       Procedure Component Value Units Date/Time    HS Troponin I 2hr [[id number]]  (Normal) Collected: 09/07/24 0538    Lab Status: Final result Specimen: Blood from Arm, Right Updated: 09/07/24 0615     hs TnI 2hr 13 ng/L      Delta 2hr hsTnI -2 ng/L     HS Troponin 0hr (reflex protocol) [[id number]]  (Normal) Collected: 09/07/24 0358    Lab Status: Final result Specimen: Blood from Arm, Right Updated: 09/07/24 0433     hs TnI 0hr 15 ng/L     Comprehensive metabolic panel [[id number]]  (Abnormal) Collected: 09/07/24 0358    Lab Status: Final result Specimen: Blood from Arm, Right Updated: 09/07/24 0432     Sodium 137 mmol/L      Potassium 3.6 mmol/L      Chloride 101 mmol/L      CO2 30 mmol/L      ANION GAP 6 mmol/L      BUN 8 mg/dL      Creatinine 0.73 mg/dL      Glucose 211 mg/dL      Calcium 9.1 mg/dL      AST 36 U/L      ALT 34 U/L      Alkaline Phosphatase 168 U/L      Total Protein 7.9 g/dL      Albumin 3.5 g/dL      Total Bilirubin 0.88 mg/dL      eGFR 108 ml/min/1.73sq m     Narrative:      National Kidney Disease Foundation guidelines for Chronic Kidney Disease (CKD):     Stage 1 with normal or high GFR (GFR > 90 mL/min/1.73 square meters)    Stage 2 Mild CKD (GFR = 60-89 mL/min/1.73 square meters)    Stage 3A Moderate CKD (GFR = 45-59 mL/min/1.73 square meters)    Stage 3B Moderate CKD (GFR = 30-44  mL/min/1.73 square meters)    Stage 4 Severe CKD (GFR = 15-29 mL/min/1.73 square meters)    Stage 5 End Stage CKD (GFR <15 mL/min/1.73 square meters)  Note: GFR calculation is accurate only with a steady state creatinine    CBC and differential [[id number]]  (Abnormal) Collected: 09/07/24 0358    Lab Status: Final result Specimen: Blood from Arm, Right Updated: 09/07/24 0417     WBC 6.00 Thousand/uL      RBC 4.83 Million/uL      Hemoglobin 14.0 g/dL      Hematocrit 40.9 %      MCV 85 fL      MCH 29.0 pg      MCHC 34.2 g/dL      RDW 13.0 %      MPV 11.2 fL      Platelets 123 Thousands/uL      nRBC 0 /100 WBCs      Segmented % 44 %      Immature Grans % 0 %      Lymphocytes % 42 %      Monocytes % 9 %      Eosinophils Relative 4 %      Basophils Relative 1 %      Absolute Neutrophils 2.60 Thousands/µL      Absolute Immature Grans 0.02 Thousand/uL      Absolute Lymphocytes 2.54 Thousands/µL      Absolute Monocytes 0.55 Thousand/µL      Eosinophils Absolute 0.24 Thousand/µL      Basophils Absolute 0.05 Thousands/µL                    XR chest 1 view portable   ED Interpretation by Chris Hill DO (09/07 0408)   Wet read-enlarged cardiac silhouette no effusion infiltrate edema or pneumothorax      Final Result by Claire Bialey MD (09/07 0851)      No acute cardiopulmonary disease.            Workstation performed: [id number]               Procedures  Procedures      ED Course  ED Course as of 09/07/24 2103   Sat Sep 07, 2024   0343 ECG 12 lead  NSR LAFB incomplete RBBB no ischemic changes no change from prior                             SBIRT 20yo+      Flowsheet Row Most Recent Value   Initial Alcohol Screen: US AUDIT-C     1. How often do you have a drink containing alcohol? 0 Filed at: 09/07/2024 0258   2. How many drinks containing alcohol do you have on a typical day you are drinking?  0 Filed at: 09/07/2024 0258   3a. Male UNDER 65: How often do you have five or more drinks on one occasion? 0 Filed at:  09/07/2024 0258   Audit-C Score 0 Filed at: 09/07/2024 0258   DARIAN: How many times in the past year have you...    Used an illegal drug or used a prescription medication for non-medical reasons? Never Filed at: 09/07/2024 0258                  Medical Decision Making  Patient is a 49-year-old male presenting for evaluation of chest pain    Differential: ACS versus anemia versus electrolyte abnormality versus musculoskeletal chest pain.  Doubt PE doubt dissection    Plan: Cardiac labs EKG chest x-ray monitor and reassess.  Final dispo pending    Initial troponin mildly elevated at 15 will obtain 2-hour delta.  Per chart review appears that patient's troponin always mildly elevated    2-hour delta was -2 downtrending.  Patient is asymptomatic HD stable and cleared for discharge with outpatient follow-up with his PCP and cardiologist.  Return precautions given patient verbalized understanding    Amount and/or Complexity of Data Reviewed  Labs: ordered.  Radiology: ordered and independent interpretation performed.  ECG/medicine tests:  Decision-making details documented in ED Course.          Disposition  Final diagnoses:   Chest pain, unspecified   HOCM (hypertrophic obstructive cardiomyopathy) (HCC)     Time reflects when diagnosis was documented in both MDM as applicable and the Disposition within this note       Time User Action Codes Description Comment    9/7/2024  6:16 AM Chris Hill Add [R07.9] Chest pain, unspecified     9/7/2024  6:16 AM Chris Hill Add [I42.1] HOCM (hypertrophic obstructive cardiomyopathy) (HCC)           ED Disposition       ED Disposition   Discharge    Condition   Stable    Date/Time   Sat Sep 7, 2024 0616    Comment   John Arevalo discharge to home/self care.                   Follow-up Information       Follow up With Specialties Details Why Contact Info    Larry Cerda MD Family Medicine Call   [address]   [address]  [facility] [address] [phone]               Discharge Medication List as of 9/7/2024  6:17 AM        CONTINUE these medications which have NOT CHANGED    Details   aspirin (ECOTRIN LOW STRENGTH) 81 mg EC tablet Take 1 tablet (81 mg total) by mouth daily, Starting Wed 5/6/2020, No Print      bisoprolol (ZEBETA) 10 MG tablet Take 1 tablet (10 mg total) by mouth daily, Starting Tue 8/20/2024, Normal      Blood Glucose Monitoring Suppl (OneTouch Verio) w/Device KIT Use to test 3-4x daily, Normal      Blood Pressure KIT Use 2 (two) times a day, Starting Tue 10/12/2021, Normal      butalbital-acetaminophen-caffeine (FIORICET,ESGIC) -40 mg per tablet TAKE 2 TABLETS BY MOUTH EVERY 8 HOURS AS NEEDED FOR HEADACHES, Historical Med      Continuous Blood Gluc  (Dexcom G7 ) HENRY Use 1 each in the morning, Starting Thu 12/28/2023, Normal      Continuous Glucose Sensor (Dexcom G7 Sensor) use every 10 days, Normal      Diclofenac Sodium (VOLTAREN) 1 % Apply 2 g topically 4 (four) times a day, Starting Fri 2/23/2024, Normal      Empagliflozin-metFORMIN HCl (Synjardy) 12.5-1000 MG TABS take 1 tablet by mouth twice a day, Normal      gabapentin (NEURONTIN) 300 mg capsule take 1 capsule by mouth twice a day START BY TAKING AT BEDTIME FI...  (REFER TO PRESCRIPTION NOTES)., Historical Med      glucose blood (OneTouch Verio) test strip Use to test 3x daily, Normal      Insulin Glargine Solostar (Lantus SoloStar) 100 UNIT/ML SOPN Inject 0.3 mL (30 Units total) under the skin daily at bedtime, Starting Thu 3/23/2023, Normal      insulin lispro (HumaLOG KwikPen) 100 units/mL injection pen Inject 10 Units under the skin 3 (three) times a day with meals, Starting Thu 3/23/2023, Normal      Insulin Pen Needle (BD Pen Needle Ann 2nd Gen) 32G X 4 MM MISC For use with insulin pen. Pharmacy may dispense brand covered by insurance., Normal      ketorolac (ACULAR) 0.5 % ophthalmic solution instill 1 drop into both eyes four times a day for 10 days, Historical Med       Lancets 30G MISC Use to test 3x daily, Normal      losartan (COZAAR) 100 MG tablet Take 100 mg by mouth daily, Starting Wed 4/19/2023, Historical Med      !! Mavacamten 5 MG CAPS Take 5 mg by mouth in the morning, Starting Fri 8/16/2024, Until Sun 9/15/2024, Normal      !! Mavacamten 5 MG CAPS Take 5 mg by mouth in the morning, Starting Thu 8/22/2024, Until Sat 9/21/2024, Normal      mirtazapine (REMERON) 30 mg tablet take 1/2 tablet by mouth nightly, Historical Med      mometasone (ELOCON) 0.1 % cream Apply 1 Application topically 2 (two) times a day To affected area, Starting Mon 2/26/2024, Historical Med      pantoprazole (PROTONIX) 40 mg tablet take 1 tablet by mouth once daily, Starting Fri 5/24/2024, Normal      rosuvastatin (CRESTOR) 5 mg tablet Take 5 mg by mouth daily, Historical Med       !! - Potential duplicate medications found. Please discuss with provider.        No discharge procedures on file.    PDMP Review         Value Time User    PDMP Reviewed  Yes 5/6/2020  2:27 PM Roxana Gamez PA-C             ED Provider  Attending physically available and evaluated John Arevalo. I managed the patient along with the ED Attending.    Electronically Signed by           Chris Hill DO  09/07/24 0142

## 2024-09-07 NOTE — ED ATTENDING ATTESTATION
9/7/2024  I, Brian Irene MD, saw and evaluated the patient. I have discussed the patient with the resident/non-physician practitioner and agree with the resident's/non-physician practitioner's findings, Plan of Care, and MDM as documented in the resident's/non-physician practitioner's note, except where noted. All available labs and Radiology studies were reviewed.  I was present for key portions of any procedure(s) performed by the resident/non-physician practitioner and I was immediately available to provide assistance.       At this point I agree with the current assessment done in the Emergency Department.  I have conducted an independent evaluation of this patient a history and physical is as follows:    ED Course     Patient presents for evaluation due to 3 days of left-sided chest pain that he states is now a pressure.  Patient does report some shortness of breath.  Patient has a history of hypertrophic obstructive cardiomyopathy.  No additional complaints. A/P: Chest pain.  Will check cardiac labs, EKG, chest x-ray.    Critical Care Time  Procedures

## 2024-09-23 ENCOUNTER — HOSPITAL ENCOUNTER (OUTPATIENT)
Dept: NON INVASIVE DIAGNOSTICS | Facility: HOSPITAL | Age: 49
Discharge: HOME | End: 2024-09-23
Payer: COMMERCIAL

## 2024-09-23 VITALS
DIASTOLIC BLOOD PRESSURE: 99 MMHG | WEIGHT: 250 LBS | BODY MASS INDEX: 35 KG/M2 | HEART RATE: 87 BPM | SYSTOLIC BLOOD PRESSURE: 156 MMHG | HEIGHT: 71 IN

## 2024-09-23 DIAGNOSIS — I42.2 HYPERTROPHIC CARDIOMYOPATHY (HCC): ICD-10-CM

## 2024-09-23 LAB
APICAL FOUR CHAMBER EJECTION FRACTION: 56 %
AV LVOT MEAN GRADIENT: 3 MMHG
AV LVOT PEAK GRADIENT: 5 MMHG
BSA FOR ECHO PROCEDURE: 2.32 M2
DOP CALC LVOT AREA: 3.46 CM2
DOP CALC LVOT CARDIAC INDEX: 3.09 L/MIN/M2
DOP CALC LVOT CARDIAC OUTPUT: 7.16 L/MIN
DOP CALC LVOT DIAMETER: 2.1 CM
DOP CALC LVOT PEAK VEL VTI: 25.12 CM
DOP CALC LVOT PEAK VEL: 1.09 M/S
DOP CALC LVOT STROKE INDEX: 36.2 ML/M2
DOP CALC LVOT STROKE VOLUME: 86.96
FRACTIONAL SHORTENING: 44 (ref 28–44)
GLOBAL LONGITUIDAL STRAIN: -14 %
INTERVENTRICULAR SEPTUM IN DIASTOLE (PARASTERNAL SHORT AXIS VIEW): 1.9 CM
INTERVENTRICULAR SEPTUM: 1.9 CM (ref 0.6–1.1)
LAAS-AP2: 25 CM2
LAAS-AP4: 24.5 CM2
LEFT ATRIUM SIZE: 5.2 CM
LEFT ATRIUM VOLUME (MOD BIPLANE): 85 ML
LEFT ATRIUM VOLUME INDEX (MOD BIPLANE): 36.6 ML/M2
LEFT INTERNAL DIMENSION IN SYSTOLE: 2.3 CM (ref 2.1–4)
LEFT VENTRICULAR INTERNAL DIMENSION IN DIASTOLE: 4.1 CM (ref 3.5–6)
LEFT VENTRICULAR POSTERIOR WALL IN END DIASTOLE: 1.8 CM
LEFT VENTRICULAR STROKE VOLUME: 54 ML
LVSV (TEICH): 54 ML
SL CV ECHO LV DYNAMIC OBSTRUCTION PEAK GRADIENT (REST): 11 MMHG
SL CV ECHO LV DYNAMIC OBSTRUCTION PEAK GRADIENT (VALSAL: 51 MMHG
SL CV LEFT ATRIUM LENGTH A2C: 6.1 CM
SL CV LV EF: 68
SL CV PED ECHO LEFT VENTRICLE DIASTOLIC VOLUME (MOD BIPLANE) 2D: 72 ML
SL CV PED ECHO LEFT VENTRICLE SYSTOLIC VOLUME (MOD BIPLANE) 2D: 18 ML
TR MAX PG: 12 MMHG
TR PEAK VELOCITY: 1.7 M/S
TRICUSPID VALVE PEAK REGURGITATION VELOCITY: 1.71 M/S

## 2024-09-23 PROCEDURE — 93325 DOPPLER ECHO COLOR FLOW MAPG: CPT | Performed by: INTERNAL MEDICINE

## 2024-09-23 PROCEDURE — 93308 TTE F-UP OR LMTD: CPT | Performed by: INTERNAL MEDICINE

## 2024-09-23 PROCEDURE — 93321 DOPPLER ECHO F-UP/LMTD STD: CPT | Performed by: INTERNAL MEDICINE

## 2024-09-23 PROCEDURE — 93325 DOPPLER ECHO COLOR FLOW MAPG: CPT

## 2024-09-23 PROCEDURE — 93321 DOPPLER ECHO F-UP/LMTD STD: CPT

## 2024-09-23 PROCEDURE — 93308 TTE F-UP OR LMTD: CPT

## 2024-09-26 ENCOUNTER — TELEPHONE (OUTPATIENT)
Dept: PSYCHOLOGY | Facility: CLINIC | Age: 49
End: 2024-09-26

## 2024-09-26 ENCOUNTER — APPOINTMENT (EMERGENCY)
Dept: RADIOLOGY | Facility: HOSPITAL | Age: 49
End: 2024-09-26
Payer: COMMERCIAL

## 2024-09-26 ENCOUNTER — HOSPITAL ENCOUNTER (EMERGENCY)
Facility: HOSPITAL | Age: 49
Discharge: HOME | End: 2024-09-26
Attending: EMERGENCY MEDICINE
Payer: COMMERCIAL

## 2024-09-26 VITALS
RESPIRATION RATE: 18 BRPM | TEMPERATURE: 100.3 F | OXYGEN SATURATION: 98 % | DIASTOLIC BLOOD PRESSURE: 97 MMHG | SYSTOLIC BLOOD PRESSURE: 160 MMHG | HEART RATE: 86 BPM

## 2024-09-26 DIAGNOSIS — R07.9 CHEST PAIN: Primary | ICD-10-CM

## 2024-09-26 DIAGNOSIS — F32.A DEPRESSION WITH SUICIDAL IDEATION: ICD-10-CM

## 2024-09-26 DIAGNOSIS — R45.851 DEPRESSION WITH SUICIDAL IDEATION: ICD-10-CM

## 2024-09-26 LAB
2HR DELTA HS TROPONIN: 0 NG/L
ALBUMIN SERPL BCG-MCNC: 3.5 G/DL (ref 3.5–5)
ALP SERPL-CCNC: 133 U/L (ref 34–104)
ALT SERPL W P-5'-P-CCNC: 31 U/L (ref 7–52)
AMPHETAMINES SERPL QL SCN: NEGATIVE
ANION GAP SERPL CALCULATED.3IONS-SCNC: 7 MMOL/L (ref 4–13)
AST SERPL W P-5'-P-CCNC: 36 U/L (ref 13–39)
ATRIAL RATE: 107 BPM
BARBITURATES UR QL: NEGATIVE
BASOPHILS # BLD AUTO: 0.05 THOUSANDS/ΜL (ref 0–0.1)
BASOPHILS NFR BLD AUTO: 1 % (ref 0–1)
BENZODIAZ UR QL: NEGATIVE
BILIRUB SERPL-MCNC: 0.77 MG/DL (ref 0.2–1)
BUN SERPL-MCNC: 9 MG/DL (ref 5–25)
CALCIUM SERPL-MCNC: 9.1 MG/DL (ref 8.4–10.2)
CARDIAC TROPONIN I PNL SERPL HS: 11 NG/L
CARDIAC TROPONIN I PNL SERPL HS: 11 NG/L
CHLORIDE SERPL-SCNC: 104 MMOL/L (ref 96–108)
CO2 SERPL-SCNC: 26 MMOL/L (ref 21–32)
COCAINE UR QL: NEGATIVE
CREAT SERPL-MCNC: 0.7 MG/DL (ref 0.6–1.3)
EOSINOPHIL # BLD AUTO: 0.2 THOUSAND/ΜL (ref 0–0.61)
EOSINOPHIL NFR BLD AUTO: 4 % (ref 0–6)
ERYTHROCYTE [DISTWIDTH] IN BLOOD BY AUTOMATED COUNT: 13.2 % (ref 11.6–15.1)
ETHANOL EXG-MCNC: 0 MG/DL
FENTANYL UR QL SCN: NEGATIVE
GFR SERPL CREATININE-BSD FRML MDRD: 110 ML/MIN/1.73SQ M
GLUCOSE SERPL-MCNC: 190 MG/DL (ref 65–140)
HCT VFR BLD AUTO: 39.2 % (ref 36.5–49.3)
HGB BLD-MCNC: 13.3 G/DL (ref 12–17)
HYDROCODONE UR QL SCN: NEGATIVE
IMM GRANULOCYTES # BLD AUTO: 0.01 THOUSAND/UL (ref 0–0.2)
IMM GRANULOCYTES NFR BLD AUTO: 0 % (ref 0–2)
LYMPHOCYTES # BLD AUTO: 1.88 THOUSANDS/ΜL (ref 0.6–4.47)
LYMPHOCYTES NFR BLD AUTO: 34 % (ref 14–44)
MCH RBC QN AUTO: 28.9 PG (ref 26.8–34.3)
MCHC RBC AUTO-ENTMCNC: 33.9 G/DL (ref 31.4–37.4)
MCV RBC AUTO: 85 FL (ref 82–98)
METHADONE UR QL: NEGATIVE
MONOCYTES # BLD AUTO: 0.57 THOUSAND/ΜL (ref 0.17–1.22)
MONOCYTES NFR BLD AUTO: 10 % (ref 4–12)
NEUTROPHILS # BLD AUTO: 2.9 THOUSANDS/ΜL (ref 1.85–7.62)
NEUTS SEG NFR BLD AUTO: 51 % (ref 43–75)
NRBC BLD AUTO-RTO: 0 /100 WBCS
OPIATES UR QL SCN: NEGATIVE
OXYCODONE+OXYMORPHONE UR QL SCN: NEGATIVE
P AXIS: 46 DEGREES
PCP UR QL: NEGATIVE
PLATELET # BLD AUTO: 126 THOUSANDS/UL (ref 149–390)
PMV BLD AUTO: 10.1 FL (ref 8.9–12.7)
POTASSIUM SERPL-SCNC: 3.4 MMOL/L (ref 3.5–5.3)
PR INTERVAL: 192 MS
PROT SERPL-MCNC: 7.6 G/DL (ref 6.4–8.4)
QRS AXIS: -52 DEGREES
QRSD INTERVAL: 118 MS
QT INTERVAL: 348 MS
QTC INTERVAL: 464 MS
RBC # BLD AUTO: 4.61 MILLION/UL (ref 3.88–5.62)
SODIUM SERPL-SCNC: 137 MMOL/L (ref 135–147)
T WAVE AXIS: 119 DEGREES
THC UR QL: NEGATIVE
TSH SERPL DL<=0.05 MIU/L-ACNC: 2.54 UIU/ML (ref 0.45–4.5)
VENTRICULAR RATE: 107 BPM
WBC # BLD AUTO: 5.61 THOUSAND/UL (ref 4.31–10.16)

## 2024-09-26 PROCEDURE — 82075 ASSAY OF BREATH ETHANOL: CPT

## 2024-09-26 PROCEDURE — 96365 THER/PROPH/DIAG IV INF INIT: CPT

## 2024-09-26 PROCEDURE — 93005 ELECTROCARDIOGRAM TRACING: CPT

## 2024-09-26 PROCEDURE — 93010 ELECTROCARDIOGRAM REPORT: CPT | Performed by: INTERNAL MEDICINE

## 2024-09-26 PROCEDURE — 99285 EMERGENCY DEPT VISIT HI MDM: CPT | Performed by: EMERGENCY MEDICINE

## 2024-09-26 PROCEDURE — 96375 TX/PRO/DX INJ NEW DRUG ADDON: CPT

## 2024-09-26 PROCEDURE — 84484 ASSAY OF TROPONIN QUANT: CPT

## 2024-09-26 PROCEDURE — 80307 DRUG TEST PRSMV CHEM ANLYZR: CPT

## 2024-09-26 PROCEDURE — 85025 COMPLETE CBC W/AUTO DIFF WBC: CPT

## 2024-09-26 PROCEDURE — 71046 X-RAY EXAM CHEST 2 VIEWS: CPT

## 2024-09-26 PROCEDURE — 84443 ASSAY THYROID STIM HORMONE: CPT

## 2024-09-26 PROCEDURE — 96366 THER/PROPH/DIAG IV INF ADDON: CPT

## 2024-09-26 PROCEDURE — 99285 EMERGENCY DEPT VISIT HI MDM: CPT

## 2024-09-26 PROCEDURE — 36415 COLL VENOUS BLD VENIPUNCTURE: CPT

## 2024-09-26 PROCEDURE — 80053 COMPREHEN METABOLIC PANEL: CPT

## 2024-09-26 RX ORDER — DIPHENHYDRAMINE HYDROCHLORIDE 50 MG/ML
25 INJECTION INTRAMUSCULAR; INTRAVENOUS ONCE
Status: COMPLETED | OUTPATIENT
Start: 2024-09-26 | End: 2024-09-26

## 2024-09-26 RX ORDER — METOCLOPRAMIDE HYDROCHLORIDE 5 MG/ML
10 INJECTION INTRAMUSCULAR; INTRAVENOUS ONCE
Status: COMPLETED | OUTPATIENT
Start: 2024-09-26 | End: 2024-09-26

## 2024-09-26 RX ORDER — KETOROLAC TROMETHAMINE 30 MG/ML
15 INJECTION, SOLUTION INTRAMUSCULAR; INTRAVENOUS ONCE
Status: COMPLETED | OUTPATIENT
Start: 2024-09-26 | End: 2024-09-26

## 2024-09-26 RX ORDER — MAGNESIUM SULFATE HEPTAHYDRATE 40 MG/ML
2 INJECTION, SOLUTION INTRAVENOUS ONCE
Status: COMPLETED | OUTPATIENT
Start: 2024-09-26 | End: 2024-09-26

## 2024-09-26 RX ORDER — ACETAMINOPHEN 325 MG/1
650 TABLET ORAL ONCE
Status: COMPLETED | OUTPATIENT
Start: 2024-09-26 | End: 2024-09-26

## 2024-09-26 RX ADMIN — METOCLOPRAMIDE 10 MG: 5 INJECTION, SOLUTION INTRAMUSCULAR; INTRAVENOUS at 01:17

## 2024-09-26 RX ADMIN — MAGNESIUM SULFATE HEPTAHYDRATE 2 G: 40 INJECTION, SOLUTION INTRAVENOUS at 01:25

## 2024-09-26 RX ADMIN — SODIUM CHLORIDE 1000 ML: 0.9 INJECTION, SOLUTION INTRAVENOUS at 01:19

## 2024-09-26 RX ADMIN — DIPHENHYDRAMINE HYDROCHLORIDE 25 MG: 50 INJECTION, SOLUTION INTRAMUSCULAR; INTRAVENOUS at 01:17

## 2024-09-26 RX ADMIN — KETOROLAC TROMETHAMINE 15 MG: 30 INJECTION, SOLUTION INTRAMUSCULAR; INTRAVENOUS at 01:16

## 2024-09-26 RX ADMIN — ACETAMINOPHEN 650 MG: 325 TABLET ORAL at 01:14

## 2024-09-26 NOTE — ED ATTENDING ATTESTATION
9/26/2024  I, Pedro Lozano MD, saw and evaluated the patient. I have discussed the patient with the resident/non-physician practitioner and agree with the resident's/non-physician practitioner's findings, Plan of Care, and MDM as documented in the resident's/non-physician practitioner's note, except where noted. All available labs and Radiology studies were reviewed.  I was present for key portions of any procedure(s) performed by the resident/non-physician practitioner and I was immediately available to provide assistance.       At this point I agree with the current assessment done in the Emergency Department.  I have conducted an independent evaluation of this patient a history and physical is as follows:    49-year-old male with a history of atrial fibrillation, type 2 diabetes, hypertension, hyperlipidemia, CAD presents to the emergency department for evaluation of chest and lower back pain.  No associated shortness of breath.  No fevers or chills.  No recent trauma, fall, or known injury.    On exam, patient was comfortably bed in no acute distress, head is normocephalic atraumatic, pupils equal round reactive, heart is regular rate and rhythm with intact distal pulses, no increased work of breathing, respiratory distress, or stridor.  Abdomen is soft, nontender nondistended without rebound or guarding.    Differential diagnosis includes but is not limited to arrhythmia, symptomatic anemia, ACS, anxiety, viral illness.  Plan to check cardiac workup.  Will have patient speak with crisis per his request.    Labs grossly unremarkable.  Patient spoke with crisis who set him up for outpatient partial program.    ED Course  ED Course as of 09/26/24 0333   u Sep 26, 2024   0317 EKG interpreted by myself demonstrates sinus tachycardia, left axis deviation, nonspecific T waves         Critical Care Time  Procedures

## 2024-09-26 NOTE — Clinical Note
[facility] PARTIAL PROGRAM REFERRAL     [facility]    Name and Date of Birth:  John Arevalo 49 y.o. 1975    Date of Referral: September 26, 2024    Presenting Symptoms and Stressors:      Symptoms:  worsening depression, anxiety, problems with concentration, and suicidal ideation  Stressors:  relationship problems, job loss, everyday stressors, and Lost 28 year job due to cardiac issues     Access to Weapons:  No    Smoking Status: denies use    Substance Use:  None    Suicidal Ideation: None, Yes, fleeting suicidal thoughts, contracts for safety, would got to Emergency Room if feeling unsafe, would seek inpatient admission if not feeling safe    Homicidal Ideation: None    Depressed Mood: depressed mood, anhedonia, sadness, hopelessness, helplessness, low energy, feeling suicidal, difficulty sleeping, poor appetite    Aleyda/Hypomania: None    Psychosis: None    Agitation: No    Appetite Changes: decreased appetite    Sleep Disturbance: difficulty falling asleep    Diagnoses:  Unspecified Depressive Disorder    Current Psychiatrist or Therapist:    Psychiatrist: None  Therapist: None    Do they Require Ambulatory Assistance: No    Communication Assistance: Not required     Legal Issues: None        Faye Mercado

## 2024-09-26 NOTE — DISCHARGE INSTRUCTIONS
Please follow up with your PCP as soon as possible.    Please return to the Emergency Department if you experience worsening of your current symptoms or any new/other concerning symptoms.

## 2024-09-26 NOTE — ED PROVIDER NOTES
No diagnosis found.  ED Disposition       None          Assessment & Plan   {Hyperlinks  Risk Stratification - NIHSS - HEART SCORE - Fill out sepsis note and make sure you call [phone] if severe or septic shock:[id number]}    Medical Decision Making  Amount and/or Complexity of Data Reviewed  Labs: ordered.  Radiology: ordered.    Risk  OTC drugs.  Prescription drug management.                     Medications   magnesium sulfate 2 g/50 mL IVPB (premix) 2 g (2 g Intravenous New Bag 9/26/24 0125)   sodium chloride 0.9 % bolus 1,000 mL (0 mL Intravenous Stopped 9/26/24 0255)   metoclopramide (REGLAN) injection 10 mg (10 mg Intravenous Given 9/26/24 0117)   ketorolac (TORADOL) injection 15 mg (15 mg Intravenous Given 9/26/24 0116)   diphenhydrAMINE (BENADRYL) injection 25 mg (25 mg Intravenous Given 9/26/24 0117)   acetaminophen (TYLENOL) tablet 650 mg (650 mg Oral Given 9/26/24 0114)       History of Present Illness   {Hyperlinks  History (Med, Surg, Fam, Social) - Current Medications - Allergies  :[id number]}    HPI    Review of Systems        Objective   {Hyperlinks  Historical Vitals - Historical Labs - Chart Review/Microbiology - Last Echo - Code Status  :[id number]}  ED Triage Vitals   Temperature Pulse Blood Pressure Respirations SpO2 Patient Position - Orthostatic VS   09/26/24 0011 09/26/24 0010 09/26/24 0012 09/26/24 0010 09/26/24 0010 09/26/24 0010   100.3 °F (37.9 °C) (!) 109 (!) 176/110 20 98 % Sitting      Temp Source Heart Rate Source BP Location FiO2 (%) Pain Score    09/26/24 0011 09/26/24 0010 09/26/24 0010 -- 09/26/24 0114    Oral Monitor Left arm  6        Physical Exam    Labs Reviewed   CBC AND DIFFERENTIAL - Abnormal       Result Value    WBC 5.61      RBC 4.61      Hemoglobin 13.3      Hematocrit 39.2      MCV 85      MCH 28.9      MCHC 33.9      RDW 13.2      MPV 10.1      Platelets 126 (*)     nRBC 0      Segmented % 51      Immature Grans % 0      Lymphocytes % 34      Monocytes % 10       Eosinophils Relative 4      Basophils Relative 1      Absolute Neutrophils 2.90      Absolute Immature Grans 0.01      Absolute Lymphocytes 1.88      Absolute Monocytes 0.57      Eosinophils Absolute 0.20      Basophils Absolute 0.05     COMPREHENSIVE METABOLIC PANEL - Abnormal    Sodium 137      Potassium 3.4 (*)     Chloride 104      CO2 26      ANION GAP 7      BUN 9      Creatinine 0.70      Glucose 190 (*)     Calcium 9.1      AST 36      ALT 31      Alkaline Phosphatase 133 (*)     Total Protein 7.6      Albumin 3.5      Total Bilirubin 0.77      eGFR 110      Narrative:     National Kidney Disease Foundation guidelines for Chronic Kidney Disease (CKD):     Stage 1 with normal or high GFR (GFR > 90 mL/min/1.73 square meters)    Stage 2 Mild CKD (GFR = 60-89 mL/min/1.73 square meters)    Stage 3A Moderate CKD (GFR = 45-59 mL/min/1.73 square meters)    Stage 3B Moderate CKD (GFR = 30-44 mL/min/1.73 square meters)    Stage 4 Severe CKD (GFR = 15-29 mL/min/1.73 square meters)    Stage 5 End Stage CKD (GFR <15 mL/min/1.73 square meters)  Note: GFR calculation is accurate only with a steady state creatinine   RAPID DRUG SCREEN, URINE - Normal    Amph/Meth UR Negative      Barbiturate Ur Negative      Benzodiazepine Urine Negative      Cocaine Urine Negative      Methadone Urine Negative      Opiate Urine Negative      PCP Ur Negative      THC Urine Negative      Oxycodone Urine Negative      Fentanyl Urine Negative      HYDROCODONE URINE Negative      Narrative:     FOR MEDICAL PURPOSES ONLY.   IF CONFIRMATION NEEDED PLEASE CONTACT THE LAB WITHIN 5 DAYS.    Drug Screen Cutoff Levels:  AMPHETAMINE/METHAMPHETAMINES  1000 ng/mL  BARBITURATES     200 ng/mL  BENZODIAZEPINES     200 ng/mL  COCAINE      300 ng/mL  METHADONE      300 ng/mL  OPIATES      300 ng/mL  PHENCYCLIDINE     25 ng/mL  THC       50 ng/mL  OXYCODONE      100 ng/mL  FENTANYL      5 ng/mL  HYDROCODONE     300 ng/mL   TSH, 3RD GENERATION - Normal    TSH  3RD [facility] 2.535     POCT ALCOHOL BREATH TEST - Normal    EXTBreath Alcohol 0.000     HS TROPONIN I 0HR     XR chest 2 views    (Results Pending)       Procedures    ED Medication and Procedure Management   Prior to Admission Medications   Prescriptions Last Dose Informant Patient Reported? Taking?   Blood Glucose Monitoring Suppl (OneTouch Verio) w/Device KIT  Self No No   Sig: Use to test 3-4x daily   Blood Pressure KIT  Self No No   Sig: Use 2 (two) times a day   Continuous Blood Gluc  (Dexcom G7 ) HENRY  Self No No   Sig: Use 1 each in the morning   Continuous Glucose Sensor (Dexcom G7 Sensor)   No No   Sig: use every 10 days   Diclofenac Sodium (VOLTAREN) 1 %  Self No No   Sig: Apply 2 g topically 4 (four) times a day   Patient not taking: Reported on 5/7/2024   Empagliflozin-metFORMIN HCl (Synjardy) 12.5-1000 MG TABS   No No   Sig: take 1 tablet by mouth twice a day   Insulin Glargine Solostar (Lantus SoloStar) 100 UNIT/ML SOPN  Self No No   Sig: Inject 0.3 mL (30 Units total) under the skin daily at bedtime   Insulin Pen Needle (BD Pen Needle Ann 2nd Gen) 32G X 4 MM MISC  Self No No   Sig: For use with insulin pen. Pharmacy may dispense brand covered by insurance.   Lancets 30G MISC  Self No No   Sig: Use to test 3x daily   aspirin (ECOTRIN LOW STRENGTH) 81 mg EC tablet  Self No No   Sig: Take 1 tablet (81 mg total) by mouth daily   bisoprolol (ZEBETA) 10 MG tablet   No No   Sig: Take 1 tablet (10 mg total) by mouth daily   butalbital-acetaminophen-caffeine (FIORICET,ESGIC) -40 mg per tablet  Self Yes No   Sig: TAKE 2 TABLETS BY MOUTH EVERY 8 HOURS AS NEEDED FOR HEADACHES   gabapentin (NEURONTIN) 300 mg capsule  Self Yes No   Sig: take 1 capsule by mouth twice a day START BY TAKING AT BEDTIME FI...  (REFER TO PRESCRIPTION NOTES).   glucose blood (OneTouch Verio) test strip  Self No No   Sig: Use to test 3x daily   insulin lispro (HumaLOG KwikPen) 100 units/mL injection pen  Self No No    Sig: Inject 10 Units under the skin 3 (three) times a day with meals   ketorolac (ACULAR) 0.5 % ophthalmic solution  Self Yes No   Sig: instill 1 drop into both eyes four times a day for 10 days   losartan (COZAAR) 100 MG tablet  Self Yes No   Sig: Take 100 mg by mouth daily   mirtazapine (REMERON) 30 mg tablet  Self Yes No   Sig: take 1/2 tablet by mouth nightly   mometasone (ELOCON) 0.1 % cream  Self Yes No   Sig: Apply 1 Application topically 2 (two) times a day To affected area   pantoprazole (PROTONIX) 40 mg tablet   No No   Sig: take 1 tablet by mouth once daily   rosuvastatin (CRESTOR) 5 mg tablet  Self Yes No   Sig: Take 5 mg by mouth daily      Facility-Administered Medications: None     Patient's Medications   Discharge Prescriptions    No medications on file     No discharge procedures on file.   Needle Ann 2nd Gen) 32G X 4 MM MISC  Self No No   Sig: For use with insulin pen. Pharmacy may dispense brand covered by insurance.   Lancets 30G MISC  Self No No   Sig: Use to test 3x daily   aspirin (ECOTRIN LOW STRENGTH) 81 mg EC tablet  Self No No   Sig: Take 1 tablet (81 mg total) by mouth daily   bisoprolol (ZEBETA) 10 MG tablet   No No   Sig: Take 1 tablet (10 mg total) by mouth daily   butalbital-acetaminophen-caffeine (FIORICET,ESGIC) -40 mg per tablet  Self Yes No   Sig: TAKE 2 TABLETS BY MOUTH EVERY 8 HOURS AS NEEDED FOR HEADACHES   gabapentin (NEURONTIN) 300 mg capsule  Self Yes No   Sig: take 1 capsule by mouth twice a day START BY TAKING AT BEDTIME FI...  (REFER TO PRESCRIPTION NOTES).   glucose blood (OneTouch Verio) test strip  Self No No   Sig: Use to test 3x daily   insulin lispro (HumaLOG KwikPen) 100 units/mL injection pen  Self No No   Sig: Inject 10 Units under the skin 3 (three) times a day with meals   ketorolac (ACULAR) 0.5 % ophthalmic solution  Self Yes No   Sig: instill 1 drop into both eyes four times a day for 10 days   losartan (COZAAR) 100 MG tablet  Self Yes No   Sig: Take 100 mg by mouth daily   mirtazapine (REMERON) 30 mg tablet  Self Yes No   Sig: take 1/2 tablet by mouth nightly   mometasone (ELOCON) 0.1 % cream  Self Yes No   Sig: Apply 1 Application topically 2 (two) times a day To affected area   pantoprazole (PROTONIX) 40 mg tablet   No No   Sig: take 1 tablet by mouth once daily   rosuvastatin (CRESTOR) 5 mg tablet  Self Yes No   Sig: Take 5 mg by mouth daily      Facility-Administered Medications: None     Discharge Medication List as of 9/26/2024  4:31 AM        CONTINUE these medications which have NOT CHANGED    Details   aspirin (ECOTRIN LOW STRENGTH) 81 mg EC tablet Take 1 tablet (81 mg total) by mouth daily, Starting Wed 5/6/2020, No Print      bisoprolol (ZEBETA) 10 MG tablet Take 1 tablet (10 mg total) by mouth daily, Starting Tue 8/20/2024, Normal      Blood  Glucose Monitoring Suppl (OneTouch Verio) w/Device KIT Use to test 3-4x daily, Normal      Blood Pressure KIT Use 2 (two) times a day, Starting Tue 10/12/2021, Normal      butalbital-acetaminophen-caffeine (FIORICET,ESGIC) -40 mg per tablet TAKE 2 TABLETS BY MOUTH EVERY 8 HOURS AS NEEDED FOR HEADACHES, Historical Med      Continuous Blood Gluc  (Dexcom G7 ) HENRY Use 1 each in the morning, Starting Thu 12/28/2023, Normal      Continuous Glucose Sensor (Dexcom G7 Sensor) use every 10 days, Normal      Diclofenac Sodium (VOLTAREN) 1 % Apply 2 g topically 4 (four) times a day, Starting Fri 2/23/2024, Normal      Empagliflozin-metFORMIN HCl (Synjardy) 12.5-1000 MG TABS take 1 tablet by mouth twice a day, Normal      gabapentin (NEURONTIN) 300 mg capsule take 1 capsule by mouth twice a day START BY TAKING AT BEDTIME FI...  (REFER TO PRESCRIPTION NOTES)., Historical Med      glucose blood (OneTouch Verio) test strip Use to test 3x daily, Normal      Insulin Glargine Solostar (Lantus SoloStar) 100 UNIT/ML SOPN Inject 0.3 mL (30 Units total) under the skin daily at bedtime, Starting Thu 3/23/2023, Normal      insulin lispro (HumaLOG KwikPen) 100 units/mL injection pen Inject 10 Units under the skin 3 (three) times a day with meals, Starting Thu 3/23/2023, Normal      Insulin Pen Needle (BD Pen Needle Ann 2nd Gen) 32G X 4 MM MISC For use with insulin pen. Pharmacy may dispense brand covered by insurance., Normal      ketorolac (ACULAR) 0.5 % ophthalmic solution instill 1 drop into both eyes four times a day for 10 days, Historical Med      Lancets 30G MISC Use to test 3x daily, Normal      losartan (COZAAR) 100 MG tablet Take 100 mg by mouth daily, Starting Wed 4/19/2023, Historical Med      mirtazapine (REMERON) 30 mg tablet take 1/2 tablet by mouth nightly, Historical Med      mometasone (ELOCON) 0.1 % cream Apply 1 Application topically 2 (two) times a day To affected area, Starting Mon 2/26/2024,  Historical Med      pantoprazole (PROTONIX) 40 mg tablet take 1 tablet by mouth once daily, Starting Fri 5/24/2024, Normal      rosuvastatin (CRESTOR) 5 mg tablet Take 5 mg by mouth daily, Historical Med                Lauryn Baker,   10/04/24 8929

## 2024-09-30 ENCOUNTER — TELEPHONE (OUTPATIENT)
Dept: CARDIOLOGY CLINIC | Facility: CLINIC | Age: 49
End: 2024-09-30

## 2024-09-30 DIAGNOSIS — I10 ESSENTIAL HYPERTENSION: Primary | ICD-10-CM

## 2024-09-30 RX ORDER — AMLODIPINE BESYLATE 5 MG/1
5 TABLET ORAL DAILY
Qty: 30 TABLET | Refills: 2 | Status: SHIPPED | OUTPATIENT
Start: 2024-09-30

## 2024-09-30 NOTE — TELEPHONE ENCOUNTER
L/m/m asking pt to cb at [phone] to give some bp readings and let us know he received camzyos and how he is doing

## 2024-10-02 ENCOUNTER — TELEPHONE (OUTPATIENT)
Dept: CARDIOLOGY CLINIC | Facility: CLINIC | Age: 49
End: 2024-10-02

## 2024-10-02 NOTE — TELEPHONE ENCOUNTER
L/m/m asking patient to call [phone] or [phone] to schedule echo needed for Camzyos on 11/14/24

## 2024-10-07 NOTE — TELEPHONE ENCOUNTER
Aisha Coyne sent teams message stated Dr Sky Deshpande called pt today  [de-identified] : 38 year female comes in today with right sided jaw pain which started two weeks ago after yawning. Been exacerbated by chewing and yawning.  Felt like her jaw wasn't in alignment.  Denies hearing loss, ottorhea, otalgia, tinnitus or vertigo.  It has slowly gotten better to the point that it is almost completely resolved.

## 2024-10-11 ENCOUNTER — TELEPHONE (OUTPATIENT)
Dept: CARDIOLOGY CLINIC | Facility: CLINIC | Age: 49
End: 2024-10-11

## 2024-10-11 NOTE — TELEPHONE ENCOUNTER
L/m/m asking for bp readings  asked him to call me @ [phone] w his bp readings. This is 3rd call

## 2024-11-11 NOTE — PROGRESS NOTES
[facility] Follow-up Visit - Cardiology   John Arevalo 49 y.o. male MRN: [id number]  Unit/Bed#:  Encounter: [id number]    Patient Active Problem List    Diagnosis Date Noted    Obesity with serious comorbidity 08/04/2020    Obstructive sleep apnea (adult) (pediatric)     Hypertrophic cardiomyopathy (HCC) 06/10/2020    Type 2 diabetes mellitus (HCC) 05/05/2020    Essential hypertension 05/05/2020    Other hyperlipidemia 05/05/2020    Coronary artery disease of native artery of native heart with stable angina pectoris (HCC) 05/05/2020    History of coronary angioplasty with insertion of stent 05/05/2020    Abdominal pain 03/22/2023    Leg pain 03/22/2023    Current use of insulin (HCC) 12/16/2021     Plan: Mr Arevalo was last seen in the [facility] Clinic on 5-7-2024. 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 He also has multiple comorbidities including diabetes with peripheral neuropathy, hypertension, obesity, and obstructive sleep apnea. He was recently seen in the ED for evaluation of chest pain twice (9-7-2024 and 10-4-2024) with negative cardiac testing and a diagnosis of costochondritis. He also has acid reflux disease.Laboratory tests including troponin levels in both occasions were unremarkable and ECG showed no acute changes. Serial surveillance echocardiograms performed for monitoring LVEF and LVOT gradients while on mavacamten (6-3, 8-22, and 9-) have shown:      Left Ventricle: Wall thickness is severely increased. There is severe asymmetric hypertrophy of the septal wall. The left ventricular ejection fraction is 64%. Systolic function is normal. Global longitudinal strain is reduced at -14%. Wall motion is normal. Diastolic function is mildly abnormal, consistent with grade I (abnormal) relaxation. There is no LV dynamic obstruction with a peak gradient of 17 mmHg at rest and 29.0 mmHg with Valsalva.    Left Atrium: The atrium is mildly to moderately dilated.    Aortic Valve: There is  aortic valve sclerosis.    Mitral Valve: There is mild annular calcification. There is systolic anterior motion without late peaking gradient.      Left Ventricle: Wall thickness is severely increased. There is severe asymmetric hypertrophy of the septal wall. The left ventricular ejection fraction is 72%. Systolic function is hyperdynamic. Global longitudinal strain is reduced at -14%. Wall motion is normal. Diastolic function is moderately abnormal, consistent with grade II (pseudonormal) relaxation. There is  outflow tract dynamic obstruction at rest with a peak gradient of 34.0 mmHg. There is outflow tract dynamic obstruction with valsalva with a peak gradient of 82.0 mmHg.    Left Atrium: The atrium is mildly to moderately dilated.    Aortic Valve: There is aortic valve sclerosis.    Mitral Valve: There is mild annular calcification. There is systolic anterior motion with late peaking gradient.    Tricuspid Valve: There is mild regurgitation.    Patient has been off mavacamten. LVOT gradients have returned and he is now symptomatic. Will resume mavacamten 5 mg daily and perform surveillance echo in 4 weeks.      Left Ventricle: Wall thickness is severely increased. There is severe asymmetric hypertrophy of the septal wall. The left ventricular ejection fraction is 68%. Systolic function is vigorous. Global longitudinal strain is reduced at -14%. Wall motion is normal. Diastolic function is abnormal. There is  outflow tract dynamic obstruction at rest with a peak gradient of 11.0 mmHg. There is outflow tract dynamic obstruction with valsalva with a peak gradient of 51.0 mmHg.    Left Atrium: The atrium is moderately dilated.    Aortic Valve: There is aortic valve sclerosis.    Mitral Valve: There is mild annular calcification. There is systolic anterior motion with late peaking gradient.    Tricuspid Valve: There is mild regurgitation.    Today, he reports feeling well, recently returned from vacation in  [place]. Discussed his BP reading at clinic today with his systolics in 140, he states that his systolic BP readings at home are usually in mid 140s. Reports of some shortness of breath with uphill walk, going up a flight of stairs and with more than ordinary physical exertion. Denies any chest discomfort or pain, denies dizziness or fainting. Denies any palpitations or swelling in his leg/foot. He is  compliance with medications. His weight has remained unchanged from last clinic visit. Daily physical activity includes walking about 3-4 blocks, reports improvement in his diabetic neuropathy. Family screening has remained incomplete. Family members including Stephen (sister), Brando (brother), Rae (daughter and Rosa (daughter) have chosen not to be tested genetically. We encourged Mr Arevalo to ask his family to at least do the echocardiographic screening. Daughter Rosa lives with him and we have offered to facilitate echocardiographic screening for her. An echocardiogram was performed in the office today and showed:      Left Ventricle: Wall thickness is severely increased. There is severe asymmetric hypertrophy of the septal wall. The left ventricular ejection fraction is 67%. Systolic function is vigorous. Global longitudinal strain is reduced at -15%. Wall motion is normal. Diastolic function is mildly abnormal, consistent with grade I (abnormal) relaxation. There is no LV dynamic obstruction.    Left Atrium: The atrium is moderately dilated.    Aortic Valve: There is aortic valve sclerosis.    Mitral Valve: There is mild annular calcification. There is systolic anterior motion without late peaking gradient.    Tricuspid Valve: There is mild regurgitation.    Patient is now on a consistent daily dose of mavacamten. LVOT gradients have returned to <30 mmHg. Will continue mavacamten 5 mg daily and perform surveillance echo in 12 weeks. His amlodipine was increased to 10 mg daily and he will be recording  his blood pressure at home twice daily to assess adequacy of blood pressure control with a goal of consistent values <13/85 mmHg.    Physician Requesting Consult: Bennie Garcias DO  Reason for Consult / Principal Problem: Hypertrophic cardiomyopathy     HPI: John Arevalo is a 49 year old male seen at HCM office at age 46. He had a past medical history of hyperlipidemia, type II diabetes and hypertension and was recently diagnosed as having hypertrophic cardiomyopathy phenotype on a transthoracic echocardiogram. The study was performed during an admission in May 2020 for chest pain and showed marked asymmetric septal hypertrophy without left ventricular outflow tract obstruction. Apical views were suboptimal and provocative maneuvers were not performed to elicit any intracavitary or outflow tract gradients. He then underwent an exercise myocardial perfusion study that showed reduced exercise capacity, exaggerated heart rate response, normal blood pressure response and no ischemic changes or significant rhythm abnormality. A cardiac MRI performed on 2020 showed an anteroseptal wall thickness of 29 mm and inferolateral wall thickness of 18 mm. The left atrium was mildly dilated and there was no evidence of late gadolinium enhancing lesions. He is currently on carvedilol 20 mg daily.     On 2020: Mr Arevalo has had no further chest pain since hospital discharge. He also denies shortness of breath, palpitation, dizziness, syncope or lower extremity swelling. He does snore and wife has noted that he does periodically stop breathing during sleep. His physical activity is limited but he does notice daytime sleepiness and takes hour long naps during the day. He works for [employer] and often has to lift heavy objects (up to 150 lbs). His family history is relatively benign. His father  at age 41 when he was only 12 year old but the circumstances of his death is unclear. There is no history of sudden death at a young  age in his first degree relatives. His risk stratification is incomplete. I have scheduled him for an exercise stress echocardiogram (to evaluate for latent outflow tract obstruction) and a 48 hour ambulatory monitor for assessment of arrhythmias. He has agreed to have genetic testing done. This would be important to particularly exclude HCM phenocopies such as hTTR amyloidosis. I have advised Mr Arevalo to adopt a heart healthy diet low in sodium, saturated fat and processed food. I have also discussed potential restrictions on lifting heavy weights. After completion of risk stratification, he would be advised to start daily exercises with the hope of improving cardiovascular conditioning. He states that he has been evaluated for sleep apnea several times (last time 4 years ago) that has been negative. He may need to be reassessed. He will also need evaluation by weight management.     9-: Mr Arevalo has been doing well on his current medications. He denies chest pain, shortness of breath, palpitation, dizziness, syncope or lower extremity swelling. He does snore and wife has noted that he does periodically stop breathing during sleep. He has not returned to work and is looking for partial disability due to the requirement for lifting heavy objects at work ([employer]). He is now referred to nutritionist and sleep medicine for further evaluation as weight management and correction of nocturnal hypoxemia would be essential for favorable outcomes. He is motivated to achieve both goals. 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 His two daughters and 2 siblings can now undergo targeted gene testing. His cardiac MRI results were reviewed with him.     10-: Mr Arevalo has done very well since last office visit. He denies shortness of breath, palpitation, dizziness, syncope, chest pain or lower extremity swelling. He has increased his daily physical activities and does  short distance walks 4 to 5 times a week. He is tolerating his medications and will be returning to work on November 4 with restrictions to his activities. He is scheduled to have sleep study and see the nutritionist in December. Both daughters have been gene tested but results have not been received yet. He does not know if his brother ans sister have done their targeted gene testings. His diet has improved and his blood pressure and heart rate are well controlled.     3-: Mr Arevalo complains of exertional chest discomfort and shortness of breath for the few weeks. He states that the symptoms have prevented him from physical activity intended to help weight loss. As the result, he has gained some weight (now 310 lbs). He denies dizziness, syncope, lower extremity edema or palpitation. His blood pressure is borderline controlled on metoprolol 50 mg twice daily and he states that he has been watching his diet for excess salt. His diabetes is not optimally controlled with home blood sugars around 175 mg/dL on 3 oral agents, insulin and semaglutide. He also has mild obstructive sleep apnea. He will be seen by sleep specialist on April 19. His cholesterol was last evaluated on 5/5/2020 and showed an HDL of 26, LDL of 43 and a triglyceride level of 265. He is on rosuvastatin 5 mg daily. He is now on long term disability and does not have to lift heavy objects any longer. I have recommended that he undergoes a stress echo study to evaluate his chest pain and shortness of breath as well as his blood pressure response to exercise. He is also referred to nutritionist to help and direct him to lose substantial amount of weight (including referral to weight management team) in order to improve management of his cardiac disease as well as hypertension, diabetes and sleep apnea. Unfortunately, his 2 daughters, and his siblings did not choose to do the targeted gene testing.     6-: Mr. Arevalo had Stress  Echocardiography done on 4/8/2021 which showed significantly reduced exercise capacity, shortness of breath at peak exercise, normal heart rate and blood pressure response to exercise, frequent isolated and paired ventricular premature complexes at peak exercise. It also showed severe left ventricular hypertrophy with asymmetrical hypertrophy of the septum (maximum wall thickness 20 mm), systolic anterior motion of the anterior mitral valve leaflet and subvalvular apparatus was present with pressure gradient of 22 and 42 mmHg at rest and during Valsalva maneuver, with gradient increasing to 60 mmHg immediately after the exercise, findings consistent with a latent obstructive hypertrophic cardiomyopathy. Trivial tricuspid regurgitation with normal estimated pulmonary systolic pressure was also noted.  Verapamil 120 mg daily was added to his regimen of metoprolol succinate 50 mg b.i.d. Today Mr Arevalo admits to occasional palpitation with exertion, but no dizziness, shortness of breath, or chest pain.  Mr. Arevalo continues to follow with Sleep clinic, he had an AHI of 5.2 and was recommended to continue with APAP for at lease 4 hours per night. He denies daytime sleepiness although does take occasional naps during the day. He believes at this time he is getting quality sleep of up to 7 hours per night. His daughters Nilam and Rosa did not choose to undergo genetic testing and have not had screening echocardiograms. He checks his blood glucose at home and has been obtaining values between 120 (fasting), around 220 (post-prandial). Dapagliflozin-metformin was added to his diabetics regime . He has not had routine labs since 2019 and will order them today. He has received both doses of Pfizer COVID-19 vaccine, along with his wife and daughters. He checks his blood pressure occasionally and the most recent blood pressure was 113/78 mmHg. He has lost 3 pounds since last visit.  He has not seen a nutritionist yet. We will  obtain the contact information for the clinic. We again discussed the importance of weight loss for overall cardiovascular health. Although his dietary choices have improved he still needs counselling on total caloric intake and choice of food items.  Reinforced the importance of the heart-healthy diet. Mr Arevalo will be seen in HCM clinic in 6 month.      10-: Mr Arevalo continues having occasional exertional chest pain and exertional dyspnea (NYHA Class 2-3) as well as occasional headache. He does not check blood pressure at home. We ordered blood pressure cuff and instructed him to check blood pressure twice daily once he get the cuff, keep blood pressure and heart rate logs, and call us with results. At this time will continue with the current medication regime and once he gets blood pressure cuff, will plan to stop candesartan and increase verapamil dosage to help with blood pressure as well as HCM symptom control. Recent labs showed Hgb A1C 11% and we reffered him to Endocrinology clinic. He has not been seen their yet and at this time PCP manages his diabetic medications. Will contact Endocrinology and Nutrition clinics to get him appointments to help with DM management as well as weight management. Mr. Arevalo also complained of the left shoulder bursitis pain, for which topical diclofenac is ordered. We again stressed the importance of weight loss, blood pressure control, and heart-healthy diet. He will be seen in HCM clinic in 6 months.      12-: Mr Arevalo was admitted to hospital on 10- for chest pain. He had no troponin elevation or ischemic chest discomfort and chest pain resolved spontaneously. He was discharged on his home medications after a day of observation. Since then he has not have any further chest pain. He also denies palpitation, dizziness, syncope or lower extremity edema although has had occasional dyspnea on exertion. His home blood pressure measurements have been mostly in the  acceptable range and his blood pressure in the office is 114/78 mmHg. Laboratory work during hospitalization shows normal K, BUN and Cr. His blood sugar has been elevated and he is going to be switched to a new insulin preparation and will be seeing endocrinology on 12/16/2021. He has lost a few lbs and is determined to voluntarily loose more weight by reducing snacking and cutting back on carbohydrates. His left shoulder pain has improved after applying topical diclofenac. I did not change his antihypertensive regimen at this visit. He follows with sleep medicine for his mild obstructive sleep apnea. His family did not choose to be genetically screened for HCM.     10-4-2022: Mr Arevalo was last seen in the [facility] Clinic on 12-. Today, he is seen along with Dr Inocente Lambert, cardiology fellow. Mr Arevalo remains symptomatic with continued chest discomfort that occurs intermittently on a daily basis for brief period of time. It is not associated with other symptoms but he does have exertional dyspnea as well with symptoms suggestive of class II-III severity. He does sleep using 2 pillows but has no paroxysmal nocturnal dyspnea. He continues to have bendopnea that is at times associated with dizziness as well. His does regularly check his blood pressure but not his weight at home. Today, his blood pressure is well controlled and his weight is 250 lbs. He has lost substantial weight recently down from 305 lbs. He has lost his insurance but so far has been able to afford his medication and has been compliant. Examination does not reveal objective evidence of volume overload. He is started on ranolazine to manage chest pain possibly related to microvascular disease. He also states that he would drink as much as 1.5 gallon of water every day and was asked to reduce that by half to see if that helps the shortness of breath. An echocardiogram was ordered (last study 4-8-2021) pending insurance reactivation.       5-9-2023:  Mr Arevalo was last seen in the [facility] Clinic on 10-4-2022. He was admitted to hospital on 3- for acute gastroenteritis that was thought to be viral and was associated with elevated lipase level consistent with pancreatitis. There was no gallstone on ultrasound. His semiglutide was discontinued and he is now on insulin with latest HgbA1C of 12.7%. He needs aggressive management of his diabetes at this point. Home glucose measurements have ranged from 117-300 mg/dL. He has had some chest discomfort thought to be due to coronary microvascular disease that has responded to ranolazine. Mr Arevalo denies palpitation, dizziness, syncope, lower extremity edema or exertional chest pain. He experiences chest discomfort that is localized to the left costochondral region and is reproduced by touch likely secondary to costochondritis. He is quite limited in his ambulation due to severe peripheral neuropathy and uses a cane to keep his balance. He does get short of breath with minimal exertion. His blood pressure is well controlled and exam does not indicate any evidence of cardiac decompensation. He has been found to have a positive occult blood test in his stool and will be undergoing colonoscopy soon. His latest echocardiogram performed on 2- has shown:       Left Ventricle: Wall thickness is severely increased. There is severe asymmetric hypertrophy of the septal wall. The left ventricular ejection fraction is 70%. Systolic function is hyperdynamic. Wall motion is normal. Diastolic function is mildly abnormal, consistent with grade I (abnormal) relaxation. There is no LV dynamic obstruction with a peak gradient of 21.0 mmHg. There is outflow tract dynamic obstruction with valsalva with a peak gradient of 41.0 mmHg.    Left Atrium: The atrium is mildly dilated.    Mitral Valve: There is systolic anterior motion without late peaking gradient.     He has no murmur on cardiac auscultation today. He is recently started on  Mirtazapine and feels that his depression has improved and his sleep is more regulated. He will continue candesartan, metoprolol, verapamil, ranolazine and rosuvastatin at this time. He has had no recent lipid profile.      10-: Since his last office visit on 5-9-2023 he has been seen in the ED in May and August for chest pain and continued chronic dyspnea on exertion.  He describes the chest pain as a stabbing sensation on the left side of the chest with no radiation but has associated dyspnea.  He did not trial any alleviating factors at home and he does not specifically discuss any exacerbating factors, he states that this discomfort occurs sporadically.  EKG's at the time showed now acute ischemia and troponin levels were normal.  He was also seen in the ED on 8/13/2023 with complaints of epigastric pain that he noted was sometimes worse with eating and had been improved with Pepto-Bismol at home.  He was ultimately referred to GI as an outpatient and has since started on omeprazole with some improvement in discomfort.  Today, the patient states that he does have several physical complaints.  Ultimately, he has neuropathy in both his feet and his hands and the neuropathy in his feet is sometimes debilitating.  Certain days he has trouble walking and he typically does use a cane for gait stability.  He states he has fallen a few times at home which he feels is a combination of lightheadedness from exertion and also gait instability.  The patient states he does get lightheaded both with position change and after walking for periods of time.  He does state that he is well-hydrated drinking 4-5 bottles of water a day.  However, the patient states that secondary to his continued GI upset/discomfort he does not eat much throughout the day.  Sometimes he will have a small breakfast and a very small dinner and that is all throughout the day.  I have encouraged him to attempt to snack a little more frequently as  he is on glucose lowering medication for his diabetes and I am concerned that he might be getting hypoglycemic at times which could potentially be causing his lightheadedness.  I have encouraged him to check his glucose levels when he gets lightheaded as he has not done this before.  The patient also describes 2 different types of chest discomfort. One type of chest discomfort does seem to be musculoskeletal in nature as the patient states that he has discomfort across his chest even while in the shower when the water hits it and also with palpation.  I note in his chart that he has had costochondritis type symptoms with reproducible chest wall tenderness for at least the past few years.  However, he also describes a chest discomfort that is left-sided and stabbing in nature with no particular exacerbating factors occurring both at rest and with exertion intermittently.  He states it does not happen every day but does happen a few times a week.  It is nonradiating but is associated with dyspnea and will typically resolve on its own.  He has not trialed any alleviating treatments at home.  He states that this type of discomfort he has had in the past and was improved slightly with the start of Ranexa.  Therefore, we will increase Ranexa to 1000 mg twice daily and we will order a bike exercise stress echo to evaluate for any progression of CAD and also LVOTO.  With regards to his dyspnea with exertion he states this is chronic and has not recently increased in severity or frequency.  He denies any palpitations.  He denies any lower extremity edema or orthopnea and denies any marquise syncope.  His prior falls I do believe are possibly combination of lightheadedness but also gait instability secondary to neuropathy.  The patient also states he does get headaches throughout the week and does note he has poor sleep quality.  He states he feels he has insomnia often waking up at 1:00 in the morning and only sleeping  intermittently throughout the remainder of the night.  He did have a sleep study in 2020 which showed mild sleep apnea but he was unable to tolerate CPAP.  He is unsure if he would like to pursue a another sleep study.  I have asked him to think about this and inform his family doctor of his decision during his office visit in December.  I have asked the patient to monitor his blood pressure at times of having a headache and inform us if his blood pressure is elevated.  Today, his blood pressure and heart rate are acceptable on his current medication regimen.  Lipid panel from March of this year shows his LDL is at 79 and he will continue rosuvastatin 5 mg daily. I encouraged him to remain as active as he can given his gait instability and neuropathy.  I have asked him to continue following a low-fat/cholesterol/sodium diet.    5-7-2024: Mr Arevalo was last seen in [facility] on 10-. Since then he has been admitted to ED 3 times, the 1st two episodes occurred in January and was due to mechanical falls due to balance problem from peripheral neuropathy. The first episode occurred outside the house and the second one indoors. He is now consistently using a cane for balance. Unfortunately, that has cause severe strain on his right forearm muscles. He was also admitted to ED for chest pain in February for which a cardiac cause was not found at the time. He does have evidence for costochondritis on examination and also has possible GERD for which he will be scheduling an upper endoscopy soon. He is not using his cPAP ever since the device was recalled. I am referring him back to sleep medicine for reevaluation as he does continue to have problems with his sleep. His diabetes is under better control on a regimen of insulin, empagliflozin and metformin and hemoglobin A1C is down to 8.1% (3-) with normal renal function (creatinine 0.9, BUN 15). He is now monitoring his blood sugar at home.and the level is 131  mg/dL in the office. His blood pressure is well controlled on bisoprolol ans losartan with home measurements consistently between 110-120 mmHg systolic. He continues to take mavacamten 5 mg daily with significant reduction in LVOT gradients on a recent echo:     Transthoracic echocardiogram (2024):       Left Ventricle: Wall thickness is severely increased. There is severe asymmetric hypertrophy of the septal wall. The left ventricular ejection fraction is 62%. Systolic function is normal. Global longitudinal strain is reduced at -15%. Wall motion is normal. Diastolic function is mildly abnormal, consistent with grade I (abnormal) relaxation. There is no LV dynamic obstruction with a peak gradient of 9.0 mmHg at rest and 24.0 mmHg with Valsalva.    Left Atrium: The atrium is mildly dilated.    Aortic Valve: There is aortic valve sclerosis.    Mitral Valve: There is mild annular calcification. There is systolic anterior motion without late peaking gradient.     He has no murmur of LVOT obstruction on examination today. He denies shortness of breath, edema or syncope. His episodes of chest pain have improved lately and occur no more than twice weekly, are triggered by stress and resolve within minutes. He does have occasional orthostatic dizziness and is instructed to perform leg exercises before standing up from prolonged sitting or lying down. His transaminases are elevated and need further evaluation.       Family history: Father  at age 41 for uncertain cause and the circumstances of his death is not known to the patient who was 12 years old at the time. Mother  in  and had diabetes, hypertension and heart failure. He has 3 siblings. The older sister  of complications of drug use at age 31. Another sister (Stephen)  is 51 year old and has no known medical condition, is not interested in further testing at this time. A younger brother (Brando) is 38 years old and has no heart disease, has not  had testing yet. Mr Mickey has 2 daughters (Rae Arevalo 9- and Rosa Arevalo 4-2-2003) who are healthy, his daughters have not undergone testing yet. Recommended that his daughters should get an echocardiogram if they are not interested in genetic testing. There is no history of sudden cardiac death or hypertrophic cardiomyopathy in the family.     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      Risk stratification:  Nonsustained ventricular tachycardia-no  Severe left ventricular hypertrophy-no  Family history of sudden death-no  Unexplained syncope-no  LVOT obstruction-no  Atrial fibrillation and left atrial dilation-yes  Age- 48 years old  NYHA-class II  Myocardial fibrosis-no  LV systolic dysfunction-no  Apical aneurysm-no     Review of systems: Reports of some shortness of breath with uphill walk, going up flight of stairs and with physical exertion. Denies any chest discomfort or pain, denies dizziness or fainting. Denies any palpitations or swelling in his leg/foot.       Devices: none    Historical Information   Past Medical History:   Diagnosis Date    Congestive heart failure (HCC)     Diabetes mellitus (HCC)     Hyperlipidemia     Hypertension     Obesity      Past Surgical History:   Procedure Laterality Date    COLONOSCOPY       Family History   Problem Relation Age of Onset    Diabetes Mother     Stroke Mother     Stroke Father      Current Outpatient Medications on File Prior to Visit   Medication Sig Dispense Refill    amLODIPine (NORVASC) 5 mg tablet Take 1 tablet (5 mg total) by mouth daily 30 tablet 2    aspirin (ECOTRIN LOW STRENGTH) 81 mg EC tablet Take 1 tablet (81 mg total) by mouth daily 30 tablet 0    bisoprolol (ZEBETA) 10 MG tablet Take 1 tablet (10 mg total) by mouth daily 90 tablet 1    Blood Glucose Monitoring Suppl (OneTouch Verio) w/Device KIT Use to test 3-4x daily 1 kit 1    Blood Pressure KIT Use 2 (two) times a day 1 kit 2     butalbital-acetaminophen-caffeine (FIORICET,ESGIC) -40 mg per tablet TAKE 2 TABLETS BY MOUTH EVERY 8 HOURS AS NEEDED FOR HEADACHES      Continuous Blood Gluc  (Dexcom G7 ) HENRY Use 1 each in the morning 1 each 1    Continuous Glucose Sensor (Dexcom G7 Sensor) use every 10 days 3 each 5    Diclofenac Sodium (VOLTAREN) 1 % Apply 2 g topically 4 (four) times a day (Patient not taking: Reported on 5/7/2024) 100 g 0    Empagliflozin-metFORMIN HCl (Synjardy) 12.5-1000 MG TABS take 1 tablet by mouth twice a day 60 tablet 5    gabapentin (NEURONTIN) 300 mg capsule take 1 capsule by mouth twice a day START BY TAKING AT BEDTIME FI...  (REFER TO PRESCRIPTION NOTES).      glucose blood (OneTouch Verio) test strip Use to test 3x daily 100 strip 3    Insulin Glargine Solostar (Lantus SoloStar) 100 UNIT/ML SOPN Inject 0.3 mL (30 Units total) under the skin daily at bedtime 5 mL 0    insulin lispro (HumaLOG KwikPen) 100 units/mL injection pen Inject 10 Units under the skin 3 (three) times a day with meals 5 mL 0    Insulin Pen Needle (BD Pen Needle Ann 2nd Gen) 32G X 4 MM MISC For use with insulin pen. Pharmacy may dispense brand covered by insurance. 100 each 0    ketorolac (ACULAR) 0.5 % ophthalmic solution instill 1 drop into both eyes four times a day for 10 days      Lancets 30G MISC Use to test 3x daily 100 each 2    losartan (COZAAR) 100 MG tablet Take 100 mg by mouth daily      mirtazapine (REMERON) 30 mg tablet take 1/2 tablet by mouth nightly      mometasone (ELOCON) 0.1 % cream Apply 1 Application topically 2 (two) times a day To affected area      pantoprazole (PROTONIX) 40 mg tablet take 1 tablet by mouth once daily 30 tablet 5    rosuvastatin (CRESTOR) 5 mg tablet Take 5 mg by mouth daily       No current facility-administered medications on file prior to visit.     No Known Allergies  Social History     Substance and Sexual Activity   Alcohol Use Not Currently     Social History     Substance  "and Sexual Activity   Drug Use Never     Social History     Tobacco Use   Smoking Status Never   Smokeless Tobacco Never     Objective   Vitals:   Vitals:    11/12/24 1006   BP: 140/70   BP Location: Right arm   Patient Position: Sitting   Cuff Size: Large   Pulse: 83   SpO2: 98%   Weight: 115 kg (252 lb 9.6 oz)   Height: 5' 11\" (1.803 m)   Body surface area is 2.33 meters squared.  Body mass index is 35.23 kg/m².    Invasive Devices       None                 Physical Exam:  GEN: John Arevalo appears well, alert and oriented x 3, pleasant and cooperative   HEENT: pupils equal, round, and reactive to light; extraocular muscles intact  NECK: supple, no carotid bruits   HEART: regular rhythm, normal S1 and S2, 1/6 systolic murmur at the base with increased intensity following extrasystole, +S4 gallop, no clicks or rubs   LUNGS: clear to auscultation bilaterally; no wheezes, rales, or rhonchi   ABDOMEN: normal bowel sounds, soft, no tenderness, no distention  EXTREMITIES: peripheral pulses normal; no clubbing, cyanosis, or edema  NEURO: no focal findings   SKIN: normal without suspicious lesions on exposed skin    Lab Results:   Lab Results   Component Value Date    WBC 5.61 09/26/2024    RBC 4.61 09/26/2024    HGB 13.3 09/26/2024    HCT 39.2 09/26/2024    MCV 85 09/26/2024     (L) 09/26/2024    RDW 13.2 09/26/2024     Lab Results   Component Value Date    K 3.4 (L) 09/26/2024     09/26/2024    CO2 26 09/26/2024    BUN 9 09/26/2024    CREATININE 0.70 09/26/2024    EGFR 110 09/26/2024    CALCIUM 9.1 09/26/2024    AST 36 09/26/2024    ALT 31 09/26/2024    ALKPHOS 133 (H) 09/26/2024     Lab Results   Component Value Date    MG 2.1 03/22/2023     Lab Results   Component Value Date    HDL 32 (L) 06/18/2021    TRIG 146 06/18/2021    LDLCALC 79 06/18/2021     Lab Results   Component Value Date    LZL0WCSBVUTF 2.535 09/26/2024     Imaging:   I have personally reviewed pertinent films in PACS        Cardiac " testing:   Results for orders placed during the hospital encounter of 20    Echo complete with contrast if indicated    Narrative  [place] [phone]    Transthoracic Echocardiogram  2D, M-mode, Doppler, and Color Doppler    Study date:  05-May-2020    Patient: JESUS BURT  MR number: [id number]  Account number: [id number]  : 1975  Age: 45 years  Gender: Male  Status: Outpatient  Location: Bedside  Height: 71 in  Weight: 295.5 lb  BP: 140/ 82 mmHg    Indications: Exertional Chest Pain.    Diagnoses: R07.9 - Chest pain, unspecified    Sonographer:  Clara Leblanc  Referring Physician:  BRIDGET Grayson  Group:  [facility]  Cardiology Fellow:  Pancho Calloway MD  Interpreting Physician:  Jd Sam MD    IMPRESSIONS:  There appears to be mild concentric hypertrophy with more moderately thickened septum.  In the absence of hypertension, consider the possibility of Hypertrophic cardiomyopathy    SUMMARY    LEFT VENTRICLE:  Systolic function was hyperdynamic. Ejection fraction was estimated to be 75 %.  There were no regional wall motion abnormalities.  Wall thickness was mild-moderately increased.  There was mild concentric hypertrophy. There was moderate assymetrical hypertrophy of the septum.  There was dynamic obstruction at rest in the mid cavity, with a peak velocity of 200 cm/s.  Doppler parameters were consistent with abnormal left ventricular relaxation (grade 1 diastolic dysfunction).    TRICUSPID VALVE:  There was trace regurgitation.    HISTORY: PRIOR HISTORY: HTN;Dyslipidemia;morbid obesity;DM;CHF.    PROCEDURE: The procedure was performed at the bedside. This was a routine study. The transthoracic approach was used. The study included complete 2D imaging, M-mode, complete spectral Doppler, and color Doppler. The heart rate was 87 bpm,  at the start of the study. Images were obtained from the  parasternal, apical, subcostal, and suprasternal notch acoustic windows. Echocardiographic views were limited due to decreased penetration and lung interference. This was a  technically difficult study.    LEFT VENTRICLE: Size was normal. Systolic function was hyperdynamic. Ejection fraction was estimated to be 75 %. There were no regional wall motion abnormalities. Wall thickness was mild-moderately increased. There was mild concentric  hypertrophy. There was moderate assymetrical hypertrophy of the septum. DOPPLER: There was dynamic obstruction at rest in the mid cavity, with a peak velocity of 200 cm/s. Doppler parameters were consistent with abnormal left ventricular  relaxation (grade 1 diastolic dysfunction).    RIGHT VENTRICLE: The size was normal. Systolic function was normal. Wall thickness was normal.    LEFT ATRIUM: Size was normal.    RIGHT ATRIUM: Size was normal.    MITRAL VALVE: Valve structure was normal. There was normal leaflet separation. DOPPLER: The transmitral velocity was within the normal range. There was no evidence for stenosis. There was no regurgitation.    AORTIC VALVE: The valve was trileaflet. Leaflets exhibited normal thickness and normal cuspal separation. DOPPLER: Transaortic velocity was within the normal range. There was no evidence for stenosis. There was no regurgitation.    TRICUSPID VALVE: The valve structure was normal. There was normal leaflet separation. DOPPLER: The transtricuspid velocity was within the normal range. There was no evidence for stenosis. There was trace regurgitation.    PULMONIC VALVE: Leaflets exhibited normal thickness, no calcification, and normal cuspal separation. DOPPLER: The transpulmonic velocity was within the normal range. There was trace regurgitation.    PERICARDIUM: There was no pericardial effusion. The pericardium was normal in appearance.    AORTA: The root exhibited normal size.    SYSTEM MEASUREMENT TABLES    2D  %FS: 32.23 %  Ao Diam:  2.56 cm  EDV(Teich): 98.26 ml  EF(Cube): 68.87 %  EF(Teich): 60.49 %  ESV(Cube): 30.67 ml  ESV(Teich): 38.82 ml  IVSd: 1.71 cm  LA Area: 19.82 cm2  LA Diam: 4.97 cm  LVEDV MOD A4C: 145.82 ml  LVEF MOD A4C: 50.05 %  LVESV MOD A4C: 72.84 ml  LVIDd: 4.62 cm  LVIDs: 3.13 cm  LVLd A4C: 9.34 cm  LVLs A4C: 8.11 cm  LVPWd: 1.65 cm  RA Area: 14.38 cm2  SI(Cube): 27.25 ml/m2  SI(Teich): 23.87 ml/m2  SV MOD A4C: 72.98 ml  SV(Cube): 67.86 ml  SV(Teich): 59.44 ml  rv diam: 3.67 cm    CW  AV Env.Ti: 273.57 ms  AV VTI: 31.4 cm  AV Vmax: 1.55 m/s  AV Vmean: 1.15 m/s  AV maxP.63 mmHg  AV meanP.81 mmHg  MR VTI: 156.12 cm  MR Vmax: 3.8 m/s  MR Vmean: 3.25 m/s  MR maxP.84 mmHg  MR meanP.92 mmHg  PRend P.06 mmHg  PRend Vmax: 1.33 m/s  TR Vmax: 2.79 m/s  TR maxP.17 mmHg    MM  TAPSE: 2.5 cm    PW  E': 0.12 m/s  E/E': 5.62  LVOT Env.Ti: 273.57 ms  LVOT VTI: 30.55 cm  LVOT Vmax: 1.48 m/s  LVOT Vmean: 1.12 m/s  LVOT maxP.77 mmHg  LVOT meanP.43 mmHg  MV A Randell: 0.79 m/s  MV Dec [place]: 2.97 m/s2  MV DecT: 224.72 ms  MV E Randell: 0.67 m/s  MV E/A Ratio: 0.84    [facility]    Prepared and electronically signed by    Jd Sam MD  Signed 05-May-2020 16:21:39    No results found for this or any previous visit.    No results found for this or any previous visit.    Results for orders placed during the hospital encounter of 20    NM Myocardial Perfusion Spect (Exercise Induced Stress and/or Rest)    [address] [address] [phone]    Stress Gated SPECT Myocardial Perfusion Imaging after Exercise    Patient: JESUS BURT  MR number: [id number]  Account number: [id number]  : 1975  Age: 45 years  Gender: Male  Status: Outpatient  Location: Stress lab  Height: 71 in  Weight: 196 lb  BP: 132/ 80 mmHg    Allergies: NO KNOWN ALLERGIES    Diagnosis: R07.89 - Other chest  pain    Interpreting Physician:  Vinnie Crenshaw MD  Referring Physician:  BRIDGET Grayson  Primary Physician:  RACHEL ARREDONDO  Technician:  Cinthia Davenport  RN:  Cinthia Malagon RN  Group:  [facility]  Cardiology Fellow:  Pancho Calloway MD  Report Prepared by::  Cinthia Malagon RN    INDICATIONS: Coronary artery disease.    HISTORY: The patient is a 45 year old  male. Chest pain status: chest pain. Coronary artery disease risk factors: dyslipidemia, hypertension, and diabetes mellitus. Cardiovascular history: congestive heart failure.  Medications: a beta blocker, aspirin, and a lipid lowering agent.    PHYSICAL EXAM: Baseline physical exam screening: normal and no wheezes audible.    REST ECG: Normal sinus rhythm. The ECG showed occasional premature ventricular contractions. T wave inversions were observed in leads I, II, aVL, aVF, V5 and V6. Nonspecific ST abnormalities were present.    PROCEDURE: The study was performed in the the Stress lab. Treadmill exercise testing was performed, using the Joe protocol. Gated SPECT myocardial perfusion imaging was performed during stress. Systolic blood pressure was 132 mmHg, at  the start of the study. Diastolic blood pressure was 80 mmHg, at the start of the study. The heart rate was 96 bpm, at the start of the study. IV double checked.    JOE PROTOCOL:  HR bpm SBP mmHg DBP mmHg Symptoms  Baseline 96 132 80 none  Stage 1 134 142 80 moderate dyspnea, mild fatigue  Stage 2 139 178 88 moderate dyspnea, moderate fatigue  Immediate 139 180 80 subsiding  Recovery 1 105 146 80 subsiding  Recovery 2 103 134 80 none  No medications or fluids given.    STRESS SUMMARY: Duration of exercise was 6 min and 0 sec. Functional capacity was decreased (greater than 40%). Maximal heart rate during stress was 148 bpm ( 85 % of maximal predicted heart rate). The heart rate response to stress was  normal. There was normal resting blood  pressure with an appropriate response to stress. The rate-pressure product for the peak heart rate and blood pressure was 85997. There was no chest pain during stress. The stress test was terminated  due to achievement of maximal (symptom limited) exercise. Pre oxygen saturation: 95 %. Peak oxygen saturation: 98 %. The stress ECG was negative for ischemia and normal. Arrhythmia during stress: isolated atrial premature beats.    ISOTOPE ADMINISTRATION:  Stress isotope administration Resting isotope administration  Agent Tetrofosmin Tetrofosmin  Dose 16.3 mCi 49.2 mCi  Date 05/06/2020 05/06/2020  Injection time 07:05 09:43  Injection-image interval 30 min 49 min    The radiopharmaceutical was injected one minute before the end of exercise.    MYOCARDIAL PERFUSION IMAGING:  The image quality was fair. Rotating projection images reveal moderate diaphragmatic attenuation. The TID ratio was 1.2.    PERFUSION DEFECTS:  -  No ischemia visualized. There was a small, moderately severe, fixed myocardial perfusion defect of the basal inferior wall likely due to diaphragm attenuation. The defect was worse on rest images.    PERFUSION QUANTITATION:  The summed perfusion score measured 2 during stress.    GATED SPECT:  Left ventricular ejection fraction was within normal limits by visual estimate. There was no left ventricular regional abnormality.    SUMMARY:  -  Stress results: Duration of exercise was 6 min and 0 sec. Functional capacity was decreased (greater than 40%). Target heart rate was achieved. There was no chest pain during stress.  -  ECG conclusions: The stress ECG was negative for ischemia and normal. Arrhythmia during stress: isolated atrial premature beats.  -  Perfusion imaging: No ischemia visualized. There was a small, moderately severe, fixed myocardial perfusion defect of the basal inferior wall likely due to diaphragm attenuation. The defect was worse on rest images.  -  Gated SPECT: Left ventricular  "ejection fraction was within normal limits by visual estimate. There was no left ventricular regional abnormality.    IMPRESSIONS: There was image artifact, without diagnostic evidence for perfusion abnormality. Left ventricular systolic function was normal.    Prepared and signed by    Vinnie Crenshaw MD  Signed 2020 12:51:16    Name: John Arevalo                       : 1975  MRN: [id number]                       Age: 49 y.o.  Patient Status: Outpatient          Gender: male  Echo follow up/limited w/ color and doppler    Height: 5' 11\" (1.803 m)   Weight: 113 kg (250 lb)   BSA: 2.32 m²   Blood Pressure: 156/99    Date of Study: 24   Ordering Provider: Joey Norris MD   Clinical Indications: Hypertrophic cardiomyopathy (HCC) [I42.2 (ICD-10-CM)]       Reading Physicians  Performing Staff   Cardiology: Joey Norris MD    Tech: Monica Ring,         PACS Images     Show images for Echo follow up/limited w/ contrast if indicated  Study Details    This transthoracic echocardiogram was performed in the echo lab. This was a routine, outpatient study. Study quality was adequate. This was a technically difficult study due to lung interference. A limited 2D, color flow Doppler, spectral Doppler, strain, 2D, color flow Doppler and spectral Doppler transthoracic echocardiogram was performed.  The apical, parasternal and subcostal views were obtained.     History    Diabetes. Obstructive sleep apnea. Hypertension. CAD. Hypertrophic cardiomyopathy. HLD.     Interpretation Summary  Show Result Comparison     Left Ventricle: Wall thickness is severely increased. There is severe asymmetric hypertrophy of the septal wall. The left ventricular ejection fraction is 68%. Systolic function is vigorous. Global longitudinal strain is reduced at -14%. Wall motion is normal. Diastolic function is abnormal. There is  outflow tract dynamic obstruction at rest with a peak gradient of 11.0 mmHg. There is " outflow tract dynamic obstruction with valsalva with a peak gradient of 51.0 mmHg.    Left Atrium: The atrium is moderately dilated.    Aortic Valve: There is aortic valve sclerosis.    Mitral Valve: There is mild annular calcification. There is systolic anterior motion with late peaking gradient.    Tricuspid Valve: There is mild regurgitation.    Patient has been off mavacamten. LVOT gradients have returned and he is now symptomatic. Will resume mavacamten 5 mg daily and perform surveillance echo in 4 weeks.     Strain was performed to quantify interventricular dyssynchrony and evaluate components of myocardial function due to HCM. Results from the utilization of Strain Analysis are listed in the report below.     Findings    Left Ventricle Left ventricular cavity size is small. Wall thickness is severely increased. There is severe asymmetric hypertrophy of the septal wall. The left ventricular ejection fraction is 68%. Systolic function is vigorous. Global longitudinal strain is reduced at -14%.  Wall motion is normal. Diastolic function is abnormal.  There is  outflow tract dynamic obstruction at rest with a peak gradient of 11.0 mmHg. There is outflow tract dynamic obstruction with valsalva with a peak gradient of 51.0 mmHg.   Right Ventricle Right ventricular cavity size is normal. Systolic function is normal.   Left Atrium The atrium is moderately dilated.   Right Atrium The atrium is normal in size.   Aortic Valve The aortic valve is trileaflet. The leaflets are mildly thickened. The leaflets are mildly calcified. The leaflets exhibit normal mobility. There is no evidence of regurgitation. There is aortic valve sclerosis.   Mitral Valve The leaflets are not thickened. The leaflets are not calcified. The leaflets exhibit normal mobility. There is mild annular calcification.  There is trace regurgitation. There is no evidence of stenosis. There is systolic anterior motion with late peaking gradient.    Tricuspid Valve Tricuspid valve structure is normal. There is mild regurgitation. There is no evidence of stenosis. The right ventricular systolic pressure is normal.   Pulmonic Valve Pulmonic valve structure is normal. There is trace regurgitation. There is no evidence of stenosis.   IVC/SVC The inferior vena cava is normal in size. Respirophasic changes were normal.   Pericardium There is no pericardial effusion. The pericardium is normal in appearance.     Left Ventricle Measurements    Function/Volumes   A4C EF 56 %         LVOT stroke volume 86.96         LVOT stroke volume index 36.2 ml/m2         Left ventricular stroke volume (2D) 54 mL         LVOT Cardiac Output 7.16 l/min         LVOT Cardiac Index 3.09 l/min/m2         Dimensions   LVIDd 4.1 cm         LVIDS 2.3 cm         IVSd 1.9 cm         LVPWd 1.8 cm         LVOT area 3.46 cm2         FS 44         Diastolic Filling   LA Volume Index (BP) 36.6 mL/m2         Strain   GLS -14 %          Report Measurements   AV LVOT peak gradient 5 mmHg         Other Measurements   Peak gradient (Rest) 11 mmHg         Peak Gradient (Valsalva) 51 mmHg              Interventricular Septum Measurements    Shunt Ratio   LVOT peak VTI 25.12 cm         LVOT peak randell 1.09 m/s              Left Atrium Measurements    Dimensions   LA size 5.2 cm         LA length (A2C) 6.1 cm         Volumes   LA volume (BP) 85 mL         LA Volume Index (BP) 36.6 mL/m2               Atrial Septum Measurements    Shunt Ratio   LVOT peak VTI 25.12 cm         LVOT peak randell 1.09 m/s               Aortic Valve Measurements    Stenosis   LVOT peak randell 1.09 m/s         LVOT peak VTI 25.12 cm         LVOT mn grad 3 mmHg         AV LVOT peak gradient 5 mmHg         Area/Dimensions   LVOT diameter 2.1 cm         LVOT area 3.46 cm2               Tricuspid Valve Measurements    RVSP Parameters   TR Peak Randell 1.7 m/s         Triscuspid Valve Regurgitation Peak Gradient 12 mmHg               Exam  "Details    Performed Procedure Technologist Supporting Staff Performing Physician   Echo follow up/limited w/ color and doppler BLANKA Delong           Appointment Date/Status Modality Department    2024     Completed BE ECHO RM 1 BE CAR NON INV           Begin Exam End Exam  End Exam Questionnaires   2024  9:04 AM 2024  9:42 AM  PATIENT EDUCATION            All Reviewers List    Joey Norris MD on 2024  8:07 AM     Signed    Electronically signed by Joey Norris MD on 24 at 1405 EDT     Name: John Arevalo                       : 1975  MRN: [id number]                       Age: 49 y.o.  Patient Status: Outpatient          Gender: male  ECHO Follow Up Limited With Contrast if Indicated    Height: 5' 11\" (1.803 m)   Weight: 113 kg (250 lb)   BSA: 2.32 m²   Blood Pressure: 160/97    Date of Study: 24   Ordering Provider: Joey Norris MD   Clinical Indications: Hypertrophic cardiomyopathy (HCC) [I42.2 (ICD-10-CM)]       Reading Physicians  Performing Staff   Cardiology: Joey Norris MD    Tech: Krysten Jose         Vitals    Height Weight BSA (Calculated - m2) BP Pulse   5' 11\" (1.803 m) 113 kg (250 lb) 2.32 sq meters 160/97 86     PACS Images     Show images for Echo follow up/limited w/ contrast if indicated  Study Details    This transthoracic echocardiogram was performed in the echo lab. This was a routine, outpatient study. Study quality was adequate. A limited 2D, color flow Doppler, spectral Doppler, 2D, color flow Doppler and spectral Doppler transthoracic echocardiogram was performed.  The apical, parasternal and subcostal views were obtained.     History    Diabetes. Obstructive sleep apnea. Hypertension. CAD. Hypertrophic cardiomyopathy. HLD.     Interpretation Summary  Show Result Comparison     Left Ventricle: Wall thickness is severely increased. There is severe asymmetric hypertrophy of the septal wall. The left ventricular ejection " fraction is 67%. Systolic function is vigorous. Global longitudinal strain is reduced at -15%. Wall motion is normal. Diastolic function is mildly abnormal, consistent with grade I (abnormal) relaxation. There is no LV dynamic obstruction.    Left Atrium: The atrium is moderately dilated.    Aortic Valve: There is aortic valve sclerosis.    Mitral Valve: There is mild annular calcification. There is systolic anterior motion without late peaking gradient.    Tricuspid Valve: There is mild regurgitation.    Patient is now on a consistent daily dose of mavacamten. LVOT gradients have returned to <30 mmHg. Will continue mavacamten 5 mg daily and perform surveillance echo in 12 weeks.     Strain was performed to quantify interventricular dyssynchrony and evaluate components of myocardial function due to HCM. Results from the utilization of Strain Analysis are listed in the report below.     Findings    Left Ventricle Left ventricular cavity size is small. Wall thickness is severely increased. There is severe asymmetric hypertrophy of the septal wall. The left ventricular ejection fraction is 67%. Systolic function is vigorous. Global longitudinal strain is reduced at -15%.  Wall motion is normal. Diastolic function is mildly abnormal, consistent with grade I (abnormal) relaxation.  There is no LV dynamic obstruction.   Right Ventricle Right ventricular cavity size is normal. Systolic function is normal.   Left Atrium The atrium is moderately dilated.   Right Atrium The atrium is normal in size.   Aortic Valve The aortic valve is trileaflet. The leaflets are mildly thickened. The leaflets are mildly calcified. The leaflets exhibit normal mobility. There is no evidence of regurgitation. There is aortic valve sclerosis.   Mitral Valve The leaflets are not thickened. The leaflets are not calcified. The leaflets exhibit normal mobility. There is mild annular calcification.  There is trace regurgitation. There is no evidence  of stenosis. There is systolic anterior motion without late peaking gradient.   Tricuspid Valve Tricuspid valve structure is normal. There is mild regurgitation. There is no evidence of stenosis. The right ventricular systolic pressure is normal.   Pulmonic Valve Pulmonic valve structure is normal. There is trace regurgitation. There is no evidence of stenosis.   IVC/SVC The inferior vena cava is normal in size. Respirophasic changes were normal.   Pericardium There is no pericardial effusion. The pericardium is normal in appearance.     Left Ventricle Measurements    Function/Volumes   A4C EF 67 %         LVOT stroke volume 96.69         LVOT stroke volume index 43.1 ml/m2         Left ventricular stroke volume (2D) 58 mL         LV Diastolic Volume (BP) 99 mL         LV Systolic Volume (BP) 28 mL         EF 71 %         LVOT Cardiac Output 7.79 l/min         LVOT Cardiac Index 3.36 l/min/m2         Dimensions   LVIDd 4 cm         LVIDS 2.1 cm         IVSd 2.2 cm         LVPWd 1.4 cm         LVOT area 3.46 cm2         FS 48         Diastolic Filling   MV E' Tissue Velocity Septal 6 cm/s         MV E' Tissue Velocity Lateral 9 cm/s         LA Volume Index (BP) 45.7 mL/m2         E/A ratio 0.71         E wave deceleration time 123 ms         MV Peak E Randell 83 cm/s         MV Peak A Randell 1.17 m/s         Strain   GLS -15 %          Report Measurements   AV LVOT peak gradient 6 mmHg         Other Measurements   Peak gradient (Rest) 11 mmHg         Peak Gradient (Valsalva) 23 mmHg              Interventricular Septum Measurements    Shunt Ratio   LVOT peak VTI 27.93 cm         LVOT peak randell 1.19 m/s              Left Atrium Measurements    Dimensions   LA size 5.4 cm         LA length (A2C) 6.3 cm         Volumes   LA volume (BP) 106 mL         LA Volume Index (BP) 45.7 mL/m2               Atrial Septum Measurements    Shunt Ratio   LVOT peak VTI 27.93 cm         LVOT peak randell 1.19 m/s               Aortic Valve  Measurements    Stenosis   Aortic valve peak velocity 1.71 m/s         LVOT peak randell 1.19 m/s         Ao VTI 39.8 cm         LVOT peak VTI 27.93 cm         AV mean gradient 7 mmHg         LVOT mn grad 4 mmHg         AV peak gradient 12 mmHg         AV LVOT peak gradient 6 mmHg         Area/Dimensions   DVI 0.7         AV valve area 2.43 cm2         AV area by cont VTI 2.4 cm2         AV area peak randell 2.4 cm2         LVOT diameter 2.1 cm         LVOT area 3.46 cm2               Mitral Valve Measurements    Stenosis   MV stenosis pressure 1/2 time 36 ms         MV valve area p 1/2 method 6.11               Tricuspid Valve Measurements    RVSP Parameters   TR Peak Randell 2.3 m/s         Triscuspid Valve Regurgitation Peak Gradient 21 mmHg               Aorta Measurements    Aortic Dimensions   Ao root 3.5 cm               Exam Details    Performed Procedure Technologist Supporting Staff Performing Physician   Echo follow up/limited w/ color and doppler Krysten Jose            Appointment Date/Status Modality Department    11/12/2024     Arrived BE [facility] ECHO 1 BE HV CAR NON INV           Begin Exam End Exam  End Exam Questionnaires   11/12/2024  8:55 AM 11/12/2024  9:44 AM  PATIENT EDUCATION            All Reviewers List    Joey Norris MD on 11/13/2024  9:02 AM     Signed    Electronically signed by Joey Norris MD on 11/12/24 at 1233 EST     Counseling / Coordination of Care  Total time spent today 48 minutes.  Greater than 50% of total time was spent with the patient and / or family counseling and / or coordination of care.

## 2024-11-12 ENCOUNTER — OFFICE VISIT (OUTPATIENT)
Dept: CARDIOLOGY CLINIC | Facility: CLINIC | Age: 49
End: 2024-11-12
Payer: COMMERCIAL

## 2024-11-12 ENCOUNTER — HOSPITAL ENCOUNTER (OUTPATIENT)
Dept: NON INVASIVE DIAGNOSTICS | Facility: CLINIC | Age: 49
Discharge: HOME | End: 2024-11-12
Payer: COMMERCIAL

## 2024-11-12 VITALS
HEIGHT: 71 IN | BODY MASS INDEX: 35 KG/M2 | DIASTOLIC BLOOD PRESSURE: 97 MMHG | SYSTOLIC BLOOD PRESSURE: 160 MMHG | HEART RATE: 86 BPM | WEIGHT: 250 LBS

## 2024-11-12 VITALS
HEIGHT: 71 IN | DIASTOLIC BLOOD PRESSURE: 70 MMHG | WEIGHT: 252.6 LBS | SYSTOLIC BLOOD PRESSURE: 140 MMHG | BODY MASS INDEX: 35.36 KG/M2 | OXYGEN SATURATION: 98 % | HEART RATE: 83 BPM

## 2024-11-12 DIAGNOSIS — I25.118 CORONARY ARTERY DISEASE OF NATIVE ARTERY OF NATIVE HEART WITH STABLE ANGINA PECTORIS (HCC): ICD-10-CM

## 2024-11-12 DIAGNOSIS — I10 ESSENTIAL HYPERTENSION: Primary | ICD-10-CM

## 2024-11-12 DIAGNOSIS — Z95.5 HISTORY OF CORONARY ANGIOPLASTY WITH INSERTION OF STENT: ICD-10-CM

## 2024-11-12 DIAGNOSIS — I42.2 HYPERTROPHIC CARDIOMYOPATHY (HCC): ICD-10-CM

## 2024-11-12 DIAGNOSIS — E11.42 TYPE 2 DIABETES MELLITUS WITH DIABETIC POLYNEUROPATHY, WITH LONG-TERM CURRENT USE OF INSULIN (HCC): ICD-10-CM

## 2024-11-12 DIAGNOSIS — E78.49 OTHER HYPERLIPIDEMIA: ICD-10-CM

## 2024-11-12 DIAGNOSIS — E66.811 CLASS 1 OBESITY DUE TO EXCESS CALORIES WITH SERIOUS COMORBIDITY AND BODY MASS INDEX (BMI) OF 34.0 TO 34.9 IN ADULT: ICD-10-CM

## 2024-11-12 DIAGNOSIS — I42.2 HYPERTROPHIC CARDIOMYOPATHY (HCC): Primary | ICD-10-CM

## 2024-11-12 DIAGNOSIS — Z79.4 TYPE 2 DIABETES MELLITUS WITH DIABETIC POLYNEUROPATHY, WITH LONG-TERM CURRENT USE OF INSULIN (HCC): ICD-10-CM

## 2024-11-12 DIAGNOSIS — E66.09 CLASS 1 OBESITY DUE TO EXCESS CALORIES WITH SERIOUS COMORBIDITY AND BODY MASS INDEX (BMI) OF 34.0 TO 34.9 IN ADULT: ICD-10-CM

## 2024-11-12 LAB
AORTIC ROOT: 3.5 CM
AORTIC VALVE MEAN VELOCITY: 12.1 M/S
APICAL FOUR CHAMBER EJECTION FRACTION: 67 %
AV AREA BY CONTINUOUS VTI: 2.4 CM2
AV AREA PEAK VELOCITY: 2.4 CM2
AV LVOT MEAN GRADIENT: 4 MMHG
AV LVOT PEAK GRADIENT: 6 MMHG
AV MEAN GRADIENT: 7 MMHG
AV PEAK GRADIENT: 12 MMHG
AV VALVE AREA: 2.43 CM2
AV VELOCITY RATIO: 0.7
BSA FOR ECHO PROCEDURE: 2.32 M2
DOP CALC AO PEAK VEL: 1.71 M/S
DOP CALC AO VTI: 39.8 CM
DOP CALC LVOT AREA: 3.46 CM2
DOP CALC LVOT CARDIAC INDEX: 3.36 L/MIN/M2
DOP CALC LVOT CARDIAC OUTPUT: 7.79 L/MIN
DOP CALC LVOT DIAMETER: 2.1 CM
DOP CALC LVOT PEAK VEL VTI: 27.93 CM
DOP CALC LVOT PEAK VEL: 1.19 M/S
DOP CALC LVOT STROKE INDEX: 43.1 ML/M2
DOP CALC LVOT STROKE VOLUME: 96.69
E WAVE DECELERATION TIME: 123 MS
E/A RATIO: 0.71
FRACTIONAL SHORTENING: 48 (ref 28–44)
GLOBAL LONGITUIDAL STRAIN: -15 %
INTERVENTRICULAR SEPTUM IN DIASTOLE (PARASTERNAL SHORT AXIS VIEW): 2.2 CM
INTERVENTRICULAR SEPTUM: 2.2 CM (ref 0.6–1.1)
LAAS-AP2: 29.4 CM2
LAAS-AP4: 28.1 CM2
LEFT ATRIUM SIZE: 5.4 CM
LEFT ATRIUM VOLUME (MOD BIPLANE): 106 ML
LEFT ATRIUM VOLUME INDEX (MOD BIPLANE): 45.7 ML/M2
LEFT INTERNAL DIMENSION IN SYSTOLE: 2.1 CM (ref 2.1–4)
LEFT VENTRICLE DIASTOLIC VOLUME (MOD BIPLANE): 99 ML
LEFT VENTRICLE DIASTOLIC VOLUME INDEX (MOD BIPLANE): 42.7 ML/M2
LEFT VENTRICLE SYSTOLIC VOLUME (MOD BIPLANE): 28 ML
LEFT VENTRICLE SYSTOLIC VOLUME INDEX (MOD BIPLANE): 12.1 ML/M2
LEFT VENTRICULAR INTERNAL DIMENSION IN DIASTOLE: 4 CM (ref 3.5–6)
LEFT VENTRICULAR POSTERIOR WALL IN END DIASTOLE: 1.4 CM
LEFT VENTRICULAR STROKE VOLUME: 58 ML
LV EF: 71 %
LVSV (TEICH): 58 ML
MV E'TISSUE VEL-LAT: 9 CM/S
MV E'TISSUE VEL-SEP: 6 CM/S
MV PEAK A VEL: 1.17 M/S
MV PEAK E VEL: 83 CM/S
MV STENOSIS PRESSURE HALF TIME: 36 MS
MV VALVE AREA P 1/2 METHOD: 6.11
SL CV ECHO LV DYNAMIC OBSTRUCTION PEAK GRADIENT (REST): 11 MMHG
SL CV ECHO LV DYNAMIC OBSTRUCTION PEAK GRADIENT (VALSAL: 23 MMHG
SL CV LEFT ATRIUM LENGTH A2C: 6.3 CM
SL CV LV EF: 67
SL CV PED ECHO LEFT VENTRICLE DIASTOLIC VOLUME (MOD BIPLANE) 2D: 71 ML
SL CV PED ECHO LEFT VENTRICLE SYSTOLIC VOLUME (MOD BIPLANE) 2D: 14 ML
TR MAX PG: 21 MMHG
TR PEAK VELOCITY: 2.3 M/S
TRICUSPID VALVE PEAK REGURGITATION VELOCITY: 2.27 M/S

## 2024-11-12 PROCEDURE — 93325 DOPPLER ECHO COLOR FLOW MAPG: CPT

## 2024-11-12 PROCEDURE — 93321 DOPPLER ECHO F-UP/LMTD STD: CPT

## 2024-11-12 PROCEDURE — 93321 DOPPLER ECHO F-UP/LMTD STD: CPT | Performed by: INTERNAL MEDICINE

## 2024-11-12 PROCEDURE — 99215 OFFICE O/P EST HI 40 MIN: CPT | Performed by: INTERNAL MEDICINE

## 2024-11-12 PROCEDURE — 93308 TTE F-UP OR LMTD: CPT | Performed by: INTERNAL MEDICINE

## 2024-11-12 PROCEDURE — 93325 DOPPLER ECHO COLOR FLOW MAPG: CPT | Performed by: INTERNAL MEDICINE

## 2024-11-12 PROCEDURE — 93308 TTE F-UP OR LMTD: CPT

## 2024-11-12 RX ORDER — AMLODIPINE BESYLATE 10 MG/1
10 TABLET ORAL DAILY
Qty: 30 TABLET | Refills: 2 | Status: SHIPPED | OUTPATIENT
Start: 2024-11-12 | End: 2025-02-10

## 2024-11-12 NOTE — PATIENT INSTRUCTIONS
Thank you for visiting our clinic! It was nice seeing you!    Today, we discussed-  -Please follow up with HCM clinic in 1 year  -We will repeat another echo in 3 months

## 2024-11-13 ENCOUNTER — TELEPHONE (OUTPATIENT)
Dept: CARDIOLOGY CLINIC | Facility: CLINIC | Age: 49
End: 2024-11-13

## 2024-11-13 RX ORDER — MAVACAMTEN 5 MG/1
CAPSULE, GELATIN COATED ORAL
Qty: 30 CAPSULE | Refills: 2 | Status: SHIPPED | OUTPATIENT
Start: 2024-11-13 | End: 2024-11-14 | Stop reason: SDUPTHER

## 2024-11-13 NOTE — TELEPHONE ENCOUNTER
Pharmacy called requesting a new precription of the Mavacamten 5 MG CAPS. Not on current med list. They are also requesting that the doctor update the patient's [facility] portal

## 2024-11-14 DIAGNOSIS — I42.2 HYPERTROPHIC CARDIOMYOPATHY (HCC): ICD-10-CM

## 2024-11-14 RX ORDER — MAVACAMTEN 5 MG/1
5 CAPSULE, GELATIN COATED ORAL DAILY
Qty: 30 CAPSULE | Refills: 2 | Status: SHIPPED | OUTPATIENT
Start: 2024-11-14

## 2024-11-19 ENCOUNTER — TELEPHONE (OUTPATIENT)
Dept: CARDIOLOGY CLINIC | Facility: CLINIC | Age: 49
End: 2024-11-19

## 2024-12-03 ENCOUNTER — TELEPHONE (OUTPATIENT)
Dept: CARDIOLOGY CLINIC | Facility: CLINIC | Age: 49
End: 2024-12-03

## 2025-02-07 ENCOUNTER — HOSPITAL ENCOUNTER (OUTPATIENT)
Dept: NON INVASIVE DIAGNOSTICS | Facility: HOSPITAL | Age: 50
Discharge: HOME | End: 2025-02-07
Payer: COMMERCIAL

## 2025-02-07 VITALS
DIASTOLIC BLOOD PRESSURE: 70 MMHG | HEIGHT: 71 IN | SYSTOLIC BLOOD PRESSURE: 140 MMHG | BODY MASS INDEX: 35.49 KG/M2 | HEART RATE: 83 BPM | WEIGHT: 253.53 LBS

## 2025-02-07 DIAGNOSIS — I42.2 HYPERTROPHIC CARDIOMYOPATHY (HCC): ICD-10-CM

## 2025-02-07 LAB
AORTIC ROOT: 3.3 CM
AV LVOT MEAN GRADIENT: 4 MMHG
AV LVOT PEAK GRADIENT: 7 MMHG
BSA FOR ECHO PROCEDURE: 2.33 M2
DOP CALC LVOT AREA: 3.46 CM2
DOP CALC LVOT CARDIAC INDEX: 3.41 L/MIN/M2
DOP CALC LVOT CARDIAC OUTPUT: 7.94 L/MIN
DOP CALC LVOT DIAMETER: 2.1 CM
DOP CALC LVOT PEAK VEL VTI: 29.38 CM
DOP CALC LVOT PEAK VEL: 1.36 M/S
DOP CALC LVOT STROKE INDEX: 43.8 ML/M2
DOP CALC LVOT STROKE VOLUME: 102
FRACTIONAL SHORTENING: 37 (ref 28–44)
INTERVENTRICULAR SEPTUM IN DIASTOLE (PARASTERNAL SHORT AXIS VIEW): 2 CM
INTERVENTRICULAR SEPTUM: 2 CM (ref 0.6–1.1)
LAAS-AP2: 25 CM2
LAAS-AP4: 25.1 CM2
LEFT ATRIUM SIZE: 5.2 CM
LEFT ATRIUM VOLUME (MOD BIPLANE): 82 ML
LEFT ATRIUM VOLUME INDEX (MOD BIPLANE): 35.2 ML/M2
LEFT INTERNAL DIMENSION IN SYSTOLE: 2.6 CM (ref 2.1–4)
LEFT VENTRICULAR INTERNAL DIMENSION IN DIASTOLE: 4.1 CM (ref 3.5–6)
LEFT VENTRICULAR POSTERIOR WALL IN END DIASTOLE: 1.4 CM
LEFT VENTRICULAR STROKE VOLUME: 50 ML
LV EF US.2D.A4C+ESTIMATED: 69 %
LVSV (TEICH): 50 ML
MV E'TISSUE VEL-LAT: 8 CM/S
MV E'TISSUE VEL-SEP: 6 CM/S
SL CV LEFT ATRIUM LENGTH A2C: 6 CM
SL CV PED ECHO LEFT VENTRICLE DIASTOLIC VOLUME (MOD BIPLANE) 2D: 74 ML
SL CV PED ECHO LEFT VENTRICLE SYSTOLIC VOLUME (MOD BIPLANE) 2D: 24 ML
TR MAX PG: 15 MMHG
TR PEAK VELOCITY: 1.9 M/S
TRICUSPID VALVE PEAK REGURGITATION VELOCITY: 1.91 M/S

## 2025-02-07 PROCEDURE — 93308 TTE F-UP OR LMTD: CPT

## 2025-02-07 PROCEDURE — 93325 DOPPLER ECHO COLOR FLOW MAPG: CPT | Performed by: INTERNAL MEDICINE

## 2025-02-07 PROCEDURE — 93308 TTE F-UP OR LMTD: CPT | Performed by: INTERNAL MEDICINE

## 2025-02-07 PROCEDURE — 93321 DOPPLER ECHO F-UP/LMTD STD: CPT | Performed by: INTERNAL MEDICINE

## 2025-02-07 PROCEDURE — 93325 DOPPLER ECHO COLOR FLOW MAPG: CPT

## 2025-02-07 PROCEDURE — 93321 DOPPLER ECHO F-UP/LMTD STD: CPT

## 2025-02-09 ENCOUNTER — HOSPITAL ENCOUNTER (EMERGENCY)
Facility: HOSPITAL | Age: 50
Discharge: HOME | End: 2025-02-09
Attending: EMERGENCY MEDICINE | Admitting: EMERGENCY MEDICINE
Payer: COMMERCIAL

## 2025-02-09 ENCOUNTER — APPOINTMENT (EMERGENCY)
Dept: RADIOLOGY | Facility: HOSPITAL | Age: 50
End: 2025-02-09
Payer: COMMERCIAL

## 2025-02-09 VITALS
HEART RATE: 91 BPM | TEMPERATURE: 98.1 F | DIASTOLIC BLOOD PRESSURE: 105 MMHG | OXYGEN SATURATION: 98 % | SYSTOLIC BLOOD PRESSURE: 183 MMHG | RESPIRATION RATE: 16 BRPM

## 2025-02-09 DIAGNOSIS — M25.562 LEFT KNEE PAIN: Primary | ICD-10-CM

## 2025-02-09 PROCEDURE — 73564 X-RAY EXAM KNEE 4 OR MORE: CPT

## 2025-02-09 PROCEDURE — 76882 US LMTD JT/FCL EVL NVASC XTR: CPT | Performed by: EMERGENCY MEDICINE

## 2025-02-09 PROCEDURE — 99283 EMERGENCY DEPT VISIT LOW MDM: CPT

## 2025-02-09 PROCEDURE — 99284 EMERGENCY DEPT VISIT MOD MDM: CPT | Performed by: EMERGENCY MEDICINE

## 2025-02-09 PROCEDURE — 96372 THER/PROPH/DIAG INJ SC/IM: CPT

## 2025-02-09 RX ORDER — ACETAMINOPHEN 325 MG/1
975 TABLET ORAL ONCE
Status: COMPLETED | OUTPATIENT
Start: 2025-02-09 | End: 2025-02-09

## 2025-02-09 RX ORDER — KETOROLAC TROMETHAMINE 30 MG/ML
15 INJECTION, SOLUTION INTRAMUSCULAR; INTRAVENOUS ONCE
Status: COMPLETED | OUTPATIENT
Start: 2025-02-09 | End: 2025-02-09

## 2025-02-09 RX ADMIN — ACETAMINOPHEN 975 MG: 325 TABLET, FILM COATED ORAL at 11:26

## 2025-02-09 RX ADMIN — KETOROLAC TROMETHAMINE 15 MG: 30 INJECTION, SOLUTION INTRAMUSCULAR; INTRAVENOUS at 11:26

## 2025-02-09 NOTE — ED ATTENDING ATTESTATION
2/9/2025  I, Gurmeet Morgan DO, saw and evaluated the patient. I have discussed the patient with the resident/non-physician practitioner and agree with the resident's/non-physician practitioner's findings, Plan of Care, and MDM as documented in the resident's/non-physician practitioner's note, except where noted. All available labs and Radiology studies were reviewed.  I was present for key portions of any procedure(s) performed by the resident/non-physician practitioner and I was immediately available to provide assistance.       At this point I agree with the current assessment done in the Emergency Department.  I have conducted an independent evaluation of this patient a history and physical is as follows:    51 yo man c/o L knee pain, swelling and intermittent buckling. Denies focal weakness/numbness/tingling, change in vision or speech. No recent injury to knee.    LLE:  Skin intact, no erythema  Minimal knee effusion  Mild varus instability. No instability in a/p, valgus  2+ DP, PT  EHL/FHL 5/5  SILT sap/bro/tib/dpn/spn    Imp: L knee pain, swelling, buckling plan: L knee xray, reassess.      ED Course         Critical Care Time  Procedures

## 2025-02-09 NOTE — DISCHARGE INSTRUCTIONS
You were seen in the emergency department for evaluation of left knee pain.  We do not see any fractures or dislocations on your x-ray.  Please follow-up with physical therapy for further management.  Please return to the ED if you fall and hit your head, are unable to move the leg for any reason, if you are not able to bear weight, or lose complete sensation in the leg.  Patient Education     Knee Pain ED   General Information   You came to the Emergency Department (ED) for knee pain. Your knees are hinge joints where thigh and shin bones meet. The knee has many parts. Cartilage covers the ends of the bones. Ligaments are strong bands of tissue that hold your bones together. Your knee has 4 major ligaments. Muscles and tendons in your legs attach to bones near your knee. These allow you to bend your knee and move your leg.  Most knee pain is caused by an injury to a ligament, tendon, or muscle. Nerves and blood vessels also run around your knee. A special fluid inside your knee helps to protect your joint. Injury, infection, or other problems that involve any of these parts of your knee can cause knee pain.  What care is needed at home?   Call your regular doctor to let them know you were in the ED. Make a follow-up appointment if you were told to.  Rest your leg. Try not to squat, kneel, or run. Wear your splint or brace to support your knee if you were given one. If needed, or if you were told to, use crutches to keep the weight off of your sore knee.  Prop your leg on pillows, keeping it raised above the level of your heart. This may help lessen pain and swelling.  You may want to take medicines like ibuprofen or naproxen for swelling and pain. These are nonsteroidal anti-inflammatory drugs (NSAIDS).  Place an ice pack or a bag of frozen vegetables wrapped in a towel over the painful part. Never put ice right on the skin. Do not leave the ice on more than 10 to 15 minutes at a time. Use for the first 24 to 48  hours after an injury.  When do I need to call the doctor?   You have a fever of 100.4°F (38°C) or higher.  You have very bad pain that is not helped by pain medicine.  Your knee has signs of infection, such as getting red and more swollen.  Your toes are numb, tingly, or blue in color.  Your knee ‘locks’ or ‘gives out’.  You are not able to put weight on your leg.  You have new or worsening symptoms.  Last Reviewed Date   2020-10-19  Consumer Information Use and Disclaimer   This generalized information is a limited summary of diagnosis, treatment, and/or medication information. It is not meant to be comprehensive and should be used as a tool to help the user understand and/or assess potential diagnostic and treatment options. It does NOT include all information about conditions, treatments, medications, side effects, or risks that may apply to a specific patient. It is not intended to be medical advice or a substitute for the medical advice, diagnosis, or treatment of a health care provider based on the health care provider's examination and assessment of a patient’s specific and unique circumstances. Patients must speak with a health care provider for complete information about their health, medical questions, and treatment options, including any risks or benefits regarding use of medications. This information does not endorse any treatments or medications as safe, effective, or approved for treating a specific patient. [employer] and its affiliates disclaim any warranty or liability relating to this information or the use thereof. The use of this information is governed by the Terms of Use, available at https://www.[employer].com/en/know/clinical-effectiveness-terms   Copyright   Copyright © 2024 [employer] and its affiliates and/or licensors. All rights reserved.

## 2025-02-09 NOTE — ED PROVIDER NOTES
Time reflects when diagnosis was documented in both MDM as applicable and the Disposition within this note       Time User Action Codes Description Comment    2/9/2025 12:35 PM Nam Irby Add [M25.562] Left knee pain           ED Disposition       ED Disposition   Discharge    Condition   Stable    Date/Time   Sun Feb 9, 2025 12:35 PM    Comment   John Arevalo discharge to home/self care.                   Assessment & Plan       Medical Decision Making  Amount and/or Complexity of Data Reviewed  Radiology: ordered and independent interpretation performed.    Risk  OTC drugs.  Prescription drug management.        Patient is a 50 y.o. male with a past medical history of diabetes with neuropathy presenting for a left knee buckling and pain that has been worsening over the last 2 weeks.      Vital signs hypertensive but otherwise stable, afebrile. On exam pain with knee flexion and varus stress.  Questionable knee effusion.    History and physical exam most consistent with musculoskeletal left knee pain. However, differential diagnosis included but not limited to fracture, joint effusion, dislocation, septic arthritis.     Plan: Tylenol, Toradol, left knee x-ray    View ED course for further discussion on patient workup.     On review of previous records patient has a past medical history of diabetes.    All radiology studies independently viewed by me and interpreted by the radiologist.  I reviewed all testing with the patient.     Upon re-evaluation patient is feeling better and is agreeable plan.  Patient's ultrasound did not show a joint effusion that could undergo arthrocentesis.  Patient not showing signs of septic arthritis and it was, therefore, ruled out clinically due to patient's lack of effusion and lack of fever.    Disposition: Discharged with strict ED return precautions and instruction to follow-up with physical therapy.    Portions of the record may have been created with voice recognition software.  "Occasional wrong word or \"sound a like\" substitutions may have occurred due to the inherent limitations of voice recognition software. Read the chart carefully and recognize, using context, where substitutions have occurred.         Medications   acetaminophen (TYLENOL) tablet 975 mg (975 mg Oral Given 2/9/25 1126)   ketorolac (TORADOL) injection 15 mg (15 mg Intramuscular Given 2/9/25 1126)       ED Risk Strat Scores                          SBIRT 20yo+      Flowsheet Row Most Recent Value   Initial Alcohol Screen: US AUDIT-C     1. How often do you have a drink containing alcohol? 0 Filed at: 02/09/2025 1049   2. How many drinks containing alcohol do you have on a typical day you are drinking?  0 Filed at: 02/09/2025 1049   3a. Male UNDER 65: How often do you have five or more drinks on one occasion? 0 Filed at: 02/09/2025 1049   3b. FEMALE Any Age, or MALE 65+: How often do you have 4 or more drinks on one occassion? 0 Filed at: 02/09/2025 1049   Audit-C Score 0 Filed at: 02/09/2025 1049   DARIAN: How many times in the past year have you...    Used an illegal drug or used a prescription medication for non-medical reasons? Never Filed at: 02/09/2025 1049                            History of Present Illness       Chief Complaint   Patient presents with    Knee Pain     Patient reports left knee pain for the past week where his knee keeps buckling on him and has led to 2 falls, patient denies prior injury to knee.        Past Medical History:   Diagnosis Date    Congestive heart failure (HCC)     Diabetes mellitus (HCC)     Hyperlipidemia     Hypertension     Obesity       Past Surgical History:   Procedure Laterality Date    COLONOSCOPY        Family History   Problem Relation Age of Onset    Diabetes Mother     Stroke Mother     Stroke Father       Social History     Tobacco Use    Smoking status: Never    Smokeless tobacco: Never   Vaping Use    Vaping status: Never Used   Substance Use Topics    Alcohol use: Not " Currently    Drug use: Never      E-Cigarette/Vaping    E-Cigarette Use Never User       E-Cigarette/Vaping Substances    Nicotine No     THC No     CBD No     Flavoring No       I have reviewed and agree with the history as documented.     HPI    Patient is a 50-year-old male with a past medical history of diabetes with neuropathy presenting for a left knee buckling and pain that has been worsening over the last 2 weeks.  Patient has been using a cane for assistance.  He cannot recall any trauma to the area and denies swelling or fevers.  This buckling has led him to lose his balance a couple of times and his PCP wanted him to come in for evaluation.  Patient denies calf pain, chest pain, and shortness of breath.    Review of Systems   Musculoskeletal:  Positive for arthralgias and myalgias.   All other systems reviewed and are negative.          Objective       ED Triage Vitals [02/09/25 1014]   Temperature Pulse Blood Pressure Respirations SpO2 Patient Position - Orthostatic VS   98.1 °F (36.7 °C) 91 (!) 183/105 16 98 % Sitting      Temp Source Heart Rate Source BP Location FiO2 (%) Pain Score    Temporal Monitor Left arm -- 5      Vitals      Date and Time Temp Pulse SpO2 Resp BP Pain Score FACES Pain Rating User   02/09/25 1126 -- -- -- -- -- 6 -- CB   02/09/25 1014 98.1 °F (36.7 °C) 91 98 % 16 183/105 patient reports he already took his BP meds today 5 -- HB            Physical Exam  Vitals and nursing note reviewed.   Constitutional:       General: He is not in acute distress.     Appearance: Normal appearance. He is well-developed. He is not ill-appearing or toxic-appearing.   HENT:      Head: Normocephalic and atraumatic.   Eyes:      General: No scleral icterus.        Right eye: No discharge.         Left eye: No discharge.      Extraocular Movements: Extraocular movements intact.      Conjunctiva/sclera: Conjunctivae normal.      Pupils: Pupils are equal, round, and reactive to light.   Cardiovascular:       Rate and Rhythm: Normal rate and regular rhythm.      Pulses: Normal pulses.           Dorsalis pedis pulses are 2+ on the right side and 2+ on the left side.      Heart sounds: Normal heart sounds. No murmur heard.  Pulmonary:      Effort: Pulmonary effort is normal. No respiratory distress.      Breath sounds: Normal breath sounds. No stridor. No wheezing, rhonchi or rales.   Abdominal:      General: Bowel sounds are normal. There is no distension.      Palpations: Abdomen is soft.      Tenderness: There is no abdominal tenderness. There is no right CVA tenderness, left CVA tenderness, guarding or rebound. Negative signs include Vázquez's sign and McBurney's sign.   Musculoskeletal:         General: No swelling.      Cervical back: Normal range of motion and neck supple.      Right lower leg: No swelling, tenderness or bony tenderness. No edema.      Left lower leg: Tenderness present. No swelling, deformity, lacerations or bony tenderness. No edema.      Comments: Left knee pain with knee flexion and varus stress   Skin:     General: Skin is warm and dry.      Capillary Refill: Capillary refill takes less than 2 seconds.      Coloration: Skin is not jaundiced.      Findings: No erythema.   Neurological:      General: No focal deficit present.      Mental Status: He is alert and oriented to person, place, and time.      Cranial Nerves: Cranial nerves 2-12 are intact. No cranial nerve deficit.      Sensory: Sensation is intact. No sensory deficit.      Motor: Motor function is intact. No weakness.   Psychiatric:         Mood and Affect: Mood normal.         Behavior: Behavior normal.         Thought Content: Thought content normal.         Judgment: Judgment normal.         Results Reviewed       None            XR knee 4+ vw left injury   ED Interpretation by Nam Irby DO (02/09 9209)   No acute osseous abnormality      Final Interpretation by Mihai Connell MD (02/09 1914)      No acute osseous  abnormality.         Computerized Assisted Algorithm (CAA) may have been used to analyze all applicable images.         Workstation performed: [id number]             POC MSK/Soft Tissue US    Date/Time: 2/9/2025 11:31 AM    Performed by: Nam Irby DO  Authorized by: Nam Irby DO    Patient location:  ED  Performed by:  Resident  Other Assisting Provider: Yes (comment) (Anjel Morgan DO)    Procedure:     Performed: musculoskeletal ultrasound    Procedure details:     Exam Type:  Diagnostic    Longitudinal view:  Obtained    Transverse view:  Obtained    Image quality: diagnostic      Image availability:  Images available in PACS  MSK ultrasound:     MSK indications: pain and limited range of motion      MSK assessment of:  Joint    MSK extremities evaluated: left lower extremity      Fluid Collection: Absent      Joint Effusion: Absent      Fractured bone: Absent    Interpretation:     MSK impressions: no abnormality identified        ED Medication and Procedure Management   Prior to Admission Medications   Prescriptions Last Dose Informant Patient Reported? Taking?   Blood Glucose Monitoring Suppl (OneTouch Verio) w/Device KIT  Self No No   Sig: Use to test 3-4x daily   Blood Pressure KIT  Self No No   Sig: Use 2 (two) times a day   Continuous Blood Gluc  (Dexcom G7 ) HENRY  Self No No   Sig: Use 1 each in the morning   Continuous Glucose Sensor (Dexcom G7 Sensor)  Self No No   Sig: use every 10 days   Diclofenac Sodium (VOLTAREN) 1 %  Self No No   Sig: Apply 2 g topically 4 (four) times a day   Patient not taking: Reported on 5/7/2024   Empagliflozin-metFORMIN HCl (Synjardy) 12.5-1000 MG TABS  Self No No   Sig: take 1 tablet by mouth twice a day   Insulin Glargine Solostar (Lantus SoloStar) 100 UNIT/ML SOPN  Self No No   Sig: Inject 0.3 mL (30 Units total) under the skin daily at bedtime   Insulin Pen Needle (BD Pen Needle Ann 2nd Gen) 32G X 4 MM MISC  Self No No   Sig: For use with insulin  pen. Pharmacy may dispense brand covered by insurance.   Lancets 30G MISC  Self No No   Sig: Use to test 3x daily   Mavacamten (Camzyos) 5 MG CAPS   No No   Sig: Take 5 mg by mouth in the morning   amLODIPine (NORVASC) 10 mg tablet   No No   Sig: Take 1 tablet (10 mg total) by mouth daily   aspirin (ECOTRIN LOW STRENGTH) 81 mg EC tablet  Self No No   Sig: Take 1 tablet (81 mg total) by mouth daily   bisoprolol (ZEBETA) 10 MG tablet  Self No No   Sig: Take 1 tablet (10 mg total) by mouth daily   butalbital-acetaminophen-caffeine (FIORICET,ESGIC) -40 mg per tablet  Self Yes No   Sig: TAKE 2 TABLETS BY MOUTH EVERY 8 HOURS AS NEEDED FOR HEADACHES   Patient not taking: Reported on 2024   gabapentin (NEURONTIN) 300 mg capsule  Self Yes No   Sig: take 1 capsule by mouth twice a day START BY TAKING AT BEDTIME FI...  (REFER TO PRESCRIPTION NOTES).   glucose blood (OneTouch Verio) test strip  Self No No   Sig: Use to test 3x daily   insulin lispro (HumaLOG KwikPen) 100 units/mL injection pen  Self No No   Sig: Inject 10 Units under the skin 3 (three) times a day with meals   ketorolac (ACULAR) 0.5 % ophthalmic solution  Self Yes No   Sig: instill 1 drop into both eyes four times a day for 10 days   Patient not taking: Reported on 2024   losartan (COZAAR) 100 MG tablet  Self Yes No   Sig: Take 100 mg by mouth daily   mirtazapine (REMERON) 30 mg tablet  Self Yes No   Si mg   mometasone (ELOCON) 0.1 % cream  Self Yes No   Sig: Apply 1 Application topically 2 (two) times a day To affected area   pantoprazole (PROTONIX) 40 mg tablet  Self No No   Sig: take 1 tablet by mouth once daily   rosuvastatin (CRESTOR) 5 mg tablet  Self Yes No   Sig: Take 5 mg by mouth daily      Facility-Administered Medications: None     Discharge Medication List as of 2025 12:39 PM        CONTINUE these medications which have NOT CHANGED    Details   amLODIPine (NORVASC) 10 mg tablet Take 1 tablet (10 mg total) by mouth daily,  Starting Tue 11/12/2024, Until Mon 2/10/2025, Normal      aspirin (ECOTRIN LOW STRENGTH) 81 mg EC tablet Take 1 tablet (81 mg total) by mouth daily, Starting Wed 5/6/2020, No Print      bisoprolol (ZEBETA) 10 MG tablet Take 1 tablet (10 mg total) by mouth daily, Starting Tue 8/20/2024, Normal      Blood Glucose Monitoring Suppl (OneTouch Verio) w/Device KIT Use to test 3-4x daily, Normal      Blood Pressure KIT Use 2 (two) times a day, Starting Tue 10/12/2021, Normal      butalbital-acetaminophen-caffeine (FIORICET,ESGIC) -40 mg per tablet TAKE 2 TABLETS BY MOUTH EVERY 8 HOURS AS NEEDED FOR HEADACHES, Historical Med      Continuous Blood Gluc  (Dexcom G7 ) HENRY Use 1 each in the morning, Starting Thu 12/28/2023, Normal      Continuous Glucose Sensor (Dexcom G7 Sensor) use every 10 days, Normal      Diclofenac Sodium (VOLTAREN) 1 % Apply 2 g topically 4 (four) times a day, Starting Fri 2/23/2024, Normal      Empagliflozin-metFORMIN HCl (Synjardy) 12.5-1000 MG TABS take 1 tablet by mouth twice a day, Normal      gabapentin (NEURONTIN) 300 mg capsule take 1 capsule by mouth twice a day START BY TAKING AT BEDTIME FI...  (REFER TO PRESCRIPTION NOTES)., Historical Med      glucose blood (OneTouch Verio) test strip Use to test 3x daily, Normal      Insulin Glargine Solostar (Lantus SoloStar) 100 UNIT/ML SOPN Inject 0.3 mL (30 Units total) under the skin daily at bedtime, Starting Thu 3/23/2023, Normal      insulin lispro (HumaLOG KwikPen) 100 units/mL injection pen Inject 10 Units under the skin 3 (three) times a day with meals, Starting Thu 3/23/2023, Normal      Insulin Pen Needle (BD Pen Needle Ann 2nd Gen) 32G X 4 MM MISC For use with insulin pen. Pharmacy may dispense brand covered by insurance., Normal      ketorolac (ACULAR) 0.5 % ophthalmic solution instill 1 drop into both eyes four times a day for 10 days, Historical Med      Lancets 30G MISC Use to test 3x daily, Normal      losartan  (COZAAR) 100 MG tablet Take 100 mg by mouth daily, Starting Wed 4/19/2023, Historical Med      Mavacamten (Camzyos) 5 MG CAPS Take 5 mg by mouth in the morning, Starting Thu 11/14/2024, Normal      mirtazapine (REMERON) 30 mg tablet 30 mg, Starting Wed 9/27/2023, Historical Med      mometasone (ELOCON) 0.1 % cream Apply 1 Application topically 2 (two) times a day To affected area, Starting Mon 2/26/2024, Historical Med      pantoprazole (PROTONIX) 40 mg tablet take 1 tablet by mouth once daily, Starting Fri 5/24/2024, Normal      rosuvastatin (CRESTOR) 5 mg tablet Take 5 mg by mouth daily, Historical Med             ED SEPSIS DOCUMENTATION   Time reflects when diagnosis was documented in both MDM as applicable and the Disposition within this note       Time User Action Codes Description Comment    2/9/2025 12:35 PM Nam Irby Add [M25.562] Left knee pain                  Nam Irby,   02/09/25 2239

## 2025-02-10 DIAGNOSIS — I42.2 HYPERTROPHIC CARDIOMYOPATHY (HCC): ICD-10-CM

## 2025-02-10 LAB
AORTIC ROOT: 3.3 CM
AV LVOT MEAN GRADIENT: 4 MMHG
AV LVOT PEAK GRADIENT: 7 MMHG
BSA FOR ECHO PROCEDURE: 2.33 M2
DOP CALC LVOT AREA: 3.46 CM2
DOP CALC LVOT CARDIAC INDEX: 3.41 L/MIN/M2
DOP CALC LVOT CARDIAC OUTPUT: 7.94 L/MIN
DOP CALC LVOT DIAMETER: 2.1 CM
DOP CALC LVOT PEAK VEL VTI: 29.38 CM
DOP CALC LVOT PEAK VEL: 1.36 M/S
DOP CALC LVOT STROKE INDEX: 43.8 ML/M2
DOP CALC LVOT STROKE VOLUME: 101.71
FRACTIONAL SHORTENING: 37 (ref 28–44)
GLOBAL LONGITUIDAL STRAIN: -16 %
INTERVENTRICULAR SEPTUM IN DIASTOLE (PARASTERNAL SHORT AXIS VIEW): 2 CM
INTERVENTRICULAR SEPTUM: 2 CM (ref 0.6–1.1)
LAAS-AP2: 25 CM2
LAAS-AP4: 25.1 CM2
LEFT ATRIUM SIZE: 5.2 CM
LEFT ATRIUM VOLUME (MOD BIPLANE): 82 ML
LEFT ATRIUM VOLUME INDEX (MOD BIPLANE): 35.2 ML/M2
LEFT INTERNAL DIMENSION IN SYSTOLE: 2.6 CM (ref 2.1–4)
LEFT VENTRICULAR INTERNAL DIMENSION IN DIASTOLE: 4.1 CM (ref 3.5–6)
LEFT VENTRICULAR POSTERIOR WALL IN END DIASTOLE: 1.4 CM
LEFT VENTRICULAR STROKE VOLUME: 50 ML
LV EF US.2D.A4C+ESTIMATED: 69 %
LVSV (TEICH): 50 ML
MV E'TISSUE VEL-LAT: 8 CM/S
MV E'TISSUE VEL-SEP: 6 CM/S
SL CV ECHO LV DYNAMIC OBSTRUCTION PEAK GRADIENT (REST): 11 MMHG
SL CV ECHO LV DYNAMIC OBSTRUCTION PEAK GRADIENT (VALSAL: 27 MMHG
SL CV LEFT ATRIUM LENGTH A2C: 6 CM
SL CV LV EF: 68
SL CV PED ECHO LEFT VENTRICLE DIASTOLIC VOLUME (MOD BIPLANE) 2D: 74 ML
SL CV PED ECHO LEFT VENTRICLE SYSTOLIC VOLUME (MOD BIPLANE) 2D: 24 ML
TR MAX PG: 15 MMHG
TR PEAK VELOCITY: 1.9 M/S
TRICUSPID VALVE PEAK REGURGITATION VELOCITY: 1.91 M/S

## 2025-02-10 RX ORDER — MAVACAMTEN 5 MG/1
5 CAPSULE, GELATIN COATED ORAL DAILY
Qty: 30 CAPSULE | Refills: 2 | Status: SHIPPED | OUTPATIENT
Start: 2025-02-10

## 2025-02-11 RX ORDER — MAVACAMTEN 5 MG/1
1 CAPSULE, GELATIN COATED ORAL EVERY MORNING
Qty: 30 CAPSULE | Refills: 2 | OUTPATIENT
Start: 2025-02-11

## 2025-03-05 ENCOUNTER — EVALUATION (OUTPATIENT)
Dept: PHYSICAL THERAPY | Facility: REHABILITATION | Age: 50
End: 2025-03-05
Payer: COMMERCIAL

## 2025-03-05 DIAGNOSIS — G89.29 CHRONIC PAIN OF LEFT KNEE: ICD-10-CM

## 2025-03-05 DIAGNOSIS — M25.562 CHRONIC PAIN OF LEFT KNEE: ICD-10-CM

## 2025-03-05 DIAGNOSIS — M22.2X2 PATELLOFEMORAL PAIN SYNDROME OF LEFT KNEE: Primary | ICD-10-CM

## 2025-03-05 PROCEDURE — 97162 PT EVAL MOD COMPLEX 30 MIN: CPT | Performed by: PHYSICAL THERAPIST

## 2025-03-05 PROCEDURE — 97110 THERAPEUTIC EXERCISES: CPT | Performed by: PHYSICAL THERAPIST

## 2025-03-05 PROCEDURE — 97140 MANUAL THERAPY 1/> REGIONS: CPT | Performed by: PHYSICAL THERAPIST

## 2025-03-05 NOTE — PROGRESS NOTES
PT Evaluation     Today's date: 3/5/2025  Patient name: John Arevalo  : 1975  MRN: [id number]  Referring provider: Gurmeet Morgan*  Dx:   Encounter Diagnosis     ICD-10-CM    1. Patellofemoral pain syndrome of left knee  M22.2X2       2. Chronic pain of left knee  M25.562 Ambulatory Referral to Physical Therapy    G89.29 PT plan of care cert/re-cert          Start Time: 1130  Stop Time: 1220  Total time in clinic (min): 50 minutes    Assessment  Impairments: abnormal gait, abnormal or restricted ROM, activity intolerance, impaired physical strength, lacks appropriate home exercise program and pain with function  Functional limitations: difficulty walking, squatting  Symptom irritability: moderate    Assessment details: John Arevalo is a 50 y.o. male who presents to therapy for reported left knee pain following multiple falls as a result of knee buckling. Patient's presented with pain along the medial joint line of left knee, medial/anterior part of the left patella, pain at end range of left knee flexion, joint effusion of knee joint. Patient demonstrated limited gluteus max/medius muscle strength, potentially impacting his ambulation and increasing his knee pain. Patient's symptom presentation aligns with left knee pain and a differential diagnosis of PFPS and irritation of the meniscus as result of weak quadriceps, hip extensor/abductor muscle groups.  Pt will benefit from skilled outpatient PT to improve strength and ROM, to address therapy goals, to reduce pain, and improve function.         Therapist explained to pt: findings of IE, rehab diagnosis, and POC. Pt-centered goals reviewed and confirmed by pt. Instruction also given for HEP, and patient expressed and demonstrated understanding of HEP by responding to verbal and tactile cues of intervention. Pt verified understanding via teach back method. Pt also expressed satisfaction that their current concerns were addressed at the end of the  session. Patient would benefit from continuing PT to address concerns of mobility, force production, and motor control that have been attributing to patient's left knee pain.     Barriers to therapy: None  Understanding of Dx/Px/POC: good     Prognosis: good    Goals  Patient Short Term Goals:  -Patient will increase L hip abduction MMT to 3/5 in 2 weeks to demonstrate improved strength to support functional ambulation.  -Patient will increase L hip extension MMT to 3/5 in  weeks to demonstrate improved glute strength necessary for improve gait mechanics and reduce strain on knee during gait.   -Patient will express a 50% improvement in reduction of left knee symptoms with daily activities within 4 weeks to demonstrate reduction of functional limitations and improve quality of life.  -Patient will decrease max pain on NPRS to 5/10 within 4 weeks to allow for improved participation of functional activities and demonstrate improved knee mobility and stability.   Pt will achieve an improvement in FOTO score indicating an improvement in pain and function in 4 weeks        Patient Long Term Goals:  Patient will increase L hip abduction MMT to 4/5 in 6 weeks to demonstrate improved strength to support functional ambulation.  -Patient will increase L hip extension MMT to 4/5 in 6  weeks to demonstrate improved glute strength necessary for improve gait mechanics and reduce strain on knee during gait.   -Patient will increase L hip extension and abduction MMT to 5/5 in 8 weeks to demonstrate improved glute strength necessary for better body mechanics and functional movement.   -Patient is able to perform and complete all his HEP exercises properly in 8 weeks to promote more functional independence  -Patient will express a 80% improvement in reduction of left knee symptoms with daily activities within 8 weeks to demonstrate reduction of functional limitations and improve quality of life.  -Patient will decrease max pain on  NPRS to 0-1/10 within 8 weeks to enable patient to participate in functional activities and demonstrate improved knee mobility, stability, and LLE strength.  -Pt will improve FOTO score to better then expected outcome indicating an overall improvement in pain and function by DC        Plan  Patient would benefit from: skilled physical therapy  Planned modality interventions: TENS, thermotherapy: hydrocollator packs, traction, ultrasound, low level laser therapy and cryotherapy    Planned therapy interventions: body mechanics training, therapeutic training, therapeutic exercise, therapeutic activities, stretching, strengthening, neuromuscular re-education, patient education, home exercise program, functional ROM exercises, flexibility, manual therapy, Herrera taping, joint mobilization and balance    Frequency: 1-2x/week.  Duration in weeks: 8  Plan of Care beginning date: 3/5/2025  Plan of Care expiration date: 4/25/2025  Treatment plan discussed with: patient  Plan details: Continue plan of care with patient 1-2x/week for 8 weeks or until symptoms resolve.         Subjective Evaluation    History of Present Illness  Mechanism of injury: Pt presents with L knee pain. States it gives out and gian when walking. This has been going on since Jan of 2024. Was going up the stairs last year carrying a cake and knee gave out and he fell down the stairs. Did not have knee pain or injury prior to this. After fall, had knee pain immediately and went to the hospital. Had XR which was normal. Has not had MRI. Did have work up for CVA due to L sided weakness, but all was negative. Has had 3 falls since the fall last year. Notes he has neuropathy in the feet. Has not had any treatment for this. Does wear a knee brace, sleeve brace. Uses a cane from time to time as well but not using cane today. Does feel the knee brace helps. Has not had any injections. Pain is over the patella over top of the patella. Pain increases with  walking, squatting, stairs (worse going up). Sitting is ok. Feels the leg is weak. Denies sleep disturbance due to the knee. Denies n/t.   Patient Goals  Patient goals for therapy: decreased pain, increased motion, increased strength and independence with ADLs/IADLs  Patient goal: get more power in the leg  Pain  Current pain ratin  At best pain ratin  At worst pain ratin  Location: anterior L knee  Quality: dull ache and throbbing  Alleviating factors: sitting.  Progression: no change    Social Support  Stairs in house: yes   Lives in: multiple-level home  Lives with: spouse (children)    Working: retired.    Diagnostic Tests  X-ray: normal  Treatments  No previous or current treatments        Objective    Observation: mild edema around knee, quad atrophy    Palpation:  (+) anteromedial jt line tenderness  (+) bounce test  Hypermobile L patella    Special tests:  (+) patellar compression test  (+) mcmurrays medial  (-) varus and valgus test at 30 of flexion  (-) thessalys          MMT   AROM   PROM     Knee Left Right Left Right Left Right   Flexion 4+ 5 124 with pain 130       Extension 4 5 0 0                Hip           Flexion 4 5          Extension 2+            Abduction 2+                     Ankle         Dorsiflexion 5 5       Plantarflexion 3+ 3+                   Precautions: HTN, DM, fall risk, R wrist sx >30 years ago, A fib, neuropathy, CHF    FOTO  3/5 = 34/55    POC exp =  25    Access Code: [email or web address]  URL: [email or web address]  Date: 2025  Prepared by: Connie Neville    Exercises  - Supine Short Arc Quad  - 1 x daily - 7 x weekly - 3 sets - 10 reps  - Supine Bridge  - 1 x daily - 7 x weekly - 3 sets - 10 reps  - Standing Terminal Knee Extension at Wall with Ball  - 1 x daily - 7 x weekly - 3 sets - 10 reps  - Long Sitting Quad Set with Towel Roll Under Heel  - 1 x daily - 7 x weekly - 3 sets - 10 reps    Manuals 3/5            Laser dir contact Pwr 12 4'                                                    Neuro Re-Ed             TKE             Quad set                                                                              Ther Ex             Supine Bridges 2x10             SAQ with quad set             Bike for ROM and strength             VG for strength             Monster walk             Resisted side stepping                                                                                                                     Ther Activity             Step ups             Step downs             Gait Training                                       Modalities

## 2025-03-06 ENCOUNTER — OFFICE VISIT (OUTPATIENT)
Dept: PHYSICAL THERAPY | Facility: REHABILITATION | Age: 50
End: 2025-03-06
Payer: COMMERCIAL

## 2025-03-06 DIAGNOSIS — M25.562 CHRONIC PAIN OF LEFT KNEE: Primary | ICD-10-CM

## 2025-03-06 DIAGNOSIS — G89.29 CHRONIC PAIN OF LEFT KNEE: Primary | ICD-10-CM

## 2025-03-06 DIAGNOSIS — M22.2X2 PATELLOFEMORAL PAIN SYNDROME OF LEFT KNEE: ICD-10-CM

## 2025-03-06 PROCEDURE — 97112 NEUROMUSCULAR REEDUCATION: CPT | Performed by: PHYSICAL THERAPIST

## 2025-03-06 PROCEDURE — 97110 THERAPEUTIC EXERCISES: CPT | Performed by: PHYSICAL THERAPIST

## 2025-03-06 PROCEDURE — 97140 MANUAL THERAPY 1/> REGIONS: CPT | Performed by: PHYSICAL THERAPIST

## 2025-03-06 NOTE — PROGRESS NOTES
Daily Note     Today's date: 3/6/2025  Patient name: John Arevalo  : 1975  MRN: [id number]  Referring provider: Gurmeet Morgan*  Dx:   Encounter Diagnosis     ICD-10-CM    1. Chronic pain of left knee  M25.562     G89.29       2. Patellofemoral pain syndrome of left knee  M22.2X2           Start Time: 1100  Stop Time: 1151  Total time in clinic (min): 51 minutes    Subjective: Current NPRS: 7/10. Patient reports no change since yesterday's session.       Objective: See treatment diary below      Assessment: Tolerated treatment by performing all interventions with limited reproduction of symptoms. Patient reported some difficulty propelling his left leg on the bike due to decreased strength and knee pain. Patient educated on HEP, verbalized and demonstrated understanding of exercises through correct performance of exercises. Pt reported increased knee pain with LLE SAQ, terminating continuation of the exercise. Pt responded to VC and TC during all interventions to enhance contraction of the quadriceps. Increased laser power, as patient stated not feeling any increased temperature from the previous session. Patient was more receptive to laser and felt relief by the end of the session. Patient will benefit from additional PT to address limitations in strength, motor control, and functional independence.    Plan: Continue per plan of care.     I have directly supervised and participated in the care of this patient with the therapy student, Roxana Booth, SPT. I agree with the details as outlined above as well as during today's session. Connie Patino, PT, DPT        Precautions: HTN, DM, fall risk, R wrist sx >30 years ago, A fib, neuropathy, CHF    FOTO  3/5 = 34/55    POC exp =  25      Access Code: [email or web address]  URL: [email or web address]  Date: 2025  Prepared by: Connie Neville    Exercises  - Supine Bridge  - 1 x daily - 7 x weekly - 3 sets - 10 reps  - Standing Terminal Knee  "Extension at Wall with Ball  - 2 x daily - 7 x weekly - 3 sets - 10 reps  - Supine Quad Set  - 2 x daily - 7 x weekly - 2-3 sets - 10 reps - 3 sec hold    Manuals 3/5 3/6           Laser dir contact Pwr 12 4' Pwr 15 3'           assess  DV/AS                                     Neuro Re-Ed             TKE  PTB 2x10 LLE           Quad set with towel roll  2x10 3\" hold ea LLE                                                                            Ther Ex             Supine Bridges 2x10  2x10           SAQ with quad set  X3 LLE-- limited by pain           Bike for ROM and strength  10'           VG for strength  np           Monster walk  np           Resisted side stepping  np           Lateral step ups  np                                                                                                      Ther Activity             Step ups  np           Step downs  np           Gait Training                                       Modalities                                            "

## 2025-03-10 ENCOUNTER — APPOINTMENT (OUTPATIENT)
Dept: PHYSICAL THERAPY | Facility: REHABILITATION | Age: 50
End: 2025-03-10
Payer: COMMERCIAL

## 2025-03-13 ENCOUNTER — OFFICE VISIT (OUTPATIENT)
Dept: PHYSICAL THERAPY | Facility: REHABILITATION | Age: 50
End: 2025-03-13
Payer: COMMERCIAL

## 2025-03-13 DIAGNOSIS — G89.29 CHRONIC PAIN OF LEFT KNEE: Primary | ICD-10-CM

## 2025-03-13 DIAGNOSIS — M22.2X2 PATELLOFEMORAL PAIN SYNDROME OF LEFT KNEE: ICD-10-CM

## 2025-03-13 DIAGNOSIS — M25.562 CHRONIC PAIN OF LEFT KNEE: Primary | ICD-10-CM

## 2025-03-13 PROCEDURE — 97110 THERAPEUTIC EXERCISES: CPT | Performed by: PHYSICAL THERAPIST

## 2025-03-13 NOTE — PROGRESS NOTES
Daily Note     Today's date: 3/13/2025  Patient name: John Arevalo  : 1975  MRN: [id number]  Referring provider: Gurmeet Morgan*  Dx:   Encounter Diagnosis     ICD-10-CM    1. Chronic pain of left knee  M25.562     G89.29       2. Patellofemoral pain syndrome of left knee  M22.2X2           Start Time: 1746  Stop Time: 1835  Total time in clinic (min): 49 minutes    Subjective: Patient reported pain in anterior knee and proximal tibia. NPRS: 6/10. Pt stated he is expecting to be very active and walking a lot this weekend.       Objective: See treatment diary below      Assessment: Patient could not tolerate standing exercises 2/2 neuropathy sx in feet. Pt stated he feels relief of knee pain with bike. He struggled with heel slides and stated he felt weak in his L knee flexors and required active assistance. Added hip abduction intervention to improve hip strength necessary for weight bearing activities. Added supine/seated strengthening exercises for knee, as patient cannot tolerate standing exercises. Patient would benefit from continuing PT to address limitations in mobility, motor control, strength, and improve functional independence.       Plan: Continue per plan of care.     I have directly supervised and participated in the care of this patient with the therapy student, Roxana Booth, SPT. I agree with the details as outlined above as well as during today's session. Connie Patino, PT, DPT        Precautions: HTN, DM, fall risk, R wrist sx >30 years ago, A fib, neuropathy, CHF    FOTO  3/5 = 34/55    POC exp =  25      Access Code: [email or web address]  URL: [email or web address]  Date: 2025  Prepared by: Connie Neville    Exercises  - Supine Bridge  - 1 x daily - 7 x weekly - 3 sets - 10 reps  - Standing Terminal Knee Extension at Wall with Ball  - 2 x daily - 7 x weekly - 3 sets - 10 reps  - Supine Quad Set  - 2 x daily - 7 x weekly - 2-3 sets - 10 reps - 3 sec hold  - Supine  "heel slides - 1 x daily - 3-4 x weekly - 2 sets - 10 reps   -Seated LAQ - 1 x daily - 3-4 x weekly - 2 sets - 10 reps    Manuals 3/5 3/6 3/13          Laser dir contact Pwr 12 4' Pwr 15 3'           assess  DV/AS                                     Neuro Re-Ed             TKE  PTB 2x10 LLE           Quad set with towel roll  2x10 3\" hold ea LLE                                                                            Ther Ex             Supine Bridges 2x10  2x10           SAQ with quad set  X3 LLE-- limited by pain           Bike for ROM and strength  10' 10' L0          VG for strength  np np          Monster walk  np np          Resisted side stepping  np np          Lateral step ups  np np          Mini squats at rail   1x10  1x8          Hip hooklying clamshells   YHB 2x10          Seated LAQ   2x10, 1\" hold at max ext          Supine heel slides AAROM   2x10 with strap                                                 Ther Activity             Step ups  np           Step downs  np           Gait Training                                       Modalities                                              "

## 2025-03-17 ENCOUNTER — APPOINTMENT (OUTPATIENT)
Dept: PHYSICAL THERAPY | Facility: REHABILITATION | Age: 50
End: 2025-03-17
Payer: COMMERCIAL

## 2025-03-19 DIAGNOSIS — I10 ESSENTIAL HYPERTENSION: ICD-10-CM

## 2025-03-19 RX ORDER — AMLODIPINE BESYLATE 10 MG/1
10 TABLET ORAL DAILY
Qty: 90 TABLET | Refills: 1 | Status: SHIPPED | OUTPATIENT
Start: 2025-03-19

## 2025-03-19 NOTE — TELEPHONE ENCOUNTER
Reason for call:   [x] Refill   [] Prior Auth  [] Other:     Office:   [] PCP/Provider -   [x] Specialty/Provider - Cardiology/ Jr    Medication: amLODIPine (NORVASC) 10 mg tablet    Dose/Frequency: Take 1 tablet (10 mg total) by mouth daily    Quantity: 90    Pharmacy: [facility] - [address] [phone]    Local Pharmacy   Does the patient have enough for 3 days?   [] Yes   [] No - Send as HP to POD    Mail Away Pharmacy   Does the patient have enough for 10 days?   [] Yes   [x] No - Send as HP to POD

## 2025-03-20 ENCOUNTER — OFFICE VISIT (OUTPATIENT)
Dept: PHYSICAL THERAPY | Facility: REHABILITATION | Age: 50
End: 2025-03-20
Payer: COMMERCIAL

## 2025-03-20 DIAGNOSIS — M22.2X2 PATELLOFEMORAL PAIN SYNDROME OF LEFT KNEE: ICD-10-CM

## 2025-03-20 DIAGNOSIS — M25.562 CHRONIC PAIN OF LEFT KNEE: Primary | ICD-10-CM

## 2025-03-20 DIAGNOSIS — G89.29 CHRONIC PAIN OF LEFT KNEE: Primary | ICD-10-CM

## 2025-03-20 PROCEDURE — 97110 THERAPEUTIC EXERCISES: CPT

## 2025-03-20 NOTE — PROGRESS NOTES
"Daily Note     Today's date: 3/20/2025  Patient name: John Arevalo  : 1975  MRN: [id number]  Referring provider: Gurmeet Morgan*  Dx:   Encounter Diagnosis     ICD-10-CM    1. Chronic pain of left knee  M25.562     G89.29       2. Patellofemoral pain syndrome of left knee  M22.2X2           Start Time: 1703  Stop Time: 1747  Total time in clinic (min): 44 minutes    Subjective: Pt reports he was doing ok after his LV with no significant soreness.  L knee still bothers him.  Would like to reduce to once a week since his weekend activities make it too much for him to come to treatments on Monday.        Objective: See treatment diary below      Assessment: Tolerated treatment fair. Continued with program as outlined below.  Progressed with VG squats d/t limited tolerance with standing activities, but still attempting to load BLE with less resistance.  Was challenged with this progression but able to complete.  Patient would benefit from continued PT.      Plan: Continue per plan of care.      Precautions: HTN, DM, fall risk, R wrist sx >30 years ago, A fib, neuropathy, CHF    FOTO  3/5 = 34/55    POC exp =  25      Access Code: [email or web address]  URL: [email or web address]  Date: 2025  Prepared by: Connie Neville    Exercises  - Supine Bridge  - 1 x daily - 7 x weekly - 3 sets - 10 reps  - Standing Terminal Knee Extension at Wall with Ball  - 2 x daily - 7 x weekly - 3 sets - 10 reps  - Supine Quad Set  - 2 x daily - 7 x weekly - 2-3 sets - 10 reps - 3 sec hold  - Supine heel slides - 1 x daily - 3-4 x weekly - 2 sets - 10 reps   -Seated LAQ - 1 x daily - 3-4 x weekly - 2 sets - 10 reps    Manuals 3/5 3/6 3/13 3/20         Laser dir contact Pwr 12 4' Pwr 15 3'           assess  DV/AS                                     Neuro Re-Ed             TKE  PTB 2x10 LLE           Quad set with towel roll  2x10 3\" hold ea LLE                                                                          " "  Ther Ex             Supine Bridges 2x10  2x10  YHB  1x10         SAQ with quad set  X3 LLE-- limited by pain           Bike for ROM and strength  10' 10' L0 10' L0         VG for strength  np np L5 -  2x10         Monster walk  np np          Resisted side stepping  np np          Lateral step ups  np np          Mini squats at rail   1x10  1x8          Hip hooklying clamshells   YHB 2x10 YHB 2x10         Seated LAQ   2x10, 1\" hold at max ext 2x10, 1\" hold at max ext         Supine heel slides AAROM   2x10 with strap 2x10 with strap                                                Ther Activity             Step ups  np           Step downs  np           Gait Training                                       Modalities                                                "

## 2025-03-24 ENCOUNTER — APPOINTMENT (OUTPATIENT)
Dept: PHYSICAL THERAPY | Facility: REHABILITATION | Age: 50
End: 2025-03-24
Payer: COMMERCIAL

## 2025-03-27 ENCOUNTER — OFFICE VISIT (OUTPATIENT)
Dept: PHYSICAL THERAPY | Facility: REHABILITATION | Age: 50
End: 2025-03-27
Payer: COMMERCIAL

## 2025-03-27 DIAGNOSIS — G89.29 CHRONIC PAIN OF LEFT KNEE: Primary | ICD-10-CM

## 2025-03-27 DIAGNOSIS — M22.2X2 PATELLOFEMORAL PAIN SYNDROME OF LEFT KNEE: ICD-10-CM

## 2025-03-27 DIAGNOSIS — M25.562 CHRONIC PAIN OF LEFT KNEE: Primary | ICD-10-CM

## 2025-03-27 PROCEDURE — 97110 THERAPEUTIC EXERCISES: CPT

## 2025-03-27 NOTE — PROGRESS NOTES
"Daily Note     Today's date: 3/27/2025  Patient name: John Arevalo  : 1975  MRN: [id number]  Referring provider: Gurmeet Morgan*  Dx:   Encounter Diagnosis     ICD-10-CM    1. Chronic pain of left knee  M25.562     G89.29       2. Patellofemoral pain syndrome of left knee  M22.2X2           Start Time: 1100  Stop Time: 1150  Total time in clinic (min): 50 minutes    Subjective: Pt has no new complaints to report at start of treatment.  Minimal to no aggravation of symptoms or soreness following his LV.        Objective: See treatment diary below      Assessment: Tolerated treatment well. Continued with program as outlined below.  Progressed with weight shifts today in effort to further improve stability and tolerance to loading L knee against gravity; some pain/discomfort at L knee during this activity.  Still struggles with strength needed to actively initiate and complete heel slide in supine.  Patient would benefit from continued PT to further improve strength, decrease pain, and maximize overall function.        Plan: Continue per plan of care.      Precautions: HTN, DM, fall risk, R wrist sx >30 years ago, A fib, neuropathy, CHF    FOTO  3/5 = 34/55    POC exp =  25      Access Code: [email or web address]  URL: [email or web address]  Date: 2025  Prepared by: Connie Neville    Exercises  - Supine Bridge  - 1 x daily - 7 x weekly - 3 sets - 10 reps  - Standing Terminal Knee Extension at Wall with Ball  - 2 x daily - 7 x weekly - 3 sets - 10 reps  - Supine Quad Set  - 2 x daily - 7 x weekly - 2-3 sets - 10 reps - 3 sec hold  - Supine heel slides - 1 x daily - 3-4 x weekly - 2 sets - 10 reps   -Seated LAQ - 1 x daily - 3-4 x weekly - 2 sets - 10 reps    Manuals 3/5 3/6 3/13 3/20 3/27        Laser dir contact Pwr 12 4' Pwr 15 3'           assess  DV/AS                                     Neuro Re-Ed             TKE  PTB 2x10 LLE           Quad set with towel roll  2x10 3\" hold ea LLE      " "     Weight shift     10\"x10                                                            Ther Ex             Supine Bridges 2x10  2x10  YHB  1x10 YHB  1x10        SAQ with quad set  X3 LLE-- limited by pain           Bike for ROM and strength  10' 10' L0 10' L0 10' L0        VG for strength  np np L5 -  2x10 L5 -  2x10        Monster walk  np np          Resisted side stepping  np np          Lateral step ups  np np          Mini squats at rail   1x10  1x8          Hip hooklying clamshells   YHB 2x10 YHB 2x10 YHB 2x10        Seated LAQ   2x10, 1\" hold at max ext 2x10, 1\" hold at max ext 2x10, 1\" hold at max ext        Supine heel slides AAROM   2x10 with strap 2x10 with strap 2x10 with strap                                               Ther Activity             Step ups  np           Step downs  np           Gait Training                                       Modalities                                                  "

## 2025-03-31 ENCOUNTER — APPOINTMENT (OUTPATIENT)
Dept: PHYSICAL THERAPY | Facility: REHABILITATION | Age: 50
End: 2025-03-31
Payer: COMMERCIAL

## 2025-04-02 ENCOUNTER — OFFICE VISIT (OUTPATIENT)
Dept: PHYSICAL THERAPY | Facility: REHABILITATION | Age: 50
End: 2025-04-02
Payer: COMMERCIAL

## 2025-04-02 DIAGNOSIS — M25.562 CHRONIC PAIN OF LEFT KNEE: Primary | ICD-10-CM

## 2025-04-02 DIAGNOSIS — M22.2X2 PATELLOFEMORAL PAIN SYNDROME OF LEFT KNEE: ICD-10-CM

## 2025-04-02 DIAGNOSIS — G89.29 CHRONIC PAIN OF LEFT KNEE: Primary | ICD-10-CM

## 2025-04-02 PROCEDURE — 97112 NEUROMUSCULAR REEDUCATION: CPT | Performed by: PHYSICAL THERAPIST

## 2025-04-02 PROCEDURE — 97110 THERAPEUTIC EXERCISES: CPT | Performed by: PHYSICAL THERAPIST

## 2025-04-02 NOTE — PROGRESS NOTES
"Daily Note     Today's date: 2025  Patient name: John Arevalo  : 1975  MRN: [id number]  Referring provider: Gurmeet Morgan*  Dx:   Encounter Diagnosis     ICD-10-CM    1. Chronic pain of left knee  M25.562     G89.29       2. Patellofemoral pain syndrome of left knee  M22.2X2           Start Time: 1220  Stop Time: 1300  Total time in clinic (min): 40 minutes    Subjective: Pt reports he feels like he is getting better and therapy is helping      Objective: See treatment diary below      Assessment: Tolerated treatment well. He responded well to addition of SLR and SLS on airex foam. He is making slow but steady progress as supported by subjective report as well as improvement in FOTO score. Pt will benefit from continued skilled outpatient PT to improve strength, to address therapy goals, to reduce pain, and improve function.        Plan: Continue per plan of care.      Precautions: HTN, DM, fall risk, R wrist sx >30 years ago, A fib, neuropathy, CHF    FOTO  3/5 = 34/55  4 = 40/55    POC exp =  25      Access Code: [email or web address]  URL: [email or web address]  Date: 2025  Prepared by: Connie Neville    Exercises  - Supine Bridge  - 1 x daily - 7 x weekly - 3 sets - 10 reps  - Standing Terminal Knee Extension at Wall with Ball  - 2 x daily - 7 x weekly - 3 sets - 10 reps  - Supine Quad Set  - 2 x daily - 7 x weekly - 2-3 sets - 10 reps - 3 sec hold  - Supine heel slides - 1 x daily - 3-4 x weekly - 2 sets - 10 reps   -Seated LAQ - 1 x daily - 3-4 x weekly - 2 sets - 10 reps    Manuals 3/5 3/6 3/13 3/20 3/27 4/2       Laser dir contact Pwr 12 4' Pwr 15 3'           assess  DV/AS                                     Neuro Re-Ed             TKE  PTB 2x10 LLE           Quad set with towel roll  2x10 3\" hold ea LLE           Weight shift     10\"x10        SLR flexion      3x10       SLS      Airex 10\"x5                                 Ther Ex             Supine Bridges 2x10  2x10  " "YHB  1x10 YHB  1x10 No band x30       SAQ with quad set  X3 LLE-- limited by pain           Bike for ROM and strength  10' 10' L0 10' L0 10' L0        VG for strength  np np L5 -  2x10 L5 -  2x10 VG 10' L5       Monster walk  np np          Resisted side stepping  np np          Lateral step ups  np np          Mini squats at rail   1x10  1x8          Hip hooklying clamshells   YHB 2x10 YHB 2x10 YHB 2x10        Seated LAQ   2x10, 1\" hold at max ext 2x10, 1\" hold at max ext 2x10, 1\" hold at max ext        Supine heel slides AAROM   2x10 with strap 2x10 with strap 2x10 with strap X30 with strap                                              Ther Activity             Step ups  np           Step downs  np           Gait Training                                       Modalities                                                  "

## 2025-04-07 ENCOUNTER — APPOINTMENT (OUTPATIENT)
Dept: PHYSICAL THERAPY | Facility: REHABILITATION | Age: 50
End: 2025-04-07
Payer: COMMERCIAL

## 2025-04-10 ENCOUNTER — APPOINTMENT (OUTPATIENT)
Dept: PHYSICAL THERAPY | Facility: REHABILITATION | Age: 50
End: 2025-04-10
Payer: COMMERCIAL

## 2025-04-14 ENCOUNTER — APPOINTMENT (OUTPATIENT)
Dept: PHYSICAL THERAPY | Facility: REHABILITATION | Age: 50
End: 2025-04-14
Payer: COMMERCIAL

## 2025-04-17 ENCOUNTER — APPOINTMENT (OUTPATIENT)
Dept: PHYSICAL THERAPY | Facility: REHABILITATION | Age: 50
End: 2025-04-17
Payer: COMMERCIAL

## 2025-04-23 ENCOUNTER — TELEPHONE (OUTPATIENT)
Age: 50
End: 2025-04-23

## 2025-04-23 NOTE — TELEPHONE ENCOUNTER
Received Voicemail as noted below.    Yes, this is Mr. Arevalo, phone number [phone]. The time is now 818 AM. I'm trying to get some information on on occupational no occupational nurse. If you have someone call me back please [phone]. Thank you.    Called and spoke with patient. He mentioned he came in and his question was answered. He appreciated call back.

## 2025-04-23 NOTE — TELEPHONE ENCOUNTER
Caller: John Arevalo    Doctor: Dr. Norris     Reason for call: Patient is calling in requesting a note from Occupational Health Nurse. I did inform him that wasn't something we do as we are cardiology. He explained to me he has to resubmit his SSI disability forms ASAP. He is stating the main reason he has been out of work and needs this is because of his chest pain with Enlarged heart. If someone could please reach to patient and let him know either way if a note can be given or not in regards to this. Thank you    Call back#: [phone]

## 2025-04-25 NOTE — TELEPHONE ENCOUNTER
Caller: Patient    Doctor: Dr. Norris    Call back #: [phone]    Reason for call: Patient stated that the letter is due as soon as possible and would appreciate if Dr. Norris could complete it before the weekend. I told patient that I will send this request to Dr. Norris and see if this could be done. Patient understood and will wait for a call back once the letter is ready for pickup. Thank you!

## 2025-04-28 ENCOUNTER — OFFICE VISIT (OUTPATIENT)
Dept: PHYSICAL THERAPY | Facility: REHABILITATION | Age: 50
End: 2025-04-28
Payer: COMMERCIAL

## 2025-04-28 ENCOUNTER — TELEPHONE (OUTPATIENT)
Dept: CARDIOLOGY CLINIC | Facility: CLINIC | Age: 50
End: 2025-04-28

## 2025-04-28 DIAGNOSIS — M25.562 CHRONIC PAIN OF LEFT KNEE: Primary | ICD-10-CM

## 2025-04-28 DIAGNOSIS — G89.29 CHRONIC PAIN OF LEFT KNEE: Primary | ICD-10-CM

## 2025-04-28 DIAGNOSIS — M22.2X2 PATELLOFEMORAL PAIN SYNDROME OF LEFT KNEE: ICD-10-CM

## 2025-04-28 PROCEDURE — 97112 NEUROMUSCULAR REEDUCATION: CPT

## 2025-04-28 PROCEDURE — 97110 THERAPEUTIC EXERCISES: CPT

## 2025-04-28 NOTE — TELEPHONE ENCOUNTER
Patient contacted and informed that requested paperwork can be picked up at receptions desk in the [facility] lobby.

## 2025-04-28 NOTE — PROGRESS NOTES
"Daily Note     Today's date: 2025  Patient name: John Arevalo  : 1975  MRN: [id number]  Referring provider: Gurmeet Morgan*  Dx:   Encounter Diagnosis     ICD-10-CM    1. Chronic pain of left knee  M25.562     G89.29       2. Patellofemoral pain syndrome of left knee  M22.2X2           Start Time: 1715  Stop Time: 1800  Total time in clinic (min): 45 minutes    Subjective: Pt reports L knee has been doing better.  Still gets random, painful popping of L knee that results in L knee giving out.  Notes this has been occurring less often.          Objective: See treatment diary below      Assessment: Tolerated treatment well. Continued with program as outlined below.  Continues to be making slow progress.  Very challenged with supine SLR.  Able to complete all assigned activity without any increase in symptoms.  Occasional use of handrail for stability during SLS required.  Patient would benefit from continued PT.      Plan: Continue per plan of care.      Precautions: HTN, DM, fall risk, R wrist sx >30 years ago, A fib, neuropathy, CHF    FOTO  3/5 = 34/55  4 = 40/55    POC exp =  25      Access Code: [email or web address]  URL: [email or web address]  Date: 2025  Prepared by: Connie Neville    Exercises  - Supine Bridge  - 1 x daily - 7 x weekly - 3 sets - 10 reps  - Standing Terminal Knee Extension at Wall with Ball  - 2 x daily - 7 x weekly - 3 sets - 10 reps  - Supine Quad Set  - 2 x daily - 7 x weekly - 2-3 sets - 10 reps - 3 sec hold  - Supine heel slides - 1 x daily - 3-4 x weekly - 2 sets - 10 reps   -Seated LAQ - 1 x daily - 3-4 x weekly - 2 sets - 10 reps    Manuals 3/5 3/6 3/13 3/20 3/27 4/2 4/28      Laser dir contact Pwr 12 4' Pwr 15 3'           assess  DV/AS                                     Neuro Re-Ed             TKE  PTB 2x10 LLE           Quad set with towel roll  2x10 3\" hold ea LLE           Weight shift     10\"x10        SLR flexion      3x10 3x10      SLS      " "Airex 10\"x5 Airex 10\"x5                                Ther Ex             Supine Bridges 2x10  2x10  YHB  1x10 YHB  1x10 No band x30 No band x30      SAQ with quad set  X3 LLE-- limited by pain           Bike for ROM and strength  10' 10' L0 10' L0 10' L0        VG for strength  np np L5 -  2x10 L5 -  2x10 VG 10' L5 VG 10' L5      Monster walk  np np          Resisted side stepping  np np          Lateral step ups  np np          Mini squats at rail   1x10  1x8          Hip hooklying clamshells   YHB 2x10 YHB 2x10 YHB 2x10  YHB 2x10      Seated LAQ   2x10, 1\" hold at max ext 2x10, 1\" hold at max ext 2x10, 1\" hold at max ext        Supine heel slides AAROM   2x10 with strap 2x10 with strap 2x10 with strap X30 with strap X30 with strap                                             Ther Activity             Step ups  np           Step downs  np           Gait Training                                       Modalities                                                    "

## 2025-05-01 ENCOUNTER — HOSPITAL ENCOUNTER (OUTPATIENT)
Dept: NON INVASIVE DIAGNOSTICS | Facility: HOSPITAL | Age: 50
Discharge: HOME | End: 2025-05-01
Payer: COMMERCIAL

## 2025-05-01 VITALS
BODY MASS INDEX: 35.42 KG/M2 | HEIGHT: 71 IN | HEART RATE: 86 BPM | SYSTOLIC BLOOD PRESSURE: 146 MMHG | DIASTOLIC BLOOD PRESSURE: 84 MMHG | WEIGHT: 253 LBS

## 2025-05-01 DIAGNOSIS — I42.2 HYPERTROPHIC CARDIOMYOPATHY (HCC): ICD-10-CM

## 2025-05-01 DIAGNOSIS — I10 ESSENTIAL HYPERTENSION: Primary | ICD-10-CM

## 2025-05-01 LAB
AV LVOT MEAN GRADIENT: 5 MMHG
AV LVOT PEAK GRADIENT: 8 MMHG
BSA FOR ECHO PROCEDURE: 2.33 M2
DOP CALC LVOT AREA: 3.14 CM2
DOP CALC LVOT CARDIAC INDEX: 3.72 L/MIN/M2
DOP CALC LVOT CARDIAC OUTPUT: 8.67 L/MIN
DOP CALC LVOT DIAMETER: 2 CM
DOP CALC LVOT PEAK VEL VTI: 31.93 CM
DOP CALC LVOT PEAK VEL: 1.44 M/S
DOP CALC LVOT STROKE INDEX: 45.1 ML/M2
DOP CALC LVOT STROKE VOLUME: 100.26
FRACTIONAL SHORTENING: 34 (ref 28–44)
GLOBAL LONGITUIDAL STRAIN: -16 %
INTERVENTRICULAR SEPTUM IN DIASTOLE (PARASTERNAL SHORT AXIS VIEW): 2.1 CM
INTERVENTRICULAR SEPTUM: 2.1 CM (ref 0.6–1.1)
LAAS-AP2: 27.2 CM2
LAAS-AP4: 30.9 CM2
LEFT ATRIUM SIZE: 4.9 CM
LEFT ATRIUM VOLUME (MOD BIPLANE): 110 ML
LEFT ATRIUM VOLUME INDEX (MOD BIPLANE): 47.2 ML/M2
LEFT INTERNAL DIMENSION IN SYSTOLE: 2.3 CM (ref 2.1–4)
LEFT VENTRICULAR INTERNAL DIMENSION IN DIASTOLE: 3.5 CM (ref 3.5–6)
LEFT VENTRICULAR POSTERIOR WALL IN END DIASTOLE: 1.8 CM
LEFT VENTRICULAR STROKE VOLUME: 34 ML
LV EF US.2D.A4C+ESTIMATED: 60 %
LVSV (TEICH): 34 ML
RA PRESSURE ESTIMATED: 8 MMHG
RV PSP: 30 MMHG
SL CV ECHO LV DYNAMIC OBSTRUCTION PEAK GRADIENT (REST): 36 MMHG
SL CV ECHO LV DYNAMIC OBSTRUCTION PEAK GRADIENT (VALSAL: 80 MMHG
SL CV LEFT ATRIUM LENGTH A2C: 6.4 CM
SL CV LV EF: 68
SL CV PED ECHO LEFT VENTRICLE DIASTOLIC VOLUME (MOD BIPLANE) 2D: 52 ML
SL CV PED ECHO LEFT VENTRICLE SYSTOLIC VOLUME (MOD BIPLANE) 2D: 18 ML
TR MAX PG: 22 MMHG
TR PEAK VELOCITY: 2.3 M/S
TRICUSPID VALVE PEAK REGURGITATION VELOCITY: 2.32 M/S

## 2025-05-01 PROCEDURE — 93321 DOPPLER ECHO F-UP/LMTD STD: CPT | Performed by: INTERNAL MEDICINE

## 2025-05-01 PROCEDURE — 93325 DOPPLER ECHO COLOR FLOW MAPG: CPT | Performed by: INTERNAL MEDICINE

## 2025-05-01 PROCEDURE — 93321 DOPPLER ECHO F-UP/LMTD STD: CPT

## 2025-05-01 PROCEDURE — 93308 TTE F-UP OR LMTD: CPT | Performed by: INTERNAL MEDICINE

## 2025-05-01 PROCEDURE — 93325 DOPPLER ECHO COLOR FLOW MAPG: CPT

## 2025-05-01 PROCEDURE — 93308 TTE F-UP OR LMTD: CPT

## 2025-05-02 ENCOUNTER — TELEPHONE (OUTPATIENT)
Dept: CARDIOLOGY CLINIC | Facility: CLINIC | Age: 50
End: 2025-05-02

## 2025-05-02 DIAGNOSIS — I42.2 HYPERTROPHIC CARDIOMYOPATHY (HCC): Primary | ICD-10-CM

## 2025-05-02 NOTE — TELEPHONE ENCOUNTER
L/m/m dose of camzyos was changed to 10 mg, keep taking the 5 mg until receiving the 10 mg. Will need a 4 wk echo and reminded to get labs done

## 2025-05-06 ENCOUNTER — TELEPHONE (OUTPATIENT)
Dept: CARDIOLOGY CLINIC | Facility: CLINIC | Age: 50
End: 2025-05-06

## 2025-05-06 NOTE — TELEPHONE ENCOUNTER
L/m/m/ 5/7/25 asking patient to call to sched echo for camzyos  Due 6/2/25    L/m/m 5/6/25 asking pt to cb to sched echo for camzyos  for 6/2/25

## 2025-05-08 ENCOUNTER — APPOINTMENT (OUTPATIENT)
Dept: PHYSICAL THERAPY | Facility: REHABILITATION | Age: 50
End: 2025-05-08
Payer: COMMERCIAL

## 2025-05-12 ENCOUNTER — OFFICE VISIT (OUTPATIENT)
Dept: PHYSICAL THERAPY | Facility: REHABILITATION | Age: 50
End: 2025-05-12
Payer: COMMERCIAL

## 2025-05-12 DIAGNOSIS — M22.2X2 PATELLOFEMORAL PAIN SYNDROME OF LEFT KNEE: ICD-10-CM

## 2025-05-12 DIAGNOSIS — G89.29 CHRONIC PAIN OF LEFT KNEE: Primary | ICD-10-CM

## 2025-05-12 DIAGNOSIS — M25.562 CHRONIC PAIN OF LEFT KNEE: Primary | ICD-10-CM

## 2025-05-12 PROCEDURE — 97110 THERAPEUTIC EXERCISES: CPT

## 2025-05-12 PROCEDURE — 97112 NEUROMUSCULAR REEDUCATION: CPT

## 2025-05-12 NOTE — PROGRESS NOTES
Daily Note     Today's date: 2025  Patient name: John Arevalo  : 1975  MRN: [id number]  Referring provider: Gurmeet Morgan*  Dx:   Encounter Diagnosis     ICD-10-CM    1. Chronic pain of left knee  M25.562     G89.29       2. Patellofemoral pain syndrome of left knee  M22.2X2           Start Time: 1615  Stop Time: 1700  Total time in clinic (min): 45 minutes    Subjective: Pt reports his L knee has been doing good.  Going up steps still bothers his L knee most.  Has been trying to perform steps at home with LLE lead, but can typically only make it up about half way before needing to switch.        Objective: See treatment diary below      Assessment: Tolerated treatment well. Continued with program as outlined below.  Progressed with increased resistance and lateral step ups today.  Most challenged with supine SLR, with slight L hip pain following completion of this exercise.  Visible limitations of L quad and hip noted.  Patient would benefit from continued PT to further improve strength, decrease pain, and maximize overall function with ADL's.        Plan: Continue per plan of care.      Precautions: HTN, DM, fall risk, R wrist sx >30 years ago, A fib, neuropathy, CHF    FOTO  3/5 = 34/55  4 = 40/55    POC exp =  25      Access Code: [email or web address]  URL: [email or web address]  Date: 2025  Prepared by: Connie Neville    Exercises  - Supine Bridge  - 1 x daily - 7 x weekly - 3 sets - 10 reps  - Standing Terminal Knee Extension at Wall with Ball  - 2 x daily - 7 x weekly - 3 sets - 10 reps  - Supine Quad Set  - 2 x daily - 7 x weekly - 2-3 sets - 10 reps - 3 sec hold  - Supine heel slides - 1 x daily - 3-4 x weekly - 2 sets - 10 reps   -Seated LAQ - 1 x daily - 3-4 x weekly - 2 sets - 10 reps    Manuals 3/5 3/6 3/13 3/20 3/27 4/2 4/28 5/12     Laser dir contact Pwr 12 4' Pwr 15 3'           assess  DV/AS                                     Neuro Re-Ed             TKE  PTB  "2x10 LLE           Quad set with towel roll  2x10 3\" hold ea LLE           Weight shift     10\"x10        SLR flexion      3x10 3x10 3x10     SLS      Airex 10\"x5 Airex 10\"x5 Airex 10\"x5                               Ther Ex             Supine Bridges 2x10  2x10  YHB  1x10 YHB  1x10 No band x30 No band x30 No band x30     SAQ with quad set  X3 LLE-- limited by pain           Bike for ROM and strength  10' 10' L0 10' L0 10' L0        VG for strength  np np L5 -  2x10 L5 -  2x10 VG 10' L5 VG 10' L5 VG 10' L6     Monster walk  np np          Resisted side stepping  np np          Lateral step ups  np np     4\"   2x10     Mini squats at rail   1x10  1x8          Hip hooklying clamshells   YHB 2x10 YHB 2x10 YHB 2x10  YHB 2x10 RHB 2x10     Seated LAQ   2x10, 1\" hold at max ext 2x10, 1\" hold at max ext 2x10, 1\" hold at max ext        Supine heel slides AAROM   2x10 with strap 2x10 with strap 2x10 with strap X30 with strap X30 with strap X30 with strap                                            Ther Activity             Step ups  np           Step downs  np           Gait Training                                       Modalities                                                      "

## 2025-05-19 ENCOUNTER — OFFICE VISIT (OUTPATIENT)
Dept: PHYSICAL THERAPY | Facility: REHABILITATION | Age: 50
End: 2025-05-19
Payer: COMMERCIAL

## 2025-05-19 DIAGNOSIS — M22.2X2 PATELLOFEMORAL PAIN SYNDROME OF LEFT KNEE: ICD-10-CM

## 2025-05-19 DIAGNOSIS — G89.29 CHRONIC PAIN OF LEFT KNEE: Primary | ICD-10-CM

## 2025-05-19 DIAGNOSIS — M25.562 CHRONIC PAIN OF LEFT KNEE: Primary | ICD-10-CM

## 2025-05-19 PROCEDURE — 97112 NEUROMUSCULAR REEDUCATION: CPT

## 2025-05-19 PROCEDURE — 97110 THERAPEUTIC EXERCISES: CPT

## 2025-05-19 NOTE — PROGRESS NOTES
"Daily Note     Today's date: 2025  Patient name: John Arevalo  : 1975  MRN: [id number]  Referring provider: Gurmeet Morgan*  Dx:   Encounter Diagnosis     ICD-10-CM    1. Chronic pain of left knee  M25.562     G89.29       2. Patellofemoral pain syndrome of left knee  M22.2X2           Start Time: 1730  Stop Time: 1810  Total time in clinic (min): 40 minutes    Subjective: Pt reports a little bit of soreness following his LV, but nothing significant.  Overall L knee has been doing a little better.        Objective: See treatment diary below      Assessment: Tolerated treatment well. Continued with program as outlined below.  Progressed strengthening with increased resistance.  Some increased soreness following completion of nautilus leg ext machine, but was able to complete all assigned reps/sets.  Patient would benefit from continued PT to further improve strength, decrease pain, and maximize overall function.        Plan: Continue per plan of care.      Precautions: HTN, DM, fall risk, R wrist sx >30 years ago, A fib, neuropathy, CHF    FOTO  3/5 = 34/55   = 40/55   = 36/55    POC exp =  25      Access Code: [email or web address]  URL: [email or web address]  Date: 2025  Prepared by: Connie Neville    Exercises  - Supine Bridge  - 1 x daily - 7 x weekly - 3 sets - 10 reps  - Standing Terminal Knee Extension at Wall with Ball  - 2 x daily - 7 x weekly - 3 sets - 10 reps  - Supine Quad Set  - 2 x daily - 7 x weekly - 2-3 sets - 10 reps - 3 sec hold  - Supine heel slides - 1 x daily - 3-4 x weekly - 2 sets - 10 reps   -Seated LAQ - 1 x daily - 3-4 x weekly - 2 sets - 10 reps    Manuals 3/5 3/6 3/13 3/20 3/27 4/2 4/28 5/12 5/19    Laser dir contact Pwr 12 4' Pwr 15 3'           assess  DV/AS                                     Neuro Re-Ed             TKE  PTB 2x10 LLE           Quad set with towel roll  2x10 3\" hold ea LLE           Weight shift     10\"x10        SLR flexion      " "3x10 3x10 3x10 np    SLS      Airex 10\"x5 Airex 10\"x5 Airex 10\"x5 Airex 10\"x5                              Ther Ex             Supine Bridges 2x10  2x10  YHB  1x10 YHB  1x10 No band x30 No band x30 No band x30 No band x30    SAQ with quad set  X3 LLE-- limited by pain           Bike for ROM and strength  10' 10' L0 10' L0 10' L0        VG for strength  np np L5 -  2x10 L5 -  2x10 VG 10' L5 VG 10' L5 VG 10' L6 VG 10' L6    Monster walk  np np          Resisted side stepping  np np          Lateral step ups  np np     4\"   2x10 4\"   2x10    Mini squats at rail   1x10  1x8          Hip hooklying clamshells   YHB 2x10 YHB 2x10 YHB 2x10  YHB 2x10 RHB 2x10 RHB 2x10    S/L  RHB  1x15 ea    Seated LAQ   2x10, 1\" hold at max ext 2x10, 1\" hold at max ext 2x10, 1\" hold at max ext    Nautilus  20#  3x8    Supine heel slides AAROM   2x10 with strap 2x10 with strap 2x10 with strap X30 with strap X30 with strap X30 with strap np                                           Ther Activity             Step ups  np           Step downs  np           Gait Training                                       Modalities                                                        "

## 2025-05-29 ENCOUNTER — OFFICE VISIT (OUTPATIENT)
Dept: PHYSICAL THERAPY | Facility: REHABILITATION | Age: 50
End: 2025-05-29
Payer: COMMERCIAL

## 2025-05-29 DIAGNOSIS — M25.562 CHRONIC PAIN OF LEFT KNEE: Primary | ICD-10-CM

## 2025-05-29 DIAGNOSIS — M22.2X2 PATELLOFEMORAL PAIN SYNDROME OF LEFT KNEE: ICD-10-CM

## 2025-05-29 DIAGNOSIS — G89.29 CHRONIC PAIN OF LEFT KNEE: Primary | ICD-10-CM

## 2025-05-29 PROCEDURE — 97110 THERAPEUTIC EXERCISES: CPT

## 2025-05-29 PROCEDURE — 97112 NEUROMUSCULAR REEDUCATION: CPT

## 2025-05-29 NOTE — PROGRESS NOTES
"Daily Note     Today's date: 2025  Patient name: John Arevalo  : 1975  MRN: [id number]  Referring provider: Gurmeet Morgan*  Dx:   Encounter Diagnosis     ICD-10-CM    1. Chronic pain of left knee  M25.562     G89.29       2. Patellofemoral pain syndrome of left knee  M22.2X2           Start Time: 1648  Stop Time: 1732  Total time in clinic (min): 44 minutes    Subjective: Pt reports his L knee and L back has been doing better.        Objective: See treatment diary below      Assessment: Tolerated treatment well. Continued with program as outlined below.  Is able to slowly increase tolerance to more activity and strengthening exercises.  No significant aggravation of symptoms by end of treatment.  Patient would benefit from continued PT.        Plan: Continue per plan of care.      Precautions: HTN, DM, fall risk, R wrist sx >30 years ago, A fib, neuropathy, CHF    FOTO  3/5 = 34/55   = 40/55   = 36/55    POC exp =  25      Access Code: [email or web address]  URL: [email or web address]  Date: 2025  Prepared by: Connie Neville    Exercises  - Supine Bridge  - 1 x daily - 7 x weekly - 3 sets - 10 reps  - Standing Terminal Knee Extension at Wall with Ball  - 2 x daily - 7 x weekly - 3 sets - 10 reps  - Supine Quad Set  - 2 x daily - 7 x weekly - 2-3 sets - 10 reps - 3 sec hold  - Supine heel slides - 1 x daily - 3-4 x weekly - 2 sets - 10 reps   -Seated LAQ - 1 x daily - 3-4 x weekly - 2 sets - 10 reps    Manuals 3/5 3/6 3/13 3/20 3/27 4/2 4/28 5/12 5/19 5/29   Laser dir contact Pwr 12 4' Pwr 15 3'           assess  DV/AS                                     Neuro Re-Ed             TKE  PTB 2x10 LLE           Quad set with towel roll  2x10 3\" hold ea LLE           Weight shift     10\"x10        SLR flexion      3x10 3x10 3x10 np    SLS      Airex 10\"x5 Airex 10\"x5 Airex 10\"x5 Airex 10\"x5 Airex 15\"x5                             Ther Ex             Supine Bridges 2x10  2x10  " "YHB  1x10 YHB  1x10 No band x30 No band x30 No band x30 No band x30 No band x30   SAQ with quad set  X3 LLE-- limited by pain           Bike for ROM and strength  10' 10' L0 10' L0 10' L0        VG for strength  np np L5 -  2x10 L5 -  2x10 VG 10' L5 VG 10' L5 VG 10' L6 VG 10' L6 VG  10' L7   Monster walk  np np          Resisted side stepping  np np          Lateral step ups  np np     4\"   2x10 4\"   2x10 4\"   2x10   Mini squats at rail   1x10  1x8          Hip hooklying clamshells   YHB 2x10 YHB 2x10 YHB 2x10  YHB 2x10 RHB 2x10 RHB 2x10    S/L  RHB  1x15 ea RHB 2x10    S/L  RHB  1x20 ea   Seated LAQ   2x10, 1\" hold at max ext 2x10, 1\" hold at max ext 2x10, 1\" hold at max ext    Nautilus  20#  3x8 Nautilus  20#  3x8   Supine heel slides AAROM   2x10 with strap 2x10 with strap 2x10 with strap X30 with strap X30 with strap X30 with strap np                                           Ther Activity             Step ups  np           Step downs  np           Gait Training                                       Modalities                                                          "

## 2025-06-02 ENCOUNTER — APPOINTMENT (OUTPATIENT)
Dept: LAB | Facility: CLINIC | Age: 50
End: 2025-06-02
Payer: COMMERCIAL

## 2025-06-02 LAB
ALBUMIN SERPL BCG-MCNC: 3.7 G/DL (ref 3.5–5)
ALP SERPL-CCNC: 169 U/L (ref 34–104)
ALT SERPL W P-5'-P-CCNC: 47 U/L (ref 7–52)
ANION GAP SERPL CALCULATED.3IONS-SCNC: 11 MMOL/L (ref 4–13)
AST SERPL W P-5'-P-CCNC: 54 U/L (ref 13–39)
BILIRUB SERPL-MCNC: 0.7 MG/DL (ref 0.2–1)
BUN SERPL-MCNC: 18 MG/DL (ref 5–25)
CALCIUM SERPL-MCNC: 10.1 MG/DL (ref 8.4–10.2)
CHLORIDE SERPL-SCNC: 97 MMOL/L (ref 96–108)
CO2 SERPL-SCNC: 26 MMOL/L (ref 21–32)
CREAT SERPL-MCNC: 0.87 MG/DL (ref 0.6–1.3)
GFR SERPL CREATININE-BSD FRML MDRD: 100 ML/MIN/1.73SQ M
GLUCOSE P FAST SERPL-MCNC: 132 MG/DL (ref 65–99)
MAGNESIUM SERPL-MCNC: 1.4 MG/DL (ref 1.9–2.7)
POTASSIUM SERPL-SCNC: 4.5 MMOL/L (ref 3.5–5.3)
PROT SERPL-MCNC: 8.3 G/DL (ref 6.4–8.4)
SODIUM SERPL-SCNC: 134 MMOL/L (ref 135–147)

## 2025-06-03 ENCOUNTER — HOSPITAL ENCOUNTER (OUTPATIENT)
Dept: NON INVASIVE DIAGNOSTICS | Facility: HOSPITAL | Age: 50
Discharge: HOME | End: 2025-06-03
Attending: INTERNAL MEDICINE
Payer: COMMERCIAL

## 2025-06-03 VITALS
WEIGHT: 253 LBS | HEIGHT: 71 IN | BODY MASS INDEX: 35.42 KG/M2 | SYSTOLIC BLOOD PRESSURE: 146 MMHG | HEART RATE: 86 BPM | DIASTOLIC BLOOD PRESSURE: 84 MMHG

## 2025-06-03 DIAGNOSIS — I42.2 HYPERTROPHIC CARDIOMYOPATHY (HCC): ICD-10-CM

## 2025-06-03 PROCEDURE — 93308 TTE F-UP OR LMTD: CPT

## 2025-06-03 PROCEDURE — 93356 MYOCRD STRAIN IMG SPCKL TRCK: CPT

## 2025-06-03 PROCEDURE — 93325 DOPPLER ECHO COLOR FLOW MAPG: CPT | Performed by: INTERNAL MEDICINE

## 2025-06-03 PROCEDURE — 93325 DOPPLER ECHO COLOR FLOW MAPG: CPT

## 2025-06-03 PROCEDURE — 93356 MYOCRD STRAIN IMG SPCKL TRCK: CPT | Performed by: INTERNAL MEDICINE

## 2025-06-03 PROCEDURE — 93308 TTE F-UP OR LMTD: CPT | Performed by: INTERNAL MEDICINE

## 2025-06-03 PROCEDURE — 93321 DOPPLER ECHO F-UP/LMTD STD: CPT

## 2025-06-03 PROCEDURE — 93321 DOPPLER ECHO F-UP/LMTD STD: CPT | Performed by: INTERNAL MEDICINE

## 2025-06-04 ENCOUNTER — TELEPHONE (OUTPATIENT)
Age: 50
End: 2025-06-04

## 2025-06-04 DIAGNOSIS — I42.2 HYPERTROPHIC CARDIOMYOPATHY (HCC): Primary | ICD-10-CM

## 2025-06-04 LAB
AORTIC ROOT: 3.3 CM
AV LVOT MEAN GRADIENT: 3 MMHG
AV LVOT PEAK GRADIENT: 5 MMHG
BSA FOR ECHO PROCEDURE: 2.33 M2
DOP CALC LVOT AREA: 4.15 CM2
DOP CALC LVOT CARDIAC INDEX: 3.89 L/MIN/M2
DOP CALC LVOT CARDIAC OUTPUT: 9.06 L/MIN
DOP CALC LVOT DIAMETER: 2.3 CM
DOP CALC LVOT PEAK VEL VTI: 24.16 CM
DOP CALC LVOT PEAK VEL: 1.09 M/S
DOP CALC LVOT STROKE INDEX: 43.3 ML/M2
DOP CALC LVOT STROKE VOLUME: 100.33
FRACTIONAL SHORTENING: 41 (ref 28–44)
GLOBAL LONGITUIDAL STRAIN: -15 %
INTERVENTRICULAR SEPTUM IN DIASTOLE (PARASTERNAL SHORT AXIS VIEW): 1.9 CM
INTERVENTRICULAR SEPTUM: 1.9 CM (ref 0.6–1.1)
LAAS-AP2: 26.9 CM2
LAAS-AP4: 25.5 CM2
LEFT ATRIUM SIZE: 5.4 CM
LEFT ATRIUM VOLUME (MOD BIPLANE): 86 ML
LEFT ATRIUM VOLUME INDEX (MOD BIPLANE): 36.9 ML/M2
LEFT INTERNAL DIMENSION IN SYSTOLE: 2.3 CM (ref 2.1–4)
LEFT VENTRICULAR INTERNAL DIMENSION IN DIASTOLE: 3.9 CM (ref 3.5–6)
LEFT VENTRICULAR POSTERIOR WALL IN END DIASTOLE: 1.9 CM
LEFT VENTRICULAR STROKE VOLUME: 47 ML
LV EF US.2D.A4C+ESTIMATED: 63 %
LVSV (TEICH): 47 ML
SL CV ECHO LV DYNAMIC OBSTRUCTION PEAK GRADIENT (REST): 7 MMHG
SL CV ECHO LV DYNAMIC OBSTRUCTION PEAK GRADIENT (VALSAL: 48 MMHG
SL CV LEFT ATRIUM LENGTH A2C: 6.9 CM
SL CV LV EF: 67
SL CV PED ECHO LEFT VENTRICLE DIASTOLIC VOLUME (MOD BIPLANE) 2D: 65 ML
SL CV PED ECHO LEFT VENTRICLE SYSTOLIC VOLUME (MOD BIPLANE) 2D: 18 ML
TR MAX PG: 23 MMHG
TR PEAK VELOCITY: 2.4 M/S
TRICUSPID ANNULAR PLANE SYSTOLIC EXCURSION: 2.4 CM
TRICUSPID VALVE PEAK REGURGITATION VELOCITY: 2.39 M/S

## 2025-06-04 NOTE — TELEPHONE ENCOUNTER
Mouna from [facility] called.  She is asking for pt's [facility] portal to be updated so they can ship the [place].

## 2025-06-11 ENCOUNTER — TELEPHONE (OUTPATIENT)
Dept: CARDIOLOGY CLINIC | Facility: CLINIC | Age: 50
End: 2025-06-11

## 2025-06-11 DIAGNOSIS — E83.42 HYPOMAGNESEMIA: Primary | ICD-10-CM

## 2025-06-11 NOTE — TELEPHONE ENCOUNTER
Spoke to patient regarding recent Magnesium levels. He has been taking two Magnesium 400 mg Capsules for the past week and will decrease to one capsule moving forward. Per Dr. Norris, patient is to repeat magnesium level next week. Patient is aware and receptive to same.

## 2025-06-17 ENCOUNTER — APPOINTMENT (OUTPATIENT)
Dept: LAB | Facility: CLINIC | Age: 50
End: 2025-06-17
Payer: COMMERCIAL

## 2025-06-17 LAB — MAGNESIUM SERPL-MCNC: 1.8 MG/DL (ref 1.9–2.7)

## 2025-06-17 PROCEDURE — 36415 COLL VENOUS BLD VENIPUNCTURE: CPT

## 2025-06-17 PROCEDURE — 83735 ASSAY OF MAGNESIUM: CPT

## 2025-06-25 ENCOUNTER — TELEPHONE (OUTPATIENT)
Dept: CARDIOLOGY CLINIC | Facility: CLINIC | Age: 50
End: 2025-06-25

## 2025-06-25 DIAGNOSIS — E83.42 HYPOMAGNESEMIA: Primary | ICD-10-CM

## 2025-06-25 NOTE — TELEPHONE ENCOUNTER
By request of Dr. Norris, repeat Magnesium levels to be completed, order placed for same. Patient is aware and receptive.

## 2025-07-16 ENCOUNTER — TELEPHONE (OUTPATIENT)
Dept: CARDIOLOGY CLINIC | Facility: CLINIC | Age: 50
End: 2025-07-16

## 2025-07-16 ENCOUNTER — APPOINTMENT (OUTPATIENT)
Dept: LAB | Facility: CLINIC | Age: 50
End: 2025-07-16
Payer: COMMERCIAL

## 2025-07-16 DIAGNOSIS — E83.42 HYPOMAGNESEMIA: Primary | ICD-10-CM

## 2025-07-16 LAB — MAGNESIUM SERPL-MCNC: 1.5 MG/DL (ref 1.9–2.7)

## 2025-07-16 NOTE — TELEPHONE ENCOUNTER
Call placed to patient to discuss Magnesium level. Patient states he has been taking Magnesium 400 mg twice daily (AM & PM). Patient states that he has been having 2-3 loose bowel movements daily. Discussed same with Dr. Simpson, the following changes have been made;     1) Magnesium 400 mg to be taken three times daily-> for  one week  2) Repeat labs in one week  3) Fiber (Benefiber) to be taken for loose stools    Patient understanding of same.    Patient A&o X4, history of CAD and HTN, received in spot 7a, with complaints of aggression/depression. Patient states, "I have been feeling depressed and I don't like the people at my assisted living facility". Patient complains of depression, denies SI/HI at this time. Denies AH/VH. Patient denies any pain or other medical complaints. Patient able to speak in clear sentences, respirations equal and unlabored. Lung sounds clear b/l, equal chest rise and fall noted. Denies CP/SOB, fever, chills, nausea, vomiting and diarrhea at this time. Skin warm and dry. Provider at bedside for eval, pending further orders.

## 2025-07-21 ENCOUNTER — HOSPITAL ENCOUNTER (OUTPATIENT)
Dept: NON INVASIVE DIAGNOSTICS | Facility: HOSPITAL | Age: 50
Discharge: HOME | End: 2025-07-21
Payer: COMMERCIAL

## 2025-07-21 VITALS
HEIGHT: 71 IN | BODY MASS INDEX: 35.42 KG/M2 | HEART RATE: 86 BPM | SYSTOLIC BLOOD PRESSURE: 146 MMHG | DIASTOLIC BLOOD PRESSURE: 84 MMHG | WEIGHT: 253 LBS

## 2025-07-21 DIAGNOSIS — I42.2 HYPERTROPHIC CARDIOMYOPATHY (HCC): ICD-10-CM

## 2025-07-21 PROCEDURE — 93356 MYOCRD STRAIN IMG SPCKL TRCK: CPT | Performed by: INTERNAL MEDICINE

## 2025-07-21 PROCEDURE — 93356 MYOCRD STRAIN IMG SPCKL TRCK: CPT

## 2025-07-21 PROCEDURE — 93321 DOPPLER ECHO F-UP/LMTD STD: CPT | Performed by: INTERNAL MEDICINE

## 2025-07-21 PROCEDURE — 93308 TTE F-UP OR LMTD: CPT

## 2025-07-21 PROCEDURE — 93321 DOPPLER ECHO F-UP/LMTD STD: CPT

## 2025-07-21 PROCEDURE — 93308 TTE F-UP OR LMTD: CPT | Performed by: INTERNAL MEDICINE

## 2025-07-21 PROCEDURE — 93325 DOPPLER ECHO COLOR FLOW MAPG: CPT

## 2025-07-21 PROCEDURE — 93325 DOPPLER ECHO COLOR FLOW MAPG: CPT | Performed by: INTERNAL MEDICINE

## 2025-07-23 ENCOUNTER — TELEPHONE (OUTPATIENT)
Dept: CARDIOLOGY CLINIC | Facility: CLINIC | Age: 50
End: 2025-07-23

## 2025-07-23 DIAGNOSIS — I42.2 HYPERTROPHIC CARDIOMYOPATHY (HCC): Primary | ICD-10-CM

## 2025-07-23 LAB
AORTIC ROOT: 3.3 CM
AV LVOT MEAN GRADIENT: 3 MMHG
AV LVOT PEAK GRADIENT: 5 MMHG
BSA FOR ECHO PROCEDURE: 2.33 M2
DOP CALC LVOT AREA: 3.8 CM2
DOP CALC LVOT CARDIAC INDEX: 3.5 L/MIN/M2
DOP CALC LVOT CARDIAC OUTPUT: 8.15 L/MIN
DOP CALC LVOT DIAMETER: 2.2 CM
DOP CALC LVOT PEAK VEL VTI: 25.57 CM
DOP CALC LVOT PEAK VEL: 1.11 M/S
DOP CALC LVOT STROKE INDEX: 40.8 ML/M2
DOP CALC LVOT STROKE VOLUME: 97.15
FRACTIONAL SHORTENING: 36 (ref 28–44)
GLOBAL LONGITUIDAL STRAIN: -15 %
INTERVENTRICULAR SEPTUM IN DIASTOLE (PARASTERNAL SHORT AXIS VIEW): 1.6 CM
INTERVENTRICULAR SEPTUM: 1.6 CM (ref 0.6–1.1)
LAAS-AP2: 25.3 CM2
LAAS-AP4: 21.5 CM2
LEFT ATRIUM SIZE: 4.9 CM
LEFT ATRIUM VOLUME (MOD BIPLANE): 72 ML
LEFT ATRIUM VOLUME INDEX (MOD BIPLANE): 30.9 ML/M2
LEFT INTERNAL DIMENSION IN SYSTOLE: 2.8 CM (ref 2.1–4)
LEFT VENTRICULAR INTERNAL DIMENSION IN DIASTOLE: 4.4 CM (ref 3.5–6)
LEFT VENTRICULAR POSTERIOR WALL IN END DIASTOLE: 1.7 CM
LEFT VENTRICULAR STROKE VOLUME: 59 ML
LV EF US.2D.A4C+ESTIMATED: 60 %
LVSV (TEICH): 59 ML
SL CV ECHO LV DYNAMIC OBSTRUCTION PEAK GRADIENT (REST): 5 MMHG
SL CV ECHO LV DYNAMIC OBSTRUCTION PEAK GRADIENT (VALSAL: 29 MMHG
SL CV LEFT ATRIUM LENGTH A2C: 6.4 CM
SL CV LV EF: 60
SL CV PED ECHO LEFT VENTRICLE DIASTOLIC VOLUME (MOD BIPLANE) 2D: 89 ML
SL CV PED ECHO LEFT VENTRICLE SYSTOLIC VOLUME (MOD BIPLANE) 2D: 30 ML
TR MAX PG: 11 MMHG
TR PEAK VELOCITY: 1.6 M/S
TRICUSPID ANNULAR PLANE SYSTOLIC EXCURSION: 2.8 CM
TRICUSPID VALVE PEAK REGURGITATION VELOCITY: 1.63 M/S

## 2025-08-05 RX ORDER — MAVACAMTEN 10 MG/1
CAPSULE, GELATIN COATED ORAL
Qty: 30 CAPSULE | OUTPATIENT
Start: 2025-08-05

## 2025-08-11 ENCOUNTER — TELEPHONE (OUTPATIENT)
Age: 50
End: 2025-08-11